# Patient Record
Sex: MALE | Race: WHITE | NOT HISPANIC OR LATINO | Employment: OTHER | URBAN - METROPOLITAN AREA
[De-identification: names, ages, dates, MRNs, and addresses within clinical notes are randomized per-mention and may not be internally consistent; named-entity substitution may affect disease eponyms.]

---

## 2017-02-17 ENCOUNTER — OFFICE VISIT - HEALTHEAST (OUTPATIENT)
Dept: INTERNAL MEDICINE | Facility: CLINIC | Age: 62
End: 2017-02-17

## 2017-02-17 DIAGNOSIS — I10 ESSENTIAL HYPERTENSION, BENIGN: ICD-10-CM

## 2017-02-17 DIAGNOSIS — R51.9 HEADACHE: ICD-10-CM

## 2017-02-17 ASSESSMENT — MIFFLIN-ST. JEOR: SCORE: 1659.43

## 2017-02-28 ENCOUNTER — COMMUNICATION - HEALTHEAST (OUTPATIENT)
Dept: INTERNAL MEDICINE | Facility: CLINIC | Age: 62
End: 2017-02-28

## 2017-05-02 ENCOUNTER — COMMUNICATION - HEALTHEAST (OUTPATIENT)
Dept: INTERNAL MEDICINE | Facility: CLINIC | Age: 62
End: 2017-05-02

## 2017-05-02 DIAGNOSIS — I10 ESSENTIAL HYPERTENSION, BENIGN: ICD-10-CM

## 2017-05-02 DIAGNOSIS — F41.9 ANXIETY: ICD-10-CM

## 2017-07-13 ENCOUNTER — COMMUNICATION - HEALTHEAST (OUTPATIENT)
Dept: INTERNAL MEDICINE | Facility: CLINIC | Age: 62
End: 2017-07-13

## 2017-07-13 DIAGNOSIS — F41.9 ANXIETY: ICD-10-CM

## 2017-07-28 ENCOUNTER — COMMUNICATION - HEALTHEAST (OUTPATIENT)
Dept: INTERNAL MEDICINE | Facility: CLINIC | Age: 62
End: 2017-07-28

## 2017-07-28 DIAGNOSIS — F41.9 ANXIETY: ICD-10-CM

## 2017-08-09 ENCOUNTER — COMMUNICATION - HEALTHEAST (OUTPATIENT)
Dept: INTERNAL MEDICINE | Facility: CLINIC | Age: 62
End: 2017-08-09

## 2017-08-09 DIAGNOSIS — F41.9 ANXIETY: ICD-10-CM

## 2017-08-12 ENCOUNTER — COMMUNICATION - HEALTHEAST (OUTPATIENT)
Dept: INTERNAL MEDICINE | Facility: CLINIC | Age: 62
End: 2017-08-12

## 2017-08-12 DIAGNOSIS — F41.9 ANXIETY: ICD-10-CM

## 2017-08-17 ENCOUNTER — OFFICE VISIT - HEALTHEAST (OUTPATIENT)
Dept: INTERNAL MEDICINE | Facility: CLINIC | Age: 62
End: 2017-08-17

## 2017-08-17 ENCOUNTER — COMMUNICATION - HEALTHEAST (OUTPATIENT)
Dept: INTERNAL MEDICINE | Facility: CLINIC | Age: 62
End: 2017-08-17

## 2017-08-17 ENCOUNTER — AMBULATORY - HEALTHEAST (OUTPATIENT)
Dept: INTERNAL MEDICINE | Facility: CLINIC | Age: 62
End: 2017-08-17

## 2017-08-17 DIAGNOSIS — Z72.0 TOBACCO ABUSE: ICD-10-CM

## 2017-08-17 DIAGNOSIS — Z51.81 MEDICATION MONITORING ENCOUNTER: ICD-10-CM

## 2017-08-17 DIAGNOSIS — M79.671 CHRONIC HEEL PAIN, RIGHT: ICD-10-CM

## 2017-08-17 DIAGNOSIS — G89.29 CHRONIC HEEL PAIN, RIGHT: ICD-10-CM

## 2017-08-17 DIAGNOSIS — F41.9 ANXIETY: ICD-10-CM

## 2017-08-17 DIAGNOSIS — G47.00 INSOMNIA, UNSPECIFIED: ICD-10-CM

## 2017-08-17 DIAGNOSIS — I10 ESSENTIAL HYPERTENSION, BENIGN: ICD-10-CM

## 2017-08-17 ASSESSMENT — MIFFLIN-ST. JEOR: SCORE: 1623.14

## 2017-08-21 ENCOUNTER — RECORDS - HEALTHEAST (OUTPATIENT)
Dept: ADMINISTRATIVE | Facility: OTHER | Age: 62
End: 2017-08-21

## 2017-08-23 ENCOUNTER — COMMUNICATION - HEALTHEAST (OUTPATIENT)
Dept: INTERNAL MEDICINE | Facility: CLINIC | Age: 62
End: 2017-08-23

## 2017-08-29 ENCOUNTER — COMMUNICATION - HEALTHEAST (OUTPATIENT)
Dept: INTERNAL MEDICINE | Facility: CLINIC | Age: 62
End: 2017-08-29

## 2017-08-29 DIAGNOSIS — F41.9 ANXIETY: ICD-10-CM

## 2017-08-29 DIAGNOSIS — G47.00 INSOMNIA: ICD-10-CM

## 2017-09-12 ENCOUNTER — COMMUNICATION - HEALTHEAST (OUTPATIENT)
Dept: INTERNAL MEDICINE | Facility: CLINIC | Age: 62
End: 2017-09-12

## 2017-09-12 DIAGNOSIS — F41.9 ANXIETY: ICD-10-CM

## 2017-09-12 DIAGNOSIS — G47.00 INSOMNIA: ICD-10-CM

## 2017-11-07 ENCOUNTER — COMMUNICATION - HEALTHEAST (OUTPATIENT)
Dept: INTERNAL MEDICINE | Facility: CLINIC | Age: 62
End: 2017-11-07

## 2017-11-07 DIAGNOSIS — F41.9 ANXIETY: ICD-10-CM

## 2017-11-07 DIAGNOSIS — G47.00 INSOMNIA: ICD-10-CM

## 2017-11-17 ENCOUNTER — OFFICE VISIT - HEALTHEAST (OUTPATIENT)
Dept: INTERNAL MEDICINE | Facility: CLINIC | Age: 62
End: 2017-11-17

## 2017-11-17 DIAGNOSIS — Z23 NEED FOR IMMUNIZATION AGAINST INFLUENZA: ICD-10-CM

## 2017-11-17 DIAGNOSIS — I10 ESSENTIAL HYPERTENSION, BENIGN: ICD-10-CM

## 2017-11-17 DIAGNOSIS — Z86.19 HISTORY OF HEPATITIS C: ICD-10-CM

## 2017-11-17 DIAGNOSIS — M79.671 CHRONIC HEEL PAIN, RIGHT: ICD-10-CM

## 2017-11-17 DIAGNOSIS — I71.20 THORACIC AORTIC ANEURYSM (H): ICD-10-CM

## 2017-11-17 DIAGNOSIS — F41.1 ANXIETY, GENERALIZED: ICD-10-CM

## 2017-11-17 DIAGNOSIS — Z72.0 TOBACCO ABUSE: ICD-10-CM

## 2017-11-17 DIAGNOSIS — G89.29 CHRONIC HEEL PAIN, RIGHT: ICD-10-CM

## 2017-11-17 DIAGNOSIS — Z00.00 HEALTHCARE MAINTENANCE: ICD-10-CM

## 2017-11-17 LAB
CHOLEST SERPL-MCNC: 164 MG/DL
FASTING STATUS PATIENT QL REPORTED: NORMAL
HDLC SERPL-MCNC: 47 MG/DL
LDLC SERPL CALC-MCNC: 103 MG/DL
PSA SERPL-MCNC: 0.6 NG/ML (ref 0–4.5)
TRIGL SERPL-MCNC: 72 MG/DL

## 2017-11-17 ASSESSMENT — MIFFLIN-ST. JEOR: SCORE: 1636.75

## 2017-11-18 ENCOUNTER — COMMUNICATION - HEALTHEAST (OUTPATIENT)
Dept: INTERNAL MEDICINE | Facility: CLINIC | Age: 62
End: 2017-11-18

## 2018-02-27 ENCOUNTER — COMMUNICATION - HEALTHEAST (OUTPATIENT)
Dept: INTERNAL MEDICINE | Facility: CLINIC | Age: 63
End: 2018-02-27

## 2018-02-27 DIAGNOSIS — G47.00 INSOMNIA: ICD-10-CM

## 2018-02-27 DIAGNOSIS — I10 ESSENTIAL HYPERTENSION, BENIGN: ICD-10-CM

## 2018-02-27 DIAGNOSIS — F41.9 ANXIETY: ICD-10-CM

## 2018-03-02 ENCOUNTER — COMMUNICATION - HEALTHEAST (OUTPATIENT)
Dept: SCHEDULING | Facility: CLINIC | Age: 63
End: 2018-03-02

## 2018-05-17 ENCOUNTER — AMBULATORY - HEALTHEAST (OUTPATIENT)
Dept: INTERNAL MEDICINE | Facility: CLINIC | Age: 63
End: 2018-05-17

## 2018-05-17 ENCOUNTER — RECORDS - HEALTHEAST (OUTPATIENT)
Dept: ADMINISTRATIVE | Facility: OTHER | Age: 63
End: 2018-05-17

## 2018-05-17 ENCOUNTER — OFFICE VISIT - HEALTHEAST (OUTPATIENT)
Dept: INTERNAL MEDICINE | Facility: CLINIC | Age: 63
End: 2018-05-17

## 2018-05-17 DIAGNOSIS — G89.29 CHRONIC PAIN OF RIGHT ANKLE: ICD-10-CM

## 2018-05-17 DIAGNOSIS — I10 ESSENTIAL HYPERTENSION: ICD-10-CM

## 2018-05-17 DIAGNOSIS — I71.20 THORACIC AORTIC ANEURYSM (H): ICD-10-CM

## 2018-05-17 DIAGNOSIS — K64.4 EXTERNAL HEMORRHOIDS: ICD-10-CM

## 2018-05-17 DIAGNOSIS — Z72.0 TOBACCO ABUSE: ICD-10-CM

## 2018-05-17 DIAGNOSIS — M25.571 CHRONIC PAIN OF RIGHT ANKLE: ICD-10-CM

## 2018-05-17 ASSESSMENT — MIFFLIN-ST. JEOR: SCORE: 1659.43

## 2018-06-04 ENCOUNTER — RECORDS - HEALTHEAST (OUTPATIENT)
Dept: ADMINISTRATIVE | Facility: OTHER | Age: 63
End: 2018-06-04

## 2018-07-23 ENCOUNTER — OFFICE VISIT - HEALTHEAST (OUTPATIENT)
Dept: INTERNAL MEDICINE | Facility: CLINIC | Age: 63
End: 2018-07-23

## 2018-07-23 ENCOUNTER — COMMUNICATION - HEALTHEAST (OUTPATIENT)
Dept: INTERNAL MEDICINE | Facility: CLINIC | Age: 63
End: 2018-07-23

## 2018-07-23 ENCOUNTER — HOSPITAL ENCOUNTER (OUTPATIENT)
Dept: CT IMAGING | Facility: CLINIC | Age: 63
Discharge: HOME | End: 2018-07-23
Attending: INTERNAL MEDICINE

## 2018-07-23 DIAGNOSIS — R10.30 LOWER ABDOMINAL PAIN: ICD-10-CM

## 2018-07-23 DIAGNOSIS — M54.50 ACUTE BILATERAL LOW BACK PAIN WITHOUT SCIATICA: ICD-10-CM
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
## 2018-07-23 LAB
ALBUMIN SERPL-MCNC: 4.1 G/DL (ref 3.5–5)
ALBUMIN UR-MCNC: NEGATIVE MG/DL
ALP SERPL-CCNC: 92 U/L (ref 45–120)
ALT SERPL W P-5'-P-CCNC: 15 U/L (ref 0–45)
ANION GAP SERPL CALCULATED.3IONS-SCNC: 12 MMOL/L (ref 5–18)
APPEARANCE UR: CLEAR
AST SERPL W P-5'-P-CCNC: 18 U/L (ref 0–40)
B BURGDOR IGG+IGM SER QL: <0.01 INDEX VALUE
BILIRUB SERPL-MCNC: 0.6 MG/DL (ref 0–1)
BILIRUB UR QL STRIP: ABNORMAL
BUN SERPL-MCNC: 16 MG/DL (ref 8–22)
CALCIUM SERPL-MCNC: 9.7 MG/DL (ref 8.5–10.5)
CHLORIDE BLD-SCNC: 105 MMOL/L (ref 98–107)
CO2 SERPL-SCNC: 24 MMOL/L (ref 22–31)
COLOR UR AUTO: YELLOW
CREAT BLD-MCNC: 0.8 MG/DL
CREAT SERPL-MCNC: 0.8 MG/DL (ref 0.7–1.3)
ERYTHROCYTE [DISTWIDTH] IN BLOOD BY AUTOMATED COUNT: 11.1 % (ref 11–14.5)
ERYTHROCYTE [SEDIMENTATION RATE] IN BLOOD BY WESTERGREN METHOD: 5 MM/HR (ref 0–15)
GFR SERPL CREATININE-BSD FRML MDRD: >60 ML/MIN/1.73M2
GLUCOSE BLD-MCNC: 119 MG/DL (ref 70–125)
GLUCOSE UR STRIP-MCNC: NEGATIVE MG/DL
HCT VFR BLD AUTO: 45.7 % (ref 40–54)
HGB BLD-MCNC: 15.5 G/DL (ref 14–18)
HGB UR QL STRIP: NEGATIVE
KETONES UR STRIP-MCNC: NEGATIVE MG/DL
LEUKOCYTE ESTERASE UR QL STRIP: NEGATIVE
LIPASE SERPL-CCNC: 25 U/L (ref 0–52)
MCH RBC QN AUTO: 32.9 PG (ref 27–34)
MCHC RBC AUTO-ENTMCNC: 34 G/DL (ref 32–36)
MCV RBC AUTO: 97 FL (ref 80–100)
NITRATE UR QL: NEGATIVE
PH UR STRIP: 5.5 [PH] (ref 5–8)
PLATELET # BLD AUTO: 142 THOU/UL (ref 140–440)
PMV BLD AUTO: 8.5 FL (ref 7–10)
POC GFR AMER AF HE - HISTORICAL: >60 ML/MIN/1.73M2
POC GFR NON AMER AF HE - HISTORICAL: >60 ML/MIN/1.73M2
POTASSIUM BLD-SCNC: 4.1 MMOL/L (ref 3.5–5)
PROT SERPL-MCNC: 7.1 G/DL (ref 6–8)
RBC # BLD AUTO: 4.72 MILL/UL (ref 4.4–6.2)
SODIUM SERPL-SCNC: 141 MMOL/L (ref 136–145)
SP GR UR STRIP: 1.02 (ref 1–1.03)
UROBILINOGEN UR STRIP-ACNC: ABNORMAL
WBC: 7.8 THOU/UL (ref 4–11)

## 2018-07-23 RX ORDER — DOCUSATE SODIUM 100 MG/1
100 CAPSULE, LIQUID FILLED ORAL PRN
Status: SHIPPED | COMMUNITY
Start: 2018-07-23

## 2018-07-23 ASSESSMENT — MIFFLIN-ST. JEOR: SCORE: 1623.14

## 2018-08-22 ENCOUNTER — RECORDS - HEALTHEAST (OUTPATIENT)
Dept: ADMINISTRATIVE | Facility: OTHER | Age: 63
End: 2018-08-22

## 2018-08-24 ENCOUNTER — COMMUNICATION - HEALTHEAST (OUTPATIENT)
Dept: INTERNAL MEDICINE | Facility: CLINIC | Age: 63
End: 2018-08-24

## 2018-08-31 ENCOUNTER — COMMUNICATION - HEALTHEAST (OUTPATIENT)
Dept: INTERNAL MEDICINE | Facility: CLINIC | Age: 63
End: 2018-08-31

## 2018-08-31 DIAGNOSIS — I10 ESSENTIAL HYPERTENSION, BENIGN: ICD-10-CM

## 2018-09-20 ENCOUNTER — COMMUNICATION - HEALTHEAST (OUTPATIENT)
Dept: INTERNAL MEDICINE | Facility: CLINIC | Age: 63
End: 2018-09-20

## 2018-09-20 DIAGNOSIS — F41.9 ANXIETY: ICD-10-CM

## 2018-09-20 DIAGNOSIS — G47.00 INSOMNIA: ICD-10-CM

## 2018-11-20 ENCOUNTER — OFFICE VISIT - HEALTHEAST (OUTPATIENT)
Dept: INTERNAL MEDICINE | Facility: CLINIC | Age: 63
End: 2018-11-20

## 2018-11-20 ENCOUNTER — COMMUNICATION - HEALTHEAST (OUTPATIENT)
Dept: INTERNAL MEDICINE | Facility: CLINIC | Age: 63
End: 2018-11-20

## 2018-11-20 DIAGNOSIS — Z00.00 ROUTINE GENERAL MEDICAL EXAMINATION AT A HEALTH CARE FACILITY: ICD-10-CM

## 2018-11-20 DIAGNOSIS — G89.29 CHRONIC PAIN OF RIGHT ANKLE: ICD-10-CM

## 2018-11-20 DIAGNOSIS — B18.2 CHRONIC HEPATITIS C WITHOUT HEPATIC COMA (H): ICD-10-CM

## 2018-11-20 DIAGNOSIS — R10.30 LOWER ABDOMINAL PAIN: ICD-10-CM

## 2018-11-20 DIAGNOSIS — Z12.5 SCREENING FOR PROSTATE CANCER: ICD-10-CM

## 2018-11-20 DIAGNOSIS — Z13.220 LIPID SCREENING: ICD-10-CM

## 2018-11-20 DIAGNOSIS — M25.571 CHRONIC PAIN OF RIGHT ANKLE: ICD-10-CM

## 2018-11-20 DIAGNOSIS — N52.01 ERECTILE DYSFUNCTION DUE TO ARTERIAL INSUFFICIENCY: ICD-10-CM

## 2018-11-20 DIAGNOSIS — I10 ESSENTIAL HYPERTENSION: ICD-10-CM

## 2018-11-20 DIAGNOSIS — Z23 NEED FOR IMMUNIZATION AGAINST INFLUENZA: ICD-10-CM

## 2018-11-20 DIAGNOSIS — R91.1 PULMONARY NODULE: ICD-10-CM

## 2018-11-20 DIAGNOSIS — F41.1 ANXIETY, GENERALIZED: ICD-10-CM

## 2018-11-20 DIAGNOSIS — I71.20 THORACIC AORTIC ANEURYSM WITHOUT RUPTURE (H): ICD-10-CM

## 2018-11-20 DIAGNOSIS — Z72.0 TOBACCO ABUSE: ICD-10-CM

## 2018-11-20 LAB
CHOLEST SERPL-MCNC: 174 MG/DL
FASTING STATUS PATIENT QL REPORTED: YES
HDLC SERPL-MCNC: 47 MG/DL
LDLC SERPL CALC-MCNC: 116 MG/DL
PSA SERPL-MCNC: 0.4 NG/ML (ref 0–4.5)
TRIGL SERPL-MCNC: 57 MG/DL

## 2018-11-20 ASSESSMENT — MIFFLIN-ST. JEOR: SCORE: 1632.21

## 2018-11-29 ENCOUNTER — COMMUNICATION - HEALTHEAST (OUTPATIENT)
Dept: INTERNAL MEDICINE | Facility: CLINIC | Age: 63
End: 2018-11-29

## 2019-03-07 ENCOUNTER — COMMUNICATION - HEALTHEAST (OUTPATIENT)
Dept: INTERNAL MEDICINE | Facility: CLINIC | Age: 64
End: 2019-03-07

## 2019-03-07 DIAGNOSIS — I10 ESSENTIAL HYPERTENSION, BENIGN: ICD-10-CM

## 2019-06-04 ENCOUNTER — COMMUNICATION - HEALTHEAST (OUTPATIENT)
Dept: INTERNAL MEDICINE | Facility: CLINIC | Age: 64
End: 2019-06-04

## 2019-06-04 DIAGNOSIS — I10 ESSENTIAL HYPERTENSION, BENIGN: ICD-10-CM

## 2019-09-01 ENCOUNTER — COMMUNICATION - HEALTHEAST (OUTPATIENT)
Dept: INTERNAL MEDICINE | Facility: CLINIC | Age: 64
End: 2019-09-01

## 2019-09-01 DIAGNOSIS — I10 ESSENTIAL HYPERTENSION, BENIGN: ICD-10-CM

## 2019-10-14 ENCOUNTER — OFFICE VISIT - HEALTHEAST (OUTPATIENT)
Dept: INTERNAL MEDICINE | Facility: CLINIC | Age: 64
End: 2019-10-14

## 2019-10-14 ENCOUNTER — AMBULATORY - HEALTHEAST (OUTPATIENT)
Dept: INTERNAL MEDICINE | Facility: CLINIC | Age: 64
End: 2019-10-14

## 2019-10-14 DIAGNOSIS — J06.9 UPPER RESPIRATORY TRACT INFECTION, UNSPECIFIED TYPE: ICD-10-CM

## 2019-10-14 DIAGNOSIS — R91.1 PULMONARY NODULE: ICD-10-CM

## 2019-10-14 DIAGNOSIS — R53.82 CHRONIC FATIGUE: ICD-10-CM

## 2019-10-14 DIAGNOSIS — N52.9 ERECTILE DYSFUNCTION, UNSPECIFIED ERECTILE DYSFUNCTION TYPE: ICD-10-CM

## 2019-10-14 DIAGNOSIS — Z72.0 TOBACCO ABUSE: ICD-10-CM

## 2019-10-14 DIAGNOSIS — G47.00 INSOMNIA, UNSPECIFIED TYPE: ICD-10-CM

## 2019-10-14 DIAGNOSIS — I10 ESSENTIAL HYPERTENSION, BENIGN: ICD-10-CM

## 2019-10-14 LAB
ALBUMIN SERPL-MCNC: 3.9 G/DL (ref 3.5–5)
ALP SERPL-CCNC: 103 U/L (ref 45–120)
ALT SERPL W P-5'-P-CCNC: 13 U/L (ref 0–45)
ANION GAP SERPL CALCULATED.3IONS-SCNC: 5 MMOL/L (ref 5–18)
AST SERPL W P-5'-P-CCNC: 14 U/L (ref 0–40)
BILIRUB SERPL-MCNC: 0.4 MG/DL (ref 0–1)
BUN SERPL-MCNC: 11 MG/DL (ref 8–22)
CALCIUM SERPL-MCNC: 9.3 MG/DL (ref 8.5–10.5)
CHLORIDE BLD-SCNC: 106 MMOL/L (ref 98–107)
CO2 SERPL-SCNC: 27 MMOL/L (ref 22–31)
CREAT SERPL-MCNC: 0.77 MG/DL (ref 0.7–1.3)
ERYTHROCYTE [DISTWIDTH] IN BLOOD BY AUTOMATED COUNT: 11.2 % (ref 11–14.5)
GFR SERPL CREATININE-BSD FRML MDRD: >60 ML/MIN/1.73M2
GLUCOSE BLD-MCNC: 101 MG/DL (ref 70–125)
HCT VFR BLD AUTO: 46.7 % (ref 40–54)
HGB BLD-MCNC: 15.8 G/DL (ref 14–18)
MCH RBC QN AUTO: 32.1 PG (ref 27–34)
MCHC RBC AUTO-ENTMCNC: 33.8 G/DL (ref 32–36)
MCV RBC AUTO: 95 FL (ref 80–100)
PLATELET # BLD AUTO: 152 THOU/UL (ref 140–440)
PMV BLD AUTO: 9.2 FL (ref 7–10)
POTASSIUM BLD-SCNC: 4.1 MMOL/L (ref 3.5–5)
PROT SERPL-MCNC: 7 G/DL (ref 6–8)
RBC # BLD AUTO: 4.92 MILL/UL (ref 4.4–6.2)
SODIUM SERPL-SCNC: 138 MMOL/L (ref 136–145)
TSH SERPL DL<=0.005 MIU/L-ACNC: 2.95 UIU/ML (ref 0.3–5)
VIT B12 SERPL-MCNC: 458 PG/ML (ref 213–816)
WBC: 7.1 THOU/UL (ref 4–11)

## 2019-10-14 ASSESSMENT — MIFFLIN-ST. JEOR: SCORE: 1618.61

## 2019-10-16 ENCOUNTER — COMMUNICATION - HEALTHEAST (OUTPATIENT)
Dept: INTERNAL MEDICINE | Facility: CLINIC | Age: 64
End: 2019-10-16

## 2019-10-16 LAB
SHBG SERPL-SCNC: 94 NMOL/L (ref 11–80)
TESTOST FREE SERPL-MCNC: 8.53 NG/DL (ref 4.7–24.4)
TESTOST SERPL-MCNC: 820 NG/DL (ref 240–950)

## 2019-10-17 ENCOUNTER — COMMUNICATION - HEALTHEAST (OUTPATIENT)
Dept: INTERNAL MEDICINE | Facility: CLINIC | Age: 64
End: 2019-10-17

## 2020-01-31 ENCOUNTER — COMMUNICATION - HEALTHEAST (OUTPATIENT)
Dept: INTERNAL MEDICINE | Facility: CLINIC | Age: 65
End: 2020-01-31

## 2020-02-21 ENCOUNTER — COMMUNICATION - HEALTHEAST (OUTPATIENT)
Dept: INTERNAL MEDICINE | Facility: CLINIC | Age: 65
End: 2020-02-21

## 2020-02-21 DIAGNOSIS — G47.00 INSOMNIA, UNSPECIFIED TYPE: ICD-10-CM

## 2020-03-02 ENCOUNTER — OFFICE VISIT - HEALTHEAST (OUTPATIENT)
Dept: INTERNAL MEDICINE | Facility: CLINIC | Age: 65
End: 2020-03-02

## 2020-03-02 DIAGNOSIS — B18.2 CHRONIC HEPATITIS C WITHOUT HEPATIC COMA (H): ICD-10-CM

## 2020-03-02 DIAGNOSIS — Z12.5 SCREENING FOR MALIGNANT NEOPLASM OF PROSTATE: ICD-10-CM

## 2020-03-02 DIAGNOSIS — Z00.00 WELCOME TO MEDICARE PREVENTIVE VISIT: ICD-10-CM

## 2020-03-02 DIAGNOSIS — N52.9 ERECTILE DYSFUNCTION, UNSPECIFIED ERECTILE DYSFUNCTION TYPE: ICD-10-CM

## 2020-03-02 DIAGNOSIS — M25.571 CHRONIC PAIN OF RIGHT ANKLE: ICD-10-CM

## 2020-03-02 DIAGNOSIS — G89.29 CHRONIC PAIN OF RIGHT ANKLE: ICD-10-CM

## 2020-03-02 DIAGNOSIS — Z23 NEED FOR IMMUNIZATION AGAINST INFLUENZA: ICD-10-CM

## 2020-03-02 DIAGNOSIS — Z72.0 TOBACCO ABUSE: ICD-10-CM

## 2020-03-02 DIAGNOSIS — F41.1 ANXIETY, GENERALIZED: ICD-10-CM

## 2020-03-02 DIAGNOSIS — R91.1 PULMONARY NODULE: ICD-10-CM

## 2020-03-02 DIAGNOSIS — K62.5 RECTAL BLEEDING: ICD-10-CM

## 2020-03-02 DIAGNOSIS — I71.20 THORACIC AORTIC ANEURYSM WITHOUT RUPTURE (H): ICD-10-CM

## 2020-03-02 DIAGNOSIS — Z13.220 ENCOUNTER FOR SCREENING FOR LIPOID DISORDERS: ICD-10-CM

## 2020-03-02 DIAGNOSIS — I10 ESSENTIAL HYPERTENSION: ICD-10-CM

## 2020-03-02 LAB
ALBUMIN SERPL-MCNC: 4 G/DL (ref 3.5–5)
ALBUMIN UR-MCNC: NEGATIVE MG/DL
ALP SERPL-CCNC: 96 U/L (ref 45–120)
ALT SERPL W P-5'-P-CCNC: 10 U/L (ref 0–45)
ANION GAP SERPL CALCULATED.3IONS-SCNC: 11 MMOL/L (ref 5–18)
APPEARANCE UR: CLEAR
AST SERPL W P-5'-P-CCNC: 14 U/L (ref 0–40)
BILIRUB SERPL-MCNC: 0.5 MG/DL (ref 0–1)
BILIRUB UR QL STRIP: NEGATIVE
BUN SERPL-MCNC: 11 MG/DL (ref 8–22)
CALCIUM SERPL-MCNC: 9.2 MG/DL (ref 8.5–10.5)
CHLORIDE BLD-SCNC: 104 MMOL/L (ref 98–107)
CHOLEST SERPL-MCNC: 173 MG/DL
CO2 SERPL-SCNC: 22 MMOL/L (ref 22–31)
COLOR UR AUTO: YELLOW
CREAT SERPL-MCNC: 0.77 MG/DL (ref 0.7–1.3)
ERYTHROCYTE [DISTWIDTH] IN BLOOD BY AUTOMATED COUNT: 11.3 % (ref 11–14.5)
FASTING STATUS PATIENT QL REPORTED: YES
GFR SERPL CREATININE-BSD FRML MDRD: >60 ML/MIN/1.73M2
GLUCOSE BLD-MCNC: 103 MG/DL (ref 70–125)
GLUCOSE UR STRIP-MCNC: NEGATIVE MG/DL
HCT VFR BLD AUTO: 43.8 % (ref 40–54)
HDLC SERPL-MCNC: 42 MG/DL
HGB BLD-MCNC: 15 G/DL (ref 14–18)
HGB UR QL STRIP: NEGATIVE
KETONES UR STRIP-MCNC: NEGATIVE MG/DL
LDLC SERPL CALC-MCNC: 118 MG/DL
LEUKOCYTE ESTERASE UR QL STRIP: NEGATIVE
MCH RBC QN AUTO: 32.4 PG (ref 27–34)
MCHC RBC AUTO-ENTMCNC: 34.1 G/DL (ref 32–36)
MCV RBC AUTO: 95 FL (ref 80–100)
NITRATE UR QL: NEGATIVE
PH UR STRIP: 5.5 [PH] (ref 5–8)
PLATELET # BLD AUTO: 167 THOU/UL (ref 140–440)
PMV BLD AUTO: 9 FL (ref 7–10)
POTASSIUM BLD-SCNC: 4.3 MMOL/L (ref 3.5–5)
PROT SERPL-MCNC: 6.7 G/DL (ref 6–8)
PSA SERPL-MCNC: 0.5 NG/ML (ref 0–4.5)
RBC # BLD AUTO: 4.61 MILL/UL (ref 4.4–6.2)
SODIUM SERPL-SCNC: 137 MMOL/L (ref 136–145)
SP GR UR STRIP: 1.02 (ref 1–1.03)
TRIGL SERPL-MCNC: 66 MG/DL
UROBILINOGEN UR STRIP-ACNC: NORMAL
WBC: 8.9 THOU/UL (ref 4–11)

## 2020-03-02 ASSESSMENT — MIFFLIN-ST. JEOR: SCORE: 1673.04

## 2020-03-03 ENCOUNTER — COMMUNICATION - HEALTHEAST (OUTPATIENT)
Dept: INTERNAL MEDICINE | Facility: CLINIC | Age: 65
End: 2020-03-03

## 2020-04-01 ENCOUNTER — COMMUNICATION - HEALTHEAST (OUTPATIENT)
Dept: INTERNAL MEDICINE | Facility: CLINIC | Age: 65
End: 2020-04-01

## 2020-04-01 DIAGNOSIS — G47.00 INSOMNIA, UNSPECIFIED TYPE: ICD-10-CM

## 2020-05-11 ENCOUNTER — RECORDS - HEALTHEAST (OUTPATIENT)
Dept: ADMINISTRATIVE | Facility: OTHER | Age: 65
End: 2020-05-11

## 2020-07-16 ENCOUNTER — COMMUNICATION - HEALTHEAST (OUTPATIENT)
Dept: INTERNAL MEDICINE | Facility: CLINIC | Age: 65
End: 2020-07-16

## 2020-07-16 DIAGNOSIS — G47.00 INSOMNIA, UNSPECIFIED TYPE: ICD-10-CM

## 2020-10-18 ENCOUNTER — COMMUNICATION - HEALTHEAST (OUTPATIENT)
Dept: INTERNAL MEDICINE | Facility: CLINIC | Age: 65
End: 2020-10-18

## 2020-10-18 DIAGNOSIS — I10 ESSENTIAL HYPERTENSION, BENIGN: ICD-10-CM

## 2020-11-10 ENCOUNTER — COMMUNICATION - HEALTHEAST (OUTPATIENT)
Dept: INTERNAL MEDICINE | Facility: CLINIC | Age: 65
End: 2020-11-10

## 2020-11-10 DIAGNOSIS — G47.00 INSOMNIA, UNSPECIFIED TYPE: ICD-10-CM

## 2021-02-27 ENCOUNTER — COMMUNICATION - HEALTHEAST (OUTPATIENT)
Dept: INTERNAL MEDICINE | Facility: CLINIC | Age: 66
End: 2021-02-27

## 2021-02-27 DIAGNOSIS — G47.00 INSOMNIA, UNSPECIFIED TYPE: ICD-10-CM

## 2021-04-09 ENCOUNTER — OFFICE VISIT - HEALTHEAST (OUTPATIENT)
Dept: INTERNAL MEDICINE | Facility: CLINIC | Age: 66
End: 2021-04-09

## 2021-04-09 DIAGNOSIS — R53.82 CHRONIC FATIGUE: ICD-10-CM

## 2021-04-09 DIAGNOSIS — Z72.0 TOBACCO ABUSE: ICD-10-CM

## 2021-04-09 DIAGNOSIS — B18.2 CHRONIC HEPATITIS C WITHOUT HEPATIC COMA (H): ICD-10-CM

## 2021-04-09 DIAGNOSIS — Z86.0100 HISTORY OF COLON POLYPS: ICD-10-CM

## 2021-04-09 DIAGNOSIS — R91.1 PULMONARY NODULE: ICD-10-CM

## 2021-04-09 DIAGNOSIS — N52.9 ERECTILE DYSFUNCTION, UNSPECIFIED ERECTILE DYSFUNCTION TYPE: ICD-10-CM

## 2021-04-09 DIAGNOSIS — I71.20 THORACIC AORTIC ANEURYSM WITHOUT RUPTURE (H): ICD-10-CM

## 2021-04-09 DIAGNOSIS — M25.571 CHRONIC PAIN OF RIGHT ANKLE: ICD-10-CM

## 2021-04-09 DIAGNOSIS — Z12.5 SCREENING FOR MALIGNANT NEOPLASM OF PROSTATE: ICD-10-CM

## 2021-04-09 DIAGNOSIS — Z00.00 MEDICARE ANNUAL WELLNESS VISIT, SUBSEQUENT: ICD-10-CM

## 2021-04-09 DIAGNOSIS — R05.3 CHRONIC COUGH: ICD-10-CM

## 2021-04-09 DIAGNOSIS — I10 ESSENTIAL HYPERTENSION: ICD-10-CM

## 2021-04-09 DIAGNOSIS — F41.1 ANXIETY, GENERALIZED: ICD-10-CM

## 2021-04-09 DIAGNOSIS — G89.29 CHRONIC PAIN OF RIGHT ANKLE: ICD-10-CM

## 2021-04-09 DIAGNOSIS — Z13.220 ENCOUNTER FOR SCREENING FOR LIPOID DISORDERS: ICD-10-CM

## 2021-04-09 LAB
ALBUMIN SERPL-MCNC: 3.9 G/DL (ref 3.5–5)
ALBUMIN UR-MCNC: ABNORMAL G/DL
ALP SERPL-CCNC: 90 U/L (ref 45–120)
ALT SERPL W P-5'-P-CCNC: 11 U/L (ref 0–45)
ANION GAP SERPL CALCULATED.3IONS-SCNC: 9 MMOL/L (ref 5–18)
APPEARANCE UR: CLEAR
AST SERPL W P-5'-P-CCNC: 12 U/L (ref 0–40)
BACTERIA #/AREA URNS HPF: ABNORMAL /[HPF]
BILIRUB SERPL-MCNC: 0.5 MG/DL (ref 0–1)
BILIRUB UR QL STRIP: ABNORMAL
BUN SERPL-MCNC: 11 MG/DL (ref 8–22)
CALCIUM SERPL-MCNC: 8.5 MG/DL (ref 8.5–10.5)
CHLORIDE BLD-SCNC: 106 MMOL/L (ref 98–107)
CHOLEST SERPL-MCNC: 181 MG/DL
CO2 SERPL-SCNC: 25 MMOL/L (ref 22–31)
COLOR UR AUTO: YELLOW
CREAT SERPL-MCNC: 0.79 MG/DL (ref 0.7–1.3)
ERYTHROCYTE [DISTWIDTH] IN BLOOD BY AUTOMATED COUNT: 11.7 % (ref 11–14.5)
FASTING STATUS PATIENT QL REPORTED: YES
GFR SERPL CREATININE-BSD FRML MDRD: >60 ML/MIN/1.73M2
GLUCOSE BLD-MCNC: 104 MG/DL (ref 70–125)
GLUCOSE UR STRIP-MCNC: NEGATIVE MG/DL
HCT VFR BLD AUTO: 45.3 % (ref 40–54)
HDLC SERPL-MCNC: 39 MG/DL
HGB BLD-MCNC: 15.2 G/DL (ref 14–18)
HGB UR QL STRIP: NEGATIVE
KETONES UR STRIP-MCNC: NEGATIVE MG/DL
LDLC SERPL CALC-MCNC: 120 MG/DL
LEUKOCYTE ESTERASE UR QL STRIP: NEGATIVE
MCH RBC QN AUTO: 31.7 PG (ref 27–34)
MCHC RBC AUTO-ENTMCNC: 33.6 G/DL (ref 32–36)
MCV RBC AUTO: 95 FL (ref 80–100)
MUCOUS THREADS #/AREA URNS LPF: ABNORMAL LPF
NITRATE UR QL: NEGATIVE
PH UR STRIP: 6 [PH] (ref 5–8)
PLATELET # BLD AUTO: 150 THOU/UL (ref 140–440)
PMV BLD AUTO: 10.3 FL (ref 7–10)
POTASSIUM BLD-SCNC: 4.1 MMOL/L (ref 3.5–5)
PROT SERPL-MCNC: 6.5 G/DL (ref 6–8)
PSA SERPL-MCNC: 0.5 NG/ML (ref 0–4.5)
RBC # BLD AUTO: 4.79 MILL/UL (ref 4.4–6.2)
RBC #/AREA URNS AUTO: ABNORMAL HPF
SODIUM SERPL-SCNC: 140 MMOL/L (ref 136–145)
SP GR UR STRIP: >=1.03 (ref 1–1.03)
SQUAMOUS #/AREA URNS AUTO: ABNORMAL LPF
TRIGL SERPL-MCNC: 109 MG/DL
UROBILINOGEN UR STRIP-ACNC: ABNORMAL
WBC #/AREA URNS AUTO: ABNORMAL HPF
WBC: 8.4 THOU/UL (ref 4–11)

## 2021-04-09 ASSESSMENT — MIFFLIN-ST. JEOR: SCORE: 1637.88

## 2021-04-10 LAB — BACTERIA SPEC CULT: NO GROWTH

## 2021-04-11 ENCOUNTER — COMMUNICATION - HEALTHEAST (OUTPATIENT)
Dept: INTERNAL MEDICINE | Facility: CLINIC | Age: 66
End: 2021-04-11

## 2021-05-13 ENCOUNTER — OFFICE VISIT - HEALTHEAST (OUTPATIENT)
Dept: INTERNAL MEDICINE | Facility: CLINIC | Age: 66
End: 2021-05-13

## 2021-05-13 DIAGNOSIS — B02.9 HERPES ZOSTER WITHOUT COMPLICATION: ICD-10-CM

## 2021-05-13 DIAGNOSIS — G47.00 INSOMNIA, UNSPECIFIED TYPE: ICD-10-CM

## 2021-05-13 DIAGNOSIS — N52.9 ERECTILE DYSFUNCTION, UNSPECIFIED ERECTILE DYSFUNCTION TYPE: ICD-10-CM

## 2021-05-13 DIAGNOSIS — I10 ESSENTIAL HYPERTENSION, BENIGN: ICD-10-CM

## 2021-05-13 RX ORDER — SILDENAFIL 100 MG/1
100 TABLET, FILM COATED ORAL DAILY PRN
Qty: 30 TABLET | Refills: 11 | Status: SHIPPED | OUTPATIENT
Start: 2021-05-13 | End: 2023-01-04

## 2021-05-13 RX ORDER — LISINOPRIL 20 MG/1
20 TABLET ORAL DAILY
Qty: 90 TABLET | Refills: 3 | Status: SHIPPED | OUTPATIENT
Start: 2021-05-13 | End: 2022-04-14

## 2021-05-13 RX ORDER — TIZANIDINE HYDROCHLORIDE 4 MG/1
4 CAPSULE, GELATIN COATED ORAL 3 TIMES DAILY PRN
Qty: 12 CAPSULE | Refills: 0 | Status: SHIPPED | OUTPATIENT
Start: 2021-05-13 | End: 2022-04-14

## 2021-05-13 RX ORDER — LORAZEPAM 1 MG/1
TABLET ORAL
Qty: 15 TABLET | Refills: 0 | Status: SHIPPED | OUTPATIENT
Start: 2021-05-13 | End: 2021-07-08

## 2021-05-13 ASSESSMENT — MIFFLIN-ST. JEOR: SCORE: 1642.42

## 2021-05-17 ENCOUNTER — RECORDS - HEALTHEAST (OUTPATIENT)
Dept: ADMINISTRATIVE | Facility: OTHER | Age: 66
End: 2021-05-17

## 2021-05-27 VITALS
BODY MASS INDEX: 28.73 KG/M2 | WEIGHT: 194 LBS | SYSTOLIC BLOOD PRESSURE: 120 MMHG | TEMPERATURE: 96.4 F | OXYGEN SATURATION: 97 % | HEIGHT: 69 IN | HEART RATE: 60 BPM | DIASTOLIC BLOOD PRESSURE: 82 MMHG

## 2021-05-29 ENCOUNTER — RECORDS - HEALTHEAST (OUTPATIENT)
Dept: ADMINISTRATIVE | Facility: CLINIC | Age: 66
End: 2021-05-29

## 2021-05-29 NOTE — TELEPHONE ENCOUNTER
Refill Approved    Rx renewed per Medication Renewal Policy. Medication was last renewed on 3/9/19.    Breonna De Anda, [facility] Triage/Med Refill 6/5/2019     Requested Prescriptions   Pending Prescriptions Disp Refills     lisinopril (PRINIVIL,ZESTRIL) 20 MG tablet [Pharmacy Med Name: LISINOPRIL 20MG TABLETS] 90 tablet 0     Sig: TAKE 1 TABLET(20 MG) BY MOUTH DAILY       Ace Inhibitors Refill Protocol Passed - 6/4/2019  2:09 PM        Passed - PCP or prescribing provider visit in past 12 months       Last office visit with prescriber/PCP: 7/23/2018 Sen Leon MD OR same dept: 7/23/2018 Sen Leon MD OR same specialty: 7/23/2018 Sen Leon MD  Last physical: 11/20/2018 Last MTM visit: Visit date not found   Next visit within 3 mo: Visit date not found  Next physical within 3 mo: Visit date not found  Prescriber OR PCP: Sen Leon MD  Last diagnosis associated with med order: 1. Benign Essential Hypertension  - lisinopril (PRINIVIL,ZESTRIL) 20 MG tablet [Pharmacy Med Name: LISINOPRIL 20MG TABLETS]; TAKE 1 TABLET(20 MG) BY MOUTH DAILY  Dispense: 90 tablet; Refill: 0    If protocol passes may refill for 12 months if within 3 months of last provider visit (or a total of 15 months).             Passed - Serum Potassium in past 12 months     Lab Results   Component Value Date    Potassium 4.1 07/23/2018             Passed - Blood pressure filed in past 12 months     BP Readings from Last 1 Encounters:   11/20/18 130/80             Passed - Serum Creatinine in past 12 months     Creatinine   Date Value Ref Range Status   07/23/2018 0.80 0.70 - 1.30 mg/dL Final

## 2021-05-30 ENCOUNTER — RECORDS - HEALTHEAST (OUTPATIENT)
Dept: ADMINISTRATIVE | Facility: CLINIC | Age: 66
End: 2021-05-30

## 2021-05-30 VITALS — HEIGHT: 69 IN | WEIGHT: 196 LBS | BODY MASS INDEX: 29.03 KG/M2

## 2021-05-31 ENCOUNTER — RECORDS - HEALTHEAST (OUTPATIENT)
Dept: ADMINISTRATIVE | Facility: CLINIC | Age: 66
End: 2021-05-31

## 2021-05-31 VITALS — HEIGHT: 69 IN | WEIGHT: 188 LBS | BODY MASS INDEX: 27.85 KG/M2

## 2021-05-31 VITALS — WEIGHT: 191 LBS | BODY MASS INDEX: 28.29 KG/M2 | HEIGHT: 69 IN

## 2021-05-31 NOTE — TELEPHONE ENCOUNTER
RN cannot approve Refill Request    RN can NOT refill this medication PCP messaged that patient is overdue for Labs. Last office visit: 7/23/2018 Sen Leon MD Last Physical: 11/20/2018 Last MTM visit: Visit date not found Last visit same specialty: 7/23/2018 Sen Leon MD.  Next visit within 3 mo: Visit date not found  Next physical within 3 mo: Visit date not found      Josue Abdul, [facility] Triage/Med Refill 9/1/2019    Requested Prescriptions   Pending Prescriptions Disp Refills     lisinopril (PRINIVIL,ZESTRIL) 20 MG tablet [Pharmacy Med Name: LISINOPRIL 20MG TABLETS] 90 tablet 0     Sig: TAKE 1 TABLET(20 MG) BY MOUTH DAILY       Ace Inhibitors Refill Protocol Failed - 9/1/2019  2:20 PM        Failed - Serum Potassium in past 12 months     No results found for: LN-POTASSIUM          Failed - Serum Creatinine in past 12 months     Creatinine   Date Value Ref Range Status   07/23/2018 0.80 0.70 - 1.30 mg/dL Final             Passed - PCP or prescribing provider visit in past 12 months       Last office visit with prescriber/PCP: 7/23/2018 Sen Leon MD OR same dept: Visit date not found OR same specialty: 7/23/2018 Sen Leon MD  Last physical: 11/20/2018 Last MTM visit: Visit date not found   Next visit within 3 mo: Visit date not found  Next physical within 3 mo: Visit date not found  Prescriber OR PCP: Sen Leon MD  Last diagnosis associated with med order: 1. Benign Essential Hypertension  - lisinopril (PRINIVIL,ZESTRIL) 20 MG tablet [Pharmacy Med Name: LISINOPRIL 20MG TABLETS]; TAKE 1 TABLET(20 MG) BY MOUTH DAILY  Dispense: 90 tablet; Refill: 0    If protocol passes may refill for 12 months if within 3 months of last provider visit (or a total of 15 months).             Passed - Blood pressure filed in past 12 months     BP Readings from Last 1 Encounters:   11/20/18 130/80

## 2021-06-01 VITALS — BODY MASS INDEX: 29.03 KG/M2 | HEIGHT: 69 IN | WEIGHT: 196 LBS

## 2021-06-01 VITALS — BODY MASS INDEX: 27.85 KG/M2 | WEIGHT: 188 LBS | HEIGHT: 69 IN

## 2021-06-02 VITALS — BODY MASS INDEX: 28.14 KG/M2 | HEIGHT: 69 IN | WEIGHT: 190 LBS

## 2021-06-02 NOTE — TELEPHONE ENCOUNTER
----- Message from Sen Leon MD sent at 10/16/2019  8:55 AM CDT -----  Please call patient and tell him that his CT scan looks unchanged from last year.  No change in small pulmonary nodules and no change in size of aortic aneurysm.  I am still waiting on a couple of his lab tests and results when they are available.

## 2021-06-02 NOTE — PROGRESS NOTES
Office Visit - Follow Up   Cesar Murillo   64 y.o. male    Date of Visit: 10/14/2019    Chief Complaint   Patient presents with     Cough     Fatigue        Assessment and Plan   1. Chronic fatigue  Check appropriate metabolic studies  - Comprehensive Metabolic Panel  - HM2(CBC w/o Differential)  - Thyroid Stimulating Hormone (TSH)  - Testosterone, Total and Free  - Vitamin B12    With smoking history, he needs imaging of his chest and will proceed with CT scan as outlined below    2. Upper respiratory tract infection, unspecified type  Suspect he may have a component of COPD with chronic tobacco abuse contributing to persistent cough and slow improvement.  Will treat with Z-Colin.  - azithromycin (ZITHROMAX Z-COLIN) 250 MG tablet; Take 2 tablets (500 mg) on  Day 1,  followed by 1 tablet (250 mg) once daily on Days 2 through 5.  Dispense: 6 tablet; Refill: 0    3. Benign Essential Hypertension  Blood pressure looks well controlled with current medication  - lisinopril (PRINIVIL,ZESTRIL) 20 MG tablet; Take 1 tablet (20 mg total) by mouth daily.  Dispense: 90 tablet; Refill: 3    4. Pulmonary nodule  Arrange for follow-up CT scan of chest with known pulmonary nodule and history of chronic tobacco abuse  - CT Chest Without Contrast; Future    5. Tobacco abuse  Unfortunately he is not ready to quit smoking at this time    6. Insomnia, unspecified type    - LORazepam (ATIVAN) 1 MG tablet; TAKE 1 TABLET BY MOUTH AT BEDTIME FOR INSOMNIA  Dispense: 15 tablet; Refill: 0    7. Erectile dysfunction, unspecified erectile dysfunction type    - sildenafil (VIAGRA) 100 MG tablet; Take 1 tablet (100 mg total) by mouth daily as needed. Up to once a day.  Dispense: 90 tablet; Refill: 5    Return in about 3 months (around 1/14/2020) for Annual physical.     History of Present Illness   This 64 y.o. old gentleman with history of chronic tobacco abuse, hypertension, generalized anxiety and chronic insomnia here to discuss persistent cough  for the past several weeks and worsening fatigue and low energy over the last 6 months.  Developed upper respiratory infection.  Chronic tobacco abuse probably a contributing factor to ongoing cough being longer to resolve.  Initially with fever and chills and sore throat although this is all better.  However, has not felt well for the past 6 months with much less energy than he expects.  He denies any exertional chest pain.  Small pulmonary nodule seen last year measuring 3 mm unchanged from previous year.  No new medications.  No unintentional weight loss.  Good appetite.  No change in bowel movements.  Ongoing problems with chronic insomnia and anxiety for which he takes lorazepam intermittently.  Using Viagra for erectile dysfunction.    Review of Systems:  Otherwise, a comprehensive review of systems was negative except as noted.     Medications, Allergies and Problem List   Patient Active Problem List   Diagnosis     External hemorrhoids     Anxiety, generalized     Chronic hepatitis C (H)     Chronic insomnia     Erectile dysfunction     Essential hypertension     History of alcohol abuse     Osteoarthritis of multiple joints     Tobacco abuse     Chronic pain of right ankle     Acute bilateral low back pain without sciatica     Thoracic aortic aneurysm (H)     Pulmonary nodule     Chronic fatigue       He has a past surgical history that includes Ankle fracture surgery; Knee arthroscopy (Bilateral); Foot fracture surgery (09/2013); Wrist surgery; and Colonoscopy.    No Known Allergies    Current Outpatient Medications   Medication Sig Dispense Refill     docusate sodium (STOOL SOFTENER ORAL) Take by mouth.       lisinopril (PRINIVIL,ZESTRIL) 20 MG tablet Take 1 tablet (20 mg total) by mouth daily. 90 tablet 3     LORazepam (ATIVAN) 1 MG tablet TAKE 1 TABLET BY MOUTH AT BEDTIME FOR INSOMNIA 15 tablet 0     sildenafil (VIAGRA) 100 MG tablet Take 1 tablet (100 mg total) by mouth daily as needed. Up to once a  "day. 90 tablet 5     azithromycin (ZITHROMAX Z-COLIN) 250 MG tablet Take 2 tablets (500 mg) on  Day 1,  followed by 1 tablet (250 mg) once daily on Days 2 through 5. 6 tablet 0     No current facility-administered medications for this visit.         Physical Exam   General Appearance:   Well-appearing middle-age male    /80 (Patient Site: Left Arm, Patient Position: Sitting, Cuff Size: Adult Large)   Pulse 65   Ht 5' 9\" (1.753 m)   Wt 187 lb (84.8 kg)   SpO2 100%   BMI 27.62 kg/m      HEENT: Normal  Neck without lymphadenopathy or thyromegaly  Respiratory: Normal respiratory effort.  Lungs are clear with no rales or wheezes.  Heart: Regular rate and rhythm without murmurs, rubs, or gallops.    Abdomen: Abdomen is soft, nontender without guarding, rebound, masses, or hepatosplenomegaly.  Extremities: No peripheral edema.  Neurologic: Grossly nonfocal  Skin: No cyanosis or pallor  Psych: Alert and oriented ×3, mood appropriate         Additional Information   Social History     Tobacco Use     Smoking status: Current Every Day Smoker     Types: Cigarettes     Smokeless tobacco: Never Used   Substance Use Topics     Alcohol use: Yes     Comment: 2-3 every day     Drug use: Yes     Types: Marijuana     Comment: Frequent use              Sen Leon MD  "

## 2021-06-03 VITALS
OXYGEN SATURATION: 100 % | BODY MASS INDEX: 27.7 KG/M2 | HEIGHT: 69 IN | SYSTOLIC BLOOD PRESSURE: 130 MMHG | HEART RATE: 65 BPM | DIASTOLIC BLOOD PRESSURE: 80 MMHG | WEIGHT: 187 LBS

## 2021-06-04 VITALS
DIASTOLIC BLOOD PRESSURE: 80 MMHG | HEART RATE: 69 BPM | WEIGHT: 199 LBS | HEIGHT: 69 IN | OXYGEN SATURATION: 98 % | BODY MASS INDEX: 29.47 KG/M2 | SYSTOLIC BLOOD PRESSURE: 138 MMHG

## 2021-06-05 VITALS
OXYGEN SATURATION: 98 % | SYSTOLIC BLOOD PRESSURE: 136 MMHG | DIASTOLIC BLOOD PRESSURE: 78 MMHG | TEMPERATURE: 97.4 F | HEART RATE: 67 BPM | WEIGHT: 193 LBS | BODY MASS INDEX: 28.58 KG/M2 | HEIGHT: 69 IN

## 2021-06-05 NOTE — TELEPHONE ENCOUNTER
Spoke with the patient and helped him to schedule an annual wellness exam with Dr. Leon on 3/2/2020 at 10 am.  He had no further questions at this time.  Natalya THOMAS CMA/BENY....................11:50 AM

## 2021-06-05 NOTE — TELEPHONE ENCOUNTER
New Appointment Needed  What is the reason for the visit:    PATient had to cancel his physical appointment for Monday, 02/03/2020 with Dr. Leon.  There are no morning appointments left through April 2020.  Patient wants a morning appointment,  Provider Preference: PCP only  How soon do you need to be seen?: See Above.  Patient wants a morning appointment with Dr. Leon.  Waitlist offered?: No  Okay to leave a detailed message:  Yes

## 2021-06-06 NOTE — PROGRESS NOTES
Assessment and Plan:       1. Welcome to Medicare preventive visit  Immunizations are reviewed and will provide flu shot and Prevnar 13.  Recommending Shingrix..  Living will discussed.  Discussed smoking cessation.  Discussed using alcohol in moderation.  Regular exercise discussed.  Needs colonoscopy schedule.  Prostate exam is normal and I will check a PSA for prostate cancer screening.  Dementia and depression screening completed.  He sees his ophthalmologist regularly and gets glaucoma screening.  Skin exam performed and recommending regular use of sunblock.   Will screen for diabetes with fasting glucose.  Checking fasting lipid profile.  Previous imaging negative for AAA.  Consider ultrasound at age 70.      2. Chronic hepatitis C without hepatic coma (H)  Successfully treated with Harvoni and interferon.  Monitor LFTs.    3. Thoracic aortic aneurysm without rupture (H)  Measuring 4.3 cm October 2019.  Asymptomatic.  Continue annual monitoring.    4. Rectal bleeding  Family history of colon or rectal cancer and with persistent rectal bleeding for the past several years presumably related to external hemorrhoid but should have colonoscopy completed.  - HM2(CBC w/o Differential)  - Ambulatory referral for Colonoscopy    5. Tobacco abuse  Discussed the need to quit smoking    6. Pulmonary nodule  Stable pulmonary nodule October 2019.  Continue annual follow-up    7. Essential hypertension  Good blood pressure control with current dose of lisinopril  - Comprehensive Metabolic Panel  - Urinalysis-UC if Indicated    8. Chronic pain of right ankle  Chronic pain involving right ankle from previous fracture and surgery.  He will follow-up with orthopedics.  Handicap form completed.  We discussed getting a second opinion.    9. Anxiety, generalized  He uses Lorazepam infrequently.  15 tablets will last him for 3 months    10. Erectile dysfunction, unspecified erectile dysfunction type  Refilling Viagra  - sildenafiL  (VIAGRA) 100 MG tablet; Take 1 tablet (100 mg total) by mouth daily as needed. Up to once a day.  Dispense: 30 tablet; Refill: 11    11. Encounter for screening for lipoid disorders    - Lipid [place], FASTING    12. Screening for malignant neoplasm of prostate    - PSA (Prostatic-Specific Antigen), Annual Screen    Over 25 minutes was spent addressing these chronic and new medical problems beyond time spent performing annual wellness visit with over 50% of the time spent counseling and coordination of care discussing chronic right ankle pain, rectal bleeding, hypertension management    The patient's current medical problems were reviewed.    I have had an Advance Directives discussion with the patient.  The following health maintenance schedule was reviewed with the patient and provided in printed form in the after visit summary:   Health Maintenance   Topic Date Due     HIV SCREENING  01/09/1970     ZOSTER VACCINES (1 of 2) 01/09/2005     INFLUENZA VACCINE RULE BASED (1) 08/01/2019     MEDICARE ANNUAL WELLNESS VISIT  01/09/2020     PNEUMOCOCCAL IMMUNIZATION 65+ LOW/MEDIUM RISK (1 of 2 - PCV13) 01/09/2020     FALL RISK ASSESSMENT  03/02/2021     COLONOSCOPY  01/14/2023     LIPID  11/20/2023     ADVANCE CARE PLANNING  03/02/2025     TD 18+ HE  11/20/2028     TDAP ADULT ONE TIME DOSE  Completed     HEPATITIS C SCREENING  Discontinued        Subjective:   Chief Complaint: Cesar Murillo is an 65 y.o. male here for a Welcome to Medicare visit.   HPI: In addition to welcome to Medicare visit, several other concerns discussed    Hypertension is managed with lisinopril and blood pressure is well controlled    History of chronic hepatitis C successfully treated with Harvoni and interferon    Chronic right ankle pain with previous calcaneal fracture and surgery causing pain when walking too far.  Especially bothersome in the morning    Still smoking with no plans to quit    Using Viagra for erectile dysfunction    Frequent  bright red blood per rectum presumably related to external hemorrhoids but there is family history for colon or rectal cancer.  Last colonoscopy 2013.  He has problems with chronic constipation and there is leakage.    Review of Systems:    Please see above.  The rest of the review of systems are negative for all systems.    Patient Care Team:  Sen Leon MD as PCP - General (Internal Medicine)  Sen Leon MD as Assigned PCP     Patient Active Problem List   Diagnosis     External hemorrhoids     Anxiety, generalized     Chronic hepatitis C (H)     Chronic insomnia     Erectile dysfunction     Essential hypertension     History of alcohol abuse     Osteoarthritis of multiple joints     Tobacco abuse     Chronic pain of right ankle     Acute bilateral low back pain without sciatica     Chronic fatigue     Thoracic aortic aneurysm (H)     Pulmonary nodule     Rectal bleeding     Past Medical History:   Diagnosis Date     Acute bilateral low back pain without sciatica 7/23/2018     Anxiety, generalized 11/17/2017     Calcaneal fracture 2013    Chronic foot/heel pain     Chronic fatigue 10/14/2019     Chronic hepatitis C (H)     Successfully treated.  Previously on interferon.  More recently SOFOSBUVIR and ribavirin, biopsy has been negative for cirrhosis, PCR -2015     Chronic insomnia      Chronic pain of right ankle 5/17/2018     Erectile dysfunction      Essential hypertension      External hemorrhoids 2012     History of alcohol abuse      History of depression     2014, prescribed citalopram     Lower abdominal pain 7/23/2018     Osteoarthritis of multiple joints     Chronic bilateral knee and chronic low back pain     Porphyria cutanea tarda (H)      Pulmonary nodule     CT August 2018 3 mm right upper lobe nodule reported as stable, stable October 2019, recheck in 1 year and annually     Rectal bleeding 3/2/2020     Thoracic aortic aneurysm (H) 11/17/2017    4.2 cm on CT scan August 2017,  follow annually, 4.1 cm August 2018, stable October 2019     Tobacco abuse 11/17/2017      Past Surgical History:   Procedure Laterality Date     ANKLE FRACTURE SURGERY       COLONOSCOPY      Normal colonoscopy September 2013     FOOT FRACTURE SURGERY  09/2013    calcaneal fracture     KNEE ARTHROSCOPY Bilateral      WRIST SURGERY      ligament injury      Family History   Problem Relation Age of Onset     Heart attack Mother         d. 84     Stroke Mother      Kidney failure Father         d 76     Rectal cancer Father      Skin cancer Brother      Crohn's disease Brother       Social History     Socioeconomic History     Marital status:      Spouse name: Not on file     Number of children: Not on file     Years of education: Not on file     Highest education level: Not on file   Occupational History     Not on file   Social Needs     Financial resource strain: Not on file     Food insecurity:     Worry: Not on file     Inability: Not on file     Transportation needs:     Medical: Not on file     Non-medical: Not on file   Tobacco Use     Smoking status: Current Every Day Smoker     Types: Cigarettes     Smokeless tobacco: Never Used   Substance and Sexual Activity     Alcohol use: Yes     Comment: 2-3 every day     Drug use: Yes     Types: Marijuana     Comment: Frequent use     Sexual activity: Not on file   Lifestyle     Physical activity:     Days per week: Not on file     Minutes per session: Not on file     Stress: Not on file   Relationships     Social connections:     Talks on phone: Not on file     Gets together: Not on file     Attends [employer] service: Not on file     Active member of club or organization: Not on file     Attends meetings of clubs or organizations: Not on file     Relationship status: Not on file     Intimate partner violence:     Fear of current or ex partner: Not on file     Emotionally abused: Not on file     Physically abused: Not on file     Forced sexual activity: Not on  "file   Other Topics Concern     Not on file   Social History Narrative    [employer]     4 children       Current Outpatient Medications   Medication Sig Dispense Refill     docusate sodium (STOOL SOFTENER ORAL) Take by mouth.       lisinopril (PRINIVIL,ZESTRIL) 20 MG tablet Take 1 tablet (20 mg total) by mouth daily. 90 tablet 3     LORazepam (ATIVAN) 1 MG tablet TAKE 1 TABLET BY MOUTH AT BEDTIME FOR INSOMNIA 15 tablet 0     sildenafil (VIAGRA) 100 MG tablet Take 1 tablet (100 mg total) by mouth daily as needed. Up to once a day. 90 tablet 5     No current facility-administered medications for this visit.       Objective:   Vital Signs:   Visit Vitals  /80 (Patient Site: Left Arm, Patient Position: Sitting, Cuff Size: Adult Large)   Pulse 69   Ht 5' 9\" (1.753 m)   Wt 199 lb (90.3 kg)   SpO2 98%   BMI 29.39 kg/m             VisionScreening:   Visual Acuity Screening    Right eye Left eye Both eyes   Without correction:      With correction: 10/12.5 10/12.5 10/10        PHYSICAL EXAM  EYES: Eyelids, conjunctiva, and sclera were normal. Pupils were normal. Cornea, iris, and lens were normal bilaterally.  HEAD, EARS, NOSE, MOUTH, AND THROAT: Head and face were normal. Nose appearance was normal and there was no discharge. Oropharynx was normal.  NECK: Neck appearance was normal. There were no neck masses and the thyroid was not enlarged and no nodules are felt.  No lymphadenopathy.  RESPIRATORY: Breathing pattern was normal and the chest moved symmetrically.  Percussion/auscultatory percussion was normal.  Lung sounds were normal and there were no rales or wheezes.  CARDIOVASCULAR: Heart rate and rhythm were normal.  S1 and S2 were normal and there were no extra sounds or murmurs. Peripheral pulses in arms and legs were normal.  Jugular venous pressure was normal.  There was no peripheral edema.  No carotid bruits.  GASTROINTESTINAL: The abdomen was normal in contour.  Bowel sounds were present.  "  Palpation detected no tenderness, mass, or enlarged organs.   RECTAL/PROSTATE: Large external hemorrhoid.  Prostate normal size, smooth, nontender without nodules  MUSCULOSKELETAL: Skeletal configuration was normal and muscle mass was normal for age. Joint appearance was overall normal.  LYMPHATIC: There were no enlarged nodes.  SKIN/HAIR/NAILS: Skin color was normal.  Hair and nails were normal.There were no skin lesions.  NEUROLOGIC: The patient was alert and oriented to person, place, time, and circumstance. Speech was normal. Cranial nerves were normal. Motor strength was normal for age. The patient was normally coordinated.  Sensation intact.  PSYCHIATRIC:  Mood and affect were normal and the patient had normal recent and remote memory. The patient's judgment and insight were normal.    Assessment Results 3/2/2020   Activities of Daily Living No help needed   Instrumental Activities of Daily Living No help needed   Get Up and Go Score Less than 12 seconds   Mini Cog Total Score 5   Some recent data might be hidden     A Mini Cog score of 0-2 suggests the possibility of dementia, score of 3-5 suggests no dementia    Identified Health Risks:     He is at risk for lack of exercise and has been provided with information to increase physical activity for the benefit of his well-being.  The patient was counseled and encouraged to consider modifying their diet and eating habits. He was provided with information on recommended healthy diet options.  The patient reports that he drinks more than one alcoholic drink per day but denies binge or excessive drinking. He was counseled and given information about possible harmful effects of excessive alcohol intake.  Information regarding advance directives (living mark), including where he can download the appropriate form, was provided to the patient via the AVS.

## 2021-06-06 NOTE — TELEPHONE ENCOUNTER
Controlled Substance Refill Request  Medication Name:   Requested Prescriptions     Pending Prescriptions Disp Refills     LORazepam (ATIVAN) 1 MG tablet [Pharmacy Med Name: LORAZEPAM 1MG TABLETS] 15 tablet 0     Sig: TAKE 1 TABLET BY MOUTH AT BEDTIME FOR INSOMNIA     Date Last Fill: 10/14/19  Requested Pharmacy: [facility]  Submit electronically to pharmacy  Controlled Substance Agreement on file:   Encounter-Level CSA Scan Date:    There are no encounter-level csa scan date.        Last office visit:  10/14/19

## 2021-06-07 NOTE — TELEPHONE ENCOUNTER
Please contact patient.  His 15 tablets supply should normally last 3 months.  This was refilled 6 weeks ago.

## 2021-06-07 NOTE — TELEPHONE ENCOUNTER
Controlled Substance Refill Request  Medication Name:   Requested Prescriptions     Pending Prescriptions Disp Refills     LORazepam (ATIVAN) 1 MG tablet [Pharmacy Med Name: LORAZEPAM 1MG TABLETS] 15 tablet 0     Sig: TAKE 1 TABLET BY MOUTH AT BEDTIME FOR INSOMNIA     Date Last Fill: 2/24/20  Requested Pharmacy: [facility]  Submit electronically to pharmacy  Controlled Substance Agreement on file:   Encounter-Level CSA Scan Date:    There are no encounter-level csa scan date.        Last office visit:  3/2/20

## 2021-06-08 NOTE — PROGRESS NOTES
"[facility] Follow Up Note    Cesar Murillo   62 y.o. male    Date of Visit: 2/17/2017    Chief Complaint   Patient presents with     Follow-up     3 mo follow up     Headache     everyday x 6 weeks, using Tylenol or Aleve     Subjective  Cesar is here for follow-up on his hypertension.  At last visit in November his blood pressure was 125/80.  But he reported that his wrist cuff at home is running in the 140s to 150s over 90s.  He brought his wrist cuff in with him today.  The cuff shows 140/99 and 153/93.  Our cuff shows 132/83 and 134/80.    He denies lightheaded dizzy spells.  He is compliant with lisinopril 20 mg a day.  Weight is stable.  No increasing shortness of breath or edema.    Still smoking and drinking alcohol on a regular basis, does not wish to quit.  Past history of hepatitis C it was cured after treatment.  No recurrent porphyria cutanea tarda.    He has chronic headaches but they've been worse over the last 6 weeks, associated with some dental work that he's been having with a dental infection, that was already addressed.  He's been chewing differently and been having right jaw pain around the ear area.  No fever or sinusitis symptoms.  He does take Aleve intermittently.    PMHx:  No past medical history on file.  PSHx:    Past Surgical History:   Procedure Laterality Date     ANKLE FRACTURE SURGERY       Immunizations:   Immunization History   Administered Date(s) Administered     Pneumo Polysac 23-V 07/02/2003     Td, historic 02/20/2008       ROS A comprehensive review of systems was performed and was otherwise negative    Medications, allergies, and problem list were reviewed and updated    Exam  Visit Vitals     /83     Pulse 69     Ht 5' 9\" (1.753 m)     Wt 196 lb (88.9 kg)     SpO2 97%     BMI 28.94 kg/m2     Tympanic membrane appears normal.  No tenderness on the external ear or periauricular tissues.  Jaw opening closes well.  No cervical or submandibular " adenopathy or tenderness.  Pharynx and buccal mucosa appear normal without ulcers or leukoplakia.  No tenderness on the teeth to palpation.  Heart is regular without murmur.  No edema.    Assessment/Plan  1. Benign Essential Hypertension  Adequately controlled on our checks.  His wrist cuff is not accurate.  Continue current lisinopril 20 mg a day, I will not add amlodipine.  Follow up in the summer.    Cholesterol was well-controlled last November and no indication for statin    Avoiding statin drugs and HCTZ with his history of alcohol use chronically.    Chronic ankle pain after complex heel fracture.  I recommended not using chronic narcotics.    2. Headache  Tension-type headache associated with jaw strain with change in chewing with dental work.  No evidence of active infection currently.  He'll be seeing his dentist soon.    I did recommend improved diet, having breakfast instead of just coffee with sugar and a cigarette every morning.  I had him consider half-and-half in the coffee instead of sugar, hydrate with water more regular basis.    Refuses to consider quitting smoking.    Return in about 6 months (around 8/17/2017) for Recheck.   There are no Patient Instructions on file for this visit.  Erasmo Ford MD      Current Outpatient Prescriptions   Medication Sig Dispense Refill     lisinopril (PRINIVIL,ZESTRIL) 20 MG tablet TAKE ONE TABLET BY MOUTH ONE TIME DAILY 90 tablet 2     LORazepam (ATIVAN) 0.5 MG tablet TAKE ONE OR TWO TABLETS BY MOUTH EVERY EIGHT HOURS AS NEEDED  25 tablet 1     PREVIDENT 5000 BOOSTER PLUS 1.1 % Pste BRUSH WITH A SMALL AMOUNT ONCE DAILY INSTEAD OF TOOTHPASTE, THEN EXPECTORATE. DO NOT SWALLOW.  2     sildenafil (VIAGRA) 100 MG tablet Take  mg by mouth daily as needed. Up to once a day.       No current facility-administered medications for this visit.      No Known Allergies  Social History   Substance Use Topics     Smoking status: Current Every Day Smoker     Smokeless  tobacco: None     Alcohol use None

## 2021-06-09 NOTE — TELEPHONE ENCOUNTER
Controlled Substance Refill Request  Medication Name:   Requested Prescriptions     Pending Prescriptions Disp Refills     LORazepam (ATIVAN) 1 MG tablet 15 tablet 0     Date Last Fill: 02/24/2020  Is patient out of medication?: No  Patient notified refills processed within 3 business days:  Yes  Requested Pharmacy: [facility]  Submit electronically to pharmacy  Controlled Substance Agreement on file:   Encounter-Level CSA Scan Date:    There are no encounter-level csa scan date.        Last office visit:  03/02/2020

## 2021-06-12 NOTE — TELEPHONE ENCOUNTER
Refill Approved    Rx renewed per Medication Renewal Policy. Medication was last renewed on 10/4/19.    Breonna De Anda, [facility] Triage/Med Refill 10/19/2020     Requested Prescriptions   Pending Prescriptions Disp Refills     lisinopriL (PRINIVIL,ZESTRIL) 20 MG tablet [Pharmacy Med Name: Lisinopril Oral Tablet 20 MG] 90 tablet 0     Sig: TAKE ONE TABLET BY MOUTH ONE TIME DAILY       [employer] Inhibitors Refill Protocol Passed - 10/18/2020  2:00 AM        Passed - PCP or prescribing provider visit in past 12 months       Last office visit with prescriber/PCP: 10/14/2019 Sen Leon MD OR same dept: Visit date not found OR same specialty: 10/14/2019 Sen Leon MD  Last physical: 3/2/2020 Last MTM visit: Visit date not found   Next visit within 3 mo: Visit date not found  Next physical within 3 mo: Visit date not found  Prescriber OR PCP: Sen Leon MD  Last diagnosis associated with med order: 1. Benign Essential Hypertension  - lisinopriL (PRINIVIL,ZESTRIL) 20 MG tablet [Pharmacy Med Name: Lisinopril Oral Tablet 20 MG]; TAKE ONE TABLET BY MOUTH ONE TIME DAILY   Dispense: 90 tablet; Refill: 0    If protocol passes may refill for 12 months if within 3 months of last provider visit (or a total of 15 months).             Passed - Serum Potassium in past 12 months     Lab Results   Component Value Date    Potassium 4.3 03/02/2020             Passed - Blood pressure filed in past 12 months     BP Readings from Last 1 Encounters:   03/02/20 138/80             Passed - Serum Creatinine in past 12 months     Creatinine   Date Value Ref Range Status   03/02/2020 0.77 0.70 - 1.30 mg/dL Final

## 2021-06-12 NOTE — PROGRESS NOTES
[facility] Follow Up Note    Cesar Murillo   62 y.o. male    Date of Visit: 8/17/2017    Chief Complaint   Patient presents with     Follow-up     6 mo follow up     Subjective  Cesar is here for follow-up of multiple medical problems.  Past history of hypertension, he has not been checking it recently.  He has had some variable blood pressures at home with his wrist cuff in the past, but has not used it recently.  In November of last year his blood pressure was 125/80.  On lisinopril 20 mg a day and compliant with that.    His weight is down some, 8 pounds.  He states he has been more active and eating somewhat less this summer.  He feels it is not an unexpected weight loss.    He continues to smoke over a pack a day.  He has no intention of quitting.  No change in cough or hemoptysis.  Patient is now interested in considering a screening chest CT scan.    He does not want to do health maintenance in the past.  No bowel changes.  He denies a history of colon polyps in the past but does not want to do another colonoscopy.  I did discuss option for Cologuard no blood in stool or change in stools.    Chronic right heel pain from history of complex fracture.  Today and he was given a handout.  He is not currently using narcotics, he has occasional tramadol that he is used.  I recommended not for him to use chronic narcotics.  History of chronic alcohol use.    He does have occasional anxiety and insomnia.  He does request a small amount of lorazepam.  He has not escalated his use of that.  I did warn him on risks of confusion, memory loss, fall risk, interaction with alcohol and that he cannot drive after taking it.    Past history of hepatitis C that was treated, he did have clearance of the virus.  Normal liver function tests last fall.  No right upper quadrant pain or jaundice.  Recent alcohol use 1-4 drinks a night, he denies escalation.    No chest pain or palpitations.    Cholesterol levels  "were excellent last November, not eating cholesterol treatment.    PMHx:  No past medical history on file.  PSHx:    Past Surgical History:   Procedure Laterality Date     ANKLE FRACTURE SURGERY       Immunizations:   Immunization History   Administered Date(s) Administered     Pneumo Polysac 23-V 07/02/2003     Td, historic 02/20/2008       ROS A comprehensive review of systems was performed and was otherwise negative    Medications, allergies, and problem list were reviewed and updated    Exam  /82  Pulse 64  Ht 5' 9\" (1.753 m)  Wt 188 lb (85.3 kg)  SpO2 98%  BMI 27.76 kg/m2  Alert and oriented ×3.  No jaundice.  No JVD.  Lungs show decreased breath sounds throughout consistent with chronic tobacco abuse.  No wheezing or crackles or dullness.  Heart is regular without murmur.  No ankle edema.  Abdomen is nontender no hepatosplenomegaly.  Liver edge is normal.    Assessment/Plan  1. Anxiety  Chronic.  Chronic alcohol use.  Small amount of lorazepam with warnings given to patient as above.  - LORazepam (ATIVAN) 0.5 MG tablet; TAKE 1 TO 2 TABLETS BY MOUTH in evening AS NEEDED  Dispense: 20 tablet; Refill: 0    2. Benign Essential Hypertension  Blood pressure rechecked by me was 135/82.  Is been labile in the past.  I encouraged him to check it more often and follow-up this fall to establish care with a new physician, she will not be following the out to [facility] clinic.    Continue lisinopril 20 mg a day.  I would have him consider amlodipine in addition in the future, if needed.    3. Insomnia  As above    4. Tobacco abuse  Patient refused to quit smoking.  He does wish to proceed with screening chest CT scan.  Refer to pulmonary medicine if nodule found.  - CT Low Dose Lung Screening Chest; Future    5. Chronic heel pain, right  Stable.    6. Medication monitoring encounter    - Comprehensive Metabolic Panel    Patient given handout for Cologuard    Previous hepatitis C treatment    Return in about 3 " months (around 11/17/2017) for Recheck.   There are no Patient Instructions on file for this visit.  Erasmo Ford MD  Total time with patient over 25 minutes and over 50% coord care.  Time all face to face.      Current Outpatient Prescriptions   Medication Sig Dispense Refill     lisinopril (PRINIVIL,ZESTRIL) 20 MG tablet TAKE 1 TABLET BY MOUTH EVERY DAY 90 tablet 2     LORazepam (ATIVAN) 0.5 MG tablet TAKE 1 TO 2 TABLETS BY MOUTH in evening AS NEEDED 20 tablet 0     PREVIDENT 5000 BOOSTER PLUS 1.1 % Pste BRUSH WITH A SMALL AMOUNT ONCE DAILY INSTEAD OF TOOTHPASTE, THEN EXPECTORATE. DO NOT SWALLOW.  2     traMADol (ULTRAM) 50 mg tablet   1     sildenafil (VIAGRA) 100 MG tablet Take  mg by mouth daily as needed. Up to once a day.       No current facility-administered medications for this visit.      No Known Allergies  Social History   Substance Use Topics     Smoking status: Current Every Day Smoker     Smokeless tobacco: None     Alcohol use None

## 2021-06-13 NOTE — TELEPHONE ENCOUNTER
Controlled Substance Refill Request  Medication Name:   Requested Prescriptions     Pending Prescriptions Disp Refills     LORazepam (ATIVAN) 1 MG tablet [Pharmacy Med Name: LORazepam Oral Tablet 1 MG] 15 tablet 0     Sig: Take 1 tablet  by mouth at bedtime as needed  for insonmia     Date Last Fill: 7/16/20  Requested Pharmacy: [facility]  Submit electronically to pharmacy  Controlled Substance Agreement on file:   Encounter-Level CSA Scan Date:    There are no encounter-level csa scan date.        Last office visit:  3/2/20  Therese Dunn RN, MA  [facility]    Triage Nurse Advisor

## 2021-06-14 NOTE — PROGRESS NOTES
Office Visit - Follow Up   Cesar Murillo   62 y.o. male    Date of Visit: 11/17/2017    Chief Complaint   Patient presents with     Establish Care        Assessment and Plan   1. Benign Essential Hypertension  Blood pressure not completely at goal.  We discussed increasing his dose to 40 mg.  He would prefer to wait and will continue 20 mg for now.  Also would recommend sodium restriction.  Will recheck in 6 months when he returns.  Will check appropriate monitoring labs  - Basic Metabolic Panel  - Hemoglobin  - Lipid Cascade    2. History of hepatitis C  Treated successfully with Harvoni.  Will obtain records.  Monitor LFTs  - Hepatic Profile    3. Anxiety, generalized  He will usually make 15-20 tablets of Lorazepam last for 3 months.  He takes infrequently for anxiety or insomnia.  Counseled to avoid using with alcohol    4. Tobacco abuse  He is not interested in quitting smoking.  Would recommend continued annual low-dose CT scan for lung cancer screening.    5. Thoracic aortic aneurysm  4.2 cm ascending thoracic aortic aneurysm found incidentally on recent CT scan.  He is asymptomatic.  Continue annual follow-up    6. Chronic heel pain, right  Chronic foot pain following severe calcaneal fracture in 2013.    7. Healthcare maintenance  We will perform prostate exam at his physical in the spring.  Will check screening PSA today.  We also discussed getting a colonoscopy every 5 years.  He will be due next year.  He is reluctant.  We reviewed immunizations.  - PSA, Annual Screen (Prostatic-Specific Antigen)    Return in about 6 months (around 5/17/2018) for Annual physical.     History of Present Illness   This 62 y.o. old gentleman here to establish care.  I reviewed his previous records.  Was a patient of Dr. Ford.  History of chronic hepatitis C successfully treated with interferon and later Harvoni.  Also essential hypertension on lisinopril.  Blood pressure fluctuates between 130 and 150 systolic.  He  smokes a pack per day of cigarettes.  Also smokes marijuana fairly regularly.  Will have 2 or 3 drinks every day.  He will use lorazepam infrequently to manage anxiety or insomnia.  He will usually take 1 or 2 tablets every couple weeks.  He was having severe insomnia several months ago wearing about his daughter after her significant other committed suicide.  He has no interest in quitting cigarettes.  He did have a CT scan of his chest for lung cancer screening in August showing no nodules.  Incidentally noted was a 4.2 cm dilation of his ascending thoracic aorta.  He is asymptomatic without any chest pain.  He has chronic pain involving his heel where he had a severe fracture in 2013 requiring surgery.  He will take tramadol rarely.    Review of Systems:  Otherwise, a comprehensive review of systems was negative except as noted.     Medications, Allergies and Problem List   Patient Active Problem List   Diagnosis     Hemorrhoids     Bright Red Blood Per Rectum     Chronic Hepatitis, C Virus     Benign Essential Hypertension     Insomnia     Medication monitoring encounter     History of hepatitis C     Chronic heel pain, right     Anxiety, generalized     Tobacco abuse     Thoracic aortic aneurysm       He has a past surgical history that includes Ankle fracture surgery; Knee arthroscopy (Bilateral); Foot fracture surgery (09/2013); and Wrist surgery.    No Known Allergies    Current Outpatient Prescriptions   Medication Sig Dispense Refill     lisinopril (PRINIVIL,ZESTRIL) 20 MG tablet TAKE 1 TABLET BY MOUTH EVERY DAY 90 tablet 2     LORazepam (ATIVAN) 1 MG tablet TAKE 1 TABLET(1 MG) BY MOUTH AT BEDTIME AS NEEDED FOR INSOMNIA 15 tablet 0     PREVIDENT 5000 BOOSTER PLUS 1.1 % Pste BRUSH WITH A SMALL AMOUNT ONCE DAILY INSTEAD OF TOOTHPASTE, THEN EXPECTORATE. DO NOT SWALLOW.  2     sildenafil (VIAGRA) 100 MG tablet Take  mg by mouth daily as needed. Up to once a day.       traMADol (ULTRAM) 50 mg tablet   1  "    No current facility-administered medications for this visit.         Physical Exam   General Appearance:   Well-appearing middle-aged male    /80 (Patient Site: Left Arm, Patient Position: Sitting, Cuff Size: Adult Regular)  Pulse 61  Ht 5' 9\" (1.753 m)  Wt 191 lb (86.6 kg)  SpO2 97%  BMI 28.21 kg/m2    HEENT: Normal  Respiratory: Normal respiratory effort.  Lungs are clear with no rales or wheezes.  Heart: Regular rate and rhythm without murmurs, rubs, or gallops.  No carotid bruits.  Abdomen: Abdomen is soft, nontender without guarding, rebound, masses, or hepatosplenomegaly.  Extremities: No peripheral edema.  Neurologic: Grossly nonfocal  Skin: No cyanosis or pallor  Psych: Alert and oriented ×3, mood appropriate         Additional Information   Social History   Substance Use Topics     Smoking status: Current Every Day Smoker     Types: Cigarettes     Smokeless tobacco: Never Used     Alcohol use None             Time: total time spent with the patient was 40 minutes of which >50% was spent in counseling and coordination of care     Sen Leon MD  "

## 2021-06-15 NOTE — TELEPHONE ENCOUNTER
Controlled Substance Refill Request  Medication Name:   Requested Prescriptions     Pending Prescriptions Disp Refills     LORazepam (ATIVAN) 1 MG tablet [Pharmacy Med Name: LORazepam Oral Tablet 1 MG] 15 tablet 0     Sig: Take 1 tablet by mouth at bedtime as needed for insonmia.     Date Last Fill: 11/16/2020  Requested Pharmacy: [facility]  Submit electronically to pharmacy  Controlled Substance Agreement on file:   Encounter-Level CSA Scan Date:    There are no encounter-level csa scan date.        Last office visit:  3/2/2020         Didi Ken RN, BSN Nurse Triage Advisor 2:06 PM 2/27/2021

## 2021-06-16 PROBLEM — F41.1 ANXIETY, GENERALIZED: Status: ACTIVE | Noted: 2017-11-17

## 2021-06-16 PROBLEM — I71.20 THORACIC AORTIC ANEURYSM (H): Status: ACTIVE | Noted: 2017-11-17

## 2021-06-16 PROBLEM — Z72.0 TOBACCO ABUSE: Status: ACTIVE | Noted: 2017-11-17

## 2021-06-16 PROBLEM — B02.9 SHINGLES: Status: ACTIVE | Noted: 2021-05-13

## 2021-06-16 PROBLEM — M25.571 CHRONIC PAIN OF RIGHT ANKLE: Status: ACTIVE | Noted: 2018-05-17

## 2021-06-16 PROBLEM — G89.29 CHRONIC PAIN OF RIGHT ANKLE: Status: ACTIVE | Noted: 2018-05-17

## 2021-06-16 PROBLEM — R53.82 CHRONIC FATIGUE: Status: ACTIVE | Noted: 2019-10-14

## 2021-06-16 NOTE — PROGRESS NOTES
Assessment and Plan:     Patient has been advised of split billing requirements and indicates understanding: Yes     1. Medicare annual wellness visit, subsequent  Immunizations are reviewed and recommending Pneumovax 23 and Shingrix.  He can get these at his pharmacy.  Living will discussed.  Smoking cessation discussed.  Discussed using alcohol in moderation.  Regular exercise discussed.  Up to date with colonoscopies and this should be repeated in 2025.  Prostate exam is normal and I will check a PSA for prostate cancer screening.  Dementia and depression screening completed.  Recommending that he sees his ophthalmologist every year. Skin exam performed and recommending regular use of sunblock.    Will screen for diabetes with fasting glucose.  Checking fasting lipid profile.  Previous imaging negative for AAA.  Consider ultrasound at age 70 as that imaging was done before age 65.    2. Thoracic aortic aneurysm without rupture (H)  Measured at 4.3 cm October 2019.  Asymptomatic.  Continue monitoring every 1 to 2 years with CT scan planned    3. Chronic hepatitis C without hepatic coma (H)  Successfully treated with Harvoni and interferon.  Monitor LFTs.  Counseled regarding excessive alcohol use.    4. Essential hypertension  Good blood pressure control with current dose of lisinopril  - HM2(CBC w/o Differential)  - Comprehensive Metabolic Panel  - Urinalysis-UC if Indicated    5. Tobacco abuse  Unfortunately still smoking and not interested in quitting.  We discussed the long-term damage that he is causing with increasing his risk for coronary artery disease, stroke, COPD and cancer.  We discussed Chantix and he will consider.    6. Chronic cough  Worsening chronic cough now with more wheezing.  Probable COPD.  He is not interested in using an inhaler like Spiriva or having pulmonary function testing.  He needs to quit smoking.  Known pulmonary nodules.  We will arrange for CT chest.  - CT Chest Without  Contrast; Future    7. Pulmonary nodule  As above, will obtain CT chest with known pulmonary nodules and worsening chronic cough  - CT Chest Without Contrast; Future    8. Chronic fatigue  Continues to feel fatigue.  Metabolic evaluation 2 years ago was unremarkable including normal B12 and TSH.  Will again check appropriate labs.  We discussed possible sleep apnea as he is a snorer.  He is not interested in evaluation at this time.    9. Anxiety, generalized  He will use lorazepam infrequently.  Recently refilled 15 tablets which will normally last 3 months.    10. Chronic pain of right ankle  Ongoing chronic pain of his ankle with posttraumatic osteoarthritis.  History of ankle fracture and surgery.    11. Erectile dysfunction, unspecified erectile dysfunction type  Using Viagra when needed    12. History of colon polyps  Colonoscopy in May 2020 with 2 adenomatous polyps.  Recommending that he return in 5 years.    13. Encounter for screening for lipoid disorders  We will check lipid profile.  Consider CT coronary calcium score  - Lipid [place], FASTING    14. Screening for malignant neoplasm of prostate    - PSA (Prostatic-Specific Antigen), Annual Screen    Over 30 minutes was spent addressing these chronic and new medical problems beyond time spent performing annual wellness visit    The patient's current medical problems were reviewed.    I have had an Advance Directives discussion with the patient.  The following health maintenance schedule was reviewed with the patient and provided in printed form in the after visit summary:   Health Maintenance Due   Topic Date Due     ZOSTER VACCINES (1 of 2) Never done        Subjective:   Chief Complaint: Cesar Murillo is an 66 y.o. male here for an Annual Wellness visit.   HPI: In addition to his annual wellness visit, several chronic medical problems discussed during today's visit including management of hypertension, erectile dysfunction, generalized anxiety and  chronic pain involving right ankle.  He has chronic cough and known pulmonary nodules and continues to smoke.  Also thoracic aortic aneurysm.  See assessment and plan for details.    Review of Systems:    Please see above.  The rest of the review of systems are negative for all systems.    Patient Care Team:  Sen Leon MD as PCP - General (Internal Medicine)  Sen Leon MD as Assigned PCP     Patient Active Problem List   Diagnosis     External hemorrhoids     Anxiety, generalized     Chronic hepatitis C (H)     Chronic insomnia     Erectile dysfunction     Essential hypertension     Osteoarthritis of multiple joints     Tobacco abuse     Chronic pain of right ankle     Chronic fatigue     Thoracic aortic aneurysm (H)     Pulmonary nodule     History of colon polyps     Past Medical History:   Diagnosis Date     Acute bilateral low back pain without sciatica 7/23/2018     Anxiety, generalized 11/17/2017     Calcaneal fracture 2013    Chronic foot/heel pain     Chronic fatigue 10/14/2019     Chronic hepatitis C (H)     Successfully treated.  Previously on interferon.  More recently SOFOSBUVIR and ribavirin, biopsy has been negative for cirrhosis, PCR -2015     Chronic insomnia      Chronic pain of right ankle 5/17/2018     Erectile dysfunction      Essential hypertension      External hemorrhoids 2012     History of alcohol abuse      History of colon polyps     Colonoscopy April 2020 with polyps.  Repeat in 5 years     History of depression     2014, prescribed citalopram     Lower abdominal pain 7/23/2018     Osteoarthritis of multiple joints     Chronic bilateral knee and chronic low back pain     Porphyria cutanea tarda (H)      Pulmonary nodule     CT August 2018 3 mm right upper lobe nodule reported as stable, stable October 2019, recheck in 1 year and annually     Rectal bleeding 3/2/2020     Thoracic aortic aneurysm (H) 11/17/2017    4.2 cm on CT scan August 2017, follow annually, 4.1  cm August 2018, stable October 2019     Tobacco abuse 11/17/2017      Past Surgical History:   Procedure Laterality Date     ANKLE FRACTURE SURGERY       FOOT FRACTURE SURGERY  09/2013    calcaneal fracture     KNEE ARTHROSCOPY Bilateral      WRIST SURGERY      ligament injury      Family History   Problem Relation Age of Onset     Heart attack Mother         d. 84     Stroke Mother      Kidney failure Father         d 76     Rectal cancer Father      Skin cancer Brother      Crohn's disease Brother       Social History     Socioeconomic History     Marital status:      Spouse name: Not on file     Number of children: Not on file     Years of education: Not on file     Highest education level: Not on file   Occupational History     Not on file   Social Needs     Financial resource strain: Not on file     Food insecurity     Worry: Not on file     Inability: Not on file     Transportation needs     Medical: Not on file     Non-medical: Not on file   Tobacco Use     Smoking status: Current Every Day Smoker     Types: Cigarettes     Smokeless tobacco: Never Used   Substance and Sexual Activity     Alcohol use: Yes     Comment: 2-3 every day     Drug use: Yes     Types: Marijuana     Comment: Frequent use     Sexual activity: Not on file   Lifestyle     Physical activity     Days per week: Not on file     Minutes per session: Not on file     Stress: Not on file   Relationships     Social connections     Talks on phone: Not on file     Gets together: Not on file     Attends Bahai service: Not on file     Active member of club or organization: Not on file     Attends meetings of clubs or organizations: Not on file     Relationship status: Not on file     Intimate partner violence     Fear of current or ex partner: Not on file     Emotionally abused: Not on file     Physically abused: Not on file     Forced sexual activity: Not on file   Other Topics Concern     Not on file   Social History Narrative    Real  [email or web address]     4 children      Current Outpatient Medications   Medication Sig Dispense Refill     docusate sodium (STOOL SOFTENER ORAL) Take by mouth.       lisinopriL (PRINIVIL,ZESTRIL) 20 MG tablet TAKE ONE TABLET BY MOUTH ONE TIME DAILY  90 tablet 1     LORazepam (ATIVAN) 1 MG tablet Take 1 tablet by mouth at bedtime as needed for insonmia.  15 tablet 0     sildenafiL (VIAGRA) 100 MG tablet Take 1 tablet (100 mg total) by mouth daily as needed. Up to once a day. 30 tablet 11     No current facility-administered medications for this visit.       Objective:   Vital Signs:   Visit Vitals  /78 (Patient Site: Right Arm, Patient Position: Sitting, Cuff Size: Adult Regular)   Pulse 67   Temp 97.4  F (36.3  C) (Tympanic)   Ht 5' 8.5\" (1.74 m)   Wt 193 lb (87.5 kg)   SpO2 98%   BMI 28.92 kg/m           VisionScreening:  No exam data present     PHYSICAL EXAM  EYES: Eyelids, conjunctiva, and sclera were normal. Pupils were normal. Cornea, iris, and lens were normal bilaterally.  HEAD, EARS, NOSE, MOUTH, AND THROAT: Head and face were normal. TMs and external auditory canals are normal  NECK: Neck appearance was normal. There were no neck masses and the thyroid was not enlarged and no nodules are felt.  No lymphadenopathy.  RESPIRATORY: Breathing pattern was normal and the chest moved symmetrically.   Lung sounds were normal and there were no rales or wheezes.  CARDIOVASCULAR: Heart rate and rhythm were normal.  S1 and S2 were normal and there were no extra sounds or murmurs. Peripheral pulses in arms and legs were normal.  Jugular venous pressure was normal.  There was no peripheral edema.  No carotid bruits.  GASTROINTESTINAL:  Bowel sounds were present.   Palpation detected no tenderness, mass, or enlarged organs.   RECTAL/PROSTATE: No external lesions.  Sphincter tone normal.  No palpable rectal lesions.  Prostate normal size, smooth, nontender without nodules  MUSCULOSKELETAL: Skeletal configuration " was normal and muscle mass was normal for age. Joint appearance was overall normal.  LYMPHATIC: There were no enlarged nodes.  SKIN/HAIR/NAILS: Skin color was normal.  Hair and nails were normal.There were no skin lesions.  NEUROLOGIC: The patient was alert and oriented to person, place, time, and circumstance. Speech was normal. Cranial nerves were normal. Motor strength was normal for age. The patient was normally coordinated.  Sensation intact.  PSYCHIATRIC:  Mood and affect were normal and the patient had normal recent and remote memory. The patient's judgment and insight were normal.    Assessment Results 4/9/2021   Activities of Daily Living No help needed   Instrumental Activities of Daily Living No help needed   Get Up and Go Score Less than 12 seconds   Mini Cog Total Score 5   Some recent data might be hidden     A Mini-Cog score of 0-2 suggests the possibility of dementia, score of 3-5 suggests no dementia    Identified Health Risks:     He is at risk for lack of exercise and has been provided with information to increase physical activity for the benefit of his well-being.  The patient reports that he drinks more than one alcoholic drink per day but denies binge or excessive drinking. He was counseled and given information about possible harmful effects of excessive alcohol intake.  The patient was counseled and encouraged to consider modifying their diet and eating habits. He was provided with information on recommended healthy diet options.  Information regarding advance directives (living mark), including where he can download the appropriate form, was provided to the patient via the AVS.

## 2021-06-17 NOTE — PROGRESS NOTES
"   Assessment & Plan     Herpes zoster without complication  Developed pain in right lower to mid back last weekend.  Pain can be moderate in intensity and waxes and wanes.  Very localized to mid to lower back without radiation to his buttock or down his leg.  Was doing some gardening projects over the weekend but no heavy lifting or other injury that he can recall.  He is able to get some relief when lying down in bed.  Pain is worse after he has been standing for too long.  History consistent with musculoligamentous back injury but he has what appears to be development of shingles on his back with raised erythematous lesions.  Will treat with Valtrex 1 g 3 times daily for 7 days and start prednisone 20 mg twice daily for 7 days.  He should discontinue ibuprofen which is providing some partial benefit.  I will provide him with tizanidine to take as needed given the chance that there is also a musculoligamentous component to his pain especially as it is described in the above history.  - valACYclovir (VALTREX) 1000 MG tablet  Dispense: 21 tablet; Refill: 0  - predniSONE (DELTASONE) 20 MG tablet  Dispense: 14 tablet; Refill: 0  - TiZANidine (ZANAFLEX) 4 MG capsule  Dispense: 12 capsule; Refill: 0    Benign Essential Hypertension  Good blood pressure control needs refill of lisinopril  - lisinopriL (PRINIVIL,ZESTRIL) 20 MG tablet  Dispense: 90 tablet; Refill: 3    Insomnia, unspecified type  He needs a refill of lorazepam  - LORazepam (ATIVAN) 1 MG tablet  Dispense: 15 tablet; Refill: 0    Erectile dysfunction, unspecified erectile dysfunction type  Refill of Viagra sent to his pharmacy  - sildenafiL (VIAGRA) 100 MG tablet  Dispense: 30 tablet; Refill: 11               Tobacco Cessation:   reports that he has been smoking cigarettes. He has never used smokeless tobacco.    BMI:   Estimated body mass index is 29.07 kg/m  as calculated from the following:    Height as of this encounter: 5' 8.5\" (1.74 m).    Weight as of " "this encounter: 194 lb (88 kg).       Return in about 1 year (around 5/13/2022) for Annual physical.    Sen Leon MD  [facility]    Subjective       HPI 66-year-old male here with right-sided mid to low back pain over the past 5 days.  See assessment and plan for details of visit    Current Outpatient Medications on File Prior to Visit   Medication Sig Dispense Refill     docusate sodium (STOOL SOFTENER ORAL) Take by mouth.       [DISCONTINUED] lisinopriL (PRINIVIL,ZESTRIL) 20 MG tablet TAKE ONE TABLET BY MOUTH ONE TIME DAILY  90 tablet 1     [DISCONTINUED] LORazepam (ATIVAN) 1 MG tablet Take 1 tablet by mouth at bedtime as needed for insonmia.  15 tablet 0     [DISCONTINUED] sildenafiL (VIAGRA) 100 MG tablet Take 1 tablet (100 mg total) by mouth daily as needed. Up to once a day. 30 tablet 11     No current facility-administered medications on file prior to visit.         Review of Systems  No fevers or chills.  No pain radiating into his leg        Objective    Vitals:    05/13/21 1142   BP: 120/82   Patient Site: Right Arm   Patient Position: Sitting   Cuff Size: Adult Large   Pulse: 60   Temp: (!) 96.4  F (35.8  C)   TempSrc: Tympanic   SpO2: 97%   Weight: 194 lb (88 kg)   Height: 5' 8.5\" (1.74 m)        Physical Exam  Rash in mid right lower back with raised erythematous lesions consistent with shingles      "

## 2021-06-18 NOTE — PATIENT INSTRUCTIONS - HE
Patient Instructions by Sen Leon MD at 3/2/2020 10:00 AM     Author: Sen Leon MD Service: -- Author Type: Physician    Filed: 3/2/2020 11:04 AM Encounter Date: 3/2/2020 Status: Addendum    : Sen Leon MD (Physician)    Related Notes: Original Note by Sen Leon MD (Physician) filed at 3/2/2020 11:01 AM         Patient Education     Exercise for a Healthier Heart  You may wonder how you can improve the health of your heart. If youre thinking about exercise, youre on the right track. You dont need to become an athlete, but you do need a certain amount of brisk exercise to help strengthen your heart. If you have been diagnosed with a heart condition, your doctor may recommend exercise to help stabilize your condition. To help make exercise a habit, choose safe, fun activities.       Be sure to check with your health care provider before starting an exercise program.    Why exercise?  Exercising regularly offers many healthy rewards. It can help you do all of the following:    Improve your blood cholesterol levels to help prevent further heart trouble    Lower your blood pressure to help prevent a stroke or heart attack    Control diabetes, or reduce your risk of getting this disease    Improve your heart and lung function    Reach and maintain a healthy weight    Make your muscles stronger and more limber so you can stay active    Prevent falls and fractures by slowing the loss of bone mass (osteoporosis)    Manage stress better  Exercise tips  Ease into your routine. Set small goals. Then build on them.  Exercise on most days. Aim for a total of 150 or more minutes of moderate to  vigorous intensity activity each week. Consider 40 minutes, 3 to 4 times a week. For best results, activity should last for 40 minutes on average. It is OK to work up to the 40 minute period over time. Examples of moderate-intensity activity is walking one mile in 15 minutes or 30 to 45  minutes of yard work.  Step up your daily activity level. Along with your exercise program, try being more active throughout the day. Walk instead of drive. Do more household tasks or yard work.  Choose one or more activities you enjoy. Walking is one of the easiest things you can do. You can also try swimming, riding a bike, or taking an exercise class.  Stop exercising and call your doctor if you:    Have chest pain or feel dizzy or lightheaded    Feel burning, tightness, pressure, or heaviness in your chest, neck, shoulders, back, or arms    Have unusual shortness of breath    Have increased joint or muscle pain    Have palpitations or an irregular heartbeat      8891-2401 [employer]. [address] [place] [address], [place] [address]. All rights reserved. This information is not intended as a substitute for professional medical care. Always follow your healthcare professional's instructions.         Patient Education   Understanding CH4e MyPlate  The USDA (US Department of Agriculture) has guidelines to help you make healthy food choices. These are called MyPlate. MyPlate shows the food groups that make up healthy meals using the image of a place setting. Before you eat, think about the healthiest choices for what to put onto your plate or into your cup or bowl. To learn more about building a healthy plate, visit www.choosemyplate.gov.       The Food Groups    Fruits: Any fruit or 100% fruit juice counts as part of the Fruit Group. Fruits may be fresh, canned, frozen, or dried, and may be whole, cut-up, or pureed. Make half your plate fruits and vegetables.    Vegetables: Any vegetable or 100% vegetable juice counts as a member of the Vegetable Group. Vegetables may be fresh, frozen, canned, or dried. They can be served raw or cooked and may be whole, cut-up, or mashed. Make half your plate fruits and vegetables.     Grains: All foods made from grains are part of the Grains Group. These include wheat,  rice, oats, cornmeal, and barley such as bread, pasta, oatmeal, cereal, tortillas, and grits. Grains should be no more than a quarter of your plate. At least half of your grains should be whole grains.    Protein: This group includes meat, poultry, seafood, beans and peas, eggs, processed soy products (like tofu), nuts (including nut butters), and seeds. Make protein choices no more than a quarter of your plate. Meat and poultry choices should be lean or low fat.    Dairy: All fluid milk products and foods made from milk that contain calcium, like yogurt and cheese are part of the Dairy Group. (Foods that have little calcium, such as cream, butter, and cream cheese, are not part of the group.) Most dairy choices should be low-fat or fat-free.    Oils: These are fats that are liquid at room temperature. They include canola, corn, olive, soybean, and sunflower oil. Foods that are mainly oil include mayonnaise, certain salad dressings, and soft margarines. You should have only 5 to 7 teaspoons of oils a day. You probably already get this much from the food you eat.  Use [employer] to Help Build Your Meals  The SuperTracker can help you plan and track your meals and activity. You can look up individual foods to see or compare their nutritional value. You can get guidelines for what and how much you should eat. You can compare your food choices. And you can assess personal physical activities and see ways you can improve. Go to www.chooseMicrolight Sensorsplate.gov/supertracker/.    6930-6211 [employer]. [address] [place] [address], [place] [address]. All rights reserved. This information is not intended as a substitute for professional medical care. Always follow your healthcare professional's instructions.           Patient Education   Alcohol Use   Many people can enjoy a glass of wine or beer without any negative consequences to their health. According to the Centers for Disease Control and Prevention (CDC), having one  or fewer drinks per day for women and two or fewer per day for men is considered moderate drinking.     When people drink more than moderately, it can become concerning. Excessive drinking is defined as consuming 15 drinks or more per week for men and 8 drinks or more per week for women. There are various health problems associated with excessive drinking, which include:    Damage to vital organs like the heart, brain, liver and pancreas    Harm to the digestive tract    Weaken the immune system    Higher risk for heart disease and cancer       Patient Education   Understanding Advance Care Planning  Advance care planning is the process of deciding ones own future medical care. It helps ensure that if you cant speak for yourself, your wishes can still be carried out. The plan is a series of legal documents that note a persons wishes. The documents vary by state. Advance care planning may be done when a person has a serious illness that is expected to get worse. It may be done before major surgery. And it can help you and your family be prepared in case of a major illness or injury. Advance care planning helps with making decisions at these times.       A health care proxy is a person who acts as the voice of a patient when the patient cant speak for himself or herself. The name of this role varies by state. It may be called a Durable Medical Power of  or Durable Power of  for Healthcare. It may be called an agent, surrogate, or advocate. Or it may be called a representative or decision maker. It is an official duty that is identified by a legal document. The document also varies by state.    Why Is Advance Care Planning Important?  If a person communicates their healthcare wishes:    They will be given medical care that matches their values and goals.    Their family members will not be forced to make decisions in a crisis with no guidance.  Creating a Plan  Making an advance care plan is often done  in 3 steps:    Thinking about ones wishes. To create an advance care plan, you should think about what kind of medical treatment you would want if you lose the ability to communicate. Are there any situations in which you would refuse or stop treatment? Are there therapies you would want or not want? And whom do you want to make decisions for you? There are many places to learn more about how to plan for your care. Ask your doctor or  for resources.    Picking a health care proxy. This means choosing a trusted person to speak for you only when you cant speak for yourself. When you cannot make medical decisions, your proxy makes sure the instructions in your advance care plan are followed. A proxy does not make decisions based on his or her own opinions. They must put aside those opinions and values if needed, and carry out your wishes.    Filling out the legal documents. There are several kinds of legal documents for advance care planning. Each one tells health care providers your wishes. The documents may vary by state. They must be signed and may need to be witnessed or notarized. You can cancel or change them whenever you wish. Depending on your state, the documents may include a Healthcare Proxy form, Living Will, Durable Medical Power of , Advance Directive, or others.  The Familys Role  The best help a family can give is to support their loved ones wishes. Open and honest communication is vital. Family should express any concerns they have about the patients choices while the patient can still make decisions.    9234-5246 [employer]. [address] [place] [address]. All rights reserved. This information is not intended as a substitute for professional medical care. Always follow your healthcare professional's instructions.         Also, [facility] [employer] [place] offers a free, downloadable health care directive that allows you to share your treatment choices and  personal preferences if you cannot communicate your wishes. It also allows you to appoint another person (called a health care agent) to make health care decisions if you are unable to do so. You can download an advance directive by going here: http://[email or web address]/honoring-choices.[email or web address]     Patient Education   Personalized Prevention Plan  You are due for the preventive services outlined below.  Your care team is available to assist you in scheduling these services.  If you have already completed any of these items, please share that information with your care team to update in your medical record.  Health Maintenance   Topic Date Due   ? ZOSTER VACCINES (1 of 2) 01/09/2005   ? INFLUENZA VACCINE RULE BASED (1) 08/01/2019   ? PNEUMOCOCCAL IMMUNIZATION 65+ LOW/MEDIUM RISK (1 of 2 - PCV13) 01/09/2020   ? MEDICARE ANNUAL WELLNESS VISIT  03/02/2021   ? FALL RISK ASSESSMENT  03/02/2021   ? COLONOSCOPY  01/14/2023   ? ADVANCE CARE PLANNING  03/02/2025   ? TD 18+ HE  11/20/2028   ? TDAP ADULT ONE TIME DOSE  Completed   ? HIV SCREENING  Discontinued         Recommending shingles vaccine, Shingrix.  This can be obtained at your pharmacy.    Schedule colonoscopy    CT chest in October 2020 for lung cancer screening and to follow-up thoracic aortic aneurysm    Quit tobacco use!

## 2021-06-18 NOTE — PROGRESS NOTES
Office Visit - Follow Up   Cesar Murillo   63 y.o. male    Date of Visit: 5/17/2018    Chief Complaint   Patient presents with     Hypertension     ankle pain     right     Hip Pain     right     Hemorrhoids     issues with bowel movement     wants chest x ray        Assessment and Plan   1. Essential hypertension  Blood pressure looks reasonably well-controlled with current dose of lisinopril 20 mg daily.  We discussed continued efforts of sodium restriction and getting regular exercise.    2. Chronic pain of right ankle  History of calcaneal fracture with surgery.  Worsening pain over the past 2 years raising concern for possible development of osteoarthritis in the ankle.  I suggested that he follow-up with his surgeon, Dr. Mireles to further discuss.  - Ambulatory referral to Orthopedics    3. Tobacco abuse  Counseled to discontinue tobacco but unfortunately he has no interested in quitting at this time.  Continue annual low-dose CT scan for lung cancer screening  - CT Low Dose Lung Screening Chest; Future    Lung Cancer Screening pre-scan counseling Visit    The patient fits the risk profile of patients who benefit from this screening:  -The patient is >55 years old and <80 years old  -The patient has 40 pack year history (over 30)  -The patient has smoked within the past 15 years  -The patient has no medical comorbidity severe enough that it would cause mortality prior to mortality due to the lung cancer attempting to be detected.    Discussion with patient regarding the harms associated with LDCT screening include false-negative and false-positive results, incidental findings, overdiagnosis, and radiation exposure were reviewed at length.   The patient understands that pursuing this screening test may result in a biopsy that was not necessary. It may also produce added stress over a nodule that is likely not cancer.    Of 100 patients who get screening, 25 will have a positive scan. Of those 25, only 1 will  have cancer.  Overdiagnosis is estimated at 10% of patients-- they would not have been detected in the patient's lifetime without screening. Less than 1% of patients likely had death related to radiation exposure increase.   Average low-dose CT associated with 0.61 to 1.5 mSv. Annual background radiation exposure in the [place] averages 2.4 mSv; mammogram is 0.7mSv.    The benefits are reduction in risk of death from lung cancer. The number needed to treat is 320 (for every 320 patients who undergo screening, 1 patient will have a benefit in mortality from early detection from the screening).    Undergoing this screening implies willingness to pursue further potentially invasive testing to discover potential cancer.    All questions were answered.    The patient was counseled regarding smoking cessation and its risk for lung cancer.    The patient's results will be followed in our pulmonary registry and will be recalled based on the findings of the CT scan.      4. Thoracic aortic aneurysm  Measuring 4.3 cm on last year CT scan.  Continue follow-up    5. External hemorrhoids  Discussed using Metamucil 1 teaspoon daily with large glass of water along with stool softener to avoid constipation and hard stools    Return in about 6 months (around 11/17/2018) for Annual physical.     History of Present Illness   This 63 y.o. old male here to follow-up multiple concerns.  Having chronic pain involving right ankle.  He had surgery for calcaneal fracture several years ago and has been experiencing increasing pain over the past 2 years.  He will use Aleve intermittently to treat this pain as well as other osteoarthritis pain.  Unfortunately continues to smoke cigarettes and has no interest in quitting.  He had CT scan of his chest last August for lung cancer screening showing no nodules and is interested in continued screening.  We discussed problems he has with recurrent external hemorrhoid.  He does have intermittent  "constipation and stools can be hard.  He does not use any Metamucil.  His blood pressure was not at goal at his last visit but looking better and remains on 20 mg of lisinopril daily.  Using lorazepam infrequently to manage anxiety.    Review of Systems: Denies any chest pain.  No abdominal pain otherwise, a comprehensive review of systems was negative except as noted.     Medications, Allergies and Problem List   Patient Active Problem List   Diagnosis     External hemorrhoids     Anxiety, generalized     Thoracic aortic aneurysm     Chronic hepatitis C     Chronic insomnia     Erectile dysfunction     Essential hypertension     History of alcohol abuse     Osteoarthritis of multiple joints     Tobacco abuse     Chronic pain of right ankle       He has a past surgical history that includes Ankle fracture surgery; Knee arthroscopy (Bilateral); Foot fracture surgery (09/2013); Wrist surgery; and Colonoscopy.    No Known Allergies    Current Outpatient Prescriptions   Medication Sig Dispense Refill     lisinopril (PRINIVIL,ZESTRIL) 20 MG tablet TAKE 1 TABLET BY MOUTH EVERY DAY 90 tablet 1     LORazepam (ATIVAN) 1 MG tablet TAKE 1 TABLET BY MOUTH AT BEDTIME FOR INSOMNIA 15 tablet 0     PREVIDENT 5000 BOOSTER PLUS 1.1 % Pste BRUSH WITH A SMALL AMOUNT ONCE DAILY INSTEAD OF TOOTHPASTE, THEN EXPECTORATE. DO NOT SWALLOW.  2     sildenafil (VIAGRA) 100 MG tablet Take  mg by mouth daily as needed. Up to once a day.       traMADol (ULTRAM) 50 mg tablet   1     No current facility-administered medications for this visit.         Physical Exam   General Appearance:   Well-appearing middle-aged male    /86 (Patient Site: Left Arm, Patient Position: Sitting, Cuff Size: Adult Regular)  Pulse 70  Ht 5' 9\" (1.753 m)  Wt 196 lb (88.9 kg)  SpO2 97%  BMI 28.94 kg/m2          No peripheral edema.  Good pedal pulses.  Normal dorsiflexion and plantar flexion at ankle but otherwise limited range of motion.  Some palpable " tenderness distal to fibula.     Additional Information   Social History   Substance Use Topics     Smoking status: Current Every Day Smoker     Types: Cigarettes     Smokeless tobacco: Never Used     Alcohol use Yes      Comment: 2-3 every day              Sen Leon MD

## 2021-06-18 NOTE — PATIENT INSTRUCTIONS - HE
Patient Instructions by Sen Leon MD at 5/13/2021 11:40 AM     Author: Sen Leon MD Service: -- Author Type: Physician    Filed: 5/13/2021 12:06 PM Encounter Date: 5/13/2021 Status: Signed    : Sen Leon MD (Physician)         Patient Education     Shingles  Shingles is a viral infection caused by the same virus as chicken pox. Anyone who has had chicken pox may get shingles later in life. The virus stays in the body, but remains dormant (asleep). Shingles often occurs in older persons or persons with lowered immunity. But it can affect anyone at any age.  Shingles starts as a tingling patch of skin on one side of the body. Small, painful blisters may then appear. The rash does not spread to the rest of the body.  Exposure to shingles cannot cause shingles. However, it can cause chicken pox in anyone who has not had chicken pox or has not been vaccinated. The contagious period ends when all blisters have crusted over (generally about 2 weeks after the illness begins).  After the blisters heal, the affected skin may be sensitive or painful for months (neuralgia). This often gradually goes away.  A shingles vaccine is available. This can help prevent shingles or make it less painful. It is generally recommended for adults over the age of 60 who have had chicken pox in the past, but who have never had shingles. Adults over 60 who have had neither chicken pox nor shingles can prevent both diseases with the chicken pox vaccine. Ask your healthcare provider about these vaccines.  Home care    Medicines may be prescribed to help relieve pain. Take these medicines as directed. Ask your healthcare provider or pharmacist before using over-the-counter medicines for helping treat pain and itching.    In certain cases, antiviral medicines may be prescribed to reduce pain, shorten the illness, and prevent neuralgia. Take these medicines as directed.    Compresses made from a solution of  cool water mixed with cornstarch or baking soda may help relieve pain and itching.     Gently wash skin daily with soap and water to help prevent infection.  Be certain to rinse off all of the soap, which can be irritating.    Trim fingernails and try not to scratch. Scratching the sores may leave scars.    Stay home from work or school until all blisters have formed a crust and you are no longer contagious.  Follow-up care  Follow up with your healthcare provider or as directed by our staff.  When to seek medical advice    Fever of 100.4 F (38 C) or higher, or as directed by your healthcare provider    Affected skin is on the face or neck and any of the following occur:  ? Headache  ? Eye pain  ? Changes in vision  ? Sores near the eye  ? Weakness of facial muscles    Pain, redness, or swelling of a joint    Signs of skin infection: colored drainage from the sores, warmth, increasing redness, or increasing pain  Date Last Reviewed: 9/25/2015 2000-2017 [employer]. [address] [place] [address], [place] [address]. All rights reserved. This information is not intended as a substitute for professional medical care. Always follow your healthcare professional's instructions.

## 2021-06-18 NOTE — PATIENT INSTRUCTIONS - HE
Patient Instructions by Sen Leon MD at 4/9/2021 11:20 AM     Author: Sen Leon MD Service: -- Author Type: Physician    Filed: 4/9/2021 12:15 PM Encounter Date: 4/9/2021 Status: Addendum    : Sen Leon MD (Physician)    Related Notes: Original Note by Sen Leon MD (Physician) filed at 4/9/2021 11:59 AM         Patient Education     Exercise for a Healthier Heart  You may wonder how you can improve the health of your heart. If youre thinking about exercise, youre on the right track. You dont need to become an athlete, but you do need a certain amount of brisk exercise to help strengthen your heart. If you have been diagnosed with a heart condition, your doctor may recommend exercise to help stabilize your condition. To help make exercise a habit, choose safe, fun activities.       Be sure to check with your health care provider before starting an exercise program.    Why exercise?  Exercising regularly offers many healthy rewards. It can help you do all of the following:    Improve your blood cholesterol levels to help prevent further heart trouble    Lower your blood pressure to help prevent a stroke or heart attack    Control diabetes, or reduce your risk of getting this disease    Improve your heart and lung function    Reach and maintain a healthy weight    Make your muscles stronger and more limber so you can stay active    Prevent falls and fractures by slowing the loss of bone mass (osteoporosis)    Manage stress better  Exercise tips  Ease into your routine. Set small goals. Then build on them.  Exercise on most days. Aim for a total of 150 or more minutes of moderate to  vigorous intensity activity each week. Consider 40 minutes, 3 to 4 times a week. For best results, activity should last for 40 minutes on average. It is OK to work up to the 40 minute period over time. Examples of moderate-intensity activity is walking one mile in 15 minutes or 30 to 45  minutes of yard work.  Step up your daily activity level. Along with your exercise program, try being more active throughout the day. Walk instead of drive. Do more household tasks or yard work.  Choose one or more activities you enjoy. Walking is one of the easiest things you can do. You can also try swimming, riding a bike, or taking an exercise class.  Stop exercising and call your doctor if you:    Have chest pain or feel dizzy or lightheaded    Feel burning, tightness, pressure, or heaviness in your chest, neck, shoulders, back, or arms    Have unusual shortness of breath    Have increased joint or muscle pain    Have palpitations or an irregular heartbeat      5682-3077 [employer]. [address] [place] [address], [place] [address]. All rights reserved. This information is not intended as a substitute for professional medical care. Always follow your healthcare professional's instructions.         Patient Education   Alcohol Use   Many people can enjoy a glass of wine or beer without any negative consequences to their health. According to the Centers for Disease Control and Prevention (CDC), having one or fewer drinks per day for women and two or fewer per day for men is considered moderate drinking.     When people drink more than moderately, it can become concerning. Excessive drinking is defined as consuming 15 drinks or more per week for men and 8 drinks or more per week for women. There are various health problems associated with excessive drinking, which include:    Damage to vital organs like the heart, brain, liver and pancreas    Harm to the digestive tract    Weaken the immune system    Higher risk for heart disease and cancer       Patient Education   Understanding USDA MyPlate  The USDA (US Department of Agriculture) has guidelines to help you make healthy food choices. These are called MyPlate. MyPlate shows the food groups that make up healthy meals using the image of a place setting.  Before you eat, think about the healthiest choices for what to put onto your plate or into your cup or bowl. To learn more about building a healthy plate, visit www.choosemyplate.gov.       The Food Groups    Fruits: Any fruit or 100% fruit juice counts as part of the Fruit Group. Fruits may be fresh, canned, frozen, or dried, and may be whole, cut-up, or pureed. Make half your plate fruits and vegetables.    Vegetables: Any vegetable or 100% vegetable juice counts as a member of the Vegetable Group. Vegetables may be fresh, frozen, canned, or dried. They can be served raw or cooked and may be whole, cut-up, or mashed. Make half your plate fruits and vegetables.     Grains: All foods made from grains are part of the Grains Group. These include wheat, rice, oats, cornmeal, and barley such as bread, pasta, oatmeal, cereal, tortillas, and grits. Grains should be no more than a quarter of your plate. At least half of your grains should be whole grains.    Protein: This group includes meat, poultry, seafood, beans and peas, eggs, processed soy products (like tofu), nuts (including nut butters), and seeds. Make protein choices no more than a quarter of your plate. Meat and poultry choices should be lean or low fat.    Dairy: All fluid milk products and foods made from milk that contain calcium, like yogurt and cheese are part of the Dairy Group. (Foods that have little calcium, such as cream, butter, and cream cheese, are not part of the group.) Most dairy choices should be low-fat or fat-free.    Oils: These are fats that are liquid at room temperature. They include canola, corn, olive, soybean, and sunflower oil. Foods that are mainly oil include mayonnaise, certain salad dressings, and soft margarines. You should have only 5 to 7 teaspoons of oils a day. You probably already get this much from the food you eat.  Use [employer] to Help Build Your Meals  The [employer] can help you plan and track your meals and  activity. You can look up individual foods to see or compare their nutritional value. You can get guidelines for what and how much you should eat. You can compare your food choices. And you can assess personal physical activities and see ways you can improve. Go to www.choosemyplate.gov/supertracker/.    3906-1084 [employer]. [address] [place] [address], [place] [address]. All rights reserved. This information is not intended as a substitute for professional medical care. Always follow your healthcare professional's instructions.           Patient Education   Understanding Advance Care Planning  Advance care planning is the process of deciding ones own future medical care. It helps ensure that if you cant speak for yourself, your wishes can still be carried out. The plan is a series of legal documents that note a persons wishes. The documents vary by state. Advance care planning may be done when a person has a serious illness that is expected to get worse. It may be done before major surgery. And it can help you and your family be prepared in case of a major illness or injury. Advance care planning helps with making decisions at these times.       A health care proxy is a person who acts as the voice of a patient when the patient cant speak for himself or herself. The name of this role varies by state. It may be called a Durable Medical Power of  or Durable Power of  for Healthcare. It may be called an agent, surrogate, or advocate. Or it may be called a representative or decision maker. It is an official duty that is identified by a legal document. The document also varies by state.    Why Is Advance Care Planning Important?  If a person communicates their healthcare wishes:    They will be given medical care that matches their values and goals.    Their family members will not be forced to make decisions in a crisis with no guidance.  Creating a Plan  Making an advance care plan is often  done in 3 steps:    Thinking about ones wishes. To create an advance care plan, you should think about what kind of medical treatment you would want if you lose the ability to communicate. Are there any situations in which you would refuse or stop treatment? Are there therapies you would want or not want? And whom do you want to make decisions for you? There are many places to learn more about how to plan for your care. Ask your doctor or  for resources.    Picking a health care proxy. This means choosing a trusted person to speak for you only when you cant speak for yourself. When you cannot make medical decisions, your proxy makes sure the instructions in your advance care plan are followed. A proxy does not make decisions based on his or her own opinions. They must put aside those opinions and values if needed, and carry out your wishes.    Filling out the legal documents. There are several kinds of legal documents for advance care planning. Each one tells health care providers your wishes. The documents may vary by state. They must be signed and may need to be witnessed or notarized. You can cancel or change them whenever you wish. Depending on your state, the documents may include a Healthcare Proxy form, Living Will, Durable Medical Power of , Advance Directive, or others.  The Familys Role  The best help a family can give is to support their loved ones wishes. Open and honest communication is vital. Family should express any concerns they have about the patients choices while the patient can still make decisions.    5628-8866 [employer]. [address] [place] [address], [place] [address]. All rights reserved. This information is not intended as a substitute for professional medical care. Always follow your healthcare professional's instructions.         Also, [employer] [place] offers a free, downloadable health care directive that allows you to share your treatment choices  and personal preferences if you cannot communicate your wishes. It also allows you to appoint another person (called a health care agent) to make health care decisions if you are unable to do so. You can download an advance directive by going here: http://[email or web address]/[email or web address]     Patient Education   Personalized Prevention Plan  You are due for the preventive services outlined below.  Your care team is available to assist you in scheduling these services.  If you have already completed any of these items, please share that information with your care team to update in your medical record.  Health Maintenance Due   Topic Date Due   ? ZOSTER VACCINES (1 of 2) Never done         Recommending shingles vaccine, Shingrix.  This can be obtained at your pharmacy    You should also get a Pneumovax 23 (pneumonia vaccine) completed this year    Colonoscopy should be repeated in 2025    Recommending annual eye exam with your optometrist or ophthalmologist    You will be contacted regarding the CT chest    I would consider taking Chantix to help with smoking cessation.    Make sure you are using alcohol only in moderation averaging one or two drinks per day at the most

## 2021-06-19 NOTE — PROGRESS NOTES
Office Visit - Follow Up   Cesar Murillo   63 y.o. male    Date of Visit: 7/23/2018    Chief Complaint   Patient presents with     Abdominal Pain     Back Pain     severe at night        Assessment and Plan   1. Lower abdominal pain with back pain  Unclear etiology of acute pain involving lower abdomen present for the past 2 weeks.  Coincides with pain in lower back and I assume this is referred pain.  Discussed possibility of diverticulitis as well as other etiologies.  We will proceed with appropriate lab studies and will make arrangements for CT abdomen/pelvis to be done today.  - HM2(CBC w/o Differential)  - Sedimentation Rate  - Comprehensive Metabolic Panel  - Lipase  - Urinalysis  - CT Abdomen Pelvis With Oral With IV Contrast; Future    Addendum.  All labs returned normal as did CT abdomen/pelvis.  Begin MiraLAX 1 scoop twice daily for severe constipation.        3. Tick bite, subsequent encounter  He raises concerns about Lyme.  Current symptoms are not consistent with this diagnosis but he has removed multiple ticks this summer and is interested in being screened  - Lyme Antibody [place]    No Follow-up on file.     History of Present Illness   This 63 y.o. old male with hypertension who is here with 2 weeks of pain in his abdomen along with low back pain.  Pain is described as cramping and severe.  Primarily located in lower abdomen and is bilateral.  Pain is in the middle of his lower back without radiation into his legs.  Worse when he lies down at night.  He rates this as severe and is actually worse than the abdominal pain.  No previous history of chronic abdominal or back pain.  He has noticed some increasing constipation.  He will have intermittent rectal bleeding secondary to hemorrhoids but nothing new.  No melena or hematochezia.  Denies any nausea.  Pain is occasionally worse after meals but can occur even when not eating.  No associated nausea or emesis.  Last colonoscopy normal 4 years  "ago.    Review of Systems: No fevers or chills.  8 pound weight loss.   No hematuria or dysuria.     Medications, Allergies and Problem List   Patient Active Problem List   Diagnosis     External hemorrhoids     Anxiety, generalized     Thoracic aortic aneurysm (H)     Chronic hepatitis C (H)     Chronic insomnia     Erectile dysfunction     Essential hypertension     History of alcohol abuse     Osteoarthritis of multiple joints     Tobacco abuse     Chronic pain of right ankle     Lower abdominal pain     Acute bilateral low back pain without sciatica       He has a past surgical history that includes Ankle fracture surgery; Knee arthroscopy (Bilateral); Foot fracture surgery (09/2013); Wrist surgery; and Colonoscopy.    No Known Allergies    Current Outpatient Prescriptions   Medication Sig Dispense Refill     docusate sodium (STOOL SOFTENER ORAL) Take by mouth.       lisinopril (PRINIVIL,ZESTRIL) 20 MG tablet TAKE 1 TABLET BY MOUTH EVERY DAY 90 tablet 1     LORazepam (ATIVAN) 1 MG tablet TAKE 1 TABLET BY MOUTH AT BEDTIME FOR INSOMNIA 15 tablet 0     PREVIDENT 5000 BOOSTER PLUS 1.1 % Pste BRUSH WITH A SMALL AMOUNT ONCE DAILY INSTEAD OF TOOTHPASTE, THEN EXPECTORATE. DO NOT SWALLOW.  2     sildenafil (VIAGRA) 100 MG tablet Take  mg by mouth daily as needed. Up to once a day.       No current facility-administered medications for this visit.         Physical Exam   General Appearance:   Well-appearing middle-aged male    /86 (Patient Site: Left Arm, Patient Position: Sitting, Cuff Size: Adult Regular)  Pulse 72  Ht 5' 9\" (1.753 m)  Wt 188 lb (85.3 kg)  SpO2 96%  BMI 27.76 kg/m2        Abdomen soft with tenderness to palpation both lower quadrants without masses, hepatosplenomegaly or rebound    No reproducible tenderness involving thoracic or lumbar spine or involving paravertebral muscles     Additional Information   Social History   Substance Use Topics     Smoking status: Current Every Day Smoker "     Types: Cigarettes     Smokeless tobacco: Never Used     Alcohol use Yes      Comment: 2-3 every day             Time: total time spent with the patient was 25 minutes of which >50% was spent in counseling and coordination of care     Sen Leon MD

## 2021-06-19 NOTE — LETTER
Letter by Sen Leon MD at      Author: Sen Leon MD Service: -- Author Type: --    Filed:  Encounter Date: 10/17/2019 Status: Signed         Cesar Murillo  [address] [place] [address]             October 17, 2019         Dear Cesar,    Below are the results from your recent visit:    Resulted Orders   Comprehensive Metabolic Panel   Result Value Ref Range    Sodium 138 136 - 145 mmol/L    Potassium 4.1 3.5 - 5.0 mmol/L    Chloride 106 98 - 107 mmol/L    CO2 27 22 - 31 mmol/L    Anion Gap, Calculation 5 5 - 18 mmol/L    Glucose 101 70 - 125 mg/dL    BUN 11 8 - 22 mg/dL    Creatinine 0.77 0.70 - 1.30 mg/dL    GFR MDRD Af Amer >60 >60 mL/min/1.73m2    GFR MDRD Non Af Amer >60 >60 mL/min/1.73m2    Bilirubin, Total 0.4 0.0 - 1.0 mg/dL    Calcium 9.3 8.5 - 10.5 mg/dL    Protein, Total 7.0 6.0 - 8.0 g/dL    Albumin 3.9 3.5 - 5.0 g/dL    Alkaline Phosphatase 103 45 - 120 U/L    AST 14 0 - 40 U/L    ALT 13 0 - 45 U/L    Narrative    Fasting Glucose reference range is 70-99 mg/dL per  American Diabetes Association (ADA) guidelines.   HM2(CBC w/o Differential)   Result Value Ref Range    WBC 7.1 4.0 - 11.0 thou/uL    RBC 4.92 4.40 - 6.20 mill/uL    Hemoglobin 15.8 14.0 - 18.0 g/dL    Hematocrit 46.7 40.0 - 54.0 %    MCV 95 80 - 100 fL    MCH 32.1 27.0 - 34.0 pg    MCHC 33.8 32.0 - 36.0 g/dL    RDW 11.2 11.0 - 14.5 %    Platelets 152 140 - 440 thou/uL    MPV 9.2 7.0 - 10.0 fL   Thyroid Stimulating Hormone (TSH)   Result Value Ref Range    TSH 2.95 0.30 - 5.00 uIU/mL   Testosterone, Total and Free   Result Value Ref Range    Testosterone, Total 820 240 - 950 ng/dL      Comment:      This test was developed and its performance characteristics determined by the  [facility],  [facility]. It has  not been cleared or approved by the FDA. The laboratory is regulated under CLIA  as qualified to perform high-complexity testing. This test is used for  clinical  purposes. It should not be regarded as investigational or for research.    Sex Hormone Bind Globulin 94 (H) 11 - 80 nmol/L    Free Testosterone Calc 8.53 4.7 - 24.4 ng/dL      Comment:        Performed and/or entered by:  [facility]  [address] [place] [address]   Vitamin B12   Result Value Ref Range    Vitamin B-12 458 213 - 816 pg/mL       All of your labs look good. No diabetes. Normal kidney and liver function. Normal testosterone levels. No anemia. Normal B12 level.    Please call with questions or contact us using [employer].    Sincerely,        Electronically signed by Sen Leon MD

## 2021-06-20 NOTE — LETTER
Letter by Sen Leon MD at      Author: Sen Leon MD Service: -- Author Type: --    Filed:  Encounter Date: 3/3/2020 Status: (Other)         Cesar Murillo  [address] [place] [address]             March 3, 2020         Dear Cesar,    Below are the results from your recent visit:    Resulted Orders   PSA (Prostatic-Specific Antigen), Annual Screen   Result Value Ref Range    PSA 0.5 0.0 - 4.5 ng/mL    Narrative    Method is Abbott Prostate-Specific Antigen (PSA)  Standard-WHO 1st International (90:10)   Lipid [place], FASTING   Result Value Ref Range    Cholesterol 173 <=199 mg/dL    Triglycerides 66 <=149 mg/dL    HDL Cholesterol 42 >=40 mg/dL    LDL Calculated 118 <=129 mg/dL    Patient Fasting > 8hrs? Yes    HM2(CBC w/o Differential)   Result Value Ref Range    WBC 8.9 4.0 - 11.0 thou/uL    RBC 4.61 4.40 - 6.20 mill/uL    Hemoglobin 15.0 14.0 - 18.0 g/dL    Hematocrit 43.8 40.0 - 54.0 %    MCV 95 80 - 100 fL    MCH 32.4 27.0 - 34.0 pg    MCHC 34.1 32.0 - 36.0 g/dL    RDW 11.3 11.0 - 14.5 %    Platelets 167 140 - 440 thou/uL    MPV 9.0 7.0 - 10.0 fL   Comprehensive Metabolic Panel   Result Value Ref Range    Sodium 137 136 - 145 mmol/L    Potassium 4.3 3.5 - 5.0 mmol/L    Chloride 104 98 - 107 mmol/L    CO2 22 22 - 31 mmol/L    Anion Gap, Calculation 11 5 - 18 mmol/L    Glucose 103 70 - 125 mg/dL    BUN 11 8 - 22 mg/dL    Creatinine 0.77 0.70 - 1.30 mg/dL    GFR MDRD Af Amer >60 >60 mL/min/1.73m2    GFR MDRD Non Af Amer >60 >60 mL/min/1.73m2    Bilirubin, Total 0.5 0.0 - 1.0 mg/dL    Calcium 9.2 8.5 - 10.5 mg/dL    Protein, Total 6.7 6.0 - 8.0 g/dL    Albumin 4.0 3.5 - 5.0 g/dL    Alkaline Phosphatase 96 45 - 120 U/L    AST 14 0 - 40 U/L    ALT 10 0 - 45 U/L    Narrative    Fasting Glucose reference range is 70-99 mg/dL per  American Diabetes Association (ADA) guidelines.   Urinalysis-UC if Indicated   Result Value Ref Range    Color, UA Yellow Colorless, Yellow, Straw, Light Yellow     Clarity, UA Clear Clear    Glucose, UA Negative Negative    Bilirubin, UA Negative Negative    Ketones, UA Negative Negative    Specific Gravity, UA 1.020 1.005 - 1.030    Blood, UA Negative Negative    pH, UA 5.5 5.0 - 8.0    Protein, UA Negative Negative mg/dL    Urobilinogen, UA 0.2 E.U./dL 0.2 E.U./dL, 1.0 E.U./dL    Nitrite, UA Negative Negative    Leukocytes, UA Negative Negative    Narrative    Microscopic not indicated  UC not indicated       Your labs are looking good.  Cholesterol well controlled.  PSA is very low and reassuring for no prostate cancer.  Normal liver studies.  Normal kidney function.  Normal urinalysis.  No anemia.  No diabetes.    Please call with questions or contact us using [email or web address].    Sincerely,        Electronically signed by Sen Leon MD

## 2021-06-21 NOTE — LETTER
Letter by Sen Leon MD at      Author: Sen Leon MD Service: -- Author Type: --    Filed:  Encounter Date: 4/11/2021 Status: (Other)         Cesar Murillo  [address] [place] [address]             April 11, 2021         Dear Cesar,    Below are the results from your recent visit:    Resulted Orders   PSA (Prostatic-Specific Antigen), Annual Screen   Result Value Ref Range    PSA 0.5 0.0 - 4.5 ng/mL    Narrative    Method is Abbott Prostate-Specific Antigen (PSA)  Standard-WHO 1st International (90:10)   Lipid [place], FASTING   Result Value Ref Range    Cholesterol 181 <=199 mg/dL    Triglycerides 109 <=149 mg/dL    HDL Cholesterol 39 (L) >=40 mg/dL    LDL Calculated 120 <=129 mg/dL    Patient Fasting > 8hrs? Yes    HM2(CBC w/o Differential)   Result Value Ref Range    WBC 8.4 4.0 - 11.0 thou/uL    RBC 4.79 4.40 - 6.20 mill/uL    Hemoglobin 15.2 14.0 - 18.0 g/dL    Hematocrit 45.3 40.0 - 54.0 %    MCV 95 80 - 100 fL    MCH 31.7 27.0 - 34.0 pg    MCHC 33.6 32.0 - 36.0 g/dL    RDW 11.7 11.0 - 14.5 %    Platelets 150 140 - 440 thou/uL    MPV 10.3 (H) 7.0 - 10.0 fL   Comprehensive Metabolic Panel   Result Value Ref Range    Sodium 140 136 - 145 mmol/L    Potassium 4.1 3.5 - 5.0 mmol/L    Chloride 106 98 - 107 mmol/L    CO2 25 22 - 31 mmol/L    Anion Gap, Calculation 9 5 - 18 mmol/L    Glucose 104 70 - 125 mg/dL    BUN 11 8 - 22 mg/dL    Creatinine 0.79 0.70 - 1.30 mg/dL    GFR MDRD Af Amer >60 >60 mL/min/1.73m2    GFR MDRD Non Af Amer >60 >60 mL/min/1.73m2    Bilirubin, Total 0.5 0.0 - 1.0 mg/dL    Calcium 8.5 8.5 - 10.5 mg/dL    Protein, Total 6.5 6.0 - 8.0 g/dL    Albumin 3.9 3.5 - 5.0 g/dL    Alkaline Phosphatase 90 45 - 120 U/L    AST 12 0 - 40 U/L    ALT 11 0 - 45 U/L    Narrative    Fasting Glucose reference range is 70-99 mg/dL per  American Diabetes Association (ADA) guidelines.   Urinalysis-UC if Indicated   Result Value Ref Range    Color, UA Yellow Colorless, Yellow, Straw,  Light Yellow    Clarity, UA Clear Clear    Glucose, UA Negative Negative    Protein, UA Trace (!) Negative    Bilirubin, UA Small (!) Negative    Urobilinogen, UA 0.2 E.U./dL 0.2 E.U./dL, 1.0 E.U./dL    pH, UA 6.0 5.0 - 8.0    Blood, UA Negative Negative    Ketones, UA Negative Negative    Nitrite, UA Negative Negative    Leukocytes, UA Negative Negative    Specific Gravity, UA >=1.030 1.005 - 1.030    RBC, UA 3-5 (!) None Seen, 0-2 hpf    WBC, UA 0-5 None Seen, 0-5 hpf    Bacteria, UA Moderate (!) None Seen    Squam Epithel, UA None Seen None Seen, 0-5 lpf    Mucus, UA Many (!) None Seen lpf    Narrative    Urine Culture ordered based on [facility]' criteria   Culture, Urine   Result Value Ref Range    Culture No Growth        Your labs look good.  PSA is reassuring for no prostate cancer.  Cholesterol is well controlled.  No diabetes.  Normal kidney function and normal liver studies.  No anemia.  Nothing of concern on the urinalysis.    Please call with questions or contact us using [employer].    Sincerely,        Electronically signed by Sen Leon MD

## 2021-06-24 NOTE — TELEPHONE ENCOUNTER
Refill Approved    Rx renewed per Medication Renewal Policy. Medication was last renewed on 8/31/18.    Kacie Hogan, [facility] Triage/Med Refill 3/9/2019     Requested Prescriptions   Pending Prescriptions Disp Refills     lisinopril (PRINIVIL,ZESTRIL) 20 MG tablet [Pharmacy Med Name: LISINOPRIL 20MG TABLETS] 90 tablet 0     Sig: TAKE 1 TABLET(20 MG) BY MOUTH DAILY    [facility] Refill Protocol Passed - 3/7/2019  8:06 AM       Passed - PCP or prescribing provider visit in past 12 months      Last office visit with prescriber/PCP: 7/23/2018 Sen Leon MD OR same dept: 7/23/2018 Sen Leon MD OR same specialty: 7/23/2018 Sen Leon MD  Last physical: 11/20/2018 Last MTM visit: Visit date not found   Next visit within 3 mo: Visit date not found  Next physical within 3 mo: Visit date not found  Prescriber OR PCP: Sen Leon MD  Last diagnosis associated with med order: 1. Benign Essential Hypertension  - lisinopril (PRINIVIL,ZESTRIL) 20 MG tablet [Pharmacy Med Name: LISINOPRIL 20MG TABLETS]; TAKE 1 TABLET(20 MG) BY MOUTH DAILY  Dispense: 90 tablet; Refill: 0    If protocol passes may refill for 12 months if within 3 months of last provider visit (or a total of 15 months).            Passed - Serum Potassium in past 12 months    Lab Results   Component Value Date    Potassium 4.1 07/23/2018            Passed - Blood pressure filed in past 12 months    BP Readings from Last 1 Encounters:   11/20/18 130/80            Passed - Serum Creatinine in past 12 months    Creatinine   Date Value Ref Range Status   07/23/2018 0.80 0.70 - 1.30 mg/dL Final

## 2021-06-26 NOTE — PROGRESS NOTES
Progress Notes by Sen Leon MD at 11/20/2018 10:00 AM     Author: Sen Leon MD Service: -- Author Type: Physician    Filed: 11/20/2018 10:52 AM Encounter Date: 11/20/2018 Status: Signed    : Sen Leon MD (Physician)       MALE PREVENTATIVE EXAM    Assessment and Plan:       1. Routine general medical examination at a health care facility  Immunizations are reviewed and will provide flu shot and Tdap.  Recommending Shingrix.  Living will has been discussed.  Discussed smoking cessation, still smoking 1 pack/day.  Discussed using alcohol in moderation.  Regular exercise discussed.  Up to date with colonoscopies and this should be repeated in 2023.  Prostate exam is normal and I will check a PSA for prostate cancer screening.  Recommending eye exam every 2 years.  Skin exam performed and recommending regular use of sunblock.   Fasting glucose normal earlier this year.  Checking fasting lipid profile.  CT negative for AAA earlier this year.  Consider ultrasound at age 70 for screening purposes.      2. Need for immunization against influenza    - Influenza, Recombinant, Inj, Quadrivalent, PF, 18+YRS    3. Tobacco abuse  Recommending that he quit tobacco but unfortunately he is currently not ready to quit.  He understands the health risks.    4. Pulmonary nodule  Continue low-dose CT scan of chest for lung cancer screening.  No change in 3 mm pulmonary nodule seen on August 2018.    5. Thoracic aortic aneurysm without rupture (H)  Stable 4.1 cm thoracic aortic aneurysm.  Continue annual screening    6. Lower abdominal pain  CT scan abdomen normal as were labs this summer.  Suspect related to constipation.  Symptoms have resolved and have not recurred    7. Essential hypertension  Good blood pressure control with current dose of lisinopril    8. Chronic hepatitis C without hepatic coma (H)  Successfully treated with interferon and Harvoni.  LFTs normal with summer    9. Anxiety,  generalized  Uses Lorazepam infrequently.  15 tablets will last 3 months.  He will take it to sleep on days of high stress.    10. Chronic pain of right ankle  Stable    11. Erectile dysfunction due to arterial insufficiency  Uses Viagra as needed    12. Screening for prostate cancer    - PSA, Annual Screen (Prostatic-Specific Antigen)    13. Lipid screening    - Lipid Cascade        Next follow up:  Return in 1 year (on 11/20/2019) for Annual physical.    Immunization Review  Adult Imm Review: Providing flu shot and Tdap.  Recommending Shingrix      I discussed the following with the patient:   Adult Healthy Living: Importance of regular exercise  Healthy nutrition  Smoking cessation    The patient was counseled and encouraged to consider modifying their diet and eating habits. He was provided with information on recommended healthy diet options.      Subjective:   Chief Complaint: Cesar Murillo is an 63 y.o. male here for a preventative health visit.     HPI: No recurrent lower abdominal pain.  Unfortunately still smoking 1 pack/day.  Uses lorazepam infrequently.  No other new concerns.    Healthy Habits  Are you taking a daily aspirin? No  Do you typically exercising at least 40 min, 3-4 times per week?  NO  Do you usually eat at least 4 servings of fruit and vegetables a day, include whole grains and fiber and avoid regularly eating high fat foods? NO  Have you had an eye exam in the past two years? NO  Do you see a dentist twice per year? Yes  Do you have any concerns regarding sleep? No    Safety Screen  If you own firearms, are they secured in a locked gun cabinet or with trigger locks? Yes  Do you feel you are safe where you are living?: Yes (11/20/2018 10:07 AM)  Do you feel you are safe in your relationship(s)?: Yes (11/20/2018 10:07 AM)      Review of Systems:  Please see above.  The rest of the review of systems are negative for all systems.     Cancer Screening       Status Date      COLONOSCOPY Next Due  "1/14/2023      Done 1/14/2013 ENDOSCOPY, COLON              History     Reviewed By Date/Time Sections Reviewed    Sen Leon MD 11/20/2018 10:16 AM Medical, Surgical, Tobacco, Alcohol, Drug Use, Sexual Activity, Family, Social Documentation    Kaykay Camacho CMA 11/20/2018 10:06 AM Tobacco, Alcohol, Drug Use, Sexual Activity            Objective:   Vital Signs:   Visit Vitals  /80 (Patient Site: Left Arm, Patient Position: Sitting, Cuff Size: Adult Regular)   Pulse 68   Ht 5' 9\" (1.753 m)   Wt 190 lb (86.2 kg)   SpO2 98%   BMI 28.06 kg/m           PHYSICAL EXAM  EYES: Eyelids, conjunctiva, and sclera were normal. Pupils were normal. Cornea, iris, and lens were normal bilaterally.  HEAD, EARS, NOSE, MOUTH, AND THROAT: Head and face were normal. Nose appearance was normal and there was no discharge. Oropharynx was normal.  NECK: Neck appearance was normal. There were no neck masses and the thyroid was not enlarged and no nodules are felt.  No lymphadenopathy.  RESPIRATORY: Breathing pattern was normal and the chest moved symmetrically.  Percussion/auscultatory percussion was normal.  Lung sounds were normal and there were no rales or wheezes.  CARDIOVASCULAR: Heart rate and rhythm were normal.  S1 and S2 were normal and there were no extra sounds or murmurs. Peripheral pulses in arms and legs were normal.  Jugular venous pressure was normal.  There was no peripheral edema.  No carotid bruits.  GASTROINTESTINAL: The abdomen was normal in contour.  Bowel sounds were present.   Palpation detected no tenderness, mass, or enlarged organs.   RECTAL/PROSTATE: No external lesions.  Sphincter tone normal.  No palpable rectal lesions.  Prostate normal size, smooth, nontender without nodules  MUSCULOSKELETAL: Skeletal configuration was normal and muscle mass was normal for age. Joint appearance was overall normal.  LYMPHATIC: There were no enlarged nodes.  SKIN/HAIR/NAILS: Skin color was normal.  Hair and " nails were normal.There were no skin lesions.  NEUROLOGIC: The patient was alert and oriented to person, place, time, and circumstance. Speech was normal. Cranial nerves were normal. Motor strength was normal for age. The patient was normally coordinated.  Sensation intact.  PSYCHIATRIC:  Mood and affect were normal and the patient had normal recent and remote memory. The patient's judgment and insight were normal.             Medication List           Accurate as of 11/20/18 10:52 AM. If you have any questions, ask your nurse or doctor.               CONTINUE taking these medications    lisinopril 20 MG tablet  Also known as:  PRINIVIL,ZESTRIL  INSTRUCTIONS:  Take 1 tablet (20 mg total) by mouth daily.        LORazepam 1 MG tablet  Also known as:  ATIVAN  INSTRUCTIONS:  TAKE 1 TABLET BY MOUTH AT BEDTIME FOR INSOMNIA        PREVIDENT 5000 BOOSTER PLUS 1.1 % Pste  INSTRUCTIONS:  BRUSH WITH A SMALL AMOUNT ONCE DAILY INSTEAD OF TOOTHPASTE, THEN EXPECTORATE. DO NOT SWALLOW.  Generic drug:  fluoride (sodium)        STOOL SOFTENER ORAL  INSTRUCTIONS:  Take by mouth.        VIAGRA 100 MG tablet  INSTRUCTIONS:  Take  mg by mouth daily as needed. Up to once a day.  Generic drug:  sildenafil               Additional Screenings Completed Today:

## 2021-07-08 ENCOUNTER — COMMUNICATION - HEALTHEAST (OUTPATIENT)
Dept: INTERNAL MEDICINE | Facility: CLINIC | Age: 66
End: 2021-07-08

## 2021-07-08 DIAGNOSIS — G47.00 INSOMNIA, UNSPECIFIED TYPE: ICD-10-CM

## 2021-07-08 RX ORDER — LORAZEPAM 1 MG/1
TABLET ORAL
Qty: 15 TABLET | Refills: 0 | Status: SHIPPED | OUTPATIENT
Start: 2021-07-08 | End: 2021-08-10

## 2021-07-08 NOTE — TELEPHONE ENCOUNTER
Telephone Encounter by Lizbet Rodriguez RN at 7/8/2021  3:40 AM     Author: Lizbet Rodriguez RN Service: -- Author Type: Registered Nurse    Filed: 7/8/2021  3:40 AM Encounter Date: 7/8/2021 Status: Signed    : Lizbet Rodriguez RN (Registered Nurse)       Controlled Substance Refill Request  Medication Name:   Requested Prescriptions     Pending Prescriptions Disp Refills   ? LORazepam (ATIVAN) 1 MG tablet [Pharmacy Med Name: LORazepam Oral Tablet 1 MG] 15 tablet 0     Sig: Take 1 tablet by mouth at bedtime as needed for insonmia.     Date Last Fill: 5/13/21  Is patient out of medication?: N/A - electronic request  Patient notified refills processed within 3 business days: N/A - electronic request  Requested Pharmacy: [facility]  Submit electronically to pharmacy  Controlled Substance Agreement on file:   Encounter-Level CSA Scan Date:    There are no encounter-level csa scan date.        Last office visit:  5/13/21    Lizbet Rodriguez RN   07/08/21 3:40 AM  [facility] Nurse Advisor

## 2021-07-14 PROBLEM — K62.5 RECTAL BLEEDING: Status: RESOLVED | Noted: 2020-03-02 | Resolved: 2021-04-09

## 2021-07-14 PROBLEM — K92.1 MELENA: Status: RESOLVED | Noted: 2020-05-13 | Resolved: 2021-04-09

## 2021-08-03 PROBLEM — I71.20 THORACIC AORTIC ANEURYSM (H): Status: RESOLVED | Noted: 2017-11-17 | Resolved: 2019-10-16

## 2021-08-03 PROBLEM — I71.20 THORACIC AORTIC ANEURYSM (H): Status: RESOLVED | Noted: 2017-11-17 | Resolved: 2018-08-24

## 2021-08-08 DIAGNOSIS — G47.00 INSOMNIA, UNSPECIFIED TYPE: ICD-10-CM

## 2021-08-10 RX ORDER — LORAZEPAM 1 MG/1
TABLET ORAL
Qty: 15 TABLET | Refills: 0 | Status: SHIPPED | OUTPATIENT
Start: 2021-08-10 | End: 2022-04-14

## 2021-08-11 NOTE — TELEPHONE ENCOUNTER
I will send refill of lorazepam but please call patient and remind him that I would like for him to make this supply last for 3 months.  I just sent a prescription for 15 tablets 1 month ago.

## 2021-08-11 NOTE — TELEPHONE ENCOUNTER
Routing refill request to provider for review/approval because:  Controlled substance    Last Written Prescription Date:  7/8/21  Last Fill Quantity: 15,  # refills: 0   Last office visit provider:  5/13/21     Requested Prescriptions   Pending Prescriptions Disp Refills     LORazepam (ATIVAN) 1 MG tablet [Pharmacy Med Name: LORazepam Oral Tablet 1 MG] 15 tablet 0     Sig: Take 1 tablet by mouth at bedtime as needed for insonmia.       There is no refill protocol information for this order          leslee perkins RN 08/10/21 8:11 PM

## 2021-08-12 NOTE — TELEPHONE ENCOUNTER
Notified pt that rx was sent and that Dr Leon would like him to make the 15 tablets last 3 months.

## 2022-04-14 ENCOUNTER — OFFICE VISIT (OUTPATIENT)
Dept: INTERNAL MEDICINE | Facility: CLINIC | Age: 67
End: 2022-04-14
Payer: COMMERCIAL

## 2022-04-14 VITALS
TEMPERATURE: 98.1 F | BODY MASS INDEX: 28.95 KG/M2 | HEIGHT: 68 IN | WEIGHT: 191 LBS | SYSTOLIC BLOOD PRESSURE: 136 MMHG | DIASTOLIC BLOOD PRESSURE: 80 MMHG | HEART RATE: 61 BPM | OXYGEN SATURATION: 98 %

## 2022-04-14 DIAGNOSIS — I10 ESSENTIAL HYPERTENSION: ICD-10-CM

## 2022-04-14 DIAGNOSIS — Z12.5 SCREENING FOR PROSTATE CANCER: ICD-10-CM

## 2022-04-14 DIAGNOSIS — Z72.0 TOBACCO ABUSE: ICD-10-CM

## 2022-04-14 DIAGNOSIS — Z13.220 LIPID SCREENING: ICD-10-CM

## 2022-04-14 DIAGNOSIS — R05.3 CHRONIC COUGH: ICD-10-CM

## 2022-04-14 DIAGNOSIS — F41.1 ANXIETY, GENERALIZED: ICD-10-CM

## 2022-04-14 DIAGNOSIS — Z86.0100 HISTORY OF COLON POLYPS: ICD-10-CM

## 2022-04-14 DIAGNOSIS — I71.20 THORACIC AORTIC ANEURYSM WITHOUT RUPTURE (H): ICD-10-CM

## 2022-04-14 DIAGNOSIS — Z00.00 ENCOUNTER FOR MEDICARE ANNUAL WELLNESS EXAM: Primary | ICD-10-CM

## 2022-04-14 DIAGNOSIS — N52.9 ERECTILE DYSFUNCTION, UNSPECIFIED ERECTILE DYSFUNCTION TYPE: ICD-10-CM

## 2022-04-14 DIAGNOSIS — F51.04 CHRONIC INSOMNIA: ICD-10-CM

## 2022-04-14 DIAGNOSIS — Z23 HIGH PRIORITY FOR 2019-NCOV VACCINE: ICD-10-CM

## 2022-04-14 DIAGNOSIS — I25.10 CORONARY ARTERY CALCIFICATION SEEN ON CT SCAN: ICD-10-CM

## 2022-04-14 PROBLEM — B02.9 SHINGLES: Status: RESOLVED | Noted: 2021-05-13 | Resolved: 2022-04-14

## 2022-04-14 LAB
ALBUMIN SERPL-MCNC: 4.1 G/DL (ref 3.5–5)
ALBUMIN UR-MCNC: NEGATIVE MG/DL
ALP SERPL-CCNC: 99 U/L (ref 45–120)
ALT SERPL W P-5'-P-CCNC: 13 U/L (ref 0–45)
ANION GAP SERPL CALCULATED.3IONS-SCNC: 12 MMOL/L (ref 5–18)
APPEARANCE UR: CLEAR
AST SERPL W P-5'-P-CCNC: 16 U/L (ref 0–40)
BILIRUB SERPL-MCNC: 0.7 MG/DL (ref 0–1)
BILIRUB UR QL STRIP: NEGATIVE
BUN SERPL-MCNC: 14 MG/DL (ref 8–22)
CALCIUM SERPL-MCNC: 9.4 MG/DL (ref 8.5–10.5)
CHLORIDE BLD-SCNC: 101 MMOL/L (ref 98–107)
CHOLEST SERPL-MCNC: 189 MG/DL
CO2 SERPL-SCNC: 24 MMOL/L (ref 22–31)
COLOR UR AUTO: YELLOW
CREAT SERPL-MCNC: 0.78 MG/DL (ref 0.7–1.3)
ERYTHROCYTE [DISTWIDTH] IN BLOOD BY AUTOMATED COUNT: 11.5 % (ref 10–15)
FASTING STATUS PATIENT QL REPORTED: ABNORMAL
GFR SERPL CREATININE-BSD FRML MDRD: >90 ML/MIN/1.73M2
GLUCOSE BLD-MCNC: 103 MG/DL (ref 70–125)
GLUCOSE UR STRIP-MCNC: NEGATIVE MG/DL
HCT VFR BLD AUTO: 46.3 % (ref 40–53)
HDLC SERPL-MCNC: 40 MG/DL
HGB BLD-MCNC: 15.2 G/DL (ref 13.3–17.7)
HGB UR QL STRIP: NEGATIVE
KETONES UR STRIP-MCNC: NEGATIVE MG/DL
LDLC SERPL CALC-MCNC: 132 MG/DL
LEUKOCYTE ESTERASE UR QL STRIP: NEGATIVE
MCH RBC QN AUTO: 31.6 PG (ref 26.5–33)
MCHC RBC AUTO-ENTMCNC: 32.8 G/DL (ref 31.5–36.5)
MCV RBC AUTO: 96 FL (ref 78–100)
NITRATE UR QL: NEGATIVE
PH UR STRIP: 6 [PH] (ref 5–8)
PLATELET # BLD AUTO: 150 10E3/UL (ref 150–450)
POTASSIUM BLD-SCNC: 4.4 MMOL/L (ref 3.5–5)
PROT SERPL-MCNC: 7 G/DL (ref 6–8)
PSA SERPL-MCNC: 0.59 UG/L (ref 0–4.5)
RBC # BLD AUTO: 4.81 10E6/UL (ref 4.4–5.9)
SODIUM SERPL-SCNC: 137 MMOL/L (ref 136–145)
SP GR UR STRIP: 1.02 (ref 1–1.03)
TRIGL SERPL-MCNC: 85 MG/DL
UROBILINOGEN UR STRIP-ACNC: 0.2 E.U./DL
WBC # BLD AUTO: 8.2 10E3/UL (ref 4–11)

## 2022-04-14 PROCEDURE — G0103 PSA SCREENING: HCPCS | Performed by: INTERNAL MEDICINE

## 2022-04-14 PROCEDURE — 80061 LIPID PANEL: CPT | Performed by: INTERNAL MEDICINE

## 2022-04-14 PROCEDURE — 85027 COMPLETE CBC AUTOMATED: CPT | Performed by: INTERNAL MEDICINE

## 2022-04-14 PROCEDURE — 81003 URINALYSIS AUTO W/O SCOPE: CPT | Performed by: INTERNAL MEDICINE

## 2022-04-14 PROCEDURE — 80053 COMPREHEN METABOLIC PANEL: CPT | Performed by: INTERNAL MEDICINE

## 2022-04-14 PROCEDURE — 99397 PER PM REEVAL EST PAT 65+ YR: CPT | Mod: 25 | Performed by: INTERNAL MEDICINE

## 2022-04-14 PROCEDURE — 99214 OFFICE O/P EST MOD 30 MIN: CPT | Mod: 25 | Performed by: INTERNAL MEDICINE

## 2022-04-14 PROCEDURE — 36415 COLL VENOUS BLD VENIPUNCTURE: CPT | Performed by: INTERNAL MEDICINE

## 2022-04-14 PROCEDURE — 91305 COVID-19,PF,PFIZER (12+ YRS): CPT | Performed by: INTERNAL MEDICINE

## 2022-04-14 PROCEDURE — 0054A COVID-19,PF,PFIZER (12+ YRS): CPT | Performed by: INTERNAL MEDICINE

## 2022-04-14 RX ORDER — LISINOPRIL 20 MG/1
20 TABLET ORAL DAILY
Qty: 90 TABLET | Refills: 3 | Status: SHIPPED | OUTPATIENT
Start: 2022-04-14 | End: 2023-03-22 | Stop reason: DRUGHIGH

## 2022-04-14 RX ORDER — LORAZEPAM 1 MG/1
TABLET ORAL
Qty: 15 TABLET | Refills: 0 | Status: SHIPPED | OUTPATIENT
Start: 2022-04-14 | End: 2022-12-08

## 2022-04-14 RX ORDER — ROSUVASTATIN CALCIUM 10 MG/1
10 TABLET, COATED ORAL DAILY
Qty: 90 TABLET | Refills: 3 | Status: SHIPPED | OUTPATIENT
Start: 2022-04-14 | End: 2023-04-10

## 2022-04-14 ASSESSMENT — ACTIVITIES OF DAILY LIVING (ADL): CURRENT_FUNCTION: NO ASSISTANCE NEEDED

## 2022-04-14 NOTE — LETTER
April 18, 2022      Cesar Murillo  [address] [place] [address]        Dear Cesar,    [facility] Orders   PSA, screen   Result Value Ref Range    Prostate Specific Antigen Screen 0.59 0.00 - 4.50 ug/L    Narrative    Assay Method is Abbott Prostate-Specific Antigen (PSA)  Standard-WHO 1st International (90:10)   CBC with platelets   Result Value Ref Range    WBC Count 8.2 4.0 - 11.0 10e3/uL    RBC Count 4.81 4.40 - 5.90 10e6/uL    Hemoglobin 15.2 13.3 - 17.7 g/dL    Hematocrit 46.3 40.0 - 53.0 %    MCV 96 78 - 100 fL    MCH 31.6 26.5 - 33.0 pg    MCHC 32.8 31.5 - 36.5 g/dL    RDW 11.5 10.0 - 15.0 %    Platelet Count 150 150 - 450 10e3/uL   Comprehensive metabolic panel (BMP + Alb, Alk Phos, ALT, AST, Total. Bili, TP)   Result Value Ref Range    Sodium 137 136 - 145 mmol/L    Potassium 4.4 3.5 - 5.0 mmol/L    Chloride 101 98 - 107 mmol/L    Carbon Dioxide (CO2) 24 22 - 31 mmol/L    Anion Gap 12 5 - 18 mmol/L    Urea Nitrogen 14 8 - 22 mg/dL    Creatinine 0.78 0.70 - 1.30 mg/dL    Calcium 9.4 8.5 - 10.5 mg/dL    Glucose 103 70 - 125 mg/dL    Alkaline Phosphatase 99 45 - 120 U/L    AST 16 0 - 40 U/L    ALT 13 0 - 45 U/L    Protein Total 7.0 6.0 - 8.0 g/dL    Albumin 4.1 3.5 - 5.0 g/dL    Bilirubin Total 0.7 0.0 - 1.0 mg/dL    GFR Estimate >90 >60 mL/min/1.73m2      Comment:      Effective December 21, 2021 eGFRcr in adults is calculated using the 2021 CKD-EPI creatinine equation which includes age and gender (Justin leyva al., NEJM, DOI: 10.1056/EAWOln1551573)   Lipid Profile (Chol, Trig, HDL, LDL calc)   Result Value Ref Range    Cholesterol 189 <=199 mg/dL    Triglycerides 85 <=149 mg/dL    Direct Measure HDL 40 >=40 mg/dL      Comment:      HDL Cholesterol Reference Range:     0-2 years:   No reference ranges established for patients under 2 years old  at [facility] for lipid analytes.    2-8 years:  Greater than 45 mg/dL     18 years and older:   Female: Greater than or equal to 50 mg/dL   Male:    Greater than or equal to 40 mg/dL    LDL Cholesterol Calculated 132 (H) <=129 mg/dL    Patient Fasting > 8hrs? Unknown    UA Macro with Reflex to Micro and Culture - lab collect   Result Value Ref Range    Color Urine Yellow Colorless, Straw, Light Yellow, Yellow    Appearance Urine Clear Clear    Glucose Urine Negative Negative mg/dL    Bilirubin Urine Negative Negative    Ketones Urine Negative Negative mg/dL    Specific Gravity Urine 1.020 1.005 - 1.030    Blood Urine Negative Negative    pH Urine 6.0 5.0 - 8.0    Protein Albumin Urine Negative Negative mg/dL    Urobilinogen Urine 0.2 0.2, 1.0 E.U./dL    Nitrite Urine Negative Negative    Leukocyte Esterase Urine Negative Negative    Narrative    Microscopic not indicated     Your cholesterol is slightly above goal.  All the more reason to start the rosuvastatin although it is primarily because of the calcified coronary arteries that were described.    Everything else looks good.  PSA remains low and reassuring for no prostate cancer.  Normal kidney function.  No diabetes.  Normal liver studies.  No anemia.  The urinalysis is normal.    If you have any questions or concerns, please call the clinic at the number listed above.       Sincerely,      Sen Leon MD

## 2022-04-14 NOTE — PROGRESS NOTES
"SUBJECTIVE:   Cesar Murillo is a 67 year old male who presents for Preventive Visit.    HPI-in addition to his annual wellness visit, Cesar is here to follow-up multiple chronic medical problems including hypertension, known thoracic Gator aneurysm, chronic tobacco use and chronic cough, chronic insomnia.  See assessment plan for details.    Patient has been advised of split billing requirements and indicates understanding: Yes  Are you in the first 12 months of your Medicare coverage?  No    Healthy Habits:     In general, how would you rate your overall health?  Good    Frequency of exercise:  None    Do you usually eat at least 4 servings of fruit and vegetables a day, include whole grains    & fiber and avoid regularly eating high fat or \"junk\" foods?  No    Taking medications regularly:  Yes    Barriers to taking medications:  None    Medication side effects:  None    Ability to successfully perform activities of daily living:  No assistance needed    Home Safety:  No safety concerns identified    Hearing Impairment:  No hearing concerns    In the past 6 months, have you been bothered by leaking of urine?  No    In general, how would you rate your overall mental or emotional health?  Good      PHQ-2 Total Score: 0    Additional concerns today:  No    Do you feel safe in your environment? Yes    Have you ever done Advance Care Planning? (For example, a Health Directive, POLST, or a discussion with a medical provider or your loved ones about your wishes): No, advance care planning information given to patient to review.  Patient declined advance care planning discussion at this time.       Fall risk  Fallen 2 or more times in the past year?: No  Any fall with injury in the past year?: No    Cognitive Screening   1) Repeat 3 items (Leader, Season, Table)    2) Clock draw: NORMAL  3) 3 item recall: Recalls 3 objects  Results: 3 items recalled: COGNITIVE IMPAIRMENT LESS LIKELY    Mini-CogTM Copyright S Harry. " "Licensed by the author for use in [facility]; reprinted with permission (pascual@[facility]). All rights reserved.      Do you have sleep apnea, excessive snoring or daytime drowsiness?: yes    Reviewed and updated as needed this visit by clinical staff   Tobacco  Allergies  Meds  Problems  Med Hx  Surg Hx  Fam Hx            Reviewed and updated as needed this visit by Provider   Tobacco  Allergies  Meds  Problems  Med Hx  Surg Hx  Fam Hx           Social History     Tobacco Use     Smoking status: Current Every Day Smoker     Types: Cigarettes     Smokeless tobacco: Never Used   Substance Use Topics     Alcohol use: Yes     Comment: Alcoholic Drinks/day: 2-3 every day         Alcohol Use 4/14/2022   Prescreen: >3 drinks/day or >7 drinks/week? Yes   AUDIT SCORE  5               Current providers sharing in care for this patient include:   Patient Care Team:  Sen Leon MD as PCP - General  Sen Leon MD as Assigned PCP    The following health maintenance items are reviewed in Epic and correct as of today:  Health Maintenance Due   Topic Date Due     LUNG CANCER SCREENING  10/16/2020     Pneumococcal Vaccine: 65+ Years (2 of 2 - PPSV23) 03/02/2021     Lab work is in process          Review of Systems  12 point review of systems is negative other than what is discussed in the assessment and plan    OBJECTIVE:   /80   Pulse 61   Temp 98.1  F (36.7  C) (Oral)   Ht 1.734 m (5' 8.25\")   Wt 86.6 kg (191 lb)   SpO2 98%   BMI 28.83 kg/m   Estimated body mass index is 28.83 kg/m  as calculated from the following:    Height as of this encounter: 1.734 m (5' 8.25\").    Weight as of this encounter: 86.6 kg (191 lb).  Physical Exam  EYES: Eyelids, conjunctiva, and sclera were normal. Pupils were normal. Cornea, iris, and lens were normal bilaterally.  HEAD, EARS, NOSE, MOUTH, AND THROAT: Head and face were normal. TMs and external auditory canals are normal  NECK: Neck appearance " was normal. There were no neck masses and the thyroid was not enlarged and no nodules are felt.  No lymphadenopathy.  RESPIRATORY: Breathing pattern was normal and the chest moved symmetrically.   Lung sounds were normal and there were no rales or wheezes.  CARDIOVASCULAR: Heart rate and rhythm were normal.  S1 and S2 were normal and there were no extra sounds or murmurs. Peripheral pulses in arms and legs were normal.  Jugular venous pressure was normal.  There was no peripheral edema.  No carotid bruits.  GASTROINTESTINAL:  Bowel sounds were present.   Palpation detected no tenderness, mass, or enlarged organs.   RECTAL/PROSTATE: Deferred  MUSCULOSKELETAL: Skeletal configuration was normal and muscle mass was normal for age. Joint appearance was overall normal.  LYMPHATIC: There were no enlarged nodes.  SKIN/HAIR/NAILS: Skin color was normal.  Hair and nails were normal.There were no skin lesions.  NEUROLOGIC: The patient was alert and oriented to person, place, time, and circumstance. Speech was normal. Cranial nerves were normal. Motor strength was normal for age. The patient was normally coordinated.  Sensation intact.  PSYCHIATRIC:  Mood and affect were normal and the patient had normal recent and remote memory. The patient's judgment and insight were normal.        ASSESSMENT / PLAN:   1. Encounter for Medicare annual wellness exam  Immunizations are reviewed and COVID booster is provided today.  Recommending that he get Pneumovax 23 completed in the next 6 months.  Continue annual flu shot.  Living will discussed.  Smoking cessation discussed.  Discussed using alcohol in moderation.  Regular exercise discussed.  Up to date with colonoscopies and this should be repeated in April 2025.  Prostate exam is deferred but I will check a PSA for prostate cancer screening.  Dementia and depression screening completed.  Recommending that he sees his ophthalmologist every year. Skin exam performed and recommending  regular use of sunblock.  Recommending that he see a dermatologist for total-body skin exam.  Previously treated for hepatitis C.  Will screen for diabetes with fasting glucose.  Previous CT 2018 with no AAA.  Consider ultrasound at age 70 as that imaging was done before age 65.    2. Essential hypertension  Blood pressure is adequately controlled with current dose of lisinopril  - CBC with platelets; Future  - Comprehensive metabolic panel (BMP + Alb, Alk Phos, ALT, AST, Total. Bili, TP); Future  - Lipid Profile (Chol, Trig, HDL, LDL calc); Future  - UA Macro with Reflex to Micro and Culture - lab collect; Future  - CBC with platelets  - Comprehensive metabolic panel (BMP + Alb, Alk Phos, ALT, AST, Total. Bili, TP)  - Lipid Profile (Chol, Trig, HDL, LDL calc)  - UA Macro with Reflex to Micro and Culture - lab collect    3. Thoracic aortic aneurysm without rupture (H)  Asymptomatic.  Last imaging in May 2021 measuring 4.5 cm.  Continue imaging every 1 to 2 years.    4. Chronic cough  Chronic cough with continued tobacco use.  Obtain CT chest especially with previous nodule seen.  - CT Chest w/o Contrast; Future    5. Tobacco abuse  Urging him to quit smoking.  Unfortunately not ready to quit at this time.  - rosuvastatin (CRESTOR) 10 MG tablet; Take 1 tablet (10 mg) by mouth daily  Dispense: 90 tablet; Refill: 3    6. Chronic insomnia with anxiety  He uses lorazepam infrequently.  We discussed caution using with alcohol        8. Coronary artery calcification seen on CT scan  Previous CT showing at least moderate amount of calcification involving coronary arteries suggesting significant atherosclerosis.  With his history of chronic tobacco use, I would recommend beginning a statin to lower his cardiovascular risk.  Begin rosuvastatin.  I am asking him to return in 4 months to have lipid profile rechecked along with LFTs  - rosuvastatin (CRESTOR) 10 MG tablet; Take 1 tablet (10 mg) by mouth daily  Dispense: 90  "tablet; Refill: 3    9. Erectile dysfunction, unspecified erectile dysfunction type  Using Viagra as needed    10. History of colon polyps  Next colonoscopy will be due in spring 2025 with history of polyps    11. High priority for 2019-nCoV vaccine  Covid booster today  - COVID-19,PF,PFIZER (12+ Yrs GRAY LABEL)    12. Screening for prostate cancer  PSA has been historically low and will be rechecked today.  Exam is deferred  - PSA, screen; Future  - PSA, screen    13. Lipid screening  Checking lipid profile.  With coronary artery calcification, beginning rosuvastatin as discussed above  - Lipid Profile (Chol, Trig, HDL, LDL calc); Future  - Lipid Profile (Chol, Trig, HDL, LDL calc)    14.  History of chronic hepatitis C with successful treatment using Harvoni and interferon.    Patient has been advised of split billing requirements and indicates understanding: Yes    COUNSELING:  Reviewed preventive health counseling, as reflected in patient instructions       Consider AAA screening for ages 65-75 and smoking history       Regular exercise       Healthy diet/nutrition       Vision screening       Dental care       Alcohol Use        Consider lung cancer screening for ages 55-80 years (77 for Medicare) and 20 pack-year smoking history        Colon cancer screening       Prostate cancer screening    Estimated body mass index is 28.83 kg/m  as calculated from the following:    Height as of this encounter: 1.734 m (5' 8.25\").    Weight as of this encounter: 86.6 kg (191 lb).        He reports that he has been smoking cigarettes. He has never used smokeless tobacco.  Tobacco Cessation Action Plan:   Information offered: Patient not interested at this time      Appropriate preventive services were discussed with this patient, including applicable screening as appropriate for cardiovascular disease, diabetes, osteopenia/osteoporosis, and glaucoma.  As appropriate for age/gender, discussed screening for colorectal cancer, " prostate cancer, breast cancer, and cervical cancer. Checklist reviewing preventive services available has been given to the patient.    Reviewed patients plan of care and provided an AVS. The Basic Care Plan (routine screening as documented in Health Maintenance) for Cesar meets the Care Plan requirement. This Care Plan has been established and reviewed with the Patient.    Counseling Resources:  ATP IV Guidelines  Pooled Cohorts Equation Calculator  Breast Cancer Risk Calculator  Breast Cancer: Medication to Reduce Risk  FRAX Risk Assessment  ICSI Preventive Guidelines  Dietary Guidelines for Americans, 2010  USDA's MyPlate  ASA Prophylaxis  Lung CA Screening    Sen Leon MD  [facility]    Identified Health Risks:      He is at risk for lack of exercise and has been provided with information to increase physical activity for the benefit of his well-being.  The patient reports that he drinks more than one alcoholic drink per day but denies binge or excessive drinking. He was counseled and given information about possible harmful effects of excessive alcohol intake.  The patient was counseled and encouraged to consider modifying their diet and eating habits. He was provided with information on recommended healthy diet options.

## 2022-04-14 NOTE — PROGRESS NOTES
He is at risk for lack of exercise and has been provided with information to increase physical activity for the benefit of his well-being.  The patient reports that he drinks more than one alcoholic drink per day but denies binge or excessive drinking. He was counseled and given information about possible harmful effects of excessive alcohol intake.  The patient was counseled and encouraged to consider modifying their diet and eating habits. He was provided with information on recommended healthy diet options.

## 2022-04-14 NOTE — PATIENT INSTRUCTIONS
Annual flu shot every fall    You should get a pneumonia vaccine called Pneumovax 23 completed in the next 6 months.  This can also be obtained at your pharmacy at the time of your flu shot.    Colonoscopy will be due April 2025    CT chest will be scheduled for lung cancer screening with your ongoing chronic cough and tobacco use    I would urge you to try to quit all tobacco use    You need to use alcohol in moderation setting a goal of no more than 2 to 3 ounces daily    I would recommend seeing a dermatologist every year for total-body skin exam.  You do have a couple lesions on your back that should be evaluated    Annual eye exam with an ophthalmologist or optometrist    I would begin rosuvastatin to lower your risk for cardiovascular disease in light of the heavy calcification found on your CT scan involving your coronary arteries.  Return in 3 to 4 months for a lab appointment to have your lipid profile rechecked and your liver studies completed.      Patient Education   Personalized Prevention Plan  You are due for the preventive services outlined below.  Your care team is available to assist you in scheduling these services.  If you have already completed any of these items, please share that information with your care team to update in your medical record.  Health Maintenance Due   Topic Date Due    LUNG CANCER SCREENING  10/16/2020    Pneumococcal Vaccine (2 of 2 - PPSV23) 03/02/2021       Exercise for a Healthier Heart  You may wonder how you can improve the health of your heart. If you re thinking about exercise, you re on the right track. You don t need to become an athlete. But you do need a certain amount of brisk exercise to help strengthen your heart. If you have been diagnosed with a heart condition, your healthcare provider may advise exercise to help stabilize your condition. To help make exercise a habit, choose safe, fun activities.      Exercise with a friend. When activity is fun, you're  more likely to stick with it.   Before you start  Check with your healthcare provider before starting an exercise program. This is especially important if you have not been active for a while. It's also important if you have a long-term (chronic) health problem such as heart disease, diabetes, or obesity. Or if you are at high risk for having these problems.   Why exercise?  Exercising regularly offers many healthy rewards. It can help you do all of the following:   Improve your blood cholesterol level to help prevent further heart trouble  Lower your blood pressure to help prevent a stroke or heart attack  Control diabetes, or reduce your risk of getting this disease  Improve your heart and lung function  Reach and stay at a healthy weight  Make your muscles stronger so you can stay active  Prevent falls and fractures by slowing the loss of bone mass (osteoporosis)  Manage stress better  Reduce your blood pressure  Improve your sense of self and your body image  Exercise tips    Ease into your routine. Set small goals. Then build on them. If you are not sure what your activity level should be, talk with your healthcare provider first before starting an exercise routine.  Exercise on most days. Aim for a total of 150 minutes (2 hours and 30 minutes) or more of moderate-intensity aerobic activity each week. Or 75 minutes (1 hour and 15 minutes) or more of vigorous-intensity aerobic activity each week. Or try for a combination of both. Moderate activity means that you breathe heavier and your heart rate increases but you can still talk. Think about doing 40 minutes of moderate exercise, 3 to 4 times a week. For best results, activity should last for about 40 minutes to lower blood pressure and cholesterol. It's OK to work up to the 40-minute period over time. Examples of moderate-intensity activity are walking 1 mile in 15 minutes. Or doing 30 to 45 minutes of yard work.  Step up your daily activity level.  Along with  your exercise program, try being more active the whole day. Walk instead of drive. Or park further away so that you take more steps each day. Do more household tasks or yard work. You may not be able to meet the advised mount of physical activity. But doing some moderate- or vigorous-intensity aerobic activity can help reduce your risk for heart disease. Your healthcare provider can help you figure out what is best for you.  Choose 1 or more activities you enjoy.  Walking is one of the easiest things you can do. You can also try swimming, riding a bike, dancing, or taking an exercise class.    When to call your healthcare provider  Call your healthcare provider if you have any of these:   Chest pain or feel dizzy or lightheaded  Burning, tightness, pressure, or heaviness in your chest, neck, shoulders, back, or arms  Abnormal shortness of breath  More joint or muscle pain  A very fast or irregular heartbeat (palpitations)  [employer] last reviewed this educational content on 7/1/2019 2000-2021 [employer]. All rights reserved. This information is not intended as a substitute for professional medical care. Always follow your healthcare professional's instructions.         Patient Education   Alcohol Use     Many people can enjoy a glass of wine or beer without any negative consequences to their health. According to the Centers for Disease Control and Prevention (CDC), having one or fewer drinks per day for women and two or fewer per day for men is considered moderate drinking.     When people drink more than moderately, it can become concerning. Excessive drinking is defined as consuming 15 drinks or more per week for men and 8 drinks or more per week for women. There are various health problems associated with excessive drinking, which include:  Damage to vital organs like the heart, brain, liver and pancreas  Harm to the digestive tract  Weaken the immune system  Higher risk for heart disease and  cancer    There are many resources available to people who are addicted to alcohol. A counselor or other health care provider can give you support. So can a , , or rabbi who is trained in substance abuse counseling. Friends and family may also help once you are connected with professionals.           Understanding USDA MyPlate  The USDA has guidelines to help you make healthy food choices. These are called MyPlate. MyPlate shows the food groups that make up healthy meals using the image of a place setting. Before you eat, think about the healthiest choices for what to put on your plate or in your cup or bowl. To learn more about building a healthy plate, visit www.choosemyplate.gov.    The food groups  Fruits. Any fruit or 100% fruit juice counts as part of the Fruit Group. Fruits may be fresh, canned, frozen, or dried, and may be whole, cut-up, or pureed. Make 1/2 of your plate fruits and vegetables.  Vegetables. Any vegetable or 100% vegetable juice counts as a member of the Vegetable Group. Vegetables may be fresh, frozen, canned, or dried. They can be served raw or cooked and may be whole, cut-up, or mashed. Make 1/2 of your plate fruits and vegetables.  Grains. All foods made from grains are part of the Grains Group. These include wheat, rice, oats, cornmeal, and barley. Grains are often used to make foods such as bread, pasta, oatmeal, cereal, tortillas, and grits. Grains should be no more than 1/4 of your plate. At least half of your grains should be whole grains.  Protein. This group includes meat, poultry, seafood, beans and peas, eggs, processed soy products (such as tofu), nuts (including nut butters), and seeds. Make protein choices no more than 1/4 of your plate. Meat and poultry choices should be lean or low fat.  Dairy. The Dairy Group includes all fluid milk products and foods made from milk that contain calcium, such as yogurt and cheese. (Foods that have little calcium, such as cream,  butter, and cream cheese, are not part of this group.) Most dairy choices should be low-fat or fat-free.  Oils. Oils aren't a food group, but they do contain essential nutrients. However it's important to watch your intake of oils. These are fats that are liquid at room temperature. They include canola, corn, olive, soybean, vegetable, and sunflower oil. Foods that are mainly oil include mayonnaise, certain salad dressings, and soft margarines. You likely already get your daily oil allowance from the foods you eat.  Things to limit  Eating healthy also means limiting these things in your diet:     Salt (sodium). Many processed foods have a lot of sodium. To keep sodium intake down, eat fresh vegetables, meats, poultry, and seafood when possible. Purchase low-sodium, reduced-sodium, or no-salt-added food products at the store. And don't add salt to your meals at home. Instead, season them with herbs and spices such as dill, oregano, cumin, and paprika. Or try adding flavor with lemon or lime zest and juice.  Saturated fat. Saturated fats are most often found in animal products such as beef, pork, and chicken. They are often solid at room temperature, such as butter. To reduce your saturated fat intake, choose leaner cuts of meat and poultry. And try healthier cooking methods such as grilling, broiling, roasting, or baking. For a simple lower-fat swap, use plain nonfat yogurt instead of mayonnaise when making potato salad or macaroni salad.  Added sugars. These are sugars added to foods. They are in foods such as ice cream, candy, soda, fruit drinks, sports drinks, energy drinks, cookies, pastries, jams, and syrups. Cut down on added sugars by sharing sweet treats with a family member or friend. You can also choose fruit for dessert, and drink water or other unsweetened beverages.     [employer] last reviewed this educational content on 6/1/2020 2000-2021 [employer]. All rights reserved. This  information is not intended as a substitute for professional medical care. Always follow your healthcare professional's instructions.

## 2022-05-04 ENCOUNTER — HOSPITAL ENCOUNTER (OUTPATIENT)
Dept: CT IMAGING | Facility: HOSPITAL | Age: 67
Discharge: HOME | End: 2022-05-04
Attending: INTERNAL MEDICINE | Admitting: INTERNAL MEDICINE
Payer: COMMERCIAL

## 2022-05-04 DIAGNOSIS — R05.3 CHRONIC COUGH: ICD-10-CM

## 2022-05-04 PROCEDURE — 71250 CT THORAX DX C-: CPT

## 2022-05-08 DIAGNOSIS — R91.1 PULMONARY NODULE: Primary | ICD-10-CM

## 2022-05-08 PROBLEM — I77.810 ASCENDING AORTA DILATION (H): Status: ACTIVE | Noted: 2022-05-08

## 2022-05-08 PROBLEM — I71.20 THORACIC AORTIC ANEURYSM (H): Status: RESOLVED | Noted: 2017-11-17 | Resolved: 2022-05-08

## 2022-06-29 ENCOUNTER — TRANSFERRED RECORDS (OUTPATIENT)
Dept: HEALTH INFORMATION MANAGEMENT | Facility: CLINIC | Age: 67
End: 2022-06-29

## 2022-07-26 ENCOUNTER — DOCUMENTATION ONLY (OUTPATIENT)
Dept: LAB | Facility: CLINIC | Age: 67
End: 2022-07-26

## 2022-07-26 DIAGNOSIS — I25.10 CORONARY ARTERY CALCIFICATION SEEN ON CT SCAN: Primary | ICD-10-CM

## 2022-07-26 DIAGNOSIS — Z51.81 ENCOUNTER FOR THERAPEUTIC DRUG MONITORING: ICD-10-CM

## 2022-07-27 ENCOUNTER — TRANSFERRED RECORDS (OUTPATIENT)
Dept: HEALTH INFORMATION MANAGEMENT | Facility: CLINIC | Age: 67
End: 2022-07-27

## 2022-08-10 ENCOUNTER — LAB (OUTPATIENT)
Dept: LAB | Facility: CLINIC | Age: 67
End: 2022-08-10
Payer: COMMERCIAL

## 2022-08-10 DIAGNOSIS — Z51.81 ENCOUNTER FOR THERAPEUTIC DRUG MONITORING: ICD-10-CM

## 2022-08-10 DIAGNOSIS — I25.10 CORONARY ARTERY CALCIFICATION SEEN ON CT SCAN: ICD-10-CM

## 2022-08-10 LAB
ALBUMIN SERPL BCG-MCNC: 4.3 G/DL (ref 3.5–5.2)
ALP SERPL-CCNC: 90 U/L (ref 40–129)
ALT SERPL W P-5'-P-CCNC: 19 U/L (ref 10–50)
AST SERPL W P-5'-P-CCNC: 28 U/L (ref 10–50)
BILIRUB DIRECT SERPL-MCNC: <0.2 MG/DL (ref 0–0.3)
BILIRUB SERPL-MCNC: 0.4 MG/DL
CHOLEST SERPL-MCNC: 136 MG/DL
HDLC SERPL-MCNC: 52 MG/DL
LDLC SERPL CALC-MCNC: 68 MG/DL
NONHDLC SERPL-MCNC: 84 MG/DL
PROT SERPL-MCNC: 6.7 G/DL (ref 6.4–8.3)
TRIGL SERPL-MCNC: 81 MG/DL

## 2022-08-10 PROCEDURE — 36415 COLL VENOUS BLD VENIPUNCTURE: CPT

## 2022-08-10 PROCEDURE — 80076 HEPATIC FUNCTION PANEL: CPT

## 2022-08-10 PROCEDURE — 80061 LIPID PANEL: CPT

## 2022-11-09 ENCOUNTER — HOSPITAL ENCOUNTER (OUTPATIENT)
Dept: CT IMAGING | Facility: HOSPITAL | Age: 67
Discharge: HOME | End: 2022-11-09
Attending: INTERNAL MEDICINE | Admitting: INTERNAL MEDICINE
Payer: COMMERCIAL

## 2022-11-09 DIAGNOSIS — R91.1 PULMONARY NODULE: ICD-10-CM

## 2022-11-09 LAB — RADIOLOGIST FLAGS: ABNORMAL

## 2022-11-09 PROCEDURE — 71250 CT THORAX DX C-: CPT

## 2022-11-10 ENCOUNTER — TELEPHONE (OUTPATIENT)
Dept: INTERNAL MEDICINE | Facility: CLINIC | Age: 67
End: 2022-11-10

## 2022-11-10 DIAGNOSIS — R91.1 PULMONARY NODULE: Primary | ICD-10-CM

## 2022-11-10 NOTE — TELEPHONE ENCOUNTER
Test Results      Who ordered the test: Dr. Leon    Type of test: Radiology  Enlarging pulmonary nodule     Date of test:  11.9.22    Where was the test performed:  [facility] CT    What are your questions/concerns?:  Abnormal    Message will be routed to PCP for review    Madison PRAJAPATI RN  [facility]

## 2022-11-11 NOTE — TELEPHONE ENCOUNTER
I discussed results of CT scan with Cesar.  Enlarging lung nodule/cystic mass needing PET scan for further evaluation

## 2022-11-25 ENCOUNTER — HOSPITAL ENCOUNTER (OUTPATIENT)
Dept: PET IMAGING | Facility: HOSPITAL | Age: 67
Discharge: HOME | End: 2022-11-25
Attending: INTERNAL MEDICINE
Payer: COMMERCIAL

## 2022-11-25 DIAGNOSIS — R91.1 PULMONARY NODULE: ICD-10-CM

## 2022-11-25 LAB
CREAT BLD-MCNC: 0.9 MG/DL (ref 0.7–1.3)
GFR SERPL CREATININE-BSD FRML MDRD: >60 ML/MIN/1.73M2
GLUCOSE BLDC GLUCOMTR-MCNC: 107 MG/DL (ref 70–99)

## 2022-11-25 PROCEDURE — 82565 ASSAY OF CREATININE: CPT

## 2022-11-25 PROCEDURE — 71260 CT THORAX DX C+: CPT

## 2022-11-25 PROCEDURE — 78815 PET IMAGE W/CT SKULL-THIGH: CPT | Mod: PI

## 2022-11-25 PROCEDURE — 343N000001 HC RX 343: Performed by: INTERNAL MEDICINE

## 2022-11-25 PROCEDURE — A9552 F18 FDG: HCPCS | Performed by: INTERNAL MEDICINE

## 2022-11-25 PROCEDURE — 250N000011 HC RX IP 250 OP 636: Performed by: INTERNAL MEDICINE

## 2022-11-25 PROCEDURE — 82962 GLUCOSE BLOOD TEST: CPT

## 2022-11-25 RX ORDER — IOPAMIDOL 755 MG/ML
94 INJECTION, SOLUTION INTRAVASCULAR ONCE
Status: COMPLETED | OUTPATIENT
Start: 2022-11-25 | End: 2022-11-25

## 2022-11-25 RX ADMIN — FLUDEOXYGLUCOSE F-18 11.45 MCI.: 500 INJECTION, SOLUTION INTRAVENOUS at 10:22

## 2022-11-25 RX ADMIN — IOPAMIDOL 94 ML: 755 INJECTION, SOLUTION INTRAVENOUS at 11:34

## 2022-11-28 ENCOUNTER — TELEPHONE (OUTPATIENT)
Dept: INTERNAL MEDICINE | Facility: CLINIC | Age: 67
End: 2022-11-28

## 2022-11-28 DIAGNOSIS — C34.90 MALIGNANT NEOPLASM OF LUNG, UNSPECIFIED LATERALITY, UNSPECIFIED PART OF LUNG (H): Primary | ICD-10-CM

## 2022-11-28 NOTE — TELEPHONE ENCOUNTER
Informed pt of info. He stated that he would prefer a Telephone call instead of a Virtual visit or an in person visit. Assisted pt with scheduling a Telephone Visit with Dr Ford as pt stated he used to see Dr Ford as well and would like to go over his CT results with Dr Ford.    GRACIELA CooperN, RN  [facility]

## 2022-11-28 NOTE — TELEPHONE ENCOUNTER
Contact patient.  Dr. Carrillo is out of town this week, but there were some findings on patient's chest CT scan that were concerning.  See if patient wants to discuss these chest CT scan findings with a virtual appointment with available provider here, or whether he wishes to discuss this with Dr. Carrillo next week.

## 2022-11-29 DIAGNOSIS — R91.8 LUNG MASS: Primary | ICD-10-CM

## 2022-12-05 DIAGNOSIS — R91.8 LUNG MASS: ICD-10-CM

## 2022-12-05 PROCEDURE — 94060 EVALUATION OF WHEEZING: CPT | Performed by: INTERNAL MEDICINE

## 2022-12-05 PROCEDURE — 94729 DIFFUSING CAPACITY: CPT | Performed by: INTERNAL MEDICINE

## 2022-12-05 PROCEDURE — 94726 PLETHYSMOGRAPHY LUNG VOLUMES: CPT | Performed by: INTERNAL MEDICINE

## 2022-12-05 NOTE — TELEPHONE ENCOUNTER
RECORDS STATUS - ALL OTHER DIAGNOSIS      RECORDS RECEIVED FROM: [facility]   DATE RECEIVED: 12/05/22   NOTES STATUS DETAILS   OFFICE NOTE from referring provider Epic Dr. Dakota De Dios   PFT Epic 12/05/22   MEDICATION LIST [facility]    CLINICAL TRIAL TREATMENTS TO DATE     LABS     PATHOLOGY REPORTS     ANYTHING RELATED TO DIAGNOSIS Epic Most recent 11/25/22   IMAGING (NEED IMAGES & REPORT)     CT SCANS PACS 11/25/22-07/23/18: CT Chest   PET PACS 11/25/22: PET Onc

## 2022-12-06 ENCOUNTER — PRE VISIT (OUTPATIENT)
Dept: SURGERY | Facility: CLINIC | Age: 67
End: 2022-12-06

## 2022-12-06 ENCOUNTER — PREP FOR PROCEDURE (OUTPATIENT)
Dept: SURGERY | Facility: CLINIC | Age: 67
End: 2022-12-06

## 2022-12-06 ENCOUNTER — OFFICE VISIT (OUTPATIENT)
Dept: SURGERY | Facility: CLINIC | Age: 67
End: 2022-12-06
Attending: INTERNAL MEDICINE
Payer: COMMERCIAL

## 2022-12-06 VITALS
SYSTOLIC BLOOD PRESSURE: 144 MMHG | BODY MASS INDEX: 28.24 KG/M2 | OXYGEN SATURATION: 97 % | HEIGHT: 69 IN | WEIGHT: 190.7 LBS | HEART RATE: 59 BPM | DIASTOLIC BLOOD PRESSURE: 92 MMHG | TEMPERATURE: 98.7 F

## 2022-12-06 DIAGNOSIS — R91.1 LUNG NODULE SEEN ON IMAGING STUDY: Primary | ICD-10-CM

## 2022-12-06 DIAGNOSIS — R91.1 LUNG NODULE SEEN ON IMAGING STUDY: ICD-10-CM

## 2022-12-06 LAB
DLCOUNC-%PRED-PRE: 112 %
DLCOUNC-PRE: 28.23 ML/MIN/MMHG
DLCOUNC-PRED: 25.02 ML/MIN/MMHG
ERV-%PRED-PRE: 105 %
ERV-PRE: 0.95 L
ERV-PRED: 0.9 L
EXPTIME-PRE: 7.77 SEC
FEF2575-%PRED-POST: 72 %
FEF2575-%PRED-PRE: 75 %
FEF2575-POST: 1.71 L/SEC
FEF2575-PRE: 1.79 L/SEC
FEF2575-PRED: 2.36 L/SEC
FEFMAX-%PRED-PRE: 84 %
FEFMAX-PRE: 6.98 L/SEC
FEFMAX-PRED: 8.26 L/SEC
FEV1-%PRED-PRE: 104 %
FEV1-PRE: 3.06 L
FEV1FEV6-PRE: 69 %
FEV1FEV6-PRED: 78 %
FEV1FVC-PRE: 65 %
FEV1FVC-PRED: 78 %
FEV1SVC-PRE: 64 %
FEV1SVC-PRED: 65 %
FIFMAX-PRE: 5.11 L/SEC
FRCPLETH-%PRED-PRE: 132 %
FRCPLETH-PRE: 4.72 L
FRCPLETH-PRED: 3.57 L
FVC-%PRED-PRE: 124 %
FVC-PRE: 4.69 L
FVC-PRED: 3.78 L
IC-%PRED-PRE: 105 %
IC-PRE: 3.83 L
IC-PRED: 3.62 L
RVPLETH-%PRED-PRE: 150 %
RVPLETH-PRE: 3.77 L
RVPLETH-PRED: 2.51 L
TLCPLETH-%PRED-PRE: 126 %
TLCPLETH-PRE: 8.55 L
TLCPLETH-PRED: 6.77 L
VA-%PRED-PRE: 118 %
VA-PRE: 7.18 L
VC-%PRED-PRE: 105 %
VC-PRE: 4.78 L
VC-PRED: 4.52 L

## 2022-12-06 PROCEDURE — 99205 OFFICE O/P NEW HI 60 MIN: CPT | Performed by: THORACIC SURGERY (CARDIOTHORACIC VASCULAR SURGERY)

## 2022-12-06 PROCEDURE — G0463 HOSPITAL OUTPT CLINIC VISIT: HCPCS

## 2022-12-06 RX ORDER — NICOTINE 21 MG/24HR
1 PATCH, TRANSDERMAL 24 HOURS TRANSDERMAL EVERY 24 HOURS
Qty: 28 PATCH | Refills: 1 | Status: SHIPPED | OUTPATIENT
Start: 2022-12-06 | End: 2023-01-04

## 2022-12-06 RX ORDER — CEFAZOLIN SODIUM 2 G/50ML
2 SOLUTION INTRAVENOUS
Status: CANCELLED | OUTPATIENT
Start: 2022-12-06

## 2022-12-06 RX ORDER — ENOXAPARIN SODIUM 100 MG/ML
40 INJECTION SUBCUTANEOUS
Status: CANCELLED | OUTPATIENT
Start: 2022-12-06

## 2022-12-06 RX ORDER — CEFAZOLIN SODIUM 2 G/50ML
2 SOLUTION INTRAVENOUS SEE ADMIN INSTRUCTIONS
Status: CANCELLED | OUTPATIENT
Start: 2022-12-06

## 2022-12-06 ASSESSMENT — PAIN SCALES - GENERAL: PAINLEVEL: NO PAIN (0)

## 2022-12-06 NOTE — NURSING NOTE
"Oncology Rooming Note    December 6, 2022 4:09 PM   Cesar Murillo is a 67 year old male who presents for:    Chief Complaint   Patient presents with     Oncology Clinic Visit     Malignant neoplasm of lung     Initial Vitals: BP (!) 144/92 (BP Location: Right arm, Patient Position: Sitting, Cuff Size: Adult Regular)   Pulse 59   Temp 98.7  F (37.1  C) (Oral)   Ht 1.75 m (5' 8.9\")   Wt 86.5 kg (190 lb 11.2 oz)   SpO2 97%   BMI 28.24 kg/m   Estimated body mass index is 28.24 kg/m  as calculated from the following:    Height as of this encounter: 1.75 m (5' 8.9\").    Weight as of this encounter: 86.5 kg (190 lb 11.2 oz). Body surface area is 2.05 meters squared.  No Pain (0) Comment: Data Unavailable   No LMP for male patient.  Allergies reviewed: Yes  Medications reviewed: Yes    Medications: Medication refills not needed today.  Pharmacy name entered into [employer]: [id number] [address]    Clinical concerns: none.       Felipe Castellano"

## 2022-12-06 NOTE — PROGRESS NOTES
THORACIC SURGERY - NEW PATIENT OFFICE VISIT      Dear Dr. Leon,    I saw Cesar Murillo at Dr. De Dios's request in consultation for the evaluation and treatment of a nodule of the RIGHT lower lobe.    HPI  Cesar Murillo is a 67 year old male patient who presents with a growing right lower lobe lung nodule. This was first seen on a lung cancer screening CT chest in June 2022. He does not have any respiratory, gastrointestinal, urinary or neurological symptoms.  He is physically active and can walk 2 blocks without problem. He can climb 2 flights of stairs but will be short of breath.     ECOG performance status  1- Mild physical restriction, sedentary      Previsit Tests   PFTs (12/05/2022): FEV1 104% predicted, 3.06L, DLCO 112    PET-CT scan (11/25/2022): FDG avid 1.8 x 1.1 cm cavitary RIGHT lower lobe Nodule (Max SUV 6.1). No suspicious hypermetabolic activity elsewhere; slowly enlarged from 0.7 x 0.6 cm on 05/17/2021, and not present on the older studies.      CT chest (5/4/2022): RIGHT lower lobe 5 x 6 mm indeterminate pulmonary Nodule, without mediastinal adenopathy, with no pleural effusion       Covid vaccination status: Vaccinated    PMH  HTN    Reviewed, as below:  Past Medical History:   Diagnosis Date     Acute bilateral low back pain without sciatica 07/23/2018     Anxiety, generalized 11/17/2017     Ascending aorta dilation (H)     4.2 cm on CT scan August 2017, follow annually, 4.1 cm August 2018, stable October 2019.  4.5 cm April 2022.  Recheck in 1 year     Calcaneal fracture 2013    Chronic foot/heel pain     Chronic cough 04/14/2022     Chronic fatigue 10/14/2019     Chronic hepatitis C (H)     Successfully treated.  Previously on interferon.  More recently SOFOSBUVIR and ribavirin, biopsy has been negative for cirrhosis, PCR -2015     Chronic insomnia      Chronic pain of right ankle 05/17/2018     Erectile dysfunction      Essential hypertension      External hemorrhoids 2012     History of alcohol  "abuse      History of colon polyps     Colonoscopy April 2020 with polyps.  Repeat in 5 years     History of depression     2014, prescribed citalopram     Lower abdominal pain 07/23/2018     Osteoarthritis of multiple joints     Chronic bilateral knee and chronic low back pain     Porphyria cutanea tarda (H)      Pulmonary nodule     CT August 2018 3 mm right upper lobe nodule reported as stable, stable October 2019.  CT with 6 mm right lower lobe nodule April 2022 needing 6-month follow-up     Rectal bleeding 03/02/2020     Screening for AAA (abdominal aortic aneurysm)     CT 2018 without abdominal aneurysm     Shingles 05/13/2021     Tobacco abuse 11/17/2017        PSH  No abdominal or chest surgery    Reviewed, as below:  Past Surgical History:   Procedure Laterality Date     ANKLE FRACTURE SURGERY       ARTHROSCOPY KNEE Bilateral      FOOT FRACTURE SURGERY  09/2013    calcaneal fracture     WRIST SURGERY      ligament injury        No Known Allergies    Current Outpatient Medications   Medication     docusate sodium (STOOL SOFTENER ORAL)     lisinopril (ZESTRIL) 20 MG tablet     LORazepam (ATIVAN) 1 MG tablet     rosuvastatin (CRESTOR) 10 MG tablet     sildenafiL (VIAGRA) 100 MG tablet     No current facility-administered medications for this visit.       ETOH: drinks alcohol daily, few drinks daily since 18 years of age  TOBACCO: 1 PPD since 20 years of age, still continues to smoke  OTHER DRUGS: smokes marijuana for last 50 years, 5 days per week    Physical examination  BP (!) 144/92 (BP Location: Right arm, Patient Position: Sitting, Cuff Size: Adult Regular)   Pulse 59   Temp 98.7  F (37.1  C) (Oral)   Ht 1.75 m (5' 8.9\")   Wt 86.5 kg (190 lb 11.2 oz)   SpO2 97%   BMI 28.24 kg/m      Exam:  General: healthy, alert and no distress  Head: normocephalic, atraumatic  Neck: supple  Cardiovascular: RRR  Respiratory: Lungs clear  Gastrointestinal: Abdomen soft, non-tender, no masses, " organomegaly  Musculoskeletal: extremities warm, no gross deformities noted  Neurologic: AAO x 3, grossly WNL.  Psychiatric: normal mentation and affect    From a personal perspective, he is here with his wife Keke. He is a  for more than 50 years and works part-time with existing clients only. He has 4 kids who live nearby.    IMPRESSION (R91.1) Lung nodule seen on imaging study  Plan: nicotine (NICODERM CQ) 21 MG/24HR 24 hr patch,         (Oncology Clinical Pharm) Med Therapy Manage         Referral   67 year old male patient with RIGHT lower lobe nodule. He appears to be a good surgical candidate.    Stage: if it is malignant, clinical stage would be [id number]    PLAN  I spent 60 min on the date of the encounter in chart review, patient visit, review of tests, documentation and/or discussion with other providers about the issues documented above. I reviewed the plan as follows:  Procedure planned: Right robot-assisted thoracoscopic right wedge resection, possible segmentectomy, possible lobectomy, mediastinal lymph node dissection.  I discussed the risks and benefits of the operation, including obtaining a diagnosis, resecting and staging the cancer. The risks include bleeding, infection, arrhythmia requiring medication or anticoagulation, prolonged air leak, chylothorax, deep venous thrombosis and pulmonary embolism, and death.  There is also a risk of prolonged pain, which could require further treatment.  Prolonged air leak could be treated with bronchoscopy and endobronchial valve insertion  or going home with a chest tube.  Postoperative bleeding (rare) could require a return to the OR.   Necessary Preop Tests & Appointments: Preoperative assessment clinic  Regional Anesthesia Plan: Intraoperative intercostal nerve block  Anticoagulation Plan: Prophylactic Lovenox  Smoking Cessation:   Mr. Murillo was counseled on the importance of smoking cessation and its impact on the antonieta-operative course.  The patient is strongly encouraged to quit smoking for 3 weeks prior to their procedure. If they feel they're absolutely unable to quit, we will proceed as planned given the malignant nature of their disease.  This guideline is in accordance with the World Health Organization (WHO) and has shown to lower the risk of both surgical and anesthesia complications as well as improve post-operative outcomes.     I asked him to quit smoking marijuana and to stop drinking prior to surgery as well.    I appreciate the opportunity to participate in the care of your patient and will keep you updated.  Sincerely,    Lobo Leger MD

## 2022-12-06 NOTE — LETTER
12/6/2022         RE: Cesar Murillo  [address]  [place] [address]        Dear Colleague,    Thank you for referring your patient, Cesar Murillo, to the [facility]. Please see a copy of my visit note below.    THORACIC SURGERY - NEW PATIENT OFFICE VISIT      Dear Dr. Leon,    I saw Cesar Murillo at Dr. De Dios's request in consultation for the evaluation and treatment of a nodule of the RIGHT lower lobe.    HPI  Cesar Murillo is a 67 year old male patient who presents with a growing right lower lobe lung nodule. This was first seen on a lung cancer screening CT chest in June 2022. He does not have any respiratory, gastrointestinal, urinary or neurological symptoms.  He is physically active and can walk 2 blocks without problem. He can climb 2 flights of stairs but will be short of breath.     ECOG performance status  1- Mild physical restriction, sedentary      Previsit Tests   PFTs (12/05/2022): FEV1 104% predicted, 3.06L, DLCO 112    PET-CT scan (11/25/2022): FDG avid 1.8 x 1.1 cm cavitary RIGHT lower lobe Nodule (Max SUV 6.1). No suspicious hypermetabolic activity elsewhere; slowly enlarged from 0.7 x 0.6 cm on 05/17/2021, and not present on the older studies.      CT chest (5/4/2022): RIGHT lower lobe 5 x 6 mm indeterminate pulmonary Nodule, without mediastinal adenopathy, with no pleural effusion       Covid vaccination status: Vaccinated    PMH  HTN    Reviewed, as below:  Past Medical History:   Diagnosis Date     Acute bilateral low back pain without sciatica 07/23/2018     Anxiety, generalized 11/17/2017     Ascending aorta dilation (H)     4.2 cm on CT scan August 2017, follow annually, 4.1 cm August 2018, stable October 2019.  4.5 cm April 2022.  Recheck in 1 year     Calcaneal fracture 2013    Chronic foot/heel pain     Chronic cough 04/14/2022     Chronic fatigue 10/14/2019     Chronic hepatitis C (H)     Successfully treated.  Previously on interferon.  More  "recently SOFOSBUVIR and ribavirin, biopsy has been negative for cirrhosis, PCR -2015     Chronic insomnia      Chronic pain of right ankle 05/17/2018     Erectile dysfunction      Essential hypertension      External hemorrhoids 2012     History of alcohol abuse      History of colon polyps     Colonoscopy April 2020 with polyps.  Repeat in 5 years     History of depression     2014, prescribed citalopram     Lower abdominal pain 07/23/2018     Osteoarthritis of multiple joints     Chronic bilateral knee and chronic low back pain     Porphyria cutanea tarda (H)      Pulmonary nodule     CT August 2018 3 mm right upper lobe nodule reported as stable, stable October 2019.  CT with 6 mm right lower lobe nodule April 2022 needing 6-month follow-up     Rectal bleeding 03/02/2020     Screening for AAA (abdominal aortic aneurysm)     CT 2018 without abdominal aneurysm     Shingles 05/13/2021     Tobacco abuse 11/17/2017        PSH  No abdominal or chest surgery    Reviewed, as below:  Past Surgical History:   Procedure Laterality Date     ANKLE FRACTURE SURGERY       ARTHROSCOPY KNEE Bilateral      FOOT FRACTURE SURGERY  09/2013    calcaneal fracture     WRIST SURGERY      ligament injury        No Known Allergies    Current Outpatient Medications   Medication     docusate sodium (STOOL SOFTENER ORAL)     lisinopril (ZESTRIL) 20 MG tablet     LORazepam (ATIVAN) 1 MG tablet     rosuvastatin (CRESTOR) 10 MG tablet     sildenafiL (VIAGRA) 100 MG tablet     No current facility-administered medications for this visit.       ETOH: drinks alcohol daily, few drinks daily since 18 years of age  TOBACCO: 1 PPD since 20 years of age, still continues to smoke  OTHER DRUGS: smokes marijuana for last 50 years, 5 days per week    Physical examination  BP (!) 144/92 (BP Location: Right arm, Patient Position: Sitting, Cuff Size: Adult Regular)   Pulse 59   Temp 98.7  F (37.1  C) (Oral)   Ht 1.75 m (5' 8.9\")   Wt 86.5 kg (190 lb 11.2 " oz)   SpO2 97%   BMI 28.24 kg/m      Exam:  General: healthy, alert and no distress  Head: normocephalic, atraumatic  Neck: supple  Cardiovascular: RRR  Respiratory: Lungs clear  Gastrointestinal: Abdomen soft, non-tender, no masses, organomegaly  Musculoskeletal: extremities warm, no gross deformities noted  Neurologic: AAO x 3, grossly WNL.  Psychiatric: normal mentation and affect    From a personal perspective, he is here with his wife Keke. He is a  for more than 50 years and works part-time with existing clients only. He has 4 kids who live nearby.    IMPRESSION (R91.1) Lung nodule seen on imaging study  Plan: nicotine (NICODERM CQ) 21 MG/24HR 24 hr patch,         (Oncology Clinical Pharm) Med Therapy Manage         Referral   67 year old male patient with RIGHT lower lobe nodule. He appears to be a good surgical candidate.    Stage: if it is malignant, clinical stage would be [id number]    PLAN  I spent 60 min on the date of the encounter in chart review, patient visit, review of tests, documentation and/or discussion with other providers about the issues documented above. I reviewed the plan as follows:  Procedure planned: Right robot-assisted thoracoscopic right wedge resection, possible segmentectomy, possible lobectomy, mediastinal lymph node dissection.  I discussed the risks and benefits of the operation, including obtaining a diagnosis, resecting and staging the cancer. The risks include bleeding, infection, arrhythmia requiring medication or anticoagulation, prolonged air leak, chylothorax, deep venous thrombosis and pulmonary embolism, and death.  There is also a risk of prolonged pain, which could require further treatment.  Prolonged air leak could be treated with bronchoscopy and endobronchial valve insertion  or going home with a chest tube.  Postoperative bleeding (rare) could require a return to the OR.   Necessary Preop Tests & Appointments: Preoperative assessment  clinic  Regional Anesthesia Plan: Intraoperative intercostal nerve block  Anticoagulation Plan: Prophylactic Lovenox  Smoking Cessation:   Mr. Murillo was counseled on the importance of smoking cessation and its impact on the antonieta-operative course. The patient is strongly encouraged to quit smoking for 3 weeks prior to their procedure. If they feel they're absolutely unable to quit, we will proceed as planned given the malignant nature of their disease.  This guideline is in accordance with the World Health Organization (WHO) and has shown to lower the risk of both surgical and anesthesia complications as well as improve post-operative outcomes.     I asked him to quit smoking marijuana and to stop drinking prior to surgery as well.    I appreciate the opportunity to participate in the care of your patient and will keep you updated.  Sincerely,  Lobo Leger MD

## 2022-12-08 DIAGNOSIS — F41.1 ANXIETY, GENERALIZED: Primary | ICD-10-CM

## 2022-12-08 RX ORDER — LORAZEPAM 1 MG/1
TABLET ORAL
Qty: 15 TABLET | Refills: 0 | Status: SHIPPED | OUTPATIENT
Start: 2022-12-08 | End: 2023-04-10

## 2022-12-09 ENCOUNTER — VIRTUAL VISIT (OUTPATIENT)
Dept: PHARMACY | Facility: CLINIC | Age: 67
End: 2022-12-09
Attending: THORACIC SURGERY (CARDIOTHORACIC VASCULAR SURGERY)
Payer: COMMERCIAL

## 2022-12-09 DIAGNOSIS — I10 ESSENTIAL HYPERTENSION: Primary | ICD-10-CM

## 2022-12-09 DIAGNOSIS — Z72.0 TOBACCO ABUSE: ICD-10-CM

## 2022-12-09 DIAGNOSIS — N52.9 ERECTILE DYSFUNCTION, UNSPECIFIED ERECTILE DYSFUNCTION TYPE: ICD-10-CM

## 2022-12-09 DIAGNOSIS — K59.00 CONSTIPATION, UNSPECIFIED CONSTIPATION TYPE: ICD-10-CM

## 2022-12-09 DIAGNOSIS — F51.04 CHRONIC INSOMNIA: ICD-10-CM

## 2022-12-09 DIAGNOSIS — E78.49 OTHER HYPERLIPIDEMIA: ICD-10-CM

## 2022-12-09 PROCEDURE — 99607 MTMS BY PHARM ADDL 15 MIN: CPT | Performed by: PHARMACIST

## 2022-12-09 PROCEDURE — 99605 MTMS BY PHARM NP 15 MIN: CPT | Performed by: PHARMACIST

## 2022-12-09 NOTE — PROGRESS NOTES
I discussed diagnosis of probable lung cancer with Cesar.  He met with surgery with plans for resection next month.  We discussed cutting back on alcohol and trying to quit smoking.  He will use nicotine patch.  I am refilling #15 lorazepam which he uses infrequently.

## 2022-12-09 NOTE — LETTER
December 9, 2022  Cesar ANGUIANO Chidi  [address] [place] [address]    Dear Mr. Lynnes, [facility]     Thank you for talking with me on Dec 9, 2022 about your health and medications. As a follow-up to our conversation, I have included two documents:      1. Your Recommended To-Do List has steps you should take to get the best results from your medications.  2. Your Medication List will help you keep track of your medications and how to take them.    If you want to talk about these documents, please call Boaz Hutchins RPH at phone: [phone], Monday-Friday 8-4:30pm.    I look forward to working with you and your doctors to make sure your medications work well for you.    Sincerely,  Boaz Hutchins RPH  [facility] Pharmacist, [facility]

## 2022-12-09 NOTE — Clinical Note
Patient prefers to quit cold turkey and is not open to medication options at this time.  Boaz Hutchins, Pharm. D., [facility] Medication Therapy Management Pharmacist Direct Voicemail: [phone]

## 2022-12-09 NOTE — PROGRESS NOTES
Medication Therapy Management (MTM) Encounter    ASSESSMENT:                            Medication Adherence/Access: No issues identified    Smoking Cessation:  Patient would benefit from pharmacotherapy to help him quit smoking however will try quitting cold turkey first.      Hypertension: blood pressure is not at goal of <130/80 mmHg. Patient should follow-up with PCP on potential increase in blood pressure medication.    Hyperlipidemia: Stable.     Insomnia: Stable.     Constipation: Stable.     ED: Stable.     PLAN:                            1. Follow-up with your primary care provider to check in on blood pressure.  2. Call me if you would like to discuss smoking cessation medications further    Follow-up: as needed    SUBJECTIVE/OBJECTIVE:                          Cesar Murillo is a 67 year old male called for an initial visit. He was referred to me from Lobo Leger.      Reason for visit: Smoking Cessation.    Allergies/ADRs: Reviewed in chart  Past Medical History: Reviewed in chart  Tobacco: He reports that he has been smoking cigarettes. He has never used smokeless tobacco.Nicotine/Tobacco Cessation Plan:   Information offered: Patient not interested at this time    Alcohol: 10 or more beverages /week      Medication Adherence/Access: no issues reported    Smoking Cessation:  How much does patient smoke:  Over 1 ppd - rolls his own cigarettes  Why does patient smoke: something he has always done  Triggers for smoking include:  Just in general. If he is drinking he will smoke more  How long has patient been smokin years  What is the most patient ever smoked:  current  Tried quitting in the past: No.    Why does patient want to quit (motivators for quitting): health issues - feels fine somewhat short of breath sometimes. They will be removing part of his lung due to a lung nodule.   What scares patient about quitting? no     Smoked under a pack on Tuesday. Wednesday he smoked 7 cigarettes, Thursday he  "had 8, so far he has had 2. He has also been trying to reduce his cocktails. Surgery in mid January. Wants him to stop smoking and drinking before then. Reports that he isnt going to  a nicotine patch. Not interested in picking a quit date yet at this point. Only smoking half a cigarette at a time.        Hypertension: Current medications include lisinopril 20 mg daily.  Patient does not self-monitor blood pressure.  Patient reports no current medication side effects.  BP Readings from Last 3 Encounters:   12/06/22 (!) 144/92   04/14/22 136/80   05/13/21 120/82     Hyperlipidemia: Current therapy includes atorvastatin 10mg daily.  Patient reports no significant myalgias or other side effects.  The 10-year ASCVD risk score (Rossy HODGSON, et al., 2019) is: 20.9%    Values used to calculate the score:      Age: 67 years      Sex: Male      Is Non- : No      Diabetic: No      Tobacco smoker: Yes      Systolic Blood Pressure: 144 mmHg      Is BP treated: Yes      HDL Cholesterol: 52 mg/dL      Total Cholesterol: 136 mg/dL  Recent Labs   Lab Test 08/10/22  1009 04/14/22  1230   CHOL 136 189   HDL 52 40   LDL 68 132*   TRIG 81 85       Insomnia: Currently taking lorazepam 1 mg as needed. Sometimes wont use for more than a month.     Constipation: Currently taking docusate as needed but not very often.     ED: Has a prescription for sildenafil 100 mg as needed, but hasnt used in \"a long time.\"    Today's Vitals: There were no vitals taken for this visit.  ----------------      I spent 20 minutes with this patient today. All changes were made via collaborative practice agreement with Lobo Leger. A copy of the visit note was provided to the patient's provider(s).    A summary of these recommendations was declined by the patient.    Boaz Hutchins, Pharm. D., [facility]  Medication Therapy Management Pharmacist  Direct Voicemail: [phone]      Telemedicine Visit Details  Type of service:  Telephone " visit  Start Time: 2:08 pm  End Time: 2:28 PM  Originating Location (pt. Location): Home      Distant Location (provider location):  On-site  Provider has received verbal consent for a visit from the patient? Yes     Medication Therapy Recommendations  No medication therapy recommendations to display

## 2022-12-09 NOTE — LETTER
_  Medication List        Prepared on: Dec 9, 2022     Bring your Medication List when you go to the doctor, hospital, or   emergency room. And, share it with your family or caregivers.     Note any changes to how you take your medications.  Cross out medications when you no longer use them.    Medication How I take it Why I use it Prescriber   docusate sodium (STOOL SOFTENER ORAL)  Take 1 by mouth daily as needed Constipation Over the Counter   lisinopril (ZESTRIL) 20 MG tablet Take 1 tablet (20 mg) by mouth daily Blood pressure  Sen Leon MD   LORazepam (ATIVAN) 1 MG tablet Take 1 tablet by mouth at bedtime as needed for insonmia. Anxiety, generalized Sen Leon MD   nicotine (NICODERM CQ) 21 MG/24HR 24 hr patch Place 1 patch onto the skin every 24 hours Smoking Cessation Lobo Leger MD   rosuvastatin (CRESTOR) 10 MG tablet Take 1 tablet (10 mg) by mouth daily Tobacco Abuse; Coronary artery calcification seen on CT scan Sen Leon MD   sildenafiL (VIAGRA) 100 MG tablet Take 1 tablet (100 mg total) by mouth daily as needed. Erectile dysfunction, unspecified erectile dysfunction type Sen Leon MD         Add new medications, over-the-counter drugs, herbals, vitamins, or  minerals in the blank rows below.    Medication How I take it Why I use it Prescriber                          Allergies:      No Known Allergies        Side effects I have had:               Other Information:              My notes and questions:

## 2022-12-09 NOTE — LETTER
"Recommended To-Do List      Prepared on: Dec 9, 2022       You can get the best results from your medications by completing the items on this \"To-Do List.\"      Bring your To-Do List when you go to your doctor. And, share it with your family or caregivers.    My To-Do List:  What we talked about: What I should do:    We discussed your medications and your smoking   Reach out to the [facility] pharmacist if you would like to discuss medications further                "

## 2022-12-15 ENCOUNTER — TELEPHONE (OUTPATIENT)
Dept: SURGERY | Facility: CLINIC | Age: 67
End: 2022-12-15

## 2022-12-15 NOTE — TELEPHONE ENCOUNTER
Called and talked to Cesar to see if he was interested in learning more about a research study that he could be a part of. He would like to learn more, Dr. Herrera was notified and patient will receive a call with further information.    Cesar has not had an alcoholic drink or smoked marijuana since 12/7/22. He is still currently smoking 7-10 cigarettes per day down from 20-25 cigarettes per day. He would prefer a Wednesday surgery, he mentioned January 18th. Our OR  will be in contact with him, he had no other questions or concerns.

## 2022-12-21 ENCOUNTER — TELEPHONE (OUTPATIENT)
Dept: ONCOLOGY | Facility: CLINIC | Age: 67
End: 2022-12-21

## 2022-12-21 NOTE — TELEPHONE ENCOUNTER
Called and spoke with pt to offer 01/25/23 at  for surgery w/Dr. Leger. Pt declined stating he does not want surgery there and would prefer [facility] since his wife will be driving back and forth during his stay. Informed pt writer would need to follow up with Dr. Leger and would get back to him.    Lala Carpio on 12/21/2022 at 8:45 AM

## 2022-12-26 ENCOUNTER — TELEPHONE (OUTPATIENT)
Dept: ONCOLOGY | Facility: CLINIC | Age: 67
End: 2022-12-26

## 2022-12-26 NOTE — TELEPHONE ENCOUNTER
FUTURE VISIT INFORMATION      SURGERY INFORMATION:    Date: 1/18/23    [facility]    Surgeon:  Lobo Leger MD-    Anesthesia Type:  general    Procedure: Robot-assisted thoracoscopic right lower lobe wedge, possible lobectomy, mediastinal lymph node dissection    Consult: ov 12/6    RECORDS REQUESTED FROM:       Primary Care Provider: Sen Leon MD- [facility]    Pertinent Medical History: hypertension, ascending aorta dilation    Most recent PFT's: 12/5/22

## 2022-12-26 NOTE — TELEPHONE ENCOUNTER
FUTURE VISIT INFORMATION        SURGERY INFORMATION:    Date: 1/18/23    Location: [facility]    Surgeon:  Lobo Leger MD-    Anesthesia Type:  general    Procedure: Robot-assisted thoracoscopic right lower lobe wedge, possible lobectomy, mediastinal lymph node dissection    Consult: ov 12/6     RECORDS REQUESTED FROM:         Primary Care Provider: Sen Leon MD- [facility]     Pertinent Medical History: hypertension, ascending aorta dilation     Most recent PFT's: 12/5/22

## 2022-12-26 NOTE — TELEPHONE ENCOUNTER
Spoke with patient to schedule procedure with Arsen Castro   Procedure was scheduled on 01/18/23 at [facility] OR  Patient will have H&P with PAC    Patient is aware a COVID-19 test is needed before their procedure.   Patient will schedule at: [facility]  (Patient is aware IP/Thoracic are still requiring COVID-19 test)     Patient is aware a / is needed day of surgery.   Surgery letter was sent via mail, patient has my direct contact information for any further questions.     Need order for Xray  Pt requested PAC link via text to his mobile [phone]       Jan 04, 2023  5:45 PM  (Arrive by 5:30 PM)  PAC EVALUATION with Fe Walker PA-C  [facility] [place] ([facility] ) [phone]   Jan 16, 2023  5:30 PM  Testing Only with [facility] COVID LAB  [facility] [place] ([facility] ) [phone]   Feb 14, 2023  2:00 PM  (Arrive by 1:45 PM)  Return Visit with Lobo Leger MD  [facility] ([facility] ) [phone]

## 2022-12-31 DIAGNOSIS — R91.1 LUNG NODULE SEEN ON IMAGING STUDY: Primary | ICD-10-CM

## 2023-01-04 ENCOUNTER — VIRTUAL VISIT (OUTPATIENT)
Dept: SURGERY | Facility: CLINIC | Age: 68
End: 2023-01-04
Payer: COMMERCIAL

## 2023-01-04 ENCOUNTER — ANESTHESIA EVENT (OUTPATIENT)
Dept: SURGERY | Facility: CLINIC | Age: 68
DRG: 165 | End: 2023-01-04
Payer: COMMERCIAL

## 2023-01-04 ENCOUNTER — PRE VISIT (OUTPATIENT)
Dept: SURGERY | Facility: CLINIC | Age: 68
End: 2023-01-04

## 2023-01-04 DIAGNOSIS — Z01.810 ENCOUNTER FOR PREPROCEDURAL CARDIOVASCULAR EXAMINATION: ICD-10-CM

## 2023-01-04 DIAGNOSIS — Z01.818 PRE-OP EVALUATION: Primary | ICD-10-CM

## 2023-01-04 PROCEDURE — 99203 OFFICE O/P NEW LOW 30 MIN: CPT | Mod: 95 | Performed by: PHYSICIAN ASSISTANT

## 2023-01-04 ASSESSMENT — LIFESTYLE VARIABLES: TOBACCO_USE: 1

## 2023-01-04 ASSESSMENT — ENCOUNTER SYMPTOMS: SEIZURES: 0

## 2023-01-04 NOTE — PROGRESS NOTES
Cesar is a 67 year old who is being evaluated via a billable video visit.      How would you like to obtain your AVS?  Send to e-mail at: [email or web address]    Pre-Op Exam      HPI         Review of Systems       Physical Exam     YAMILETH Davenport LPN

## 2023-01-04 NOTE — PATIENT INSTRUCTIONS
Patient ok to use her insulin pump per .  She covered herself with   20:36  -21.4 U by patient  22:15 -1.6 by patient  (We gave 10 U Lantus  12U novolog)  2:15    -3.2 U by patient  6:15   BS  114 no units given Preparing for Your Surgery      Name:  Cesar Murillo   MRN:  [id number]   :  1955   Today's Date:  2023       Arriving for surgery:  Surgery date:  23  Arrival time:  1:00 pm     Surgeries and procedures: Adult patients can have 2 visitors all through the surgery process.     Visiting hours: 8 a.m. to 8:30 p.m.     Hospital: Adult patients and children under age 18 can have 4 visitor at a time     No visitors under the age of 5 are allowed for hospital patients.  Double occupancy rooms: Patients can have only two visitors at a time.     Patients with disabilities: Can have a support person with them (family member, service provider     Or someone well informed about their needs) plus the allowed number of visitors     Patients confirmed or suspected to have symptoms of COVID 19 or flu:     No visitors allowed for adult patients.   Children (under age 18) can have 1 named visitor.     People who are sick or showing symptoms of COVID 19 or flu:    Are not allowed to visit patients--we can only make exceptions in special situations.       Please follow these guidelines for your visit:   Arrive wearing a mask over your mouth and nose; we will give you a medical mask to wear    If you arrive wearing a cloth mask.   Keep it on during your entire visit, even when in patient's room.   If you don't wear a mask we'll ask you to leave.     Clean your hands with alcohol hand . Do this when you arrive at and leave the building and patient room,    And again after you touch your mask or anything in the room.     You can t visit if you have a fever, cough, shortness of breath, muscle aches, headaches, sore throat    Or diarrhea      Stay 6 feet away from others during your visit and between visits     Go directly to and from the room you are visiting.     Stay in the patient s room during your visit. Limit going to other places in the hospital as much as possible     Leave bags and jackets at home or in the  car.     For everyone s health, please don t come and go during your visit. That includes for smoking   during your visit.     Please come to:     [facility] [address]  [address] [place]  [address]    - ? parking is available in front of the hospital      -    Please proceed to [address] on the 3rd floor. [phone]?     - ?If you are in need of directions, wheelchair or escort please stop at the Information Desk in the lobby.  Inform the information person that you are here for surgery; a wheelchair and escort to [address] will be provided.?     What can I eat or drink?  -  You may eat and drink normally up to 8 hours prior to arrival time.   -  You may have clear liquids until 2 hours prior to arrival time.     Examples of clear liquids:  Water  Clear broth  Juices (apple, white grape, white cranberry  and cider) without pulp  Noncarbonated, powder based beverages  (lemonade and Jr-Aid)  Sodas (Sprite, 7-Up, ginger ale and seltzer)  Coffee or tea (without milk or cream)  Gatorade    -  No Alcohol for at least 24 hours before surgery.     Which medicines can I take?    Hold Aspirin for 7 days before surgery.   Hold Multivitamins for 7 days before surgery.  Hold Supplements for 7 days before surgery.  Hold Ibuprofen (Advil, Motrin) for 1 day before surgery--unless otherwise directed by surgeon.  Hold Naproxen (Aleve) for 4 days before surgery.  -  DO NOT take these medications the day of surgery:  Lisinopril.    -  PLEASE TAKE these medications the day of surgery:  Tylenol if needed; take other morning medications.    How do I prepare myself?  - Please take 2 showers before surgery using Scrubcare or Hibiclens soap.    Use this soap only from the neck to your toes.     Leave the soap on your skin for one minute--then rinse thoroughly.      You may use your own shampoo and conditioner. No other hair products.   - Please remove all jewelry and  body piercings.  - No lotions, deodorants or fragrance.  - No makeup or fingernail polish.   - Bring your ID and insurance card.    -If you have a Deep Brain Stimulator, Spinal Cord Stimulator, or any Neuro Stimulator device---you must bring the remote control to the hospital.      ALL PATIENTS GOING HOME THE SAME DAY OF SURGERY ARE REQUIRED TO HAVE A RESPONSIBLE ADULT TO DRIVE AND BE IN ATTENDANCE WITH THEM FOR 24 HOURS FOLLOWING SURGERY.    Covid testing policy as of 12/06/2022  Your surgeon will notify and schedule you for a COVID test if one is needed before surgery--please direct any questions or COVID symptoms to your surgeon      Questions or Concerns:    - For any questions regarding the day of surgery or your hospital stay, please contact the Pre Admission Nursing Office at [phone].       - If you have health changes between today and your surgery, please call your surgeon.       - For questions after surgery, please call your surgeons office.

## 2023-01-04 NOTE — H&P
Pre-Operative H & P     CC:  Preoperative exam to assess for increased cardiopulmonary risk while undergoing surgery and anesthesia.    Date of Encounter: 1/4/2023  Primary Care Physician:  Sen Leon     Reason for visit:   Encounter Diagnoses   Name Primary?     Pre-op evaluation Yes     Encounter for preprocedural cardiovascular examination        HPI  Cesar Murillo is a 67 year old male who presents for pre-operative H & P in preparation for  Procedure Information     Case: [id number] Date/Time: 01/18/23 1505    Procedure: Robot-assisted thoracoscopic right lower lobe wedge, possible lobectomy, mediastinal lymph node dissection (Right: Chest)    Anesthesia type: General    Diagnosis: Lung nodule seen on imaging study [R91.1]    Pre-op diagnosis: Lung nodule seen on imaging study [R91.1]    Location:  OR [address] [place] [address] OR    Providers: Lobo Leger MD          Patient is being evaluated for comorbid conditions of hypertension, tobacco use, depression, anxiety, h/o HCV now treated, OA    Mr. Murillo has a history of a growing right lung nodule. He was seen by Dr. Leger and is now scheduled for the above procedure.     History is obtained from the patient and chart review    Hx of abnormal bleeding or anti-platelet use: denies      Past Medical History  Past Medical History:   Diagnosis Date     Acute bilateral low back pain without sciatica 07/23/2018     Anxiety, generalized 11/17/2017     Ascending aorta dilation (H)     4.2 cm on CT scan August 2017, follow annually, 4.1 cm August 2018, stable October 2019.  4.5 cm April 2022.  Recheck in 1 year     Calcaneal fracture 2013    Chronic foot/heel pain     Chronic cough 04/14/2022     Chronic fatigue 10/14/2019     Chronic hepatitis C (H)     Successfully treated.  Previously on interferon.  More recently SOFOSBUVIR and ribavirin, biopsy has been negative for cirrhosis, PCR -2015     Chronic insomnia      Chronic pain of right ankle 05/17/2018     Erectile  dysfunction      Essential hypertension      External hemorrhoids 2012     History of alcohol abuse      History of colon polyps     Colonoscopy April 2020 with polyps.  Repeat in 5 years     History of depression     2014, prescribed citalopram     Lower abdominal pain 07/23/2018     Osteoarthritis of multiple joints     Chronic bilateral knee and chronic low back pain     Porphyria cutanea tarda (H)      Pulmonary nodule     CT August 2018 3 mm right upper lobe nodule reported as stable, stable October 2019.  CT with 6 mm right lower lobe nodule April 2022 needing 6-month follow-up     Rectal bleeding 03/02/2020     Screening for AAA (abdominal aortic aneurysm)     CT 2018 without abdominal aneurysm     Shingles 05/13/2021     Tobacco abuse 11/17/2017       Past Surgical History  Past Surgical History:   Procedure Laterality Date     ANKLE FRACTURE SURGERY       ARTHROSCOPY KNEE Bilateral      COLONOSCOPY       FOOT FRACTURE SURGERY  09/01/2013    calcaneal fracture     WRIST SURGERY      ligament injury       Prior to Admission Medications  Current Outpatient Medications   Medication Sig Dispense Refill     docusate sodium (STOOL SOFTENER ORAL) Take by mouth as needed       lisinopril (ZESTRIL) 20 MG tablet Take 1 tablet (20 mg) by mouth daily (Patient taking differently: Take 20 mg by mouth every morning) 90 tablet 3     LORazepam (ATIVAN) 1 MG tablet Take 1 tablet by mouth at bedtime as needed for insonmia. (Patient taking differently: Take 1 mg by mouth nightly as needed Take 1 tablet by mouth at bedtime as needed for insonmia.) 15 tablet 0     rosuvastatin (CRESTOR) 10 MG tablet Take 1 tablet (10 mg) by mouth daily (Patient taking differently: Take 10 mg by mouth every morning) 90 tablet 3       Allergies  No Known Allergies    Social History  Social History     Socioeconomic History     Marital status:      Spouse name: Not on file     Number of children: Not on file     Years of education: Not on  file     Highest education level: Not on file   Occupational History     Not on file   Tobacco Use     Smoking status: Every Day     Types: Cigarettes     Smokeless tobacco: Never   Substance and Sexual Activity     Alcohol use: Yes     Comment: Alcoholic Drinks/day: 2-3 every day     Drug use: Yes     Types: Marijuana     Comment: Drug use: Frequent use     Sexual activity: Not on file   Other Topics Concern     Not on file   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 84     Cerebrovascular Disease Mother      Kidney failure Father         d 76     Rectal Cancer Father      Skin Cancer Brother      Crohn's Disease Brother      Anesthesia Reaction No family hx of      Deep Vein Thrombosis (DVT) No family hx of        Review of Systems  The complete review of systems is negative other than noted in the HPI or here.   Anesthesia Evaluation   Pt has had prior anesthetic.     No history of anesthetic complications       ROS/MED HX  ENT/Pulmonary: Comment: Lung nodule    (+) tobacco use, Current use,     Neurologic:  - neg neurologic ROS  (-) no seizures and no CVA   Cardiovascular:     (+) hypertension----- (-) taking anticoagulants/antiplatelets   METS/Exercise Tolerance: >4 METS Comment: Can walk a mile and ascend a flight of stairs without CP, some intermittent GANDHI   Hematologic:  - neg hematologic  ROS  (-) history of blood clots and history of blood transfusion   Musculoskeletal: Comment: OA      GI/Hepatic:     (+) hepatitis resolved hepatitis type C,      Renal/Genitourinary:  - neg Renal ROS     Endo:  - neg endo ROS  (-) chronic steroid usage   Psychiatric/Substance Use:     (+) psychiatric history anxiety and depression     Infectious Disease:  - neg infectious disease ROS     Malignancy:       Other:            Virtual visit -  No vitals were obtained    Physical Exam  Constitutional: Awake, alert, cooperative, no apparent distress, and appears stated age.  HENT: Normocephalic  Respiratory: non labored breathing   Neurologic: Awake, alert, oriented to name, place and time.   Neuropsychiatric: Calm, cooperative. Normal affect.      Prior Labs/Diagnostic Studies   All labs and imaging personally reviewed     EKG/ stress test - if available please see in ROS above   No results found.  PFT Latest Ref Rng & Units 12/5/2022   FVC L 4.69   FEV1 L 3.06   FVC% % 124   FEV1% % 104         The patient's records and results personally reviewed by this provider.     Outside records reviewed from: Care Everywhere      Assessment      Cesar Murillo is a 67 year old male seen as a PAC referral for risk assessment and optimization for anesthesia.    Plan/Recommendations  Pt will be optimized for the proposed procedure.  See below for details on the assessment, risk, and preoperative recommendations    NEUROLOGY  - No history of TIA, CVA or seizure  -Post Op delirium risk factors:  No risk identified    ENT  - No current airway concerns.  Will need to be reassessed day of surgery.  Mallampati: Unable to assess  TM: Unable to assess    CARDIAC  -hypertension using lisinopril  -denies cardiac history  -reports he can walk up to a mile and ascend stairs slowly. Endorses GANDHI. Will update stress test prior to surgery  - METS (Metabolic Equivalents)  Patient CANNOT perform 4 METS exercise without symptoms            Total Score: 1    Functional Capacity: Unable to complete 4 METS      RCRI-Low risk: Class 2 0.9% complication rate            Total Score: 1    RCRI: High Risk Surgery     "    PULMONARY  PAWAN Low Risk            Total Score: 2    PAWAN: Over 50 ys old    PAWAN: Male      -pulmonary nodule with above procedure planned   - Tobacco History      History   Smoking Status     Every Day     Types: Cigarettes   Smokeless Tobacco     Never       GI  -h/o HCV, now treated  PONV Low Risk  Total Score: 1           1 AN PONV: Intended Post Op Opioids        /RENAL  - Baseline Creatinine WNL    ENDOCRINE    - BMI: Estimated body mass index is 28.24 kg/m  as calculated from the following:    Height as of 12/6/22: 1.75 m (5' 8.9\").    Weight as of 12/6/22: 86.5 kg (190 lb 11.2 oz).  Overweight (BMI 25.0-29.9)  - No history of Diabetes Mellitus    HEME  VTE High Risk 3%            Total Score: 8    VTE: Greater than 59 yrs old    VTE: Male    VTE: Current cancer      - No history of abnormal bleeding or antiplatelet use.      Different anesthesia methods/types have been discussed with the patient, but they are aware that the final plan will be decided by the assigned anesthesia provider on the date of service.    The patient is optimized for their procedure, pending stress test results. AVS with information on surgery time/arrival time, meds and NPO status given by nursing staff. No further diagnostic testing indicated.    Please refer to the physical examination documented by the anesthesiologist in the anesthesia record on the day of surgery.      Addendum 1/13/23: Cesar went in for DSE, but it was cancelled as patient had frequent PVCs/ runs bigeminy.  Discussed with Dr. Garcia. He completed NM stress test today with results below. He is scheduled for cardiology optimization 1/16 in preparation for surgery 1/18/23.   \"   The nuclear stress test is negative for inducible myocardial ischemia or infarction.     Left ventricular function is normal.     There is no prior study for comparison.\"      ADDENDUM: 1/17/23   Cardiology evaluation on 1/16/23  At this point in time, I recommended we proceed with " diagnostic coronary angiography for a definitive evaluation in this regard.  I have explained the indications, risks and benefits of coronary angiography to the patient in detail and he is agreeable to proceeding.  I will contact the surgical team at the [facility] to coordinate his surgery scheduling based on the results of the coronary angiogram.       I would anticipate that unless critical left main/ 3-vessel coronary artery disease is identified, we would recommend proceeding with surgery given that this nodule may represent a malignant process.    Coronary angiogram 1/17/23  Conclusion         The ejection fraction is 40-50% by visual estimate.    2nd Diag lesion is 20% stenosed.    Prox LAD to Mid LAD lesion is 30% stenosed.    RPAV lesion is 30% stenosed.    Prox RCA to Mid RCA lesion is 30% stenosed.    Mid RCA to Dist RCA lesion is 20% stenosed.     Non-obstructive coronary artery disease.  LVEDP 17.  Reduced LVEF with frequent unifocal PVC    Updated EKG today   Sinus bradycardia, with frequent PVCS in a pattern of bigeminy, septal infarct, age undetermined    Approved for surgery by Dr. Pereira              Video-Visit Details    Type of service:  Video Visit    Provider received verbal consent for a Video Visit from the patient? Yes   Video Start Time: 1512  Video End Time:1525    Originating Location (pt. Location): Home    Distant Location (provider location):  Off-site  Mode of Communication:  Video Conference via [employer]  On the day of service:     Prep time: 6 minutes  Visit time: 13 minutes  Documentation time: 6 minutes  ------------------------------------------  Total time: 25 minutes      Fe Walker PA-C  Preoperative Assessment Center  [facility]  Clinic and Surgery Center  Phone: [phone]  Fax: [phone]

## 2023-01-05 ENCOUNTER — TELEPHONE (OUTPATIENT)
Dept: SURGERY | Facility: CLINIC | Age: 68
End: 2023-01-05

## 2023-01-05 NOTE — TELEPHONE ENCOUNTER
Updated Cesar of his stress test on 1/10, 10:00 am at [place].  Reviewed prep with him as well; Cesar expressed understanding.  Corazon Banks RN

## 2023-01-10 ENCOUNTER — HOSPITAL ENCOUNTER (OUTPATIENT)
Dept: CARDIOLOGY | Facility: CLINIC | Age: 68
Discharge: HOME | End: 2023-01-10
Attending: PHYSICIAN ASSISTANT | Admitting: PHYSICIAN ASSISTANT
Payer: COMMERCIAL

## 2023-01-10 DIAGNOSIS — Z01.818 PREOP EXAMINATION: Primary | ICD-10-CM

## 2023-01-10 DIAGNOSIS — Z01.810 ENCOUNTER FOR PREPROCEDURAL CARDIOVASCULAR EXAMINATION: ICD-10-CM

## 2023-01-10 DIAGNOSIS — R91.1 LUNG NODULE SEEN ON IMAGING STUDY: Primary | ICD-10-CM

## 2023-01-10 LAB — LVEF ECHO: NORMAL

## 2023-01-10 PROCEDURE — 93306 TTE W/DOPPLER COMPLETE: CPT | Mod: 26 | Performed by: STUDENT IN AN ORGANIZED HEALTH CARE EDUCATION/TRAINING PROGRAM

## 2023-01-10 PROCEDURE — 255N000002 HC RX 255 OP 636: Performed by: STUDENT IN AN ORGANIZED HEALTH CARE EDUCATION/TRAINING PROGRAM

## 2023-01-10 RX ORDER — METOPROLOL TARTRATE 1 MG/ML
1-20 INJECTION, SOLUTION INTRAVENOUS
Status: DISCONTINUED | OUTPATIENT
Start: 2023-01-10 | End: 2023-01-10

## 2023-01-10 RX ORDER — ATROPINE SULFATE 0.4 MG/ML
.2-2 AMPUL (ML) INJECTION
Status: DISCONTINUED | OUTPATIENT
Start: 2023-01-10 | End: 2023-01-10

## 2023-01-10 RX ORDER — DOBUTAMINE HYDROCHLORIDE 200 MG/100ML
10-50 INJECTION INTRAVENOUS CONTINUOUS
Status: DISCONTINUED | OUTPATIENT
Start: 2023-01-10 | End: 2023-01-10

## 2023-01-10 RX ADMIN — PERFLUTREN 5 ML: 6.52 INJECTION, SUSPENSION INTRAVENOUS at 11:03

## 2023-01-11 ENCOUNTER — TELEPHONE (OUTPATIENT)
Dept: SURGERY | Facility: CLINIC | Age: 68
End: 2023-01-11

## 2023-01-11 NOTE — TELEPHONE ENCOUNTER
Called patient to let him know Dr. Leger would like an updated ct chest scan prior to surgery on 1/18/23. Patient stated he is still smoking about 1/5 pack of cigarettes per day and drinking about 1-3 drinks per week, which is down from 1-1.5 packs of cigarettes per day and drinking 1-4 drinks per day. Patient should stop drinking today as it is 1 week prior to surgery. PAC ordered an ECHO and Cesar stated he was told he has irregularities with his heart rate and is scheduled for an EKG Stress [facility] on Friday. Cesar did not have any further questions or concerns and appreciated writer's call.    Patient is scheduled for the chest ct scan on 1/16/23. It was also advised that he see Cardiology prior to surgery. Dr. Leger was notified.

## 2023-01-12 ENCOUNTER — TELEPHONE (OUTPATIENT)
Dept: SURGERY | Facility: CLINIC | Age: 68
End: 2023-01-12

## 2023-01-12 DIAGNOSIS — Z01.818 PREOP EXAMINATION: Primary | ICD-10-CM

## 2023-01-12 NOTE — TELEPHONE ENCOUNTER
Misael Guerrero of his upcoming cardiology appointment [facility] - scheduled   Monday at 9:15 am 1/16, with Dr. Pereira.  Cesar expressed understanding.  Corazon Banks RN

## 2023-01-13 ENCOUNTER — HOSPITAL ENCOUNTER (OUTPATIENT)
Dept: NUCLEAR MEDICINE | Facility: CLINIC | Age: 68
Setting detail: NUCLEAR MEDICINE
Discharge: HOME | End: 2023-01-13
Attending: PHYSICIAN ASSISTANT
Payer: COMMERCIAL

## 2023-01-13 ENCOUNTER — HOSPITAL ENCOUNTER (OUTPATIENT)
Dept: CARDIOLOGY | Facility: CLINIC | Age: 68
Discharge: HOME | End: 2023-01-13
Attending: PHYSICIAN ASSISTANT
Payer: COMMERCIAL

## 2023-01-13 DIAGNOSIS — Z01.810 ENCOUNTER FOR PREPROCEDURAL CARDIOVASCULAR EXAMINATION: ICD-10-CM

## 2023-01-13 DIAGNOSIS — Z01.818 PREOP EXAMINATION: ICD-10-CM

## 2023-01-13 LAB
CV STRESS MAX HR HE: 79
RATE PRESSURE PRODUCT: 9006
STRESS ECHO BASELINE DIASTOLIC HE: 68
STRESS ECHO BASELINE HR: 59 BPM
STRESS ECHO BASELINE SYSTOLIC BP: 130
STRESS ECHO CALCULATED PERCENT HR: 52 %
STRESS ECHO LAST STRESS DIASTOLIC BP: 67
STRESS ECHO LAST STRESS SYSTOLIC BP: 114
STRESS ECHO TARGET HR: 152

## 2023-01-13 PROCEDURE — A9502 TC99M TETROFOSMIN: HCPCS | Performed by: PHYSICIAN ASSISTANT

## 2023-01-13 PROCEDURE — 343N000001 HC RX 343: Performed by: PHYSICIAN ASSISTANT

## 2023-01-13 PROCEDURE — 78452 HT MUSCLE IMAGE SPECT MULT: CPT | Mod: 26 | Performed by: RADIOLOGY

## 2023-01-13 PROCEDURE — 78452 HT MUSCLE IMAGE SPECT MULT: CPT

## 2023-01-13 PROCEDURE — 93016 CV STRESS TEST SUPVJ ONLY: CPT | Performed by: INTERNAL MEDICINE

## 2023-01-13 PROCEDURE — 93017 CV STRESS TEST TRACING ONLY: CPT

## 2023-01-13 PROCEDURE — 250N000011 HC RX IP 250 OP 636: Performed by: INTERNAL MEDICINE

## 2023-01-13 PROCEDURE — 93018 CV STRESS TEST I&R ONLY: CPT | Performed by: INTERNAL MEDICINE

## 2023-01-13 RX ORDER — AMINOPHYLLINE 25 MG/ML
50-100 INJECTION, SOLUTION INTRAVENOUS
Status: DISCONTINUED | OUTPATIENT
Start: 2023-01-13 | End: 2023-01-14 | Stop reason: HOSPADM

## 2023-01-13 RX ORDER — REGADENOSON 0.08 MG/ML
0.4 INJECTION, SOLUTION INTRAVENOUS ONCE
Status: COMPLETED | OUTPATIENT
Start: 2023-01-13 | End: 2023-01-13

## 2023-01-13 RX ORDER — CAFFEINE CITRATE 20 MG/ML
60 SOLUTION INTRAVENOUS
Status: DISCONTINUED | OUTPATIENT
Start: 2023-01-13 | End: 2023-01-14 | Stop reason: HOSPADM

## 2023-01-13 RX ORDER — ACYCLOVIR 200 MG/1
0-1 CAPSULE ORAL
Status: DISCONTINUED | OUTPATIENT
Start: 2023-01-13 | End: 2023-01-14 | Stop reason: HOSPADM

## 2023-01-13 RX ORDER — ALBUTEROL SULFATE 90 UG/1
2 AEROSOL, METERED RESPIRATORY (INHALATION) EVERY 5 MIN PRN
Status: DISCONTINUED | OUTPATIENT
Start: 2023-01-13 | End: 2023-01-14 | Stop reason: HOSPADM

## 2023-01-13 RX ADMIN — REGADENOSON 0.4 MG: 0.08 INJECTION, SOLUTION INTRAVENOUS at 11:27

## 2023-01-13 RX ADMIN — TETROFOSMIN 37 MCI.: 1.38 INJECTION, POWDER, LYOPHILIZED, FOR SOLUTION INTRAVENOUS at 11:30

## 2023-01-13 RX ADMIN — TETROFOSMIN 10 MCI.: 1.38 INJECTION, POWDER, LYOPHILIZED, FOR SOLUTION INTRAVENOUS at 10:12

## 2023-01-15 LAB
ABO/RH(D): NORMAL
ANTIBODY SCREEN: NEGATIVE
SPECIMEN EXPIRATION DATE: NORMAL

## 2023-01-15 NOTE — PROGRESS NOTES
HPI and Plan:   See dictation      CURRENT MEDICATIONS:  Current Outpatient Medications   Medication Sig Dispense Refill     docusate sodium (STOOL SOFTENER ORAL) Take by mouth as needed       lisinopril (ZESTRIL) 20 MG tablet Take 1 tablet (20 mg) by mouth daily (Patient taking differently: Take 20 mg by mouth every morning) 90 tablet 3     LORazepam (ATIVAN) 1 MG tablet Take 1 tablet by mouth at bedtime as needed for insonmia. (Patient taking differently: Take 1 mg by mouth nightly as needed Take 1 tablet by mouth at bedtime as needed for insonmia.) 15 tablet 0     rosuvastatin (CRESTOR) 10 MG tablet Take 1 tablet (10 mg) by mouth daily (Patient taking differently: Take 10 mg by mouth every morning) 90 tablet 3       ALLERGIES   No Known Allergies    PAST MEDICAL HISTORY:  Past Medical History:   Diagnosis Date     Acute bilateral low back pain without sciatica 07/23/2018     Anxiety, generalized 11/17/2017     Ascending aorta dilation (H)     4.2 cm on CT scan August 2017, follow annually, 4.1 cm August 2018, stable October 2019.  4.5 cm April 2022.  Recheck in 1 year     Calcaneal fracture 2013    Chronic foot/heel pain     Chronic cough 04/14/2022     Chronic fatigue 10/14/2019     Chronic hepatitis C (H)     Successfully treated.  Previously on interferon.  More recently SOFOSBUVIR and ribavirin, biopsy has been negative for cirrhosis, PCR -2015     Chronic insomnia      Chronic pain of right ankle 05/17/2018     Erectile dysfunction      Essential hypertension      External hemorrhoids 2012     History of alcohol abuse      History of colon polyps     Colonoscopy April 2020 with polyps.  Repeat in 5 years     History of depression     2014, prescribed citalopram     Lower abdominal pain 07/23/2018     Osteoarthritis of multiple joints     Chronic bilateral knee and chronic low back pain     Porphyria cutanea tarda (H)      Pulmonary nodule     CT August 2018 3 mm right upper lobe nodule reported as stable,  stable October 2019.  CT with 6 mm right lower lobe nodule April 2022 needing 6-month follow-up     Rectal bleeding 03/02/2020     Screening for AAA (abdominal aortic aneurysm)     CT 2018 without abdominal aneurysm     Shingles 05/13/2021     Tobacco abuse 11/17/2017       PAST SURGICAL HISTORY:  Past Surgical History:   Procedure Laterality Date     ANKLE FRACTURE SURGERY       ARTHROSCOPY KNEE Bilateral      COLONOSCOPY       FOOT FRACTURE SURGERY  09/01/2013    calcaneal fracture     WRIST SURGERY      ligament injury       FAMILY HISTORY:  Family History   Problem Relation Age of Onset     Coronary Artery Disease Mother         d. 84     Cerebrovascular Disease Mother      Kidney failure Father         d 76     Rectal Cancer Father      Skin Cancer Brother      Crohn's Disease Brother      Anesthesia Reaction No family hx of      Deep Vein Thrombosis (DVT) No family hx of        SOCIAL HISTORY:  Social History     Socioeconomic History     Marital status:    Tobacco Use     Smoking status: Every Day     Types: Cigarettes     Smokeless tobacco: Never   Substance and Sexual Activity     Alcohol use: Yes     Comment: Alcoholic Drinks/day: 2-3 every day     Drug use: Yes     Types: Marijuana     Comment: Drug use: Frequent use   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    Constitutional:  cooperative        Skin:  warm and dry to the touch          Head:  normocephalic        Eyes:           Lymph:No Cervical lymphadenopathy present     ENT:           Neck:  JVP normal        Respiratory:  clear to auscultation         Cardiac: regular rhythm                 pulses full and equal                                        GI:  abdomen soft        Extremities and Muscular Skeletal:  no deformities, clubbing, cyanosis, erythema observed              Neurological:  no gross motor deficits        Psych:  Alert and Oriented x 3        CC  Fe Walker PA-C  [address] [place] [address]

## 2023-01-16 ENCOUNTER — TELEPHONE (OUTPATIENT)
Dept: MEDSURG UNIT | Facility: CLINIC | Age: 68
End: 2023-01-16

## 2023-01-16 ENCOUNTER — TELEPHONE (OUTPATIENT)
Dept: CARDIOLOGY | Facility: CLINIC | Age: 68
End: 2023-01-16

## 2023-01-16 ENCOUNTER — LAB (OUTPATIENT)
Dept: LAB | Facility: CLINIC | Age: 68
End: 2023-01-16
Payer: COMMERCIAL

## 2023-01-16 ENCOUNTER — PATIENT OUTREACH (OUTPATIENT)
Dept: SURGERY | Facility: CLINIC | Age: 68
End: 2023-01-16

## 2023-01-16 ENCOUNTER — ANCILLARY PROCEDURE (OUTPATIENT)
Dept: CT IMAGING | Facility: CLINIC | Age: 68
End: 2023-01-16
Attending: THORACIC SURGERY (CARDIOTHORACIC VASCULAR SURGERY)
Payer: COMMERCIAL

## 2023-01-16 ENCOUNTER — OFFICE VISIT (OUTPATIENT)
Dept: CARDIOLOGY | Facility: CLINIC | Age: 68
End: 2023-01-16
Payer: COMMERCIAL

## 2023-01-16 VITALS
HEART RATE: 62 BPM | WEIGHT: 190 LBS | DIASTOLIC BLOOD PRESSURE: 80 MMHG | BODY MASS INDEX: 28.79 KG/M2 | OXYGEN SATURATION: 98 % | HEIGHT: 68 IN | SYSTOLIC BLOOD PRESSURE: 130 MMHG

## 2023-01-16 DIAGNOSIS — Z01.818 PREOP EXAMINATION: ICD-10-CM

## 2023-01-16 DIAGNOSIS — R91.1 LUNG NODULE SEEN ON IMAGING STUDY: ICD-10-CM

## 2023-01-16 DIAGNOSIS — R93.1 ABNORMAL FINDINGS ON DIAGNOSTIC IMAGING OF HEART AND CORONARY CIRCULATION: Primary | ICD-10-CM

## 2023-01-16 DIAGNOSIS — Z01.818 PRE-OP EVALUATION: ICD-10-CM

## 2023-01-16 DIAGNOSIS — Z20.822 ENCOUNTER FOR LABORATORY TESTING FOR COVID-19 VIRUS: ICD-10-CM

## 2023-01-16 LAB
ANION GAP SERPL CALCULATED.3IONS-SCNC: 9 MMOL/L (ref 7–15)
BUN SERPL-MCNC: 13.1 MG/DL (ref 8–23)
CALCIUM SERPL-MCNC: 9.1 MG/DL (ref 8.8–10.2)
CHLORIDE SERPL-SCNC: 105 MMOL/L (ref 98–107)
CREAT SERPL-MCNC: 0.84 MG/DL (ref 0.67–1.17)
DEPRECATED HCO3 PLAS-SCNC: 25 MMOL/L (ref 22–29)
ERYTHROCYTE [DISTWIDTH] IN BLOOD BY AUTOMATED COUNT: 11.7 % (ref 10–15)
GFR SERPL CREATININE-BSD FRML MDRD: >90 ML/MIN/1.73M2
GLUCOSE SERPL-MCNC: 109 MG/DL (ref 70–99)
HCT VFR BLD AUTO: 41.8 % (ref 40–53)
HGB BLD-MCNC: 13.8 G/DL (ref 13.3–17.7)
MCH RBC QN AUTO: 31.1 PG (ref 26.5–33)
MCHC RBC AUTO-ENTMCNC: 33 G/DL (ref 31.5–36.5)
MCV RBC AUTO: 94 FL (ref 78–100)
PLATELET # BLD AUTO: 141 10E3/UL (ref 150–450)
POTASSIUM SERPL-SCNC: 4.3 MMOL/L (ref 3.4–5.3)
RBC # BLD AUTO: 4.44 10E6/UL (ref 4.4–5.9)
SODIUM SERPL-SCNC: 139 MMOL/L (ref 136–145)
WBC # BLD AUTO: 10.2 10E3/UL (ref 4–11)

## 2023-01-16 PROCEDURE — 80048 BASIC METABOLIC PNL TOTAL CA: CPT | Performed by: PATHOLOGY

## 2023-01-16 PROCEDURE — 93000 ELECTROCARDIOGRAM COMPLETE: CPT | Performed by: INTERNAL MEDICINE

## 2023-01-16 PROCEDURE — 99207 PR NO CHARGE NURSE ONLY: CPT

## 2023-01-16 PROCEDURE — 86901 BLOOD TYPING SEROLOGIC RH(D): CPT | Performed by: PHYSICIAN ASSISTANT

## 2023-01-16 PROCEDURE — 85027 COMPLETE CBC AUTOMATED: CPT | Performed by: PATHOLOGY

## 2023-01-16 PROCEDURE — 99205 OFFICE O/P NEW HI 60 MIN: CPT | Performed by: INTERNAL MEDICINE

## 2023-01-16 PROCEDURE — 71250 CT THORAX DX C-: CPT | Mod: GC | Performed by: RADIOLOGY

## 2023-01-16 PROCEDURE — 36415 COLL VENOUS BLD VENIPUNCTURE: CPT | Performed by: PATHOLOGY

## 2023-01-16 PROCEDURE — U0003 INFECTIOUS AGENT DETECTION BY NUCLEIC ACID (DNA OR RNA); SEVERE ACUTE RESPIRATORY SYNDROME CORONAVIRUS 2 (SARS-COV-2) (CORONAVIRUS DISEASE [COVID-19]), AMPLIFIED PROBE TECHNIQUE, MAKING USE OF HIGH THROUGHPUT TECHNOLOGIES AS DESCRIBED BY CMS-2020-01-R: HCPCS | Performed by: FAMILY MEDICINE

## 2023-01-16 RX ORDER — LIDOCAINE 40 MG/G
CREAM TOPICAL
Status: CANCELLED | OUTPATIENT
Start: 2023-01-16

## 2023-01-16 RX ORDER — POTASSIUM CHLORIDE 1500 MG/1
20 TABLET, EXTENDED RELEASE ORAL
Status: CANCELLED | OUTPATIENT
Start: 2023-01-16

## 2023-01-16 RX ORDER — SODIUM CHLORIDE 9 MG/ML
INJECTION, SOLUTION INTRAVENOUS CONTINUOUS
Status: CANCELLED | OUTPATIENT
Start: 2023-01-16

## 2023-01-16 NOTE — TELEPHONE ENCOUNTER
Patient saw Dr. Pereira today for pre-surgery clearance. He has been scheduled for a diagnostic angiogram tomorrow.  Per Dr. Pereira - Dr. Leger to be updated and to see if patient can still have his pulmonary surgery on 1/18/2023 if the angiogram clears him.    Called Dr. Leger at the [facility] [phone] and left a message for his RN team with update.  Alternate phone if no reply - [phone] /5/2/triage.    Awaiting reply      [id number] call from Dr. Leger's office: if angiogram clears the patient for cardiac approval, then OK to continue with surgery plan.  Per Dr. Leger's recommendation, have patient avoid caffeine post-angiogram and drink 6-8 glasses of water post-cath up to midnight cut-off for hydration.    Once angiogram is completed and clearance is approved. Please update Dr. Leger's team (Aubree) at [phone].    Will update Dr. Pereira

## 2023-01-16 NOTE — TELEPHONE ENCOUNTER
"Writer received voicemail message from Dr Randall Garrido 's nurse (at [facility]). Radhika was calling to let Dr Leger know that this patient was seen today by Dr Pereira for cardiac clearance for his upcoming surgery that is scheduled for Wed Jan 18th. Radhkia wanted Dr Leger to know that he is scheduled for a Diagnostic Angio tomorrow and wanted to clarify, that IF he is cleared for surgery after the Angio tomorrow, that he can report for surgery as planned on Wednesday morning.  Verbalized that their concern is that because the angio includes CT dye and it is occurring the day before surgery, they wanted to make sure that it is ok to proceed with surgery. Writer forwarded message on to Dr Leger, who responded \"Yes, he can proceed if the angiogram is negative. He should just hydrate well up until midnight\".  Writer called Radhika to inform her of Dr Leger's response. Radhika verbalized that she will provide patient with instructions to hydrate well (drink at least 8 glasses of water and avoid caffenated beverages) after the angio with their instructions. Radhika will update Dr Leger's nurse, Aubree Beasley after results of Angio are in. Aubree's direct office number provided to Radhika.    Velma Mora RN, BSN  Thoracic Surgery RN Care Coordinator      "

## 2023-01-16 NOTE — PROGRESS NOTES
Service Date: 01/16/2023    CARDIOLOGY CONSULTATION    REASON FOR CONSULTATION:  Preoperative cardiac risk assessment prior to undergoing robotic-assisted thoracoscopic right-sided lower lobe wedge resection.    HISTORY OF PRESENT ILLNESS:  I had the pleasure of seeing Mr. Murillo in consultation at the [facility] today.  He is a very pleasant 68-year-old gentleman who was recently found to have an enlarging right lung nodule for which he was seen by Dr. Leger at the [facility].  Robotic-assisted thoracoscopic right lower lobe wedge resection and possible lobectomy and mediastinal lymph node dissection is planned for 01/18/2023.    As part of the patient's preoperative cardiac evaluation, he underwent an attempted dobutamine stress echocardiogram on 01/10/2023.  He was noted to have frequent runs of PVCs at rest and was also found to have mildly reduced left ventricular systolic function with an estimated ejection fraction of 45%-50%.  Given these findings, a Lexiscan stress perfusion study was performed on 01/13/2023.  This was reported as being negative for ischemia or infarction with normal left ventricular systolic function.  Of note, he has severe coronary artery calcification noted on his CT of the chest, although he has never undergone formal coronary artery calcium scoring.    From a symptomatic standpoint, he does endorse dyspnea on exertion that has been worsening over the past few years.  Recently, he was working in his garage and did experience significant dyspnea which was resolved by rest.  He denies any chest pressure, palpitations, syncope or presyncope.  Denies any PND or orthopnea.    He does have a significant history of tobacco use, although he has recently cut back.    His past medical history, family history, social history, allergies and medications are reviewed in my Epic note.    PHYSICAL EXAMINATION:  Dictated below.    I personally  reviewed his dobutamine stress echocardiogram baseline images as well as his Lexiscan stress perfusion study from 01/13/2023.  This demonstrates apical inferior and septal perfusion abnormalities which do improve with attenuation correction but did not completely resolve.    I also personally reviewed his CT of the chest on 11/25/2022 which demonstrated severe diffuse coronary artery calcification.    Pulmonary function tests were also reviewed from 12/05/2022.  These demonstrate a reduced FEV1/FVC ratio consistent with mild airflow obstruction without a significant bronchodilator response.  Diffusion capacity was normal.    Lipids on 08/10/2022 were reviewed and demonstrated total cholesterol 189 with an LDL of 132.  He has been started on rosuvastatin 10 mg daily.    IMPRESSION:    1.  Right lower lobe nodule for which surgery is planned, as described above.  2.  Significant history of tobacco use.  3.  Exertional dyspnea which has been worsening over the past year.  4.  Severe coronary artery calcification.    Mr. Murillo presents for an evaluation regarding upcoming thoracic surgery, as described above.  Although his stress perfusion study was reported as normal, in the context of severe coronary artery calcification and worseningdyspnea on exertion, I am still concerned about significant obstructive coronary artery disease.    At this point in time, I recommended we proceed with diagnostic coronary angiography for a definitive evaluation in this regard.  I have explained the indications, risks and benefits of coronary angiography to the patient in detail and he is agreeable to proceeding.  I will contact the surgical team at the [facility] to coordinate his surgery scheduling based on the results of the coronary angiogram.      I would anticipate that unless critical left main/ 3-vessel coronary artery disease is identified, we would recommend proceeding with surgery given that this  nodule may represent a malignant process.    It was a pleasure seeing him in consultation.  This was a high-complexity visit.    Von Pereira MD        D: 2023   T: 2023   MT: vernon    Name:     DULCE CHAVIRA  MRN:      [id number]        Account:      [id number]   :      1955           Service Date: 2023       Document: [id number]      Addendum 2023.    Coronary angiogram reviewed. Mild to moderate non-obstructive CAD. No intervention indicated. OK to proceed with surgery, will need cardiology follow up after surgery.

## 2023-01-16 NOTE — TELEPHONE ENCOUNTER
Coronary angiogram/PCI/Right Heart Cath prep instructions.     Patient is scheduled for a Coronary Angiogram at [facility] - [address], [place] [address] - Main Entrance of the Hospital, on 1/17/2023.  Check in time is at 0730 and procedure to follow.    Patient instructed to remain NPO for solid foods 8 hours prior to arrival and may have clear liquids up to 2 hours prior to arrival.  Post-cath per surgery team - no caffeine all day and drink 6-8 glasses of water for extra hydration.    Patient does not require extra fluids prior to procedure.    Patient is not diabetic.    Patient is not on anticoagulation.    Patient is having a diagnostic angiogram, no aspirin is needed    Pt is not on a SGLT2 inhibitor.    Patient advised to take their other daily medications the morning of the procedure with small sips of water.     Verified patient does not have a contrast allergy.    Verified patient has someone available to drive them home from the hospital and can stay with them for 24 hours after the procedure.     Patient advised to notify care team with any new COVID like symptoms prior to procedure.    Patient will check their temperature the morning of procedure and call [facility] at [phone] if temp is >100.0.    Patient is aware of visitor policy.    Patient expresses understanding of above instructions and denies further questions at this time.

## 2023-01-16 NOTE — LETTER
1/16/2023    Sen Leon MD  [address] [facility] [address]    RE: Cesar Murillo       Dear Colleague,     I had the pleasure of seeing Cesar Murillo in the [facility].  HPI and Plan:   See dictation      CURRENT MEDICATIONS:  Current Outpatient Medications   Medication Sig Dispense Refill     docusate sodium (STOOL SOFTENER ORAL) Take by mouth as needed       lisinopril (ZESTRIL) 20 MG tablet Take 1 tablet (20 mg) by mouth daily (Patient taking differently: Take 20 mg by mouth every morning) 90 tablet 3     LORazepam (ATIVAN) 1 MG tablet Take 1 tablet by mouth at bedtime as needed for insonmia. (Patient taking differently: Take 1 mg by mouth nightly as needed Take 1 tablet by mouth at bedtime as needed for insonmia.) 15 tablet 0     rosuvastatin (CRESTOR) 10 MG tablet Take 1 tablet (10 mg) by mouth daily (Patient taking differently: Take 10 mg by mouth every morning) 90 tablet 3       ALLERGIES   No Known Allergies    PAST MEDICAL HISTORY:  Past Medical History:   Diagnosis Date     Acute bilateral low back pain without sciatica 07/23/2018     Anxiety, generalized 11/17/2017     Ascending aorta dilation (H)     4.2 cm on CT scan August 2017, follow annually, 4.1 cm August 2018, stable October 2019.  4.5 cm April 2022.  Recheck in 1 year     Calcaneal fracture 2013    Chronic foot/heel pain     Chronic cough 04/14/2022     Chronic fatigue 10/14/2019     Chronic hepatitis C (H)     Successfully treated.  Previously on interferon.  More recently SOFOSBUVIR and ribavirin, biopsy has been negative for cirrhosis, PCR -2015     Chronic insomnia      Chronic pain of right ankle 05/17/2018     Erectile dysfunction      Essential hypertension      External hemorrhoids 2012     History of alcohol abuse      History of colon polyps     Colonoscopy April 2020 with polyps.  Repeat in 5 years     History of depression     2014, prescribed citalopram     Lower abdominal pain 07/23/2018      Osteoarthritis of multiple joints     Chronic bilateral knee and chronic low back pain     Porphyria cutanea tarda (H)      Pulmonary nodule     CT August 2018 3 mm right upper lobe nodule reported as stable, stable October 2019.  CT with 6 mm right lower lobe nodule April 2022 needing 6-month follow-up     Rectal bleeding 03/02/2020     Screening for AAA (abdominal aortic aneurysm)     CT 2018 without abdominal aneurysm     Shingles 05/13/2021     Tobacco abuse 11/17/2017       PAST SURGICAL HISTORY:  Past Surgical History:   Procedure Laterality Date     ANKLE FRACTURE SURGERY       ARTHROSCOPY KNEE Bilateral      COLONOSCOPY       FOOT FRACTURE SURGERY  09/01/2013    calcaneal fracture     WRIST SURGERY      ligament injury       FAMILY HISTORY:  Family History   Problem Relation Age of Onset     Coronary Artery Disease Mother         d. 84     Cerebrovascular Disease Mother      Kidney failure Father         d 76     Rectal Cancer Father      Skin Cancer Brother      Crohn's Disease Brother      Anesthesia Reaction No family hx of      Deep Vein Thrombosis (DVT) No family hx of        SOCIAL HISTORY:  Social History     Socioeconomic History     Marital status:    Tobacco Use     Smoking status: Every Day     Types: Cigarettes     Smokeless tobacco: Never   Substance and Sexual Activity     Alcohol use: Yes     Comment: Alcoholic Drinks/day: 2-3 every day     Drug use: Yes     Types: Marijuana     Comment: Drug use: Frequent use   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    Constitutional:  cooperative        Skin:  warm and dry to the touch           Head:  normocephalic        Eyes:           Lymph:No Cervical lymphadenopathy present     ENT:           Neck:  JVP normal        Respiratory:  clear to auscultation         Cardiac: regular rhythm                pulses full and equal                                        GI:  abdomen soft        Extremities and Muscular Skeletal:  no deformities, clubbing, cyanosis, erythema observed              Neurological:  no gross motor deficits        Psych:  Alert and Oriented x 3        CC  Fe Walker PA-C  [address] [place] [address]                Service Date: 01/16/2023    CARDIOLOGY CONSULTATION    REASON FOR CONSULTATION:  Preoperative cardiac risk assessment prior to undergoing robotic-assisted thoracoscopic right-sided lower lobe wedge resection.    HISTORY OF PRESENT ILLNESS:  I had the pleasure of seeing Mr. Murillo in consultation at the [facility] today.  He is a very pleasant 68-year-old gentleman who was recently found to have an enlarging right lung nodule for which he was seen by Dr. Leger at the [facility].  Robotic-assisted thoracoscopic right lower lobe wedge resection and possible lobectomy and mediastinal lymph node dissection is planned for 01/18/2023.    As part of the patient's preoperative cardiac evaluation, he underwent an attempted dobutamine stress echocardiogram on 01/10/2023.  He was noted to have frequent runs of PVCs at rest and was also found to have mildly reduced left ventricular systolic function with an estimated ejection fraction of 45%-50%.  Given these findings, a Lexiscan stress perfusion study was performed on 01/13/2023.  This was reported as being negative for ischemia or infarction with normal left ventricular systolic function.  Of note, he has severe coronary artery calcification noted on his CT of the chest, although he has never undergone formal coronary artery calcium scoring.    From a  symptomatic standpoint, he does endorse dyspnea on exertion that has been worsening over the past few years.  Recently, he was working in his garage and did experience significant dyspnea which was resolved by rest.  He denies any chest pressure, palpitations, syncope or presyncope.  Denies any PND or orthopnea.    He does have a significant history of tobacco use, although he has recently cut back.    His past medical history, family history, social history, allergies and medications are reviewed in my Epic note.    PHYSICAL EXAMINATION:  Dictated below.    I personally reviewed his dobutamine stress echocardiogram baseline images as well as his Lexiscan stress perfusion study from 01/13/2023.  This demonstrates apical inferior and septal perfusion abnormalities which do improve with attenuation correction but did not completely resolve.    I also personally reviewed his CT of the chest on 11/25/2022 which demonstrated severe diffuse coronary artery calcification.    Pulmonary function tests were also reviewed from 12/05/2022.  These demonstrate a reduced FEV1/FVC ratio consistent with mild airflow obstruction without a significant bronchodilator response.  Diffusion capacity was normal.    Lipids on 08/10/2022 were reviewed and demonstrated total cholesterol 189 with an LDL of 132.  He has been started on rosuvastatin 10 mg daily.    IMPRESSION:    1.  Right lower lobe nodule for which surgery is planned, as described above.  2.  Significant history of tobacco use.  3.  Exertional dyspnea which has been worsening over the past year.  4.  Severe coronary artery calcification.    Mr. Murillo presents for an evaluation regarding upcoming thoracic surgery, as described above.  Although his stress perfusion study was reported as normal, in the context of severe coronary artery calcification and worseningdyspnea on exertion, I am still concerned about significant obstructive coronary artery disease.    At this point in  time, I recommended we proceed with diagnostic coronary angiography for a definitive evaluation in this regard.  I have explained the indications, risks and benefits of coronary angiography to the patient in detail and he is agreeable to proceeding.  I will contact the surgical team at the [facility] to coordinate his surgery scheduling based on the results of the coronary angiogram.      I would anticipate that unless critical left main/ 3-vessel coronary artery disease is identified, we would recommend proceeding with surgery given that this nodule may represent a malignant process.    It was a pleasure seeing him in consultation.  This was a high-complexity visit.    Von Pereira MD        D: 2023   T: 2023   MT: dw    Name:     DULCE CHAVIRA  MRN:      [id number]        Account:      [id number]   :      1955           Service Date: 2023       Document: [id number]      Thank you for allowing me to participate in the care of your patient.      Sincerely,     Von Pereira MD     [facility]  cc:   Fe Walker PA-C  [address] [place] [id number]

## 2023-01-17 ENCOUNTER — TELEPHONE (OUTPATIENT)
Dept: CARDIOLOGY | Facility: CLINIC | Age: 68
End: 2023-01-17

## 2023-01-17 ENCOUNTER — HOSPITAL ENCOUNTER (OUTPATIENT)
Facility: CLINIC | Age: 68
Discharge: HOME | End: 2023-01-17
Admitting: INTERNAL MEDICINE
Payer: COMMERCIAL

## 2023-01-17 ENCOUNTER — PREP FOR PROCEDURE (OUTPATIENT)
Dept: SURGERY | Facility: CLINIC | Age: 68
End: 2023-01-17

## 2023-01-17 ENCOUNTER — DOCUMENTATION ONLY (OUTPATIENT)
Dept: SURGERY | Facility: CLINIC | Age: 68
End: 2023-01-17

## 2023-01-17 VITALS
HEIGHT: 69 IN | WEIGHT: 189.9 LBS | DIASTOLIC BLOOD PRESSURE: 85 MMHG | HEART RATE: 56 BPM | RESPIRATION RATE: 16 BRPM | SYSTOLIC BLOOD PRESSURE: 123 MMHG | TEMPERATURE: 98 F | BODY MASS INDEX: 28.13 KG/M2 | OXYGEN SATURATION: 98 %

## 2023-01-17 DIAGNOSIS — I25.10 CAD (CORONARY ARTERY DISEASE): Primary | ICD-10-CM

## 2023-01-17 DIAGNOSIS — R93.1 ABNORMAL FINDINGS ON DIAGNOSTIC IMAGING OF HEART AND CORONARY CIRCULATION: ICD-10-CM

## 2023-01-17 PROBLEM — Z98.890 STATUS POST CORONARY ANGIOGRAM: Status: ACTIVE | Noted: 2023-01-17

## 2023-01-17 LAB
ANION GAP SERPL CALCULATED.3IONS-SCNC: 4 MMOL/L (ref 3–14)
APTT PPP: 29 SECONDS (ref 22–38)
BUN SERPL-MCNC: 15 MG/DL (ref 7–30)
CALCIUM SERPL-MCNC: 8.9 MG/DL (ref 8.5–10.1)
CATH EF ESTIMATED: 45 %
CHLORIDE BLD-SCNC: 110 MMOL/L (ref 94–109)
CO2 SERPL-SCNC: 26 MMOL/L (ref 20–32)
CREAT SERPL-MCNC: 0.84 MG/DL (ref 0.66–1.25)
ERYTHROCYTE [DISTWIDTH] IN BLOOD BY AUTOMATED COUNT: 11.9 % (ref 10–15)
GFR SERPL CREATININE-BSD FRML MDRD: >90 ML/MIN/1.73M2
GLUCOSE BLD-MCNC: 108 MG/DL (ref 70–99)
HCT VFR BLD AUTO: 44.1 % (ref 40–53)
HGB BLD-MCNC: 14.4 G/DL (ref 13.3–17.7)
INR PPP: 1.03 (ref 0.85–1.15)
MCH RBC QN AUTO: 31.4 PG (ref 26.5–33)
MCHC RBC AUTO-ENTMCNC: 32.7 G/DL (ref 31.5–36.5)
MCV RBC AUTO: 96 FL (ref 78–100)
PLATELET # BLD AUTO: 133 10E3/UL (ref 150–450)
POTASSIUM BLD-SCNC: 4.3 MMOL/L (ref 3.4–5.3)
RBC # BLD AUTO: 4.58 10E6/UL (ref 4.4–5.9)
SARS-COV-2 RNA RESP QL NAA+PROBE: NEGATIVE
SODIUM SERPL-SCNC: 140 MMOL/L (ref 133–144)
WBC # BLD AUTO: 6.9 10E3/UL (ref 4–11)

## 2023-01-17 PROCEDURE — 93458 L HRT ARTERY/VENTRICLE ANGIO: CPT | Mod: 26 | Performed by: INTERNAL MEDICINE

## 2023-01-17 PROCEDURE — 250N000011 HC RX IP 250 OP 636: Performed by: INTERNAL MEDICINE

## 2023-01-17 PROCEDURE — 258N000003 HC RX IP 258 OP 636: Performed by: INTERNAL MEDICINE

## 2023-01-17 PROCEDURE — 999N000071 HC STATISTIC HEART CATH LAB OR EP LAB

## 2023-01-17 PROCEDURE — 36591 DRAW BLOOD OFF VENOUS DEVICE: CPT

## 2023-01-17 PROCEDURE — 93458 L HRT ARTERY/VENTRICLE ANGIO: CPT | Performed by: INTERNAL MEDICINE

## 2023-01-17 PROCEDURE — 250N000009 HC RX 250: Performed by: INTERNAL MEDICINE

## 2023-01-17 PROCEDURE — 36415 COLL VENOUS BLD VENIPUNCTURE: CPT | Performed by: INTERNAL MEDICINE

## 2023-01-17 PROCEDURE — 85027 COMPLETE CBC AUTOMATED: CPT | Performed by: INTERNAL MEDICINE

## 2023-01-17 PROCEDURE — 85610 PROTHROMBIN TIME: CPT | Performed by: INTERNAL MEDICINE

## 2023-01-17 PROCEDURE — 99152 MOD SED SAME PHYS/QHP 5/>YRS: CPT | Mod: GC | Performed by: INTERNAL MEDICINE

## 2023-01-17 PROCEDURE — 93005 ELECTROCARDIOGRAM TRACING: CPT

## 2023-01-17 PROCEDURE — 272N000001 HC OR GENERAL SUPPLY STERILE: Performed by: INTERNAL MEDICINE

## 2023-01-17 PROCEDURE — 99152 MOD SED SAME PHYS/QHP 5/>YRS: CPT | Performed by: INTERNAL MEDICINE

## 2023-01-17 PROCEDURE — 82947 ASSAY GLUCOSE BLOOD QUANT: CPT | Performed by: INTERNAL MEDICINE

## 2023-01-17 PROCEDURE — 93010 ELECTROCARDIOGRAM REPORT: CPT | Performed by: INTERNAL MEDICINE

## 2023-01-17 PROCEDURE — 999N000054 HC STATISTIC EKG NON-CHARGEABLE

## 2023-01-17 PROCEDURE — 85730 THROMBOPLASTIN TIME PARTIAL: CPT | Performed by: INTERNAL MEDICINE

## 2023-01-17 PROCEDURE — 999N000184 HC STATISTIC TELEMETRY

## 2023-01-17 RX ORDER — OXYCODONE HYDROCHLORIDE 5 MG/1
10 TABLET ORAL EVERY 4 HOURS PRN
Status: DISCONTINUED | OUTPATIENT
Start: 2023-01-17 | End: 2023-01-17 | Stop reason: HOSPADM

## 2023-01-17 RX ORDER — LIDOCAINE 40 MG/G
CREAM TOPICAL
Status: DISCONTINUED | OUTPATIENT
Start: 2023-01-17 | End: 2023-01-17 | Stop reason: HOSPADM

## 2023-01-17 RX ORDER — CEFAZOLIN SODIUM 2 G/50ML
2 SOLUTION INTRAVENOUS SEE ADMIN INSTRUCTIONS
Status: CANCELLED | OUTPATIENT
Start: 2023-01-17

## 2023-01-17 RX ORDER — NALOXONE HYDROCHLORIDE 0.4 MG/ML
0.4 INJECTION, SOLUTION INTRAMUSCULAR; INTRAVENOUS; SUBCUTANEOUS
Status: DISCONTINUED | OUTPATIENT
Start: 2023-01-17 | End: 2023-01-17 | Stop reason: HOSPADM

## 2023-01-17 RX ORDER — OXYCODONE HYDROCHLORIDE 5 MG/1
5 TABLET ORAL EVERY 4 HOURS PRN
Status: DISCONTINUED | OUTPATIENT
Start: 2023-01-17 | End: 2023-01-17 | Stop reason: HOSPADM

## 2023-01-17 RX ORDER — POTASSIUM CHLORIDE 1500 MG/1
20 TABLET, EXTENDED RELEASE ORAL
Status: DISCONTINUED | OUTPATIENT
Start: 2023-01-17 | End: 2023-01-17 | Stop reason: HOSPADM

## 2023-01-17 RX ORDER — CEFAZOLIN SODIUM 2 G/100ML
2 INJECTION, SOLUTION INTRAVENOUS SEE ADMIN INSTRUCTIONS
Status: DISCONTINUED | OUTPATIENT
Start: 2023-01-17 | End: 2023-01-17 | Stop reason: CLARIF

## 2023-01-17 RX ORDER — SODIUM CHLORIDE 9 MG/ML
INJECTION, SOLUTION INTRAVENOUS CONTINUOUS
Status: DISCONTINUED | OUTPATIENT
Start: 2023-01-17 | End: 2023-01-17 | Stop reason: HOSPADM

## 2023-01-17 RX ORDER — ATROPINE SULFATE 0.1 MG/ML
0.5 INJECTION INTRAVENOUS
Status: DISCONTINUED | OUTPATIENT
Start: 2023-01-17 | End: 2023-01-17 | Stop reason: HOSPADM

## 2023-01-17 RX ORDER — NALOXONE HYDROCHLORIDE 0.4 MG/ML
0.2 INJECTION, SOLUTION INTRAMUSCULAR; INTRAVENOUS; SUBCUTANEOUS
Status: DISCONTINUED | OUTPATIENT
Start: 2023-01-17 | End: 2023-01-17 | Stop reason: HOSPADM

## 2023-01-17 RX ORDER — ENOXAPARIN SODIUM 100 MG/ML
40 INJECTION SUBCUTANEOUS
Status: CANCELLED | OUTPATIENT
Start: 2023-01-17

## 2023-01-17 RX ORDER — CEFAZOLIN SODIUM 2 G/100ML
2 INJECTION, SOLUTION INTRAVENOUS
Status: DISCONTINUED | OUTPATIENT
Start: 2023-01-17 | End: 2023-01-17 | Stop reason: CLARIF

## 2023-01-17 RX ORDER — FENTANYL CITRATE 50 UG/ML
25 INJECTION, SOLUTION INTRAMUSCULAR; INTRAVENOUS
Status: DISCONTINUED | OUTPATIENT
Start: 2023-01-17 | End: 2023-01-17 | Stop reason: HOSPADM

## 2023-01-17 RX ORDER — CEFAZOLIN SODIUM 2 G/50ML
2 SOLUTION INTRAVENOUS
Status: CANCELLED | OUTPATIENT
Start: 2023-01-17

## 2023-01-17 RX ORDER — FLUMAZENIL 0.1 MG/ML
0.2 INJECTION, SOLUTION INTRAVENOUS
Status: DISCONTINUED | OUTPATIENT
Start: 2023-01-17 | End: 2023-01-17 | Stop reason: HOSPADM

## 2023-01-17 RX ORDER — ACETAMINOPHEN 325 MG/1
650 TABLET ORAL EVERY 4 HOURS PRN
Status: DISCONTINUED | OUTPATIENT
Start: 2023-01-17 | End: 2023-01-17 | Stop reason: HOSPADM

## 2023-01-17 RX ORDER — FENTANYL CITRATE 50 UG/ML
INJECTION, SOLUTION INTRAMUSCULAR; INTRAVENOUS
Status: DISCONTINUED | OUTPATIENT
Start: 2023-01-17 | End: 2023-01-17 | Stop reason: HOSPADM

## 2023-01-17 RX ORDER — ENOXAPARIN SODIUM 100 MG/ML
40 INJECTION SUBCUTANEOUS
Status: DISCONTINUED | OUTPATIENT
Start: 2023-01-17 | End: 2023-01-17 | Stop reason: CLARIF

## 2023-01-17 RX ADMIN — SODIUM CHLORIDE: 9 INJECTION, SOLUTION INTRAVENOUS at 08:59

## 2023-01-17 ASSESSMENT — ACTIVITIES OF DAILY LIVING (ADL)
ADLS_ACUITY_SCORE: 35

## 2023-01-17 NOTE — PRE-PROCEDURE
GENERAL PRE-PROCEDURE:   Procedure:  Heart catheterization possible intervention  Date/Time:  1/17/2023 9:34 AM    Written consent obtained?: Yes    Risks and benefits: Risks, benefits and alternatives were discussed    Consent given by:  Patient  Patient states understanding of procedure being performed: Yes    Patient's understanding of procedure matches consent: Yes    Procedure consent matches procedure scheduled: Yes    Expected level of sedation:  Moderate  Appropriately NPO:  Yes  Lungs:  Lungs clear with good breath sounds bilaterally  Heart:  Normal heart sounds and rate  History & Physical reviewed:  History and physical reviewed and no updates needed  Statement of review:  I have reviewed the lab findings, diagnostic data, medications, and the plan for sedation  risk benefit indication for cath poss intervention discussed with pt and wife  All questions answered  Long discussion with pt and wife regarding if needs stent then unlikely will get lung surgery for at least one month  He understands

## 2023-01-17 NOTE — TELEPHONE ENCOUNTER
Left patient a message that his cardiac clearance for tomorrow was called to Dr. Leger's office. Reviewed that Dr. Pereira would like to see the patient in 2 months to review cardiac results and to expect a call from scheduling.

## 2023-01-17 NOTE — DISCHARGE INSTRUCTIONS
Cardiac Angiogram Discharge Instructions - Femoral    After you go home:    Have an adult stay with you until tomorrow.  Drink extra fluids for 2 days.  You may resume your normal diet.  No smoking       For 24 hours - due to the sedation you received:  Relax and take it easy.  Do NOT make any important or legal decisions.  Do NOT drive or operate machines at home or at work.  Do NOT drink alcohol.    Care of Groin Puncture Site:    For the first 24 hrs - check the puncture site every 1-2 hours while awake.  For 2 days, when you cough, sneeze, laugh or move your bowels, hold your hand over the puncture site and press firmly.  Remove the bandaid after 24 hours. If there is minor oozing, apply another bandaid and remove it after 12 hours.  It is normal to have a small bruise or pea size lump at the site.  You may shower tomorrow. Do NOT take a bath, or use a hot tub or pool for at least 3 days. Do NOT scrub the site. Do not use lotion or powder near the puncture site.    Activity:            For 2 days:  No stooping or squatting  Do NOT do any heavy activity such as exercise, lifting, or straining.   No housework, yard work or any activity that make you sweat  Do NOT lift more than 10 pounds    Bleeding:    If you start bleeding from the site in your groin, lie down flat and press firmly on/above the site for 10 minutes.   Once bleeding stops, lay flat for 2 hours.   Call [facility] as soon as you can.       Call 911 right away if you have heavy bleeding or bleeding that does not stop.      Medicines:    If you are taking an antiplatelet medication such as Plavix, Brilinta or Effient, do not stop taking it until you talk to your cardiologist.    Take your medications, including blood thinners, unless your provider tells you not to.    If you have stopped any medicines, check with your provider about when to restart them.    Follow Up Appointments:    Follow up with [facility] Nurse Practitioner at [facility] of  patient preference in 7-10 days.    Call the clinic if:    You have increased pain or a large or growing hard lump around the site.  The site is red, swollen, hot or tender.  Blood or fluid is draining from the site.  You have chills or a fever greater than 101 F (38 C).  Your leg feels numb, cool or changes color.  You have hives, a rash or unusual itching.  New pain in the back or belly that you cannot control with Tylenol.  Any questions or concerns.          [facility] [place]:    [phone] UM (7 days a week)

## 2023-01-17 NOTE — Clinical Note
Is This A New Presentation, Or A Follow-Up?: Nail Discoloration dry, intact, no bleeding and no hematoma. How Severe Is It?: mild

## 2023-01-17 NOTE — TELEPHONE ENCOUNTER
Diagnostic angiogram 1/17/2023:    The ejection fraction is 40-50% by visual estimate.    2nd Diag lesion is 20% stenosed.    Prox LAD to Mid LAD lesion is 30% stenosed.    RPAV lesion is 30% stenosed.    Prox RCA to Mid RCA lesion is 30% stenosed.    Mid RCA to Dist RCA lesion is 20% stenosed.     Non-obstructive coronary artery disease.  LVEDP 17.  Reduced LVEF with frequent unifocal PVC      Will message Dr. Pereira to review for final cardiac clearance prior to surgery tomorrow    Once angiogram is completed and clearance is approved. Please update Dr. Leger's team (Aubree) at [phone].    .

## 2023-01-17 NOTE — PROGRESS NOTES
Care Suites Admission Nursing Note    Patient Information  Name: Cesar Murillo  Age: 68 year old  Reason for admission: heart cath  Care Suites arrival time: 0800    Visitor Information  Name: isatu Davies  Informed of visitor restrictions: Yes  1 visitor allowed per patient   Visitor must screen negative for COVID symptoms   Visitor must wear a mask  Waiting rooms closed to visitors    Patient Admission/Assessment   Pre-procedure assessment complete: Yes  If abnormal assessment/labs, provider notified: Yes  NPO: Yes  Medications held per instructions/orders: Yes  Consent: obtained  If applicable, pregnancy test status: deferred  Patient oriented to room: Yes  Education/questions answered: Yes  Plan/other: scheduled for 0930    Discharge Planning  Discharge name/phone number: Keke [phone]  Overnight post sedation caregiver: isatu Davies  Discharge location: [place]    Lara Dillard RN

## 2023-01-17 NOTE — PROGRESS NOTES
Received voicemail from TENZIN Garrido with [facility] stating Dr. Pereira has approved patient for surgery tomorrow with Dr. Leger.     Dr. Leger was updated

## 2023-01-17 NOTE — TELEPHONE ENCOUNTER
Left a message for Aubree ([phone]) at Dr. Leger's team to review cardiac clearance update.    Per Dr. Pereira's addendum:Coronary angiogram reviewed. Mild to moderate non-obstructive CAD. No intervention indicated. OK to proceed with surgery, will need cardiology follow up after surgery.     Will message Dr. Pereira to clarify the timing of a return visit and if this is to be SARIAH or MD visit.

## 2023-01-17 NOTE — PROGRESS NOTES
Care Suites Post Procedure Note    Patient Information  Name: Cesar Murillo  Age: 68 year old    Post Procedure  Time patient returned to Care Suites: 1045  Concerns/abnormal assessment: none  If abnormal assessment, provider notified: N/A  Plan/Other: bedrest x 2 hours then discharge    Lara Dillard RN

## 2023-01-17 NOTE — PROGRESS NOTES
Care Suites Discharge Nursing Note    Patient Information  Name: Cesar Murillo  Age: 68 year old    Discharge Education:  Discharge instructions reviewed: Yes  Additional education/resources provided: yes  Patient/patient representative verbalizes understanding: Yes  Patient discharging on new medications: No  Medication education completed: Yes    Discharge Plans:   Discharge location: home  Discharge ride contacted: Yes  Approximate discharge time: 1345    Discharge Criteria:  Discharge criteria met and vital signs stable: Yes    Patient Belongs:  Patient belongings returned to patient: Yes    Lara Dillard RN

## 2023-01-18 ENCOUNTER — HOSPITAL ENCOUNTER (INPATIENT)
Facility: CLINIC | Age: 68
LOS: 4 days | Discharge: HOME | DRG: 165 | End: 2023-01-22
Attending: THORACIC SURGERY (CARDIOTHORACIC VASCULAR SURGERY) | Admitting: THORACIC SURGERY (CARDIOTHORACIC VASCULAR SURGERY)
Payer: COMMERCIAL

## 2023-01-18 ENCOUNTER — PATIENT OUTREACH (OUTPATIENT)
Dept: SURGERY | Facility: CLINIC | Age: 68
End: 2023-01-18
Payer: COMMERCIAL

## 2023-01-18 ENCOUNTER — ANESTHESIA (OUTPATIENT)
Dept: SURGERY | Facility: CLINIC | Age: 68
DRG: 165 | End: 2023-01-18
Payer: COMMERCIAL

## 2023-01-18 ENCOUNTER — APPOINTMENT (OUTPATIENT)
Dept: GENERAL RADIOLOGY | Facility: CLINIC | Age: 68
DRG: 165 | End: 2023-01-18
Attending: THORACIC SURGERY (CARDIOTHORACIC VASCULAR SURGERY)
Payer: COMMERCIAL

## 2023-01-18 ENCOUNTER — TRANSFERRED RECORDS (OUTPATIENT)
Dept: HEALTH INFORMATION MANAGEMENT | Facility: CLINIC | Age: 68
End: 2023-01-18

## 2023-01-18 DIAGNOSIS — C34.31 MALIGNANT NEOPLASM OF LOWER LOBE OF RIGHT LUNG (H): Primary | ICD-10-CM

## 2023-01-18 PROBLEM — C34.30 LUNG CANCER, LOWER LOBE (H): Status: ACTIVE | Noted: 2023-01-18

## 2023-01-18 LAB
CREAT SERPL-MCNC: 0.81 MG/DL (ref 0.67–1.17)
GFR SERPL CREATININE-BSD FRML MDRD: >90 ML/MIN/1.73M2
GLUCOSE BLDC GLUCOMTR-MCNC: 116 MG/DL (ref 70–99)

## 2023-01-18 PROCEDURE — 370N000017 HC ANESTHESIA TECHNICAL FEE, PER MIN: Performed by: THORACIC SURGERY (CARDIOTHORACIC VASCULAR SURGERY)

## 2023-01-18 PROCEDURE — 88307 TISSUE EXAM BY PATHOLOGIST: CPT | Mod: 26 | Performed by: PATHOLOGY

## 2023-01-18 PROCEDURE — 88332 PATH CONSLTJ SURG EA ADD BLK: CPT | Mod: 26 | Performed by: STUDENT IN AN ORGANIZED HEALTH CARE EDUCATION/TRAINING PROGRAM

## 2023-01-18 PROCEDURE — 250N000009 HC RX 250: Performed by: THORACIC SURGERY (CARDIOTHORACIC VASCULAR SURGERY)

## 2023-01-18 PROCEDURE — 250N000011 HC RX IP 250 OP 636: Performed by: THORACIC SURGERY (CARDIOTHORACIC VASCULAR SURGERY)

## 2023-01-18 PROCEDURE — 710N000010 HC RECOVERY PHASE 1, LEVEL 2, PER MIN: Performed by: THORACIC SURGERY (CARDIOTHORACIC VASCULAR SURGERY)

## 2023-01-18 PROCEDURE — 250N000013 HC RX MED GY IP 250 OP 250 PS 637: Performed by: THORACIC SURGERY (CARDIOTHORACIC VASCULAR SURGERY)

## 2023-01-18 PROCEDURE — 36415 COLL VENOUS BLD VENIPUNCTURE: CPT | Performed by: THORACIC SURGERY (CARDIOTHORACIC VASCULAR SURGERY)

## 2023-01-18 PROCEDURE — 258N000003 HC RX IP 258 OP 636: Performed by: THORACIC SURGERY (CARDIOTHORACIC VASCULAR SURGERY)

## 2023-01-18 PROCEDURE — 88331 PATH CONSLTJ SURG 1 BLK 1SPC: CPT | Mod: TC | Performed by: THORACIC SURGERY (CARDIOTHORACIC VASCULAR SURGERY)

## 2023-01-18 PROCEDURE — 250N000009 HC RX 250: Performed by: ANESTHESIOLOGY

## 2023-01-18 PROCEDURE — 999N000141 HC STATISTIC PRE-PROCEDURE NURSING ASSESSMENT: Performed by: THORACIC SURGERY (CARDIOTHORACIC VASCULAR SURGERY)

## 2023-01-18 PROCEDURE — 250N000009 HC RX 250: Performed by: STUDENT IN AN ORGANIZED HEALTH CARE EDUCATION/TRAINING PROGRAM

## 2023-01-18 PROCEDURE — 250N000013 HC RX MED GY IP 250 OP 250 PS 637: Performed by: ANESTHESIOLOGY

## 2023-01-18 PROCEDURE — 07T74ZZ RESECTION OF THORAX LYMPHATIC, PERCUTANEOUS ENDOSCOPIC APPROACH: ICD-10-PCS | Performed by: THORACIC SURGERY (CARDIOTHORACIC VASCULAR SURGERY)

## 2023-01-18 PROCEDURE — 71045 X-RAY EXAM CHEST 1 VIEW: CPT | Mod: 26 | Performed by: RADIOLOGY

## 2023-01-18 PROCEDURE — 0BTF4ZZ RESECTION OF RIGHT LOWER LUNG LOBE, PERCUTANEOUS ENDOSCOPIC APPROACH: ICD-10-PCS | Performed by: THORACIC SURGERY (CARDIOTHORACIC VASCULAR SURGERY)

## 2023-01-18 PROCEDURE — 272N000001 HC OR GENERAL SUPPLY STERILE: Performed by: THORACIC SURGERY (CARDIOTHORACIC VASCULAR SURGERY)

## 2023-01-18 PROCEDURE — 82565 ASSAY OF CREATININE: CPT | Performed by: THORACIC SURGERY (CARDIOTHORACIC VASCULAR SURGERY)

## 2023-01-18 PROCEDURE — 88341 IMHCHEM/IMCYTCHM EA ADD ANTB: CPT | Mod: 26 | Performed by: PATHOLOGY

## 2023-01-18 PROCEDURE — 32663 THORACOSCOPY W/LOBECTOMY: CPT | Mod: RT | Performed by: THORACIC SURGERY (CARDIOTHORACIC VASCULAR SURGERY)

## 2023-01-18 PROCEDURE — 88342 IMHCHEM/IMCYTCHM 1ST ANTB: CPT | Mod: 26 | Performed by: PATHOLOGY

## 2023-01-18 PROCEDURE — 32668 THORACOSCOPY W/W RESECT DIAG: CPT | Mod: RT | Performed by: THORACIC SURGERY (CARDIOTHORACIC VASCULAR SURGERY)

## 2023-01-18 PROCEDURE — 999N000065 XR CHEST PORT 1 VIEW

## 2023-01-18 PROCEDURE — 0BBF4ZX EXCISION OF RIGHT LOWER LUNG LOBE, PERCUTANEOUS ENDOSCOPIC APPROACH, DIAGNOSTIC: ICD-10-PCS | Performed by: THORACIC SURGERY (CARDIOTHORACIC VASCULAR SURGERY)

## 2023-01-18 PROCEDURE — 250N000011 HC RX IP 250 OP 636: Performed by: ANESTHESIOLOGY

## 2023-01-18 PROCEDURE — 88305 TISSUE EXAM BY PATHOLOGIST: CPT | Mod: 26 | Performed by: PATHOLOGY

## 2023-01-18 PROCEDURE — 250N000025 HC SEVOFLURANE, PER MIN: Performed by: THORACIC SURGERY (CARDIOTHORACIC VASCULAR SURGERY)

## 2023-01-18 PROCEDURE — 32674 THORACOSCOPY LYMPH NODE EXC: CPT | Mod: GC | Performed by: THORACIC SURGERY (CARDIOTHORACIC VASCULAR SURGERY)

## 2023-01-18 PROCEDURE — 88331 PATH CONSLTJ SURG 1 BLK 1SPC: CPT | Mod: 26 | Performed by: STUDENT IN AN ORGANIZED HEALTH CARE EDUCATION/TRAINING PROGRAM

## 2023-01-18 PROCEDURE — 88309 TISSUE EXAM BY PATHOLOGIST: CPT | Mod: 26 | Performed by: PATHOLOGY

## 2023-01-18 PROCEDURE — 88360 TUMOR IMMUNOHISTOCHEM/MANUAL: CPT | Mod: 26 | Performed by: PATHOLOGY

## 2023-01-18 PROCEDURE — 88332 PATH CONSLTJ SURG EA ADD BLK: CPT | Mod: TC | Performed by: THORACIC SURGERY (CARDIOTHORACIC VASCULAR SURGERY)

## 2023-01-18 PROCEDURE — 8E0W4CZ ROBOTIC ASSISTED PROCEDURE OF TRUNK REGION, PERCUTANEOUS ENDOSCOPIC APPROACH: ICD-10-PCS | Performed by: THORACIC SURGERY (CARDIOTHORACIC VASCULAR SURGERY)

## 2023-01-18 PROCEDURE — 258N000003 HC RX IP 258 OP 636: Performed by: ANESTHESIOLOGY

## 2023-01-18 PROCEDURE — 120N000002 HC R&B MED SURG/OB UMMC

## 2023-01-18 PROCEDURE — 250N000011 HC RX IP 250 OP 636: Performed by: STUDENT IN AN ORGANIZED HEALTH CARE EDUCATION/TRAINING PROGRAM

## 2023-01-18 PROCEDURE — 360N000080 HC SURGERY LEVEL 7, PER MIN: Performed by: THORACIC SURGERY (CARDIOTHORACIC VASCULAR SURGERY)

## 2023-01-18 RX ORDER — ONDANSETRON 4 MG/1
4 TABLET, ORALLY DISINTEGRATING ORAL EVERY 30 MIN PRN
Status: DISCONTINUED | OUTPATIENT
Start: 2023-01-18 | End: 2023-01-18 | Stop reason: HOSPADM

## 2023-01-18 RX ORDER — FENTANYL CITRATE 50 UG/ML
50 INJECTION, SOLUTION INTRAMUSCULAR; INTRAVENOUS EVERY 5 MIN PRN
Status: DISCONTINUED | OUTPATIENT
Start: 2023-01-18 | End: 2023-01-18 | Stop reason: HOSPADM

## 2023-01-18 RX ORDER — KETAMINE HYDROCHLORIDE 10 MG/ML
INJECTION INTRAMUSCULAR; INTRAVENOUS PRN
Status: DISCONTINUED | OUTPATIENT
Start: 2023-01-18 | End: 2023-01-18

## 2023-01-18 RX ORDER — POLYETHYLENE GLYCOL 3350 17 G/17G
17 POWDER, FOR SOLUTION ORAL DAILY
Status: DISCONTINUED | OUTPATIENT
Start: 2023-01-19 | End: 2023-01-22 | Stop reason: HOSPADM

## 2023-01-18 RX ORDER — SODIUM CHLORIDE, SODIUM LACTATE, POTASSIUM CHLORIDE, CALCIUM CHLORIDE 600; 310; 30; 20 MG/100ML; MG/100ML; MG/100ML; MG/100ML
INJECTION, SOLUTION INTRAVENOUS CONTINUOUS
Status: DISCONTINUED | OUTPATIENT
Start: 2023-01-18 | End: 2023-01-18 | Stop reason: HOSPADM

## 2023-01-18 RX ORDER — METHOCARBAMOL 500 MG/1
500 TABLET, FILM COATED ORAL EVERY 6 HOURS PRN
Status: DISCONTINUED | OUTPATIENT
Start: 2023-01-18 | End: 2023-01-22 | Stop reason: HOSPADM

## 2023-01-18 RX ORDER — PROCHLORPERAZINE MALEATE 5 MG
5 TABLET ORAL EVERY 6 HOURS PRN
Status: DISCONTINUED | OUTPATIENT
Start: 2023-01-18 | End: 2023-01-22 | Stop reason: HOSPADM

## 2023-01-18 RX ORDER — ACETAMINOPHEN 325 MG/1
975 TABLET ORAL ONCE
Status: DISCONTINUED | OUTPATIENT
Start: 2023-01-18 | End: 2023-01-18 | Stop reason: HOSPADM

## 2023-01-18 RX ORDER — ALBUTEROL SULFATE 0.83 MG/ML
2.5 SOLUTION RESPIRATORY (INHALATION) EVERY 4 HOURS PRN
Status: DISCONTINUED | OUTPATIENT
Start: 2023-01-18 | End: 2023-01-18 | Stop reason: HOSPADM

## 2023-01-18 RX ORDER — NALOXONE HYDROCHLORIDE 0.4 MG/ML
0.4 INJECTION, SOLUTION INTRAMUSCULAR; INTRAVENOUS; SUBCUTANEOUS
Status: DISCONTINUED | OUTPATIENT
Start: 2023-01-18 | End: 2023-01-18 | Stop reason: HOSPADM

## 2023-01-18 RX ORDER — AMOXICILLIN 250 MG
1 CAPSULE ORAL 2 TIMES DAILY
Status: DISCONTINUED | OUTPATIENT
Start: 2023-01-18 | End: 2023-01-22 | Stop reason: HOSPADM

## 2023-01-18 RX ORDER — CEFAZOLIN SODIUM/WATER 2 G/20 ML
2 SYRINGE (ML) INTRAVENOUS SEE ADMIN INSTRUCTIONS
Status: DISCONTINUED | OUTPATIENT
Start: 2023-01-18 | End: 2023-01-18 | Stop reason: HOSPADM

## 2023-01-18 RX ORDER — HYDROMORPHONE HYDROCHLORIDE 1 MG/ML
0.4 INJECTION, SOLUTION INTRAMUSCULAR; INTRAVENOUS; SUBCUTANEOUS EVERY 5 MIN PRN
Status: DISCONTINUED | OUTPATIENT
Start: 2023-01-18 | End: 2023-01-18 | Stop reason: HOSPADM

## 2023-01-18 RX ORDER — OXYCODONE HYDROCHLORIDE 5 MG/1
5 TABLET ORAL EVERY 4 HOURS PRN
Status: DISCONTINUED | OUTPATIENT
Start: 2023-01-18 | End: 2023-01-18 | Stop reason: HOSPADM

## 2023-01-18 RX ORDER — FENTANYL CITRATE 50 UG/ML
25 INJECTION, SOLUTION INTRAMUSCULAR; INTRAVENOUS EVERY 5 MIN PRN
Status: DISCONTINUED | OUTPATIENT
Start: 2023-01-18 | End: 2023-01-18 | Stop reason: HOSPADM

## 2023-01-18 RX ORDER — SODIUM CHLORIDE, SODIUM LACTATE, POTASSIUM CHLORIDE, CALCIUM CHLORIDE 600; 310; 30; 20 MG/100ML; MG/100ML; MG/100ML; MG/100ML
INJECTION, SOLUTION INTRAVENOUS CONTINUOUS PRN
Status: DISCONTINUED | OUTPATIENT
Start: 2023-01-18 | End: 2023-01-18

## 2023-01-18 RX ORDER — BISACODYL 10 MG
10 SUPPOSITORY, RECTAL RECTAL DAILY PRN
Status: DISCONTINUED | OUTPATIENT
Start: 2023-01-18 | End: 2023-01-22 | Stop reason: HOSPADM

## 2023-01-18 RX ORDER — ONDANSETRON 2 MG/ML
INJECTION INTRAMUSCULAR; INTRAVENOUS PRN
Status: DISCONTINUED | OUTPATIENT
Start: 2023-01-18 | End: 2023-01-18

## 2023-01-18 RX ORDER — ONDANSETRON 2 MG/ML
4 INJECTION INTRAMUSCULAR; INTRAVENOUS EVERY 30 MIN PRN
Status: DISCONTINUED | OUTPATIENT
Start: 2023-01-18 | End: 2023-01-18 | Stop reason: HOSPADM

## 2023-01-18 RX ORDER — NALOXONE HYDROCHLORIDE 0.4 MG/ML
0.2 INJECTION, SOLUTION INTRAMUSCULAR; INTRAVENOUS; SUBCUTANEOUS
Status: DISCONTINUED | OUTPATIENT
Start: 2023-01-18 | End: 2023-01-22 | Stop reason: HOSPADM

## 2023-01-18 RX ORDER — MEPERIDINE HYDROCHLORIDE 25 MG/ML
12.5 INJECTION INTRAMUSCULAR; INTRAVENOUS; SUBCUTANEOUS EVERY 5 MIN PRN
Status: DISCONTINUED | OUTPATIENT
Start: 2023-01-18 | End: 2023-01-18 | Stop reason: HOSPADM

## 2023-01-18 RX ORDER — DIPHENHYDRAMINE HYDROCHLORIDE 50 MG/ML
12.5 INJECTION INTRAMUSCULAR; INTRAVENOUS EVERY 6 HOURS PRN
Status: DISCONTINUED | OUTPATIENT
Start: 2023-01-18 | End: 2023-01-22 | Stop reason: HOSPADM

## 2023-01-18 RX ORDER — CEFAZOLIN SODIUM/WATER 2 G/20 ML
2 SYRINGE (ML) INTRAVENOUS
Status: COMPLETED | OUTPATIENT
Start: 2023-01-18 | End: 2023-01-18

## 2023-01-18 RX ORDER — PROPOFOL 10 MG/ML
INJECTION, EMULSION INTRAVENOUS PRN
Status: DISCONTINUED | OUTPATIENT
Start: 2023-01-18 | End: 2023-01-18

## 2023-01-18 RX ORDER — NALOXONE HYDROCHLORIDE 0.4 MG/ML
0.4 INJECTION, SOLUTION INTRAMUSCULAR; INTRAVENOUS; SUBCUTANEOUS
Status: DISCONTINUED | OUTPATIENT
Start: 2023-01-18 | End: 2023-01-22 | Stop reason: HOSPADM

## 2023-01-18 RX ORDER — ENOXAPARIN SODIUM 100 MG/ML
40 INJECTION SUBCUTANEOUS
Status: COMPLETED | OUTPATIENT
Start: 2023-01-18 | End: 2023-01-18

## 2023-01-18 RX ORDER — ESMOLOL HYDROCHLORIDE 10 MG/ML
INJECTION INTRAVENOUS PRN
Status: DISCONTINUED | OUTPATIENT
Start: 2023-01-18 | End: 2023-01-18

## 2023-01-18 RX ORDER — ALBUTEROL SULFATE 0.83 MG/ML
2.5 SOLUTION RESPIRATORY (INHALATION)
Status: DISCONTINUED | OUTPATIENT
Start: 2023-01-18 | End: 2023-01-22 | Stop reason: HOSPADM

## 2023-01-18 RX ORDER — OXYCODONE HYDROCHLORIDE 5 MG/1
5 TABLET ORAL EVERY 4 HOURS PRN
Status: DISCONTINUED | OUTPATIENT
Start: 2023-01-18 | End: 2023-01-22 | Stop reason: HOSPADM

## 2023-01-18 RX ORDER — ENOXAPARIN SODIUM 100 MG/ML
40 INJECTION SUBCUTANEOUS EVERY 24 HOURS
Status: DISCONTINUED | OUTPATIENT
Start: 2023-01-19 | End: 2023-01-22 | Stop reason: HOSPADM

## 2023-01-18 RX ORDER — SODIUM CHLORIDE, SODIUM GLUCONATE, SODIUM ACETATE, POTASSIUM CHLORIDE AND MAGNESIUM CHLORIDE 526; 502; 368; 37; 30 MG/100ML; MG/100ML; MG/100ML; MG/100ML; MG/100ML
INJECTION, SOLUTION INTRAVENOUS CONTINUOUS PRN
Status: DISCONTINUED | OUTPATIENT
Start: 2023-01-18 | End: 2023-01-18

## 2023-01-18 RX ORDER — LIDOCAINE HYDROCHLORIDE 20 MG/ML
INJECTION, SOLUTION INFILTRATION; PERINEURAL PRN
Status: DISCONTINUED | OUTPATIENT
Start: 2023-01-18 | End: 2023-01-18

## 2023-01-18 RX ORDER — BUPIVACAINE HYDROCHLORIDE 2.5 MG/ML
INJECTION, SOLUTION INFILTRATION; PERINEURAL PRN
Status: DISCONTINUED | OUTPATIENT
Start: 2023-01-18 | End: 2023-01-18 | Stop reason: HOSPADM

## 2023-01-18 RX ORDER — LABETALOL 20 MG/4 ML (5 MG/ML) INTRAVENOUS SYRINGE
PRN
Status: DISCONTINUED | OUTPATIENT
Start: 2023-01-18 | End: 2023-01-18

## 2023-01-18 RX ORDER — ACETAMINOPHEN 325 MG/1
975 TABLET ORAL EVERY 6 HOURS
Status: COMPLETED | OUTPATIENT
Start: 2023-01-18 | End: 2023-01-21

## 2023-01-18 RX ORDER — GLYCOPYRROLATE 0.2 MG/ML
INJECTION, SOLUTION INTRAMUSCULAR; INTRAVENOUS PRN
Status: DISCONTINUED | OUTPATIENT
Start: 2023-01-18 | End: 2023-01-18

## 2023-01-18 RX ORDER — FAMOTIDINE 20 MG/1
20 TABLET, FILM COATED ORAL 2 TIMES DAILY
Status: DISCONTINUED | OUTPATIENT
Start: 2023-01-18 | End: 2023-01-22 | Stop reason: HOSPADM

## 2023-01-18 RX ORDER — NALOXONE HYDROCHLORIDE 0.4 MG/ML
0.2 INJECTION, SOLUTION INTRAMUSCULAR; INTRAVENOUS; SUBCUTANEOUS
Status: DISCONTINUED | OUTPATIENT
Start: 2023-01-18 | End: 2023-01-18 | Stop reason: HOSPADM

## 2023-01-18 RX ORDER — HYDROMORPHONE HYDROCHLORIDE 1 MG/ML
0.2 INJECTION, SOLUTION INTRAMUSCULAR; INTRAVENOUS; SUBCUTANEOUS EVERY 5 MIN PRN
Status: DISCONTINUED | OUTPATIENT
Start: 2023-01-18 | End: 2023-01-18 | Stop reason: HOSPADM

## 2023-01-18 RX ORDER — CELECOXIB 200 MG/1
200 CAPSULE ORAL ONCE
Status: COMPLETED | OUTPATIENT
Start: 2023-01-18 | End: 2023-01-18

## 2023-01-18 RX ORDER — ONDANSETRON 4 MG/1
4 TABLET, ORALLY DISINTEGRATING ORAL EVERY 6 HOURS PRN
Status: DISCONTINUED | OUTPATIENT
Start: 2023-01-18 | End: 2023-01-22 | Stop reason: HOSPADM

## 2023-01-18 RX ORDER — DEXAMETHASONE SODIUM PHOSPHATE 4 MG/ML
INJECTION, SOLUTION INTRA-ARTICULAR; INTRALESIONAL; INTRAMUSCULAR; INTRAVENOUS; SOFT TISSUE PRN
Status: DISCONTINUED | OUTPATIENT
Start: 2023-01-18 | End: 2023-01-18

## 2023-01-18 RX ORDER — NITROGLYCERIN 10 MG/100ML
INJECTION INTRAVENOUS PRN
Status: DISCONTINUED | OUTPATIENT
Start: 2023-01-18 | End: 2023-01-18

## 2023-01-18 RX ORDER — ACETAMINOPHEN 325 MG/1
975 TABLET ORAL ONCE
Status: COMPLETED | OUTPATIENT
Start: 2023-01-18 | End: 2023-01-18

## 2023-01-18 RX ORDER — ONDANSETRON 2 MG/ML
4 INJECTION INTRAMUSCULAR; INTRAVENOUS EVERY 6 HOURS PRN
Status: DISCONTINUED | OUTPATIENT
Start: 2023-01-18 | End: 2023-01-22 | Stop reason: HOSPADM

## 2023-01-18 RX ORDER — FENTANYL CITRATE 50 UG/ML
INJECTION, SOLUTION INTRAMUSCULAR; INTRAVENOUS PRN
Status: DISCONTINUED | OUTPATIENT
Start: 2023-01-18 | End: 2023-01-18

## 2023-01-18 RX ORDER — OXYCODONE HYDROCHLORIDE 10 MG/1
10 TABLET ORAL EVERY 4 HOURS PRN
Status: DISCONTINUED | OUTPATIENT
Start: 2023-01-18 | End: 2023-01-22 | Stop reason: HOSPADM

## 2023-01-18 RX ORDER — DIPHENHYDRAMINE HCL 12.5MG/5ML
12.5 LIQUID (ML) ORAL EVERY 6 HOURS PRN
Status: DISCONTINUED | OUTPATIENT
Start: 2023-01-18 | End: 2023-01-22 | Stop reason: HOSPADM

## 2023-01-18 RX ORDER — EPHEDRINE SULFATE 50 MG/ML
INJECTION, SOLUTION INTRAMUSCULAR; INTRAVENOUS; SUBCUTANEOUS PRN
Status: DISCONTINUED | OUTPATIENT
Start: 2023-01-18 | End: 2023-01-18

## 2023-01-18 RX ADMIN — SODIUM CHLORIDE, SODIUM GLUCONATE, SODIUM ACETATE, POTASSIUM CHLORIDE AND MAGNESIUM CHLORIDE: 526; 502; 368; 37; 30 INJECTION, SOLUTION INTRAVENOUS at 15:33

## 2023-01-18 RX ADMIN — HYDROMORPHONE HYDROCHLORIDE 0.5 MG: 1 INJECTION, SOLUTION INTRAMUSCULAR; INTRAVENOUS; SUBCUTANEOUS at 19:10

## 2023-01-18 RX ADMIN — ONDANSETRON 4 MG: 2 INJECTION INTRAMUSCULAR; INTRAVENOUS at 19:10

## 2023-01-18 RX ADMIN — FENTANYL CITRATE 100 MCG: 50 INJECTION, SOLUTION INTRAMUSCULAR; INTRAVENOUS at 14:05

## 2023-01-18 RX ADMIN — Medication 10 MG: at 16:51

## 2023-01-18 RX ADMIN — ENOXAPARIN SODIUM 40 MG: 40 INJECTION SUBCUTANEOUS at 13:13

## 2023-01-18 RX ADMIN — Medication 20 MG: at 18:15

## 2023-01-18 RX ADMIN — Medication 50 MG: at 13:57

## 2023-01-18 RX ADMIN — ESMOLOL HYDROCHLORIDE 30 MG: 10 INJECTION, SOLUTION INTRAVENOUS at 15:13

## 2023-01-18 RX ADMIN — HYDROMORPHONE HYDROCHLORIDE 0.2 MG: 1 INJECTION, SOLUTION INTRAMUSCULAR; INTRAVENOUS; SUBCUTANEOUS at 19:57

## 2023-01-18 RX ADMIN — DEXAMETHASONE SODIUM PHOSPHATE 8 MG: 4 INJECTION, SOLUTION INTRA-ARTICULAR; INTRALESIONAL; INTRAMUSCULAR; INTRAVENOUS; SOFT TISSUE at 14:52

## 2023-01-18 RX ADMIN — FENTANYL CITRATE 50 MCG: 50 INJECTION, SOLUTION INTRAMUSCULAR; INTRAVENOUS at 19:25

## 2023-01-18 RX ADMIN — SUGAMMADEX 200 MG: 100 INJECTION, SOLUTION INTRAVENOUS at 18:50

## 2023-01-18 RX ADMIN — GLYCOPYRROLATE 0.4 MG: 0.2 INJECTION, SOLUTION INTRAMUSCULAR; INTRAVENOUS at 14:29

## 2023-01-18 RX ADMIN — HYDROMORPHONE HYDROCHLORIDE 0.4 MG: 1 INJECTION, SOLUTION INTRAMUSCULAR; INTRAVENOUS; SUBCUTANEOUS at 19:38

## 2023-01-18 RX ADMIN — ONDANSETRON 4 MG: 2 INJECTION INTRAMUSCULAR; INTRAVENOUS at 17:51

## 2023-01-18 RX ADMIN — Medication 20 MG: at 16:14

## 2023-01-18 RX ADMIN — Medication 2 G: at 14:22

## 2023-01-18 RX ADMIN — HYDROMORPHONE HYDROCHLORIDE 0.5 MG: 1 INJECTION, SOLUTION INTRAMUSCULAR; INTRAVENOUS; SUBCUTANEOUS at 17:38

## 2023-01-18 RX ADMIN — PROCHLORPERAZINE EDISYLATE 5 MG: 5 INJECTION INTRAMUSCULAR; INTRAVENOUS at 19:20

## 2023-01-18 RX ADMIN — Medication 10 MG: at 14:19

## 2023-01-18 RX ADMIN — ACETAMINOPHEN 975 MG: 325 TABLET ORAL at 23:22

## 2023-01-18 RX ADMIN — CELECOXIB 200 MG: 200 CAPSULE ORAL at 13:22

## 2023-01-18 RX ADMIN — ESMOLOL HYDROCHLORIDE 30 MG: 10 INJECTION, SOLUTION INTRAVENOUS at 14:45

## 2023-01-18 RX ADMIN — ACETAMINOPHEN 975 MG: 325 TABLET, FILM COATED ORAL at 13:13

## 2023-01-18 RX ADMIN — FAMOTIDINE 20 MG: 20 TABLET, FILM COATED ORAL at 23:22

## 2023-01-18 RX ADMIN — Medication 30 MG: at 14:45

## 2023-01-18 RX ADMIN — KETAMINE HYDROCHLORIDE 10 MG/HR: 100 INJECTION, SOLUTION, CONCENTRATE INTRAMUSCULAR; INTRAVENOUS at 14:40

## 2023-01-18 RX ADMIN — Medication 30 MG: at 14:05

## 2023-01-18 RX ADMIN — HYDROMORPHONE HYDROCHLORIDE 0.5 MG: 1 INJECTION, SOLUTION INTRAMUSCULAR; INTRAVENOUS; SUBCUTANEOUS at 15:12

## 2023-01-18 RX ADMIN — GLYCOPYRROLATE 0.4 MG: 0.2 INJECTION, SOLUTION INTRAMUSCULAR; INTRAVENOUS at 18:23

## 2023-01-18 RX ADMIN — Medication 20 MG: at 14:02

## 2023-01-18 RX ADMIN — PROPOFOL 50 MG: 10 INJECTION, EMULSION INTRAVENOUS at 14:02

## 2023-01-18 RX ADMIN — ESMOLOL HYDROCHLORIDE 20 MG: 10 INJECTION, SOLUTION INTRAVENOUS at 14:57

## 2023-01-18 RX ADMIN — HYDROMORPHONE HYDROCHLORIDE 0.4 MG: 1 INJECTION, SOLUTION INTRAMUSCULAR; INTRAVENOUS; SUBCUTANEOUS at 19:49

## 2023-01-18 RX ADMIN — LIDOCAINE HYDROCHLORIDE 100 MG: 20 INJECTION, SOLUTION INFILTRATION; PERINEURAL at 13:55

## 2023-01-18 RX ADMIN — FENTANYL CITRATE 100 MCG: 50 INJECTION, SOLUTION INTRAMUSCULAR; INTRAVENOUS at 13:55

## 2023-01-18 RX ADMIN — FENTANYL CITRATE 50 MCG: 50 INJECTION, SOLUTION INTRAMUSCULAR; INTRAVENOUS at 19:16

## 2023-01-18 RX ADMIN — Medication 20 MG: at 17:17

## 2023-01-18 RX ADMIN — LABETALOL 20 MG/4 ML (5 MG/ML) INTRAVENOUS SYRINGE 10 MG: at 15:32

## 2023-01-18 RX ADMIN — SODIUM CHLORIDE, POTASSIUM CHLORIDE, SODIUM LACTATE AND CALCIUM CHLORIDE: 600; 310; 30; 20 INJECTION, SOLUTION INTRAVENOUS at 13:45

## 2023-01-18 RX ADMIN — PROPOFOL 150 MG: 10 INJECTION, EMULSION INTRAVENOUS at 13:56

## 2023-01-18 RX ADMIN — NITROGLYCERIN 200 MCG: 10 INJECTION INTRAVENOUS at 14:00

## 2023-01-18 RX ADMIN — MIDAZOLAM 2 MG: 1 INJECTION INTRAMUSCULAR; INTRAVENOUS at 13:54

## 2023-01-18 RX ADMIN — Medication 10 MG: at 15:52

## 2023-01-18 RX ADMIN — SODIUM CHLORIDE, SODIUM GLUCONATE, SODIUM ACETATE, POTASSIUM CHLORIDE AND MAGNESIUM CHLORIDE: 526; 502; 368; 37; 30 INJECTION, SOLUTION INTRAVENOUS at 14:05

## 2023-01-18 RX ADMIN — GLYCOPYRROLATE 0.2 MG: 0.2 INJECTION, SOLUTION INTRAMUSCULAR; INTRAVENOUS at 14:27

## 2023-01-18 RX ADMIN — Medication 20 MG: at 15:18

## 2023-01-18 RX ADMIN — Medication: at 21:14

## 2023-01-18 ASSESSMENT — ACTIVITIES OF DAILY LIVING (ADL)
ADLS_ACUITY_SCORE: 22

## 2023-01-18 ASSESSMENT — LIFESTYLE VARIABLES: TOBACCO_USE: 1

## 2023-01-18 ASSESSMENT — ENCOUNTER SYMPTOMS: SEIZURES: 0

## 2023-01-18 NOTE — ANESTHESIA PREPROCEDURE EVALUATION
Pre-Operative H & P     CC:  Preoperative exam to assess for increased cardiopulmonary risk while undergoing surgery and anesthesia.    Date of Encounter: 1/4/2023  Primary Care Physician:  Sen Leon     Reason for visit:   No diagnosis found.    HPI  Cesar Murillo is a 67 year old male who presents for pre-operative H & P in preparation for  Procedure Information     Case: [id number] Date/Time: 01/18/23 1215    Procedure: Robot-assisted thoracoscopic right lower lobe wedge, possible lobectomy, mediastinal lymph node dissection (Right: Chest)    Anesthesia type: General    Diagnosis: Lung nodule seen on imaging study [R91.1]    Pre-op diagnosis: Lung nodule seen on imaging study [R91.1]    Location:  OR  /  OR    Providers: Lobo Leger MD          Patient is being evaluated for comorbid conditions of hypertension, tobacco use, depression, anxiety, h/o HCV now treated, OA    Mr. Murillo has a history of a growing right lung nodule. He was seen by Dr. Leger and is now scheduled for the above procedure.     History is obtained from the patient and chart review    Hx of abnormal bleeding or anti-platelet use: denies      Past Medical History  Past Medical History:   Diagnosis Date     Acute bilateral low back pain without sciatica 07/23/2018     Anxiety, generalized 11/17/2017     Ascending aorta dilation (H)     4.2 cm on CT scan August 2017, follow annually, 4.1 cm August 2018, stable October 2019.  4.5 cm April 2022.  Recheck in 1 year     CAD (coronary artery disease)     cardiac cath 1/17/2023: non-obstructive CAD     Calcaneal fracture 2013    Chronic foot/heel pain     Chronic cough 04/14/2022     Chronic fatigue 10/14/2019     Chronic hepatitis C (H)     Successfully treated.  Previously on interferon.  More recently SOFOSBUVIR and ribavirin, biopsy has been negative for cirrhosis, PCR -2015     Chronic insomnia      Chronic pain of right ankle 05/17/2018     Erectile dysfunction      Essential  hypertension      External hemorrhoids 2012     History of alcohol abuse      History of colon polyps     Colonoscopy April 2020 with polyps.  Repeat in 5 years     History of depression     2014, prescribed citalopram     Lower abdominal pain 07/23/2018     Osteoarthritis of multiple joints     Chronic bilateral knee and chronic low back pain     Porphyria cutanea tarda (H)      Pulmonary nodule     CT August 2018 3 mm right upper lobe nodule reported as stable, stable October 2019.  CT with 6 mm right lower lobe nodule April 2022 needing 6-month follow-up     Rectal bleeding 03/02/2020     Screening for AAA (abdominal aortic aneurysm)     CT 2018 without abdominal aneurysm     Shingles 05/13/2021     Tobacco abuse 11/17/2017       Past Surgical History  Past Surgical History:   Procedure Laterality Date     ANKLE FRACTURE SURGERY       ARTHROSCOPY KNEE Bilateral      COLONOSCOPY       CV CORONARY ANGIOGRAM N/A 1/17/2023    Procedure: Coronary Angiogram;  Surgeon: Souleymane Ratliff MD;  Location:  [facility]     CV LEFT HEART CATH N/A 1/17/2023    Procedure: Left Heart Catheterization;  Surgeon: Souleymane Ratliff MD;  Location:  [facility]ATH [facility]     CV LEFT VENTRICULOGRAM N/A 1/17/2023    Procedure: Left Ventriculogram;  Surgeon: Souleymane Ratliff MD;  Location:  [facility]ATH [facility]     FOOT FRACTURE SURGERY  09/01/2013    calcaneal fracture     WRIST SURGERY      ligament injury       Prior to Admission Medications  No current outpatient medications on file.       Allergies  No Known Allergies    Social History  Social History     Socioeconomic History     Marital status:      Spouse name: Not on file     Number of children: Not on file     Years of education: Not on file     Highest education level: Not on file   Occupational History     Not on file   Tobacco Use     Smoking status: Every Day     Packs/day: 1.50     Types: Cigarettes     Start date: 1968     Smokeless tobacco:  Never     Tobacco comments:     Patient smokes half a pack a day   Substance and Sexual Activity     Alcohol use: Yes     Comment: Alcoholic Drinks/day: 2-3 every day     Drug use: Yes     Types: Marijuana     Comment: Drug use: Frequent use     Sexual activity: Not on file   Other Topics Concern     Not on file   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 84     Cerebrovascular Disease Mother      Kidney failure Father         d 76     Rectal Cancer Father      Skin Cancer Brother      Crohn's Disease Brother      Anesthesia Reaction No family hx of      Deep Vein Thrombosis (DVT) No family hx of        Review of Systems  The complete review of systems is negative other than noted in the HPI or here.   Anesthesia Evaluation   Pt has had prior anesthetic. Type: General and MAC.    No history of anesthetic complications       ROS/MED HX  ENT/Pulmonary: Comment: Lung nodule    (+) tobacco use, Current use,     Neurologic:  - neg neurologic ROS  (-) no seizures and no CVA   Cardiovascular:     (+) Dyslipidemia hypertension----- (-) taking anticoagulants/antiplatelets and murmur   METS/Exercise Tolerance: >4 METS Comment: Can walk a mile and ascend a flight of stairs without CP, some intermittent GANDHI   Hematologic:  - neg hematologic  ROS  (-) history of blood clots and history of blood transfusion   Musculoskeletal: Comment: OA      GI/Hepatic:     (+) hepatitis resolved hepatitis type C,     Renal/Genitourinary:  - neg Renal ROS     Endo:  - neg endo ROS  (-) chronic steroid  usage   Psychiatric/Substance Use:     (+) psychiatric history anxiety and depression     Infectious Disease:  - neg infectious disease ROS     Malignancy:       Other:            Virtual visit -  No vitals were obtained    Physical Exam  Constitutional: Awake, alert, cooperative, no apparent distress, and appears stated age.  HENT: Normocephalic  Respiratory: non labored breathing   Neurologic: Awake, alert, oriented to name, place and time.   Neuropsychiatric: Calm, cooperative. Normal affect.      Prior Labs/Diagnostic Studies   All labs and imaging personally reviewed     EKG/ stress test - if available please see in ROS above   No results found.  PFT Latest Ref Rng & Units 12/5/2022   FVC L 4.69   FEV1 L 3.06   FVC% % 124   FEV1% % 104         The patient's records and results personally reviewed by this provider.     Outside records reviewed from: Care Everywhere      Assessment      Cesar Murillo is a 67 year old male seen as a PAC referral for risk assessment and optimization for anesthesia.    Plan/Recommendations  Pt will be optimized for the proposed procedure.  See below for details on the assessment, risk, and preoperative recommendations    NEUROLOGY  - No history of TIA, CVA or seizure  -Post Op delirium risk factors:  No risk identified    ENT  - No current airway concerns.  Will need to be reassessed day of surgery.  Mallampati: Unable to assess  TM: Unable to assess    CARDIAC  -hypertension using lisinopril  -denies cardiac history  -reports he can walk up to a mile and ascend stairs slowly. Endorses GANDHI. Will update stress test prior to surgery  - METS (Metabolic Equivalents)  Patient CANNOT perform 4 METS exercise without symptoms            Total Score: 1    Functional Capacity: Unable to complete 4 METS      RCRI-Low risk: Class 2 0.9% complication rate            Total Score: 1    RCRI: High Risk Surgery        PULMONARY  PAWAN Low Risk            Total Score: 2    PAWAN: Over 50 ys old    PAWAN:  "Male      -pulmonary nodule with above procedure planned   - Tobacco History      History   Smoking Status     Every Day     Packs/day: 1.50     Types: Cigarettes     Start date: 1968   Smokeless Tobacco     Never       GI  -h/o HCV, now treated  PONV Low Risk  Total Score: 1           1 AN PONV: Intended Post Op Opioids        /RENAL  - Baseline Creatinine WNL    ENDOCRINE    - BMI: Estimated body mass index is 28.13 kg/m  as calculated from the following:    Height as of an earlier encounter on 1/18/23: 1.753 m (5' 9\").    Weight as of an earlier encounter on 1/18/23: 86.4 kg (190 lb 7.6 oz).  Overweight (BMI 25.0-29.9)  - No history of Diabetes Mellitus    HEME  VTE High Risk 3%            Total Score: 8    VTE: Greater than 59 yrs old    VTE: Male    VTE: Current cancer      - No history of abnormal bleeding or antiplatelet use.      Different anesthesia methods/types have been discussed with the patient, but they are aware that the final plan will be decided by the assigned anesthesia provider on the date of service.    The patient is optimized for their procedure, pending stress test results. AVS with information on surgery time/arrival time, meds and NPO status given by nursing staff. No further diagnostic testing indicated.    Please refer to the physical examination documented by the anesthesiologist in the anesthesia record on the day of surgery.      Addendum 1/13/23: Cesar went in for DSE, but it was cancelled as patient had frequent PVCs/ runs bigeminy.  Discussed with Dr. Garcia. He completed NM stress test today with results below. He is scheduled for cardiology optimization 1/16 in preparation for surgery 1/18/23.   \"   The nuclear stress test is negative for inducible myocardial ischemia or infarction.     Left ventricular function is normal.     There is no prior study for comparison.\"      ADDENDUM: 1/17/23   Cardiology evaluation on 1/16/23  At this point in time, I recommended we proceed with " diagnostic coronary angiography for a definitive evaluation in this regard.  I have explained the indications, risks and benefits of coronary angiography to the patient in detail and he is agreeable to proceeding.  I will contact the surgical team at the [facility] to coordinate his surgery scheduling based on the results of the coronary angiogram.       I would anticipate that unless critical left main/ 3-vessel coronary artery disease is identified, we would recommend proceeding with surgery given that this nodule may represent a malignant process.    Coronary angiogram 1/17/23  Conclusion         The ejection fraction is 40-50% by visual estimate.    2nd Diag lesion is 20% stenosed.    Prox LAD to Mid LAD lesion is 30% stenosed.    RPAV lesion is 30% stenosed.    Prox RCA to Mid RCA lesion is 30% stenosed.    Mid RCA to Dist RCA lesion is 20% stenosed.     Non-obstructive coronary artery disease.  LVEDP 17.  Reduced LVEF with frequent unifocal PVC    Updated EKG today   Sinus bradycardia, with frequent PVCS in a pattern of bigeminy, septal infarct, age undetermined    Approved for surgery by Dr. Pereira              Video-Visit Details    Type of service:  Video Visit    Provider received verbal consent for a Video Visit from the patient? Yes   Video Start Time: 1512  Video End Time:1525    Originating Location (pt. Location): Home    Distant Location (provider location):  Off-site  Mode of Communication:  Video Conference via [employer]  On the day of service:     Prep time: 6 minutes  Visit time: 13 minutes  Documentation time: 6 minutes  ------------------------------------------  Total time: 25 minutes      Fe Walker PA-C  Preoperative Assessment Center  [facility]  Phone: [phone]  Fax: [phone]    Physical Exam    Airway        Mallampati: I   TM distance: > 3 FB   Neck ROM: full   Mouth opening: > 3 cm    Respiratory  Devices and Support         Dental       (+) Minor Abnormalities - some fillings, tiny chips      Cardiovascular          Rhythm and rate: regular and normal (-) no murmur    Pulmonary           breath sounds clear to auscultation             Anesthesia Plan    ASA Status:  3   NPO Status:  NPO Appropriate    Anesthesia Type: General.     - Airway: ETT   Induction: Intravenous.   Maintenance: Inhalation.   Techniques and Equipment:     - Lines/Monitors: 2nd IV, BIS     - Blood: T&S     Consents    Anesthesia Plan(s) and associated risks, benefits, and realistic alternatives discussed. Questions answered and patient/representative(s) expressed understanding.    - Discussed:     - Discussed with:  Patient      - Extended Intubation/Ventilatory Support Discussed: No.      - Patient is DNR/DNI Status: No    Use of blood products discussed: Yes.     - Discussed with: Patient.     - Consented: consented to blood products            Reason for refusal: other.     Postoperative Care    Pain management: IV analgesics, Oral pain medications.   PONV prophylaxis: Ondansetron (or other 5HT-3), Dexamethasone or Solumedrol     Comments:    Other Comments: Patient seen and examined.  Risks, benefits and alternatives to GETA discussed.  Questions answered and patient wishes to proceed

## 2023-01-18 NOTE — TELEPHONE ENCOUNTER
Received call from patient. Patient calling to inform Dr Leger that he is running late for his surgery. Verbalized that he previously spoke with Kacie and told her 11am but now it will be closer to 11:30 before he gets to the hospital for his surgery. Stated that he is having some issues passing his bowels. Informed patient that writer will update Dr Leger and surgery team. [employer] message sent to Dr Leger, Braxton BOWIE, and AZEB Shafer.    Velma Mora RN, BSN  Thoracic Surgery RN Care Coordinator

## 2023-01-18 NOTE — ANESTHESIA PROCEDURE NOTES
Airway       Patient location during procedure: OR       Procedure Start/Stop Times: 1/18/2023 2:01 PM  Staff -        CRNA: Keshav Billingsley APRN CRNA       Performed By: CRNA  Consent for Airway        Urgency: elective  Indications and Patient Condition       Indications for airway management: antonieta-procedural       Induction type:intravenous       Mask difficulty assessment: 1 - vent by mask    Final Airway Details       Final airway type: endotracheal airway       Successful airway: ETT - double lumen left  Endotracheal Airway Details        Cuffed: yes       Inital cuff pressure (cm H2O): 10       Tracheal cuff pressure (cm H2O): 3       Successful intubation technique: video laryngoscopy       VL Blade Size: Glidescope 3       Grade View of Cords: 1       Adjucts: stylet       Position: Right       Measured from: gums/teeth       Secured at (cm): 27       Bite block used: None       ETT Double lumen (fr): 41    Post intubation assessment        Placement verified by: capnometry, equal breath sounds and chest rise        Number of attempts at approach: 1       Number of other approaches attempted: 0       Secured with: pink tape       Ease of procedure: easy       Dentition: Intact and Unchanged    Medication(s) Administered   Medication Administration Time: 1/18/2023 2:01 PM

## 2023-01-19 ENCOUNTER — APPOINTMENT (OUTPATIENT)
Dept: OCCUPATIONAL THERAPY | Facility: CLINIC | Age: 68
DRG: 165 | End: 2023-01-19
Attending: THORACIC SURGERY (CARDIOTHORACIC VASCULAR SURGERY)
Payer: COMMERCIAL

## 2023-01-19 ENCOUNTER — APPOINTMENT (OUTPATIENT)
Dept: GENERAL RADIOLOGY | Facility: CLINIC | Age: 68
DRG: 165 | End: 2023-01-19
Attending: THORACIC SURGERY (CARDIOTHORACIC VASCULAR SURGERY)
Payer: COMMERCIAL

## 2023-01-19 LAB
ANION GAP SERPL CALCULATED.3IONS-SCNC: 14 MMOL/L (ref 7–15)
ATRIAL RATE - MUSE: 58 BPM
BUN SERPL-MCNC: 11.7 MG/DL (ref 8–23)
CALCIUM SERPL-MCNC: 8.2 MG/DL (ref 8.8–10.2)
CHLORIDE SERPL-SCNC: 102 MMOL/L (ref 98–107)
CREAT SERPL-MCNC: 0.72 MG/DL (ref 0.67–1.17)
DEPRECATED HCO3 PLAS-SCNC: 19 MMOL/L (ref 22–29)
DIASTOLIC BLOOD PRESSURE - MUSE: NORMAL MMHG
ERYTHROCYTE [DISTWIDTH] IN BLOOD BY AUTOMATED COUNT: 11.9 % (ref 10–15)
GFR SERPL CREATININE-BSD FRML MDRD: >90 ML/MIN/1.73M2
GLUCOSE SERPL-MCNC: 140 MG/DL (ref 70–99)
HCT VFR BLD AUTO: 38.6 % (ref 40–53)
HGB BLD-MCNC: 12.4 G/DL (ref 13.3–17.7)
INTERPRETATION ECG - MUSE: NORMAL
MCH RBC QN AUTO: 31.5 PG (ref 26.5–33)
MCHC RBC AUTO-ENTMCNC: 32.1 G/DL (ref 31.5–36.5)
MCV RBC AUTO: 98 FL (ref 78–100)
P AXIS - MUSE: 1 DEGREES
PLATELET # BLD AUTO: 133 10E3/UL (ref 150–450)
POTASSIUM SERPL-SCNC: 4.5 MMOL/L (ref 3.4–5.3)
PR INTERVAL - MUSE: 172 MS
QRS DURATION - MUSE: 100 MS
QT - MUSE: 450 MS
QTC - MUSE: 441 MS
R AXIS - MUSE: 36 DEGREES
RBC # BLD AUTO: 3.94 10E6/UL (ref 4.4–5.9)
SODIUM SERPL-SCNC: 135 MMOL/L (ref 136–145)
SYSTOLIC BLOOD PRESSURE - MUSE: NORMAL MMHG
T AXIS - MUSE: 51 DEGREES
VENTRICULAR RATE- MUSE: 58 BPM
WBC # BLD AUTO: 14.3 10E3/UL (ref 4–11)

## 2023-01-19 PROCEDURE — 97165 OT EVAL LOW COMPLEX 30 MIN: CPT | Mod: GO

## 2023-01-19 PROCEDURE — 120N000002 HC R&B MED SURG/OB UMMC

## 2023-01-19 PROCEDURE — 71045 X-RAY EXAM CHEST 1 VIEW: CPT

## 2023-01-19 PROCEDURE — 93010 ELECTROCARDIOGRAM REPORT: CPT | Performed by: INTERNAL MEDICINE

## 2023-01-19 PROCEDURE — 36415 COLL VENOUS BLD VENIPUNCTURE: CPT | Performed by: THORACIC SURGERY (CARDIOTHORACIC VASCULAR SURGERY)

## 2023-01-19 PROCEDURE — 250N000011 HC RX IP 250 OP 636: Performed by: THORACIC SURGERY (CARDIOTHORACIC VASCULAR SURGERY)

## 2023-01-19 PROCEDURE — 93005 ELECTROCARDIOGRAM TRACING: CPT

## 2023-01-19 PROCEDURE — 80048 BASIC METABOLIC PNL TOTAL CA: CPT | Performed by: THORACIC SURGERY (CARDIOTHORACIC VASCULAR SURGERY)

## 2023-01-19 PROCEDURE — 97530 THERAPEUTIC ACTIVITIES: CPT | Mod: GO

## 2023-01-19 PROCEDURE — 71045 X-RAY EXAM CHEST 1 VIEW: CPT | Mod: 26 | Performed by: RADIOLOGY

## 2023-01-19 PROCEDURE — 250N000013 HC RX MED GY IP 250 OP 250 PS 637: Performed by: STUDENT IN AN ORGANIZED HEALTH CARE EDUCATION/TRAINING PROGRAM

## 2023-01-19 PROCEDURE — 999N000147 HC STATISTIC PT IP EVAL DEFER

## 2023-01-19 PROCEDURE — 85027 COMPLETE CBC AUTOMATED: CPT | Performed by: THORACIC SURGERY (CARDIOTHORACIC VASCULAR SURGERY)

## 2023-01-19 PROCEDURE — 250N000013 HC RX MED GY IP 250 OP 250 PS 637: Performed by: THORACIC SURGERY (CARDIOTHORACIC VASCULAR SURGERY)

## 2023-01-19 RX ORDER — NICOTINE 21 MG/24HR
1 PATCH, TRANSDERMAL 24 HOURS TRANSDERMAL DAILY
Status: DISCONTINUED | OUTPATIENT
Start: 2023-01-19 | End: 2023-01-22 | Stop reason: HOSPADM

## 2023-01-19 RX ADMIN — OXYCODONE HYDROCHLORIDE 5 MG: 5 TABLET ORAL at 14:44

## 2023-01-19 RX ADMIN — FAMOTIDINE 20 MG: 20 TABLET, FILM COATED ORAL at 20:32

## 2023-01-19 RX ADMIN — SENNOSIDES AND DOCUSATE SODIUM 1 TABLET: 50; 8.6 TABLET ORAL at 08:01

## 2023-01-19 RX ADMIN — ACETAMINOPHEN 975 MG: 325 TABLET ORAL at 05:48

## 2023-01-19 RX ADMIN — METHOCARBAMOL 500 MG: 500 TABLET ORAL at 08:01

## 2023-01-19 RX ADMIN — OXYCODONE HYDROCHLORIDE 10 MG: 10 TABLET ORAL at 21:25

## 2023-01-19 RX ADMIN — POLYETHYLENE GLYCOL 3350 17 G: 17 POWDER, FOR SOLUTION ORAL at 08:01

## 2023-01-19 RX ADMIN — FAMOTIDINE 20 MG: 20 TABLET, FILM COATED ORAL at 08:01

## 2023-01-19 RX ADMIN — METHOCARBAMOL 500 MG: 500 TABLET ORAL at 20:40

## 2023-01-19 RX ADMIN — NICOTINE 1 PATCH: 14 PATCH, EXTENDED RELEASE TRANSDERMAL at 16:51

## 2023-01-19 RX ADMIN — ENOXAPARIN SODIUM 40 MG: 40 INJECTION SUBCUTANEOUS at 12:26

## 2023-01-19 RX ADMIN — ACETAMINOPHEN 975 MG: 325 TABLET ORAL at 12:14

## 2023-01-19 RX ADMIN — METHOCARBAMOL 500 MG: 500 TABLET ORAL at 14:46

## 2023-01-19 ASSESSMENT — ACTIVITIES OF DAILY LIVING (ADL)
ADLS_ACUITY_SCORE: 22
ADLS_ACUITY_SCORE: 28
ADLS_ACUITY_SCORE: 22
ADLS_ACUITY_SCORE: 28

## 2023-01-19 NOTE — PROGRESS NOTES
"Surgery [place] Post-op Check    S: Patient seen at bedside. Pain is well controlled with dPCA. Has had sips of water. Denies n/v, cp, sob. Has not urinated yet.     O: /75 (BP Location: Right arm, Patient Position: Semi-Ramos's)   Pulse 72   Temp 97.9  F (36.6  C) (Oral)   Resp 21   Ht 1.753 m (5' 9\")   Wt 86.4 kg (190 lb 7.6 oz)   SpO2 98%   BMI 28.13 kg/m      Gen: A&O x3, right CT, pca in place  Resp: NLB on 2LNV, Right CT with minimal bloody output, no air leak noted  CV: rrr, wwp  Abdomen: soft, non tender, non-distended  Extremities: no oedema noted, wwp, SCDs in place    A/P: 67yo M with h/o lung Ca, now s/p thoracoscopic RLL wedge resection and mediastinal LN dissection. Patient doing well post-op, no acute concerns.     - multimodal pain control  - CLD, adat  - pulmonary hygiene, IS  - OOB  - Rest of care per primary team    Cee Moore, PGY1  General Surgery Resident  [phone]   "

## 2023-01-19 NOTE — PROGRESS NOTES
Occupational Therapy Evaluation 01/19/23 0900   Appointment Info   Signing Clinician's Name / Credentials (OT) Maura Angelo OTR/L   Living Environment   People in Home child(zackery), adult;spouse   Current Living Arrangements house   Home Accessibility stairs within home   Number of Stairs, Within Home, Primary other (see comments)  (one flight to basement)   Transportation Anticipated family or friend will provide   Self-Care   Usual Activity Tolerance good   Current Activity Tolerance good   Regular Exercise No   Equipment Currently Used at Home none   Fall history within last six months no   Activity/Exercise/Self-Care Comment Pt reports IND with I/ADLs and mobility at baseline   General Information   Onset of Illness/Injury or Date of Surgery 01/18/23   Referring Physician Lobo Leger MD   Patient/Family Therapy Goal Statement (OT) To return to PLOF   Additional Occupational Profile Info/Pertinent History of Current Problem 67yo M with h/o lung Ca, now s/p thoracoscopic RLL wedge resection and mediastinal LN dissection. Patient doing well post-op, no acute concerns.   Existing Precautions/Restrictions thoracotomy   Right Upper Extremity (Weight-bearing Status) partial weight-bearing (PWB)   Cognitive Status Examination   Orientation Status orientation to person, place and time   Cognitive Status Comments Cognition appears intact   Visual Perception   Visual Impairment/Limitations WFL   Sensory   Sensory Quick Adds sensation intact   Pain Assessment   Patient Currently in Pain No   Posture   Posture not impaired   Range of Motion Comprehensive   General Range of Motion bilateral upper extremity ROM WFL   Strength Comprehensive (MMT)   Comment, General Manual Muscle Testing (MMT) Assessment Not formally tested d/t thoracotomy precautions   Coordination   Upper Extremity Coordination No deficits were identified   Bed Mobility   Bed Mobility supine-sit   Supine-Sit [place] (Bed Mobility) supervision    Transfers   Transfers sit-stand transfer   Sit-Stand Transfer   Sit-Stand [place] (Transfers) supervision   Activities of Daily Living   BADL Assessment/Intervention lower body dressing;toileting;upper body dressing   Upper Body Dressing Assessment/Training   Comment, (Upper Body Dressing) Per clinical judgement, SBA   [place] Level (Upper Body Dressing) not tested   Lower Body Dressing Assessment/Training   Comment, (Lower Body Dressing) Per clinical judgement, SBA   [place] Level (Lower Body Dressing) not tested   Toileting   [place] Level (Toileting) supervision   Clinical Impression   Criteria for Skilled Therapeutic Interventions Met (OT) Yes, treatment indicated   OT Diagnosis Decreased I/ADL IND   OT Problem List-Impairments impacting ADL problems related to;post-surgical precautions   Assessment of Occupational Performance 1-3 Performance Deficits   Identified Performance Deficits bathing, home mgmt, dressing   Planned Therapy Interventions (OT) ADL retraining;IADL retraining;progressive activity/exercise;ROM;transfer training;home program guidelines   Clinical Decision Making Complexity (OT) low complexity   Risk & Benefits of therapy have been explained evaluation/treatment results reviewed;care plan/treatment goals reviewed;risks/benefits reviewed;current/potential barriers reviewed;participants voiced agreement with care plan;participants included;patient   OT Total Evaluation Time   OT Eval, Low Complexity Minutes (04731) 5   OT Goals   Therapy Frequency (OT) Daily   OT Predicted Duration/Target Date for Goal Attainment 01/21/23   OT Goals Upper Body Dressing;Lower Body Dressing;OT Goal 1   OT: Upper Body Dressing Independent   OT: Lower Body Dressing Independent   OT: Goal 1 Pt will be able to IND complete thoracotomy exercises to promote return of ROM on surgical side to maintain ADL IND   OT Discharge Planning   OT Discharge Recommendation (DC Rec) home with assist   OT  Rationale for DC Rec Pt presents near functional baseline and is safe to d/c home w/ A for heavy IADLs from family   Total Session Time   Total Session Time (sum of timed and untimed services) 5

## 2023-01-19 NOTE — PROGRESS NOTES
Patient having low hear rates this evening (37-40s), pt asymptomatic, BP stable, RA. Denies chest pain or SOB. Called the Rapid Respond team to assess pt. Report given to the oncoming RN. Will continue to monitor.

## 2023-01-19 NOTE — PHARMACY-ADMISSION MEDICATION HISTORY
Admission Medication History Completed by Pharmacy    See [facility] Admission Navigator for allergy information, preferred outpatient pharmacy, prior to admission medications and immunization status.     Medication History Sources:     Patient    Fill history    Changes made to PTA medication list (reason):    Added: None    Deleted: None    Changed:   o Docusate (added dose and tablet strength)    Additional Information:    None    Prior to Admission medications    Medication Sig Last Dose Taking? Auth Provider Long Term End Date   docusate sodium (COLACE) 100 MG capsule Take 100 mg by mouth as needed 1/18/2023 at 0800 Yes Provider, Historical Yes    lisinopril (ZESTRIL) 20 MG tablet Take 1 tablet (20 mg) by mouth daily 1/17/2023 at 0830 Yes Sen Leon MD Yes    LORazepam (ATIVAN) 1 MG tablet Take 1 tablet by mouth at bedtime as needed for insonmia. Past Month Yes Sen Leon MD Yes    rosuvastatin (CRESTOR) 10 MG tablet Take 1 tablet (10 mg) by mouth daily 1/18/2023 Yes Sen Leon MD Yes        Date completed: 01/19/23    Medication history completed by: Rajan Ayers [facility]

## 2023-01-19 NOTE — PROVIDER NOTIFICATION
Paged the team (thoracic surg). Re:   Patient is requesting an order for a nicotine patch. He uses 14mg patch daily at home. Please order if appropriate. Thanks renetta

## 2023-01-19 NOTE — PROGRESS NOTES
"THORACIC SURGERY PROGRESS NOTE     Subjective:   Resting in bed, no acute events overnight. States tolerating CLD without nausea. Minimal pain at chest tube site. No SOB.     Objective:   /68 (BP Location: Right arm)   Pulse 69   Temp 97.9  F (36.6  C) (Oral)   Resp 16   Ht 1.753 m (5' 9\")   Wt 86.4 kg (190 lb 7.6 oz)   SpO2 98%   BMI 28.13 kg/m      Physical Exam:   General: AOX3, NAD, Resting comfortably in bed   CV: RRR   Pulmonary: nonlabored breathing on room air. L chest tube in place with serosnaguinous output and mild air leak. Tidling present.   Abdomen: soft, NT, ND   Neuro: AOX3, CN II-XII grossly intact, SOSA   Extremities: WWP, no pitting edema     A&P:   Mr. Murillo is a 69 y/o male w/ h/o lung cancer now s/p thoracoscopic RLL wedge resection, RLL lobectomy, and mediastinal LN dissection on 1/18/23.   - CT to water seal   - Regular diet, pepcid, BR   - Multimodal pain control: evie tylenol, prn robaxin and oxy; dilaudid PCA discontinued   - Added nicotine patch   - Encourage pulmonary toilet with IS  - OOB/ambulate     Patient seen and discussed with fellow and staff.     Walter Lemos MD PGY1  General Surgery Resident       "

## 2023-01-19 NOTE — PROGRESS NOTES
Admitted/transferred from:  PACU  2 RN full   skin assessment completed by Debra Mathias, RN and Cristela RN  Skin assessment finding: issues found 4 lap sites and chest tube insertion site   Interventions/actions: skin interventions continue to monitor.     Bedside Emergency Equipment Present:  Suction Regulator: Yes  Suction Canister: Yes  Tubing between Regulator and Canister: Yes  O2 Regulator with Tree: Yes  Ambu Bag: Yes     POD #0   Activity:patient not out of bed   Neuros:alert and oriented x 4  Cardiac:denies chest pain  Respiratory:2L NC  GI/:hypoactive bowel sounds, due to void  Diet:clear liquid  Lines/Drains:PIV right hand infusing TKO and PCA pump, Left PIV saline locked  Plan:continue to monitor

## 2023-01-19 NOTE — BRIEF OP NOTE
[facility]    Brief Operative Note    Pre-operative diagnosis: Lung nodule seen on imaging study [R91.1]  Post-operative diagnosis Lung cancer    Procedure: Procedure(s):  Robot-assisted thoracoscopic right lower lobe wedge, right lower lobectomy, and mediastinal lymph node dissection  Surgeon: Surgeon(s) and Role:     * Lobo Leger MD - Primary     * Lisandro Marcus PA-MELISA - Assisting     * Jed Herrera MD - Fellow - Assisting  Anesthesia: General   Estimated Blood Loss: Less than 50 ml    Drains: 28 Fr chest tube  Specimens:   ID Type Source Tests Collected by Time Destination   1 : Righ lower lobe wedge Tissue Lung, Lower Lobe, Right SURGICAL PATHOLOGY EXAM Lobo Leger MD 1/18/2023  3:28 PM    2 : 9R Tissue Lymph Node(s) SURGICAL PATHOLOGY EXAM Lobo Leger MD 1/18/2023  3:29 PM    3 : Station 7 Tissue Lymph Node(s) SURGICAL PATHOLOGY EXAM Lobo Leger MD 1/18/2023  3:56 PM    4 : 4R Tissue Lymph Node(s) SURGICAL PATHOLOGY EXAM Lobo Leger MD 1/18/2023  4:09 PM    5 : 11R Tissue Lymph Node(s) SURGICAL PATHOLOGY EXAM Lobo Leger MD 1/18/2023  4:33 PM    6 : RIGHT lower  lobe Tissue Lung, Lower Lobe, Right SURGICAL PATHOLOGY EXAM Lobo Leger MD 1/18/2023  5:36 PM      Findings:   None.  Complications: None.  Implants: * No implants in log *

## 2023-01-19 NOTE — ANESTHESIA POSTPROCEDURE EVALUATION
Patient: Cesar Murillo    Procedure: Procedure(s):  Robot-assisted thoracoscopic right lower lobe wedge, right lower lobectomy, and mediastinal lymph node dissection       Anesthesia Type:  General    Note:  Disposition: Inpatient   Postop Pain Control: Uneventful            Sign Out: Well controlled pain   PONV: No   Neuro/Psych: Uneventful            Sign Out: Acceptable/Baseline neuro status   Airway/Respiratory: Uneventful            Sign Out: Acceptable/Baseline resp. status   CV/Hemodynamics: Uneventful            Sign Out: Acceptable CV status; No obvious hypovolemia; No obvious fluid overload   Other NRE: NONE   DID A NON-ROUTINE EVENT OCCUR? No           Last vitals:  Vitals Value Taken Time   /69 01/18/23 2000   Temp 36.8  C (98.2  F) 01/18/23 2000   Pulse 73 01/18/23 2004   Resp 12 01/18/23 2004   SpO2 98 % 01/18/23 2004   Vitals shown include unvalidated device data.    Electronically Signed By: Jayme Lin MD  January 18, 2023  8:06 PM

## 2023-01-19 NOTE — PLAN OF CARE
Physical Therapy: Orders received. Chart reviewed and discussed with care team.? Physical Therapy not indicated due to pt demonstrating functional mobility with SBA and no acute PT needs. OT following and will address all impairments with functional mobility.? Defer discharge recommendations to OT.? Will complete orders.  Please re-consult PT if any new needs arise.

## 2023-01-19 NOTE — PLAN OF CARE
"Goal Outcome Evaluation:      Plan of Care Reviewed With: patient    Overall Patient Progress: no changeOverall Patient Progress: no change    Shift: 9397-3478    Vital Signs: /55 (BP Location: Right arm, Patient Position: Semi-Ramos's)   Pulse 73   Temp 97.8  F (36.6  C) (Oral)   Resp 20   Ht 1.753 m (5' 9\")   Wt 86.4 kg (190 lb 7.6 oz)   SpO2 97%   BMI 28.13 kg/m      Neuros: A & Ox4. Neuro intact.  Activity:    Cardiac: WNL, denies any chest pain  Respiratory: LS coarse and diminished right lung, clear left lung. CT to water seal, bright red drainage, with known mild air leak. CT site leaking, reinforced dressing with pressure tape, provider aware. Denies SOB, Unlabored. RA. On capnography.   GI/: +BS, denies passing flatus, no BM. Voiding large amount of urine this am.   Diet: Regular-tolerating   Skin: Lap sites primapore, scant dried drainage marked.   Lines:  Left PIV-Saline locked   Labs: Reviewed.   Pain/nausea: Right lateral chest and right back pain 4-6/10. PCA stopped. Given prn Oxycodone 5mg and Robaxin. scheduled Tylenol. Denies nausea.   New changes this shift: walked in the halls. Pt requesting nicotine patch.   Plan: Continue POC.   "

## 2023-01-19 NOTE — ANESTHESIA CARE TRANSFER NOTE
Patient: Cesar Murillo    Procedure: Procedure(s):  Robot-assisted thoracoscopic right lower lobe wedge, right lower lobectomy, and mediastinal lymph node dissection       Diagnosis: Lung nodule seen on imaging study [R91.1]  Diagnosis Additional Information: No value filed.    Anesthesia Type:   General     Note:    Oropharynx: oropharynx clear of all foreign objects  Level of Consciousness: awake  Oxygen Supplementation: nasal cannula  Level of Supplemental Oxygen (L/min / FiO2): 3  Independent Airway: airway patency satisfactory and stable  Dentition: dentition unchanged  Vital Signs Stable: post-procedure vital signs reviewed and stable  Report to RN Given: handoff report given  Patient transferred to: PACU    Handoff Report: Identifed the Patient, Identified the Reponsible Provider, Reviewed the pertinent medical history, Discussed the surgical course, Reviewed Intra-OP anesthesia mangement and issues during anesthesia, Set expectations for post-procedure period and Allowed opportunity for questions and acknowledgement of understanding      Vitals:  Vitals Value Taken Time   BP     Temp     Pulse 82 01/18/23 1915   Resp 20 01/18/23 1915   SpO2 98 % 01/18/23 1915   Vitals shown include unvalidated device data.    Electronically Signed By: FAUZIA Calzada CRNA  January 18, 2023  7:16 PM

## 2023-01-19 NOTE — PLAN OF CARE
"Vital signs:  Temp: 97.9  F (36.6  C) Temp src: Oral BP: 130/75 Pulse: 72   Resp: 21 SpO2: 98 % O2 Device: Nasal cannula Oxygen Delivery: 2 LPM Height: 175.3 cm (5' 9\") Weight: 86.4 kg (190 lb 7.6 oz)    2278-4991:    Activity: Stood at bedside with assist of one.   Neuros: A & Ox4. Neuro intact.  Cardiac: WDL. Asymptomatic.   Respiratory: LS coarse and diminished right lung, clear left lung. CT to water seal with 55cc bright red drainage, with known mild air leak. CT site leaking, reinforced dressing with pressure tape, provider notified. Denies SOB. Unlabored. O2 sats high 90s on 2 L/min NC. On capnography, IPI 10.   GI/: BS faint, denies passing flatus, no BM. Unable to void at 0245, bladder scanned 541cc, straight catheterized 700cc clear tea-colored urine, provider notified.  Diet: Tolerating clears.   Skin: Lap sites primapore, scant dried drainage marked.   Lines: Right arm PIV out with patient movement, stopped bleeding and applied dressing. Left PIV infusing TKO with PCA Dilaudid.   Labs: Reviewed.   Pain/nausea: Right lateral chest and right back pain 6/10 controlled with PCA Dilaudid at 0.2mg with 10 minute lockout and scheduled Tylenol, slept in between cares. Denies nausea.   New changes this shift: See above.  Plan: Continue POC.     "

## 2023-01-19 NOTE — PROVIDER NOTIFICATION
Paged the team (thoracic surg). Re: patient requesting diet changes. Pt stating he is very hungry and tolerating clears.

## 2023-01-20 ENCOUNTER — APPOINTMENT (OUTPATIENT)
Dept: GENERAL RADIOLOGY | Facility: CLINIC | Age: 68
DRG: 165 | End: 2023-01-20
Attending: THORACIC SURGERY (CARDIOTHORACIC VASCULAR SURGERY)
Payer: COMMERCIAL

## 2023-01-20 ENCOUNTER — APPOINTMENT (OUTPATIENT)
Dept: GENERAL RADIOLOGY | Facility: CLINIC | Age: 68
DRG: 165 | End: 2023-01-20
Attending: PHYSICIAN ASSISTANT
Payer: COMMERCIAL

## 2023-01-20 ENCOUNTER — APPOINTMENT (OUTPATIENT)
Dept: OCCUPATIONAL THERAPY | Facility: CLINIC | Age: 68
DRG: 165 | End: 2023-01-20
Attending: THORACIC SURGERY (CARDIOTHORACIC VASCULAR SURGERY)
Payer: COMMERCIAL

## 2023-01-20 LAB
ATRIAL RATE - MUSE: 62 BPM
ATRIAL RATE - MUSE: 65 BPM
DIASTOLIC BLOOD PRESSURE - MUSE: NORMAL MMHG
DIASTOLIC BLOOD PRESSURE - MUSE: NORMAL MMHG
ERYTHROCYTE [DISTWIDTH] IN BLOOD BY AUTOMATED COUNT: 11.9 % (ref 10–15)
HCT VFR BLD AUTO: 40.3 % (ref 40–53)
HGB BLD-MCNC: 13.1 G/DL (ref 13.3–17.7)
HOLD SPECIMEN: NORMAL
INTERPRETATION ECG - MUSE: NORMAL
INTERPRETATION ECG - MUSE: NORMAL
MCH RBC QN AUTO: 31.5 PG (ref 26.5–33)
MCHC RBC AUTO-ENTMCNC: 32.5 G/DL (ref 31.5–36.5)
MCV RBC AUTO: 97 FL (ref 78–100)
P AXIS - MUSE: 29 DEGREES
P AXIS - MUSE: 54 DEGREES
PLATELET # BLD AUTO: 132 10E3/UL (ref 150–450)
PR INTERVAL - MUSE: 168 MS
PR INTERVAL - MUSE: 170 MS
QRS DURATION - MUSE: 100 MS
QRS DURATION - MUSE: 94 MS
QT - MUSE: 364 MS
QT - MUSE: 422 MS
QTC - MUSE: 417 MS
QTC - MUSE: 428 MS
R AXIS - MUSE: -14 DEGREES
R AXIS - MUSE: -2 DEGREES
RBC # BLD AUTO: 4.16 10E6/UL (ref 4.4–5.9)
SYSTOLIC BLOOD PRESSURE - MUSE: NORMAL MMHG
SYSTOLIC BLOOD PRESSURE - MUSE: NORMAL MMHG
T AXIS - MUSE: -8 DEGREES
T AXIS - MUSE: 14 DEGREES
VENTRICULAR RATE- MUSE: 62 BPM
VENTRICULAR RATE- MUSE: 79 BPM
WBC # BLD AUTO: 12.6 10E3/UL (ref 4–11)

## 2023-01-20 PROCEDURE — 85027 COMPLETE CBC AUTOMATED: CPT

## 2023-01-20 PROCEDURE — 93010 ELECTROCARDIOGRAM REPORT: CPT | Performed by: INTERNAL MEDICINE

## 2023-01-20 PROCEDURE — 250N000011 HC RX IP 250 OP 636: Performed by: THORACIC SURGERY (CARDIOTHORACIC VASCULAR SURGERY)

## 2023-01-20 PROCEDURE — 71045 X-RAY EXAM CHEST 1 VIEW: CPT | Mod: 77

## 2023-01-20 PROCEDURE — 120N000002 HC R&B MED SURG/OB UMMC

## 2023-01-20 PROCEDURE — 93005 ELECTROCARDIOGRAM TRACING: CPT

## 2023-01-20 PROCEDURE — 97535 SELF CARE MNGMENT TRAINING: CPT | Mod: GO

## 2023-01-20 PROCEDURE — 250N000013 HC RX MED GY IP 250 OP 250 PS 637: Performed by: THORACIC SURGERY (CARDIOTHORACIC VASCULAR SURGERY)

## 2023-01-20 PROCEDURE — 250N000013 HC RX MED GY IP 250 OP 250 PS 637: Performed by: STUDENT IN AN ORGANIZED HEALTH CARE EDUCATION/TRAINING PROGRAM

## 2023-01-20 PROCEDURE — 250N000013 HC RX MED GY IP 250 OP 250 PS 637

## 2023-01-20 PROCEDURE — 71045 X-RAY EXAM CHEST 1 VIEW: CPT | Mod: 26 | Performed by: RADIOLOGY

## 2023-01-20 PROCEDURE — 36415 COLL VENOUS BLD VENIPUNCTURE: CPT

## 2023-01-20 PROCEDURE — 71045 X-RAY EXAM CHEST 1 VIEW: CPT

## 2023-01-20 RX ADMIN — ACETAMINOPHEN 975 MG: 325 TABLET ORAL at 00:41

## 2023-01-20 RX ADMIN — DIPHENHYDRAMINE HYDROCHLORIDE 12.5 MG: 50 INJECTION, SOLUTION INTRAMUSCULAR; INTRAVENOUS at 00:42

## 2023-01-20 RX ADMIN — POLYETHYLENE GLYCOL 3350 17 G: 17 POWDER, FOR SOLUTION ORAL at 08:07

## 2023-01-20 RX ADMIN — OXYCODONE HYDROCHLORIDE 10 MG: 10 TABLET ORAL at 11:08

## 2023-01-20 RX ADMIN — OXYCODONE HYDROCHLORIDE 10 MG: 10 TABLET ORAL at 06:21

## 2023-01-20 RX ADMIN — SENNOSIDES AND DOCUSATE SODIUM 1 TABLET: 50; 8.6 TABLET ORAL at 20:46

## 2023-01-20 RX ADMIN — ACETAMINOPHEN 975 MG: 325 TABLET ORAL at 12:00

## 2023-01-20 RX ADMIN — SENNOSIDES AND DOCUSATE SODIUM 1 TABLET: 50; 8.6 TABLET ORAL at 08:07

## 2023-01-20 RX ADMIN — METHOCARBAMOL 500 MG: 500 TABLET ORAL at 07:00

## 2023-01-20 RX ADMIN — FAMOTIDINE 20 MG: 20 TABLET, FILM COATED ORAL at 20:46

## 2023-01-20 RX ADMIN — ENOXAPARIN SODIUM 40 MG: 40 INJECTION SUBCUTANEOUS at 12:00

## 2023-01-20 RX ADMIN — METHOCARBAMOL 500 MG: 500 TABLET ORAL at 20:46

## 2023-01-20 RX ADMIN — Medication 1 MG: at 20:46

## 2023-01-20 RX ADMIN — ACETAMINOPHEN 975 MG: 325 TABLET ORAL at 06:21

## 2023-01-20 RX ADMIN — OXYCODONE HYDROCHLORIDE 10 MG: 10 TABLET ORAL at 15:43

## 2023-01-20 RX ADMIN — METHOCARBAMOL 500 MG: 500 TABLET ORAL at 14:40

## 2023-01-20 RX ADMIN — NICOTINE 1 PATCH: 14 PATCH, EXTENDED RELEASE TRANSDERMAL at 08:11

## 2023-01-20 RX ADMIN — OXYCODONE HYDROCHLORIDE 10 MG: 10 TABLET ORAL at 20:46

## 2023-01-20 RX ADMIN — ACETAMINOPHEN 975 MG: 325 TABLET ORAL at 18:07

## 2023-01-20 RX ADMIN — FAMOTIDINE 20 MG: 20 TABLET, FILM COATED ORAL at 08:07

## 2023-01-20 ASSESSMENT — ACTIVITIES OF DAILY LIVING (ADL)
ADLS_ACUITY_SCORE: 22
ADLS_ACUITY_SCORE: 28
ADLS_ACUITY_SCORE: 28
ADLS_ACUITY_SCORE: 22
ADLS_ACUITY_SCORE: 22
ADLS_ACUITY_SCORE: 28
ADLS_ACUITY_SCORE: 22
ADLS_ACUITY_SCORE: 28
ADLS_ACUITY_SCORE: 22
ADLS_ACUITY_SCORE: 28
ADLS_ACUITY_SCORE: 22
ADLS_ACUITY_SCORE: 22

## 2023-01-20 NOTE — PLAN OF CARE
Occupational Therapy Discharge Summary    Reason for therapy discharge:    All goals and outcomes met, no further needs identified.    Progress towards therapy goal(s). See goals on Care Plan in [facility] electronic health record for goal details.  Goals met    Therapy recommendation(s):    Continue home exercise program.

## 2023-01-20 NOTE — PROGRESS NOTES
"THORACIC SURGERY PROGRESS NOTE     Subjective:   Resting in bed, no acute events overnight. States tolerating CLD without nausea. Minimal pain at chest tube site. No SOB.     Objective:   BP (!) 143/90 (BP Location: Right arm)   Pulse 59   Temp 97.8  F (36.6  C) (Oral)   Resp 18   Ht 1.753 m (5' 9\")   Wt 85 kg (187 lb 6.4 oz)   SpO2 99%   BMI 27.67 kg/m      Physical Exam:   General: AOX3, NAD, Resting comfortably in bed   CV: RRR   Pulmonary: nonlabored breathing on room air. L chest tube in place with serosnaguinous output and mild air leak. Tidling present.   Abdomen: soft, NT, ND   Neuro: AOX3, CN II-XII grossly intact, SOSA   Extremities: WWP, no pitting edema     A&P:   Mr. Murillo is a 69 y/o male w/ h/o lung cancer now s/p thoracoscopic RLL wedge resection, RLL lobectomy, and mediastinal LN dissection on 1/18/23.    -Bradycardia via monitor, not c/w pulse which has a higher rate (wnl)  - Regular diet, pepcid, BR   - Multimodal pain control   - Added nicotine patch   - Encourage pulmonary toilet with IS  - OOB/ambulate   - LIkely dispo in 1-2 days    Patient seen and discussed with fellow and staff.     Chong Segal PA-C       "

## 2023-01-20 NOTE — PLAN OF CARE
Goal Outcome Evaluation:      Plan of Care Reviewed With: patient    Overall Patient Progress: improving    Time: 0407-0431  Neuros: A&Ox4, anxious, cooperative  Cardiac: HR 83-87 on continuous monitoring, denies cardiac chest pain.   Respiratory: Sats 95-96% on RA. Denies SOB. IS encouraged.   Pain: managed with scheduled tylenol, oxycodone, & robaxin with relief.   Nausea: Denies N/V  Diet: Regular, po fluids provided.   GI/: Voiding adequately in the urinal. Hypo BS, denies passing flatus, no BM.   Skin/Incisions/drains: 4 Lap sites intact covered with primapore, chest tube to water seal dressing changed x1.   Lines: L PIV SL.   Labs: Reviewed.   Activity: Activity encouraged OOB, assist of 1  New Changes this Shift: pt requested prn benadryl for sleep. Nicotine patch is off, removed on previous evening shift.   Plan: Continue POC.

## 2023-01-20 NOTE — PLAN OF CARE
Activity:assist of 1  Neuros:alert and oriented x 4  Cardiac:bradycardic at shift change - EKG ordered, service notified. Later in shift heart rate irregular service notified. Will continue to monitor  Respiratory:room air, chest tube with air leak  GI/:hypoactive bowel sounds, voiding  Diet:regular denies nausea  Skin:4 lap sites and chest tube insertion site. Nicotine patch removed  Lines/Drains:PIV saline locked  Plan:continue to monitor.

## 2023-01-20 NOTE — PLAN OF CARE
Vitals: Temp: 97.7  F (36.5  C) Temp src: Oral BP: 131/80 Pulse: (!) 43   Resp: 20 SpO2: 97 % O2 Device: None (Room air)        Time: 6158-4971  Reason for admission: POD#2 RLL wedge + mediastinal lymph node dissection.   Activity:  SBA.  Pain:  Oxycodone given x2. Robaxin x1.   Neuro: A&O x4. CMS intact.   Cardiac: Bradycardic 30-40's at times. Continuous pulse ox monitoring. Denies chest pain or other symptoms. EKG done.  Respiratory:  LS w/ expiratory wheezes, diminished in R lobes. RA. Denies SOB or cough. IS at bedside, encouraged.   GI/:  Voiding spontaneously, not saving. +flatus. No BM.   Diet: Regular, tolerating well.   Lines:  L PIV SL.   Incisions/Drains/Skin:  Lap sites x4. R CT clamped per thoracic.  Lab:  Reviewed.   Electrolyte Replacements: Not indicated.      New changes this shift:  EKG done for bradycardia. Plan to take out CT tomorrow after CXR. Continue to monitor.

## 2023-01-20 NOTE — PROVIDER NOTIFICATION
"THORACIC paged \"1q, [id number], Chidi.   COSME pt's HR dipping into 40's. Pt asymptomatic.   Thanks, Eloisa Walden ([facility]) \"    STAT EKG ordered.    1145 follow-up page \"7k, [id number], Chidi.   FYI EKG is in. Pt still resting in the 40's and sometimes 30's. Just double checking cards was contacted?  Thanks, Eloisa Walden \"    "

## 2023-01-21 ENCOUNTER — APPOINTMENT (OUTPATIENT)
Dept: GENERAL RADIOLOGY | Facility: CLINIC | Age: 68
DRG: 165 | End: 2023-01-21
Attending: THORACIC SURGERY (CARDIOTHORACIC VASCULAR SURGERY)
Payer: COMMERCIAL

## 2023-01-21 LAB
HOLD SPECIMEN: NORMAL
PLATELET # BLD AUTO: 119 10E3/UL (ref 150–450)

## 2023-01-21 PROCEDURE — 71045 X-RAY EXAM CHEST 1 VIEW: CPT

## 2023-01-21 PROCEDURE — 36415 COLL VENOUS BLD VENIPUNCTURE: CPT | Performed by: THORACIC SURGERY (CARDIOTHORACIC VASCULAR SURGERY)

## 2023-01-21 PROCEDURE — 250N000013 HC RX MED GY IP 250 OP 250 PS 637

## 2023-01-21 PROCEDURE — 120N000002 HC R&B MED SURG/OB UMMC

## 2023-01-21 PROCEDURE — 71045 X-RAY EXAM CHEST 1 VIEW: CPT | Mod: 26 | Performed by: RADIOLOGY

## 2023-01-21 PROCEDURE — 71045 X-RAY EXAM CHEST 1 VIEW: CPT | Mod: 77

## 2023-01-21 PROCEDURE — 250N000011 HC RX IP 250 OP 636: Performed by: THORACIC SURGERY (CARDIOTHORACIC VASCULAR SURGERY)

## 2023-01-21 PROCEDURE — 85049 AUTOMATED PLATELET COUNT: CPT | Performed by: THORACIC SURGERY (CARDIOTHORACIC VASCULAR SURGERY)

## 2023-01-21 PROCEDURE — 250N000013 HC RX MED GY IP 250 OP 250 PS 637: Performed by: STUDENT IN AN ORGANIZED HEALTH CARE EDUCATION/TRAINING PROGRAM

## 2023-01-21 PROCEDURE — 250N000013 HC RX MED GY IP 250 OP 250 PS 637: Performed by: THORACIC SURGERY (CARDIOTHORACIC VASCULAR SURGERY)

## 2023-01-21 RX ORDER — METHOCARBAMOL 500 MG/1
500 TABLET, FILM COATED ORAL EVERY 6 HOURS PRN
Qty: 30 TABLET | Refills: 0 | Status: SHIPPED | OUTPATIENT
Start: 2023-01-21 | End: 2023-01-21

## 2023-01-21 RX ORDER — AMOXICILLIN 250 MG
1 CAPSULE ORAL 2 TIMES DAILY
Qty: 50 TABLET | Refills: 0 | Status: SHIPPED | OUTPATIENT
Start: 2023-01-21 | End: 2023-01-21

## 2023-01-21 RX ORDER — ACETAMINOPHEN 325 MG/1
975 TABLET ORAL EVERY 6 HOURS
COMMUNITY
Start: 2023-01-21 | End: 2023-02-10

## 2023-01-21 RX ORDER — AMOXICILLIN 250 MG
1 CAPSULE ORAL 2 TIMES DAILY
Qty: 50 TABLET | Refills: 0 | Status: SHIPPED | OUTPATIENT
Start: 2023-01-21

## 2023-01-21 RX ORDER — OXYCODONE HYDROCHLORIDE 5 MG/1
5-10 TABLET ORAL EVERY 4 HOURS PRN
Qty: 50 TABLET | Refills: 0 | Status: SHIPPED | OUTPATIENT
Start: 2023-01-21 | End: 2023-02-17

## 2023-01-21 RX ORDER — OXYCODONE HYDROCHLORIDE 5 MG/1
5-10 TABLET ORAL EVERY 4 HOURS PRN
Qty: 50 TABLET | Refills: 0 | Status: SHIPPED | OUTPATIENT
Start: 2023-01-21 | End: 2023-01-21

## 2023-01-21 RX ORDER — NICOTINE 21 MG/24HR
1 PATCH, TRANSDERMAL 24 HOURS TRANSDERMAL DAILY
Qty: 30 PATCH | Refills: 0 | Status: SHIPPED | OUTPATIENT
Start: 2023-01-22 | End: 2023-01-21

## 2023-01-21 RX ORDER — NICOTINE 21 MG/24HR
1 PATCH, TRANSDERMAL 24 HOURS TRANSDERMAL DAILY
Qty: 30 PATCH | Refills: 0 | Status: SHIPPED | OUTPATIENT
Start: 2023-01-22

## 2023-01-21 RX ORDER — METHOCARBAMOL 500 MG/1
500 TABLET, FILM COATED ORAL EVERY 6 HOURS PRN
Qty: 30 TABLET | Refills: 0 | Status: SHIPPED | OUTPATIENT
Start: 2023-01-21 | End: 2023-02-09

## 2023-01-21 RX ORDER — ENOXAPARIN SODIUM 100 MG/ML
40 INJECTION SUBCUTANEOUS EVERY 24 HOURS
Qty: 1.6 ML | Refills: 0 | Status: SHIPPED | OUTPATIENT
Start: 2023-01-21 | End: 2023-01-25

## 2023-01-21 RX ADMIN — FAMOTIDINE 20 MG: 20 TABLET, FILM COATED ORAL at 07:40

## 2023-01-21 RX ADMIN — OXYCODONE HYDROCHLORIDE 10 MG: 10 TABLET ORAL at 06:42

## 2023-01-21 RX ADMIN — OXYCODONE HYDROCHLORIDE 5 MG: 5 TABLET ORAL at 12:12

## 2023-01-21 RX ADMIN — METHOCARBAMOL 500 MG: 500 TABLET ORAL at 13:19

## 2023-01-21 RX ADMIN — Medication 1 MG: at 20:19

## 2023-01-21 RX ADMIN — METHOCARBAMOL 500 MG: 500 TABLET ORAL at 06:42

## 2023-01-21 RX ADMIN — SENNOSIDES AND DOCUSATE SODIUM 1 TABLET: 50; 8.6 TABLET ORAL at 07:40

## 2023-01-21 RX ADMIN — ENOXAPARIN SODIUM 40 MG: 40 INJECTION SUBCUTANEOUS at 12:14

## 2023-01-21 RX ADMIN — METHOCARBAMOL 500 MG: 500 TABLET ORAL at 20:19

## 2023-01-21 RX ADMIN — OXYCODONE HYDROCHLORIDE 5 MG: 5 TABLET ORAL at 20:19

## 2023-01-21 RX ADMIN — OXYCODONE HYDROCHLORIDE 10 MG: 10 TABLET ORAL at 01:26

## 2023-01-21 RX ADMIN — NICOTINE 1 PATCH: 14 PATCH, EXTENDED RELEASE TRANSDERMAL at 07:41

## 2023-01-21 RX ADMIN — POLYETHYLENE GLYCOL 3350 17 G: 17 POWDER, FOR SOLUTION ORAL at 07:40

## 2023-01-21 RX ADMIN — SENNOSIDES AND DOCUSATE SODIUM 1 TABLET: 50; 8.6 TABLET ORAL at 20:19

## 2023-01-21 ASSESSMENT — ACTIVITIES OF DAILY LIVING (ADL)
ADLS_ACUITY_SCORE: 22

## 2023-01-21 NOTE — PROGRESS NOTES
"THORACIC SURGERY PROGRESS NOTE     Subjective:   No acute events. Continued wet cough, able to expel sputum. Chest tube removed on rounds. No SOB.     Objective:   /71 (BP Location: Right arm)   Pulse (!) 45   Temp 98  F (36.7  C) (Oral)   Resp 18   Ht 1.753 m (5' 9\")   Wt 85 kg (187 lb 6.4 oz)   SpO2 96%   BMI 27.67 kg/m      General: AOX3, NAD, Resting comfortably in bed   CV: RRR   Pulmonary: nonlabored breathing on room air. R chest tube in place with serosnaguinous output, no airleak   Abdomen: soft, NT, ND   Neuro: AOX3, CN II-XII grossly intact, SOSA   Extremities: WWP, no pitting edema     A&P:   Mr. Murillo is a 69 y/o male w/ h/o lung cancer now s/p thoracoscopic RLL wedge resection, RLL lobectomy, and mediastinal LN dissection on 1/18/23.    - Regular diet, pepcid, bowel regimen  - Multimodal pain control   - Encourage pulmonary toilet with IS  - OOB/ambulate   - Chest tube pulled, post pull CXR  - Home 1/21 PM vs 1/22 AM    Patient seen and discussed with fellow and staff.     Bruno Carrillo MD  Surgery PGY-3        "

## 2023-01-21 NOTE — PLAN OF CARE
"5496-3467    /59 (BP Location: Right arm)   Pulse 85   Temp 97.3  F (36.3  C) (Oral)   Resp 18   Ht 1.753 m (5' 9\")   Wt 85 kg (187 lb 6.4 oz)   SpO2 99%   BMI 27.67 kg/m        Activity: rested in bed overnight   Neuros: alert and orientated x 4, calm and cooperative   Cardiac: bradycardic at times (high 30's-40's), otherwise WNL   Respiratory: O2 sats high 90's on RA, unlabored.  Lungs sounds diminished right mid/lower lobe   GI/: abdomen soft/non-tender.  No BM this shift.  Voiding spont   Diet: regular diet   Lines: PIV   Incisions/Drains: lap sites X 4, CDI.  Right chest tube clamped   Labs: reviewed   Pain/nausea: scheduled tylenol, prn oxycodone and robaxin with \"adequate\" pain control.  No nausea    New changes this shift: none    Plan: continue POC          "

## 2023-01-21 NOTE — PLAN OF CARE
Abdomen , soft, nontender, nondistended , no guarding or rigidity , no masses palpable , normal bowel sounds , Liver and Spleen , no hepatomegaly present , no hepatosplenomegaly , liver nontender , spleen not palpable Vitals: Temp: 98  F (36.7  C) Temp src: Oral BP: 117/71 Pulse: (!) 45   Resp: 18 SpO2: 96 % O2 Device: None (Room air)        Time: 5759-2108  Reason for admission: POD#3 RLL wedge + mediastinal lymph node dissection.   Activity:  SBA.  Pain:  Oxycodone given x2. Robaxin x1.   Neuro: A&O x4. CMS intact.   Cardiac: Bradycardic 30-40's at times. Continuous pulse ox monitoring. Asymptomatic.   Respiratory:  LS diminished in R lobes. RA. Denies SOB or cough. IS at bedside, encouraged.   GI/:  Voiding spontaneously, not saving. +flatus. No BM.   Diet: Regular, tolerating well.   Lines:  L PIV SL.   Incisions/Drains/Skin:  Lap sites x4. Old R CT site w/ drainage on dressing.  Lab:  Reviewed.   Electrolyte Replacements: Not indicated.      New changes this shift: Discharge tonight vs tomorrow AM. Continue to monitor.

## 2023-01-21 NOTE — OP NOTE
Preoperative diagnosis:                         Lung nodule  Postoperative diagnosis:                       Lung cancer     Procedure:   1. Right robot-assisted right lower wedge resection, completion right lower lobectomy, mediastinal lymph node dissection     Anesthesia: General   Surgeon: Lobo Leger   Assistant:  Lisandro Marcus PA-C; Jed Herrera MD  EBL: 50 mL     Complications: none immediate     Description of procedure  The patient was brought to the room and placed supine upon the table.  After confirming the patient's identity and verifying the consent, appropriate monitoring devices were placed, as well as SCD boots. General anesthesia was administered.  The patient was intubated with a double-lumen endotracheal tube.  Proper position was confirmed by fiberoptic bronchoscopy.  Intravenous antibiotic was administered within 1 hour prior to the incision. The patient was turned to the left lateral decubitus position and all pressure points were padded. The bed was flexed, and the patient was secured to the table and the right arm was placed on an airplane board.  The right chest was prepped with chlorhexidine and  draped in the standard surgical fashion.       An 8 mm incision was made in the seventh intercostal space in line with the anterior superior iliac spine.  This was carried down to the pleural cavity.  An 8 mm port was placed and an 8 mm, 30-degree thoracoscope was inserted into the pleural cavity.  There were no adhesions.  A 12 mm port was placed anteriorly and another 12 mm port placed posteriorly, approximately 7 cm apart.  A final 8 mm port was placed 7 mm more posterior from the posterior 12 mm port, approximately 8 cm anterior to the spinous processes.  Finally, a 15 mm assistant port was made in the low anterior chest.  The robot was docked without difficulty.  The nodule was located in the right lower lobe and a wedge resection was done with serial fires of the stapler with blue  loads. The frozen section was consisted with lung cancer. The inferior pulmonary ligament and posterior mediastinal pleura were divided and a level 9 lymph node station was removed.  The level 7 packet was dissected free and removed, as was the right level 4 and right level 10 stations.  The anterior mediastinal pleura was divided and dissection continued in the fissure, at the confluence of the lobes. The inferior   pulmonary vein branches were divided with the robotic stapler with a vascular load. The lower lobe PA branches were located and the fissure was divided using bipolar and serial fires of the robotic stapler with blue loads.  The middle lobe and posterior ascending PA branches were identified. The lower lobe PA branches were divided with a vascular load of the stapler.  The lower lobe bronchus was dissected free and divided with a blue load after confirming appropriate placement with the bronchoscope. The lower lobe was brought out through the enlarged assistant port. The bronchial margin was benign on frozen section.  Hemostasis was achieved with pressure and SNOW. A multiple level intercostal nerve block was administered. A 28 Malagasy chest tube was then placed going posteriorly to the apex  through the initial camera port site.  This was secured to the skin using 0 silk suture.  The lung was observed to inflate appropriately.  The utility incision was closed in layers, irrigating each layer prior to closure.  The smaller incisions were closed in layers. The areas were cleaned and dried and benzoin and steri-strips were applied. At  the end of the case, sponge, needle, and instrument counts were correct.

## 2023-01-21 NOTE — PLAN OF CARE
Goal Outcome Evaluation:      Plan of Care Reviewed With: patient          Outcome Evaluation: 68 year male post op #2 RLL wedge and mediastinal lymph node dissection.  VSS exept episodic bradycardic. Reports he has no  symptoms. Lung sounds diminished right lobe  with expiratory wheezes.  CT tube clamped per thoracic with 4 lap sites  CDI.  CT Dressing CDI with minimal dried drainage and  minimal serosaguinous ouput.  Continuous pulse ox shows intermittent HR  30s-40s and jumps back to 90-100s within 10-15 seconds.Patient reports hopeful to get CT removed and trial of ovenight inpatient without it to see how he manages.  Patiet motivated to quit smoking and reduce ETOH.   Appetite improving. UO with bedside urinal. Pain managed with prn oxycodone,robaxinol.  Patient had melatonin prn at bedtime.    Plan:  Continue to follow POC and update provider with changes.

## 2023-01-22 ENCOUNTER — APPOINTMENT (OUTPATIENT)
Dept: GENERAL RADIOLOGY | Facility: CLINIC | Age: 68
DRG: 165 | End: 2023-01-22
Attending: THORACIC SURGERY (CARDIOTHORACIC VASCULAR SURGERY)
Payer: COMMERCIAL

## 2023-01-22 VITALS
DIASTOLIC BLOOD PRESSURE: 79 MMHG | WEIGHT: 187.4 LBS | OXYGEN SATURATION: 96 % | HEIGHT: 69 IN | SYSTOLIC BLOOD PRESSURE: 121 MMHG | BODY MASS INDEX: 27.76 KG/M2 | HEART RATE: 55 BPM | TEMPERATURE: 97.7 F | RESPIRATION RATE: 16 BRPM

## 2023-01-22 PROCEDURE — 250N000013 HC RX MED GY IP 250 OP 250 PS 637: Performed by: THORACIC SURGERY (CARDIOTHORACIC VASCULAR SURGERY)

## 2023-01-22 PROCEDURE — 71045 X-RAY EXAM CHEST 1 VIEW: CPT | Mod: 26 | Performed by: RADIOLOGY

## 2023-01-22 PROCEDURE — 71045 X-RAY EXAM CHEST 1 VIEW: CPT

## 2023-01-22 PROCEDURE — 250N000013 HC RX MED GY IP 250 OP 250 PS 637: Performed by: STUDENT IN AN ORGANIZED HEALTH CARE EDUCATION/TRAINING PROGRAM

## 2023-01-22 RX ADMIN — SENNOSIDES AND DOCUSATE SODIUM 1 TABLET: 50; 8.6 TABLET ORAL at 07:27

## 2023-01-22 RX ADMIN — OXYCODONE HYDROCHLORIDE 10 MG: 10 TABLET ORAL at 06:15

## 2023-01-22 RX ADMIN — METHOCARBAMOL 500 MG: 500 TABLET ORAL at 02:16

## 2023-01-22 RX ADMIN — NICOTINE 1 PATCH: 14 PATCH, EXTENDED RELEASE TRANSDERMAL at 07:30

## 2023-01-22 RX ADMIN — OXYCODONE HYDROCHLORIDE 10 MG: 10 TABLET ORAL at 02:14

## 2023-01-22 ASSESSMENT — ACTIVITIES OF DAILY LIVING (ADL)
ADLS_ACUITY_SCORE: 22

## 2023-01-22 NOTE — DISCHARGE SUMMARY
NAME: Cesar Murillo   MRN: [id number]   : 1955     DATE OF ADMISSION: 2023     Preoperative diagnosis:                         Lung nodule  Postoperative diagnosis:                       Lung cancer     Procedure:   Right robot-assisted right lower wedge resection, completion right lower lobectomy, mediastinal lymph node dissection      CODING ADDENDUM:  Thrombocytopenia    PATHOLOGY RESULTS: Final pathology pending at time of discharge.      CULTURE RESULTS: None     INTRAOPERATIVE COMPLICATIONS: None     POSTOPERATIVE MEDICAL ISSUES: None     DRAINS/TUBES PRESENT AT DISCHARGE: None    DATE OF DISCHARGE:  2023     HOSPITAL COURSE: Cesar Murillo is a 68 year old male who on 2023 underwent the above-named procedures.  He tolerated the operation well and postoperatively was transferred to the general post-surgical unit.  The remainder of his course was essentially uncomplicated.  Prior to discharge, his pain was controlled well, he was able to perform ADLs and ambulate independently without difficulty, and had full return of bowel and bladder function.  On 2023, he was discharged to home in stable condition.    DISCHARGE EXAM:   A&O, NAD  Resp non-labored  Distal extremities warm    Incisions healthy appearing     DISCHARGE INSTRUCTIONS:  Discharge Procedure Orders   X-ray Chest 2 vws*   Standing Status: Future Standing Exp. Date: 23     Order Specific Question Answer Comments   Priority Routine      Reason for your hospital stay   Order Comments: surgery     Activity   Order Comments: As in discharge instruction section     Order Specific Question Answer Comments   Is discharge order? Yes      Adult [facility] Follow-up and recommended labs and tests   Order Comments: 1.) Follow up with primary care physician, Sen Leon, in 1-2 weeks.  2.) Follow up with Jeanne Dillard, Clinical Nurse Specialist, in Thoracic Surgery clinic in 1 month, prior to which a CXR should  be performed (CXR should be on the same day as clinic appointment).      Appointments on [place] and/or [facility] (with [facility] or [facility] provider or service). Call [phone] if you haven't heard regarding these appointments within 7 days of discharge.     Discharge Instructions   Order Comments: THORACIC SURGERY DISCHARGE INSTRUCTIONS    DIET: Regular diet - as prior to admission     If your plans upon discharge include prolonged periods of sitting (i.e a lengthy car or plane ride), it is highly beneficial to get up and walk at least once per hour to help prevent swelling and blood clots.     You may remove chest tube dressing 48 hours after tube removal and bandage the site at your own discretion thereafter.  Small amounts of leakage are normal for 2-3 days after removal.  Feel free to call with questions.    You may get incision wet 2 days after operation. Do not submerge, soak, or scrub incision or swim until seen in follow-up.    Take incentive spirometer home for continued frequent use    Activity as tolerated, no strenous activity until seen in follow-up, no lifting greater than 20 pounds for the next 1-2 weeks.    Stay hydrated. Take over the counter fiber (metamucil or benefiber) and stool softeners (Miralax, docusate or senna) if becoming constipated.     Call for fever greater than 101.5, chills, increased size of incision, red skin around incision, vision changes, muscle strength changes, sensation changes, shortness of breath, or other concerns.    No driving while taking narcotic pain medication.    Transition to ibuprofen or tylenol/acetaminophen for pain control. Do not take tylenol/acetaminophen and acetaminophen containing narcotic (e.g., percocet or vicodin) at the same time. If you have known ulcer problems, or kidney trouble (elevated creatinine) do not take the ibuprofen.    In emergencies, call 911    For other Questions or Concerns;   A.) During weekday working hours (Monday through  "Friday 8am to 4:30pm)   call [phone] and ask to speak to a thoracic surgery nurse (RN or LPN).     CELESTINA) At nights (after 4:30pm), on weekends, or if urgent call [phone] and   tell the  \"I would like to page job code [phone], the thoracic surgery   fellow on call, please.\"     Diet   Order Comments: As in discharge instruction section     Order Specific Question Answer Comments   Is discharge order? Yes        DISCHARGE MEDICATIONS:   Current Discharge Medication List      START taking these medications    Details   acetaminophen (TYLENOL) 325 MG tablet Take 3 tablets (975 mg) by mouth every 6 hours    Associated Diagnoses: Malignant neoplasm of lower lobe of right lung (H)      enoxaparin ANTICOAGULANT (LOVENOX) 40 MG/0.4ML syringe Inject 0.4 mLs (40 mg) Subcutaneous every 24 hours for 4 days  Qty: 1.6 mL, Refills: 0    Associated Diagnoses: Malignant neoplasm of lower lobe of right lung (H)      methocarbamol (ROBAXIN) 500 MG tablet Take 1 tablet (500 mg) by mouth every 6 hours as needed for muscle spasms  Qty: 30 tablet, Refills: 0    Associated Diagnoses: Malignant neoplasm of lower lobe of right lung (H)      nicotine (NICODERM CQ) 14 MG/24HR 24 hr patch Place 1 patch onto the skin daily  Qty: 30 patch, Refills: 0    Associated Diagnoses: Malignant neoplasm of lower lobe of right lung (H)      oxyCODONE (ROXICODONE) 5 MG tablet Take 1-2 tablets (5-10 mg) by mouth every 4 hours as needed for moderate pain (4-6) Wean narcotic use as tolerated starting at time of discharge  Qty: 50 tablet, Refills: 0    Associated Diagnoses: Malignant neoplasm of lower lobe of right lung (H)      senna-docusate (SENOKOT-S/PERICOLACE) 8.6-50 MG tablet Take 1 tablet by mouth 2 times daily  Qty: 50 tablet, Refills: 0    Associated Diagnoses: Malignant neoplasm of lower lobe of right lung (H)         CONTINUE these medications which have NOT CHANGED    Details   docusate sodium (COLACE) 100 MG capsule Take 100 mg " by mouth as needed      lisinopril (ZESTRIL) 20 MG tablet Take 1 tablet (20 mg) by mouth daily  Qty: 90 tablet, Refills: 3      LORazepam (ATIVAN) 1 MG tablet Take 1 tablet by mouth at bedtime as needed for insonmia.  Qty: 15 tablet, Refills: 0    Associated Diagnoses: Anxiety, generalized      rosuvastatin (CRESTOR) 10 MG tablet Take 1 tablet (10 mg) by mouth daily  Qty: 90 tablet, Refills: 3    Associated Diagnoses: Tobacco abuse; Coronary artery calcification seen on CT scan

## 2023-01-22 NOTE — PLAN OF CARE
Pt refusing continuous pulse ox at this time. Educated on importance of monitoring his HR given being bradycardic this admission. HR currently in 70's. Pt verbalized understanding and will be agreeable to putting it back on at bedtime.

## 2023-01-22 NOTE — PLAN OF CARE
"1930-0730    BP (!) 122/94 (BP Location: Right arm)   Pulse 90   Temp 97.8  F (36.6  C) (Oral)   Resp 18   Ht 1.753 m (5' 9\")   Wt 85 kg (187 lb 6.4 oz)   SpO2 98%   BMI 27.67 kg/m        Activity: up ad pat in room   Neuros: alert and orientated x 4, calm and cooperative   Cardiac: bradycardic at times (high 30's-40's), otherwise WNL   Respiratory: O2 sats high 90's on RA, unlabored.  Lungs sounds diminished right mid/lower lobe   GI/: abdomen soft/non-tender.  Passing gas.  Last BM 1/18. Voiding spont   Diet: regular diet   Lines: PIV   Incisions/Drains: lap sites X 4, CDI.  Old chest tube site dressing with moderate serosang   Skin:  bruising right chest and flank   Labs: reviewed   Pain/nausea: scheduled tylenol, prn oxycodone and robaxin with \"adequate\" pain control.  No nausea   New changes this shift: none    Plan: continue POC.  Possible discharge to home today    "

## 2023-01-22 NOTE — PLAN OF CARE
Pt. discharged at 0945 to home, was accompanied by wife, and left with personal belongings. Pt. received complete discharge paperwork and medications as filled by discharge pharmacy. Pt. was given times of last dose for all discharge medications in writing on discharge medication sheets. Discharge teaching included medication, pain management, activity restrictions, dressing changes, and signs and symptoms of infection. Dressing supplies sent home. Pt. to follow up with thoracic in clinic. Pt. had no further questions at the time of discharge and no unmet needs were identified.

## 2023-01-23 ENCOUNTER — PATIENT OUTREACH (OUTPATIENT)
Dept: CARE COORDINATION | Facility: CLINIC | Age: 68
End: 2023-01-23
Payer: COMMERCIAL

## 2023-01-23 RX ORDER — IOPAMIDOL 755 MG/ML
INJECTION, SOLUTION INTRAVASCULAR
Status: SHIPPED | OUTPATIENT
Start: 2023-01-17

## 2023-01-23 NOTE — PROGRESS NOTES
Clinic Care Coordination Contact  [facility]: Post-Discharge Note  SITUATION                                                      Admission:    Admission Date: 01/18/23   Reason for Admission: Right robot-assisted right lower wedge resection, completion right lower lobectomy, mediastinal lymph node dissection  Discharge:   Discharge Date: 01/22/23  Discharge Diagnosis: Right robot-assisted right lower wedge resection, completion right lower lobectomy, mediastinal lymph node dissection    BACKGROUND                                                      Per hospital discharge summary and inpatient provider notes:    Cesar Murillo is a 68 year old male who on 1/18/2023 underwent the above-named procedures.  He tolerated the operation well and postoperatively was transferred to the general post-surgical unit.  The remainder of his course was essentially uncomplicated.  Prior to discharge, his pain was controlled well, he was able to perform ADLs and ambulate independently without difficulty, and had full return of bowel and bladder function.  On January 22, 2023, he was discharged to home in stable condition.    ASSESSMENT           Discharge Assessment  How are you doing now that you are home?: I am doing okay. I have not had a bowel movement yet. I am only taking Tylenol for the pain until I have a bowel movement.  How are your symptoms? (Red Flag symptoms escalate to triage hotline per guidelines): Unchanged  Do you feel your condition is stable enough to be safe at home until your provider visit?: Yes  Does the patient have their discharge instructions? : Yes  Does the patient have questions regarding their discharge instructions? : No  Were you started on any new medications or were there changes to any of your previous medications? : Yes  Does the patient have all of their medications?: Yes  Do you have questions regarding any of your medications? : No  Discharge follow-up appointment scheduled within 14  calendar days? : Yes  Discharge Follow Up Appointment Date: 02/14/23  Discharge Follow Up Appointment Scheduled with?: Specialty Care Provider    Post-op ([facility] Only)  If the patient had a surgery or procedure, do they have any questions for a nurse?: No           PLAN                                                      Outpatient Plan: 1.) Follow up with primary care physician, Sen Leon, in 1-2 weeks.  2.) Follow up with Jeanne Dillard, Clinical Nurse Specialist, in Thoracic Surgery clinic in 1 month, prior to which a CXR  should be performed (CXR should be on the same day as clinic appointment).    Future Appointments   Date Time Provider Department Center   2/6/2023  1:30 PM [facility]   2/14/2023  1:00 PM [id number]   2/14/2023  2:00 PM Lobo Leger MD [facility]         For any urgent concerns, please contact our 24 hour nurse triage line: [phone] ([phone])         TERRI Mcintosh  [phone]  [facility]

## 2023-01-24 LAB
PATH REPORT.COMMENTS IMP SPEC: ABNORMAL
PATH REPORT.COMMENTS IMP SPEC: ABNORMAL
PATH REPORT.COMMENTS IMP SPEC: YES
PATH REPORT.FINAL DX SPEC: ABNORMAL
PATH REPORT.GROSS SPEC: ABNORMAL
PATH REPORT.INTRAOP OBS SPEC DOC: ABNORMAL
PATH REPORT.MICROSCOPIC SPEC OTHER STN: ABNORMAL
PATH REPORT.RELEVANT HX SPEC: ABNORMAL
PATHOLOGY SYNOPTIC REPORT: ABNORMAL
PHOTO IMAGE: ABNORMAL

## 2023-01-25 ENCOUNTER — PATIENT OUTREACH (OUTPATIENT)
Dept: SURGERY | Facility: CLINIC | Age: 68
End: 2023-01-25
Payer: COMMERCIAL

## 2023-01-27 NOTE — TELEPHONE ENCOUNTER
"Writer received call from patient stating that he has a couple of questions. Inquiring about pathology results. Informed patient that pathology results are not back yet and explained that it can take up to 10-14 days for the final pathology report to come back. Reassured him that someone from the Thoracic Team will reach out to him to discuss the results once they are in. Patient verbalized his understanding and requested that a copy of the pathology results be faxed to his PCP, Dr Leon.     While on the phone patient stated that his is \"overall doing well\". Reports that his pain is well controlled, if not diminished. Inquired what pain medication was given to him in his arm in the hospital. Stating \"that didn't do anything\".     Velma Mora RN, BSN  Thoracic Surgery RN Care Coordinator    "

## 2023-02-01 ENCOUNTER — TELEPHONE (OUTPATIENT)
Dept: SURGERY | Facility: CLINIC | Age: 68
End: 2023-02-01
Payer: COMMERCIAL

## 2023-02-01 NOTE — TELEPHONE ENCOUNTER
Called Cesar as our Thoracic Surgery nurse mentioned that he would like to talk more about his pathology. There was no answer. Message left with call back information.

## 2023-02-02 ENCOUNTER — TELEPHONE (OUTPATIENT)
Dept: INTERNAL MEDICINE | Facility: CLINIC | Age: 68
End: 2023-02-02
Payer: COMMERCIAL

## 2023-02-02 NOTE — TELEPHONE ENCOUNTER
Test Results    Contacts       Type Contact Phone/Fax    02/02/2023 11:17 AM CST Phone (Incoming) Cesar Murillo (Self) [phone] (M)          Who ordered the test:  Dr. Lobo Leger    Type of test: Lab Pathology result from surgical procedure    Date of test:  01/18/2023    Where was the test performed:  [facility]    What are your questions/concerns?:  Would like to discuss what results are and address any questions/concerns    Okay to leave a detailed message?: Yes at Cell number on file:    Telephone Information:   Mobile [phone]

## 2023-02-02 NOTE — TELEPHONE ENCOUNTER
Looks like somebody from the thoracic team reached out to him yesterday and was unable to get a hold of him.  Somebody named Jeanne Dillard apparently left callback information for him. He needs to call the thoracic team back for results and recommendations.

## 2023-02-02 NOTE — TELEPHONE ENCOUNTER
Called pt and told him that Dr Leon was out of the office this week.  Needs to talk to team that did the biopsy.  He said that Aubree Beasley left him a message yesterday and that he tried calling them twice and left messages with no return call.  Sent IM to Aubree to try to contact pt again.

## 2023-02-09 ENCOUNTER — TELEPHONE (OUTPATIENT)
Dept: INTERNAL MEDICINE | Facility: CLINIC | Age: 68
End: 2023-02-09
Payer: COMMERCIAL

## 2023-02-09 DIAGNOSIS — C34.31 MALIGNANT NEOPLASM OF LOWER LOBE OF RIGHT LUNG (H): ICD-10-CM

## 2023-02-09 RX ORDER — METHOCARBAMOL 500 MG/1
500 TABLET, FILM COATED ORAL EVERY 6 HOURS PRN
Qty: 30 TABLET | Refills: 0 | Status: SHIPPED | OUTPATIENT
Start: 2023-02-09 | End: 2023-02-17

## 2023-02-09 NOTE — TELEPHONE ENCOUNTER
Methocarbamol 500mg tablet  Last prescribing provider: Chong Segal on 1/21/23    Last clinic visit date: 1/18/23 procedure with Dr. Leger    Recommendations for requested medication (if none, N/A): NA    Any other pertinent information (if none, N/A): Pt reports he has been out of muscle relaxer for a couple days and next upcoming appt is on 2/17/23. He is questioning if he can get a stronger muscle relaxer. He reports spasms throughout his lower back, spasms come and go, denies radiating.     Refilled: Y/N, if NO, why?    Routed to Chong Segal for refill and Jeanne Dillard as FYI for upcoming appt.

## 2023-02-10 ENCOUNTER — DOCUMENTATION ONLY (OUTPATIENT)
Dept: CARDIOLOGY | Facility: CLINIC | Age: 68
End: 2023-02-10

## 2023-02-10 ENCOUNTER — OFFICE VISIT (OUTPATIENT)
Dept: CARDIOLOGY | Facility: CLINIC | Age: 68
End: 2023-02-10
Payer: COMMERCIAL

## 2023-02-10 VITALS
BODY MASS INDEX: 28.54 KG/M2 | SYSTOLIC BLOOD PRESSURE: 123 MMHG | WEIGHT: 188.3 LBS | HEART RATE: 66 BPM | DIASTOLIC BLOOD PRESSURE: 80 MMHG | HEIGHT: 68 IN | OXYGEN SATURATION: 97 %

## 2023-02-10 DIAGNOSIS — I25.10 CORONARY ARTERY DISEASE INVOLVING NATIVE CORONARY ARTERY OF NATIVE HEART WITHOUT ANGINA PECTORIS: Primary | ICD-10-CM

## 2023-02-10 DIAGNOSIS — I25.10 CORONARY ARTERY DISEASE INVOLVING NATIVE CORONARY ARTERY OF NATIVE HEART WITHOUT ANGINA PECTORIS: ICD-10-CM

## 2023-02-10 DIAGNOSIS — R00.2 PALPITATIONS: Primary | ICD-10-CM

## 2023-02-10 PROCEDURE — 99214 OFFICE O/P EST MOD 30 MIN: CPT | Performed by: NURSE PRACTITIONER

## 2023-02-10 NOTE — PROGRESS NOTES
Cardiology Clinic Progress Note  Cesar Murillo MRN# [id number]   YOB: 1955 Age: 68 year old   Primary Cardiologist: Dr. Pereira Reason for visit: Post angiogram follow-up            Assessment and Plan:   1. Non-obstructive coronary artery disease  -1/2023 angiogram showing multivessel mild nonobstructive coronary disease  -Continue statin   -We will review starting aspirin 81 mg daily with with    -No anginal symptoms     2.  Right lung squamous cell carcinoma, s/p complete right lower lobe lobectomy and mediastinal lymph node dissection (1/2023)  -Plan is for patient undergo repeat imaging in follow-up, unsure of timeline for this as patient has not yet had his postsurgical follow-up, scheduled for 2/17/2023    3.  Hyperlipidemia, controlled  -Continue rosuvastatin  -Will review labs from PCP visit 2/28/2023 at her follow-up visit and discussed goal for his LDL is less than 55, so likely will need to increase his rosuvastatin at the time    4.  Tobacco abuse, continued smoking  -Encouraged continued taper and discontinuation of tobacco use  -Congratulated him for reducing his use up to this point and also encouraged him to discuss with his primary care provider at the end of the month any further options they have for supporting his tobacco cessation    5. Mild non-ischemic cardiomyopathy, LVEF 45-50% (1/2023)  -Suspect secondary to chronic alcohol use    6. Palpitations  -We will have patient wear a Zio patch monitor to assess ectopic burden and for any other arrhythmias that could be causing symptoms of palpitations as frequent PVCs were noted during coronary angiogram as well as stress echocardiogram  -Would look to add low dose beta blocker and reduce lisinopril to avoid hypotension    Patient is seen today in follow-up after his coronary angiogram.  He is recovering from his right lower lobe lobectomy and post-operative pain continues to improve.  His main cardiac concerns is his  palpitations.  He has been reducing his alcohol use significantly since meeting with Dr. Leger in December 2022 which has been in turn helpful at reducing his tobacco use as he smokes less when he has not using alcohol.  He has a follow-up with his primary care provider at the end of the month and plans to have labs drawn at that visit.  We will plan to review those labs at his follow-up visit with me after his Zio patch monitor has been completed.  We can discuss any titrations of his rosuvastatin at that time.  In the meantime I will contact Dr. Leger and collaborate regarding start of an 81 mg aspirin considering his coronary angiogram showed mild nonobstructive coronary disease.    Changes today: Start aspirin 81mg daily pending collaboration with Dr. Leger, patient     Follow up plan: Patient follow-up with me after he has completed his Zio patch monitor        History of Presenting Illness:    Cesar Murillo is a very pleasant 68 year old male with a history of coronary artery disease, significant history of tobacco abuse, right lower lobe lung nodule.    Patient was seen in clinic by Dr. Pereira on 1/16/2023 for preoperative cardiac risk assessment prior to a undergoing robotic assisted thorascopic right sided lower lobe wedge resection.  Patient had an enlarging right lung nodule.     As part of his preoperative cardiac evaluation he underwent a dobutamine stress echocardiogram on 1/10/2023 during which he was having frequent runs of PVCs and noted to have a mildly reduced LVEF estimated at 45 to 50%.  A follow-up Lexiscan nuclear stress test was done on 1/13/2023 which was negative for ischemia or infarction.  On his CT of the chest he is noted to have severe coronary artery calcification although he is never undergone formal coronary artery calcium scoring.  Diagnostic coronary angiography was recommended to assess for any concerns for three-vessel disease.  Nonobstructive coronary disease was present,  all lesions were 30% stenotic or less.     Patient underwent  his right lower wedge resection, complete right lower lobe lobectomy and mediastinal lymph node dissection on 1/18/2023 at [facility] with Dr. Leger.  Pathology showed stage I A2 squamous cell carcinoma.  Plan is to follow with serial imaging.    Patient is here today for follow-up after his angiogram. He continues to gradually feel better from his surgery but had issues with significant post-operative pain.     Patient denies chest pain or chest tightness. Denies dizziness, lightheadedness or other presyncopal symptoms. Denies tachycardia. He has had some shortness of breath since his surgery.     He will get frequent palpitations with associated shortness of breath, short in duration.     Working on quitting smoking, down to 1/2 PPD. Using the nicotine patch.     Blood pressure 123/80 and HR 66 in clinic today.    Denies any issues with bleeding.     Diet: eats what he feels like   Exercise: golfs but rides cart, light yardwork  Alcohol: drinks daily, hard alcohol 2 per day, some times more prior to his 12/6, now drinking 1-2 times weekly   Tobacco: 1/2 PPD     Right groin puncture site healed, non-tender, no bruising.         Recent Hospitalizations   As above          Social History    ,   Social History     Socioeconomic History     Marital status:      Spouse name: Not on file     Number of children: Not on file     Years of education: Not on file     Highest education level: Not on file   Occupational History     Not on file   Tobacco Use     Smoking status: Every Day     Packs/day: 1.50     Types: Cigarettes     Start date: 1968     Smokeless tobacco: Never     Tobacco comments:     Patient smokes half a pack a day   Substance and Sexual Activity     Alcohol use: Yes     Comment: Alcoholic Drinks/day: 2-3 every day     Drug use: Yes     Types: Marijuana     Comment: Drug use: Frequent use     Sexual activity: Not on file   Other Topics  "Concern     Not on file   Social History Narrative    Real [email or web address] business   4 children       Social Determinants of Health     Financial Resource Strain: Not on file   Food Insecurity: Not on file   Transportation Needs: Not on file   Physical Activity: Not on file   Stress: Not on file   Social Connections: Not on file   Intimate Partner Violence: Not on file   Housing Stability: Not on file            Review of Systems:   Skin:        Eyes:       ENT:       Respiratory:  Positive for dyspnea on exertion  Cardiovascular:  Negative;chest pain;palpitations;dizziness;lightheadedness;edema Positive for;fatigue  Gastroenterology:      Genitourinary:       Musculoskeletal:       Neurologic:       Psychiatric:       Heme/Lymph/Imm:       Endocrine:  Negative           Physical Exam:   Vitals: /80 (BP Location: Left arm, Patient Position: Sitting)   Pulse 66   Ht 1.727 m (5' 8\")   Wt 85.4 kg (188 lb 4.8 oz)   SpO2 97%   BMI 28.63 kg/m     Wt Readings from Last 4 Encounters:   02/10/23 85.4 kg (188 lb 4.8 oz)   01/20/23 85 kg (187 lb 6.4 oz)   01/17/23 86.1 kg (189 lb 14.4 oz)   01/16/23 86.2 kg (190 lb)     GEN: well nourished, in no acute distress.  HEENT:  Pupils equal, round. Sclerae nonicteric.   NECK: Supple, no masses appreciated.  C/V:  Regular rate and rhythm, no murmur, rub or gallop.  Occasional irregular beat  RESP: Respirations are unlabored. Clear to auscultation bilaterally without wheezing, rales, or rhonchi.  GI: Abdomen soft, nontender.  EXTREM: No LE edema.  NEURO: Alert and oriented, cooperative.  SKIN: Warm and dry. Right groin puncture site healed with no bruising, non-tender       Data:     LIPID RESULTS:  Lab Results   Component Value Date    CHOL 136 08/10/2022    HDL 52 08/10/2022    LDL 68 08/10/2022    TRIG 81 08/10/2022     LIVER ENZYME RESULTS:  Lab Results   Component Value Date    AST 28 08/10/2022    ALT 19 08/10/2022     CBC RESULTS:  Lab Results   Component Value Date    " WBC 12.6 (H) 01/20/2023    RBC 4.16 (L) 01/20/2023    HGB 13.1 (L) 01/20/2023    HCT 40.3 01/20/2023    MCV 97 01/20/2023    MCH 31.5 01/20/2023    MCHC 32.5 01/20/2023    RDW 11.9 01/20/2023     (L) 01/21/2023     BMP RESULTS:  Lab Results   Component Value Date     (L) 01/19/2023    POTASSIUM 4.5 01/19/2023    POTASSIUM 4.3 01/17/2023    CHLORIDE 102 01/19/2023    CHLORIDE 110 (H) 01/17/2023    CO2 19 (L) 01/19/2023    CO2 26 01/17/2023    ANIONGAP 14 01/19/2023    ANIONGAP 4 01/17/2023     (H) 01/19/2023     (H) 01/18/2023     (H) 01/17/2023    BUN 11.7 01/19/2023    BUN 15 01/17/2023    CR 0.72 01/19/2023    GFRESTIMATED >90 01/19/2023    GFRESTIMATED >60 11/25/2022    GFRESTIMATED >60 04/09/2021    GFRESTBLACK >60 04/09/2021    JANINA 8.2 (L) 01/19/2023      A1C RESULTS:  No results found for: A1C  INR RESULTS:  Lab Results   Component Value Date    INR 1.03 01/17/2023            Medications     Current Outpatient Medications   Medication Sig Dispense Refill     docusate sodium (COLACE) 100 MG capsule Take 100 mg by mouth as needed       lisinopril (ZESTRIL) 20 MG tablet Take 1 tablet (20 mg) by mouth daily 90 tablet 3     LORazepam (ATIVAN) 1 MG tablet Take 1 tablet by mouth at bedtime as needed for insonmia. 15 tablet 0     methocarbamol (ROBAXIN) 500 MG tablet Take 1 tablet (500 mg) by mouth every 6 hours as needed for muscle spasms 30 tablet 0     Naproxen Sodium (ALEVE PO) As needed       nicotine (NICODERM CQ) 14 MG/24HR 24 hr patch Place 1 patch onto the skin daily 30 patch 0     oxyCODONE (ROXICODONE) 5 MG tablet Take 1-2 tablets (5-10 mg) by mouth every 4 hours as needed for moderate pain (4-6) Wean narcotic use as tolerated starting at time of discharge 50 tablet 0     rosuvastatin (CRESTOR) 10 MG tablet Take 1 tablet (10 mg) by mouth daily 90 tablet 3     senna-docusate (SENOKOT-S/PERICOLACE) 8.6-50 MG tablet Take 1 tablet by mouth 2 times daily 50 tablet 0      acetaminophen (TYLENOL) 325 MG tablet Take 3 tablets (975 mg) by mouth every 6 hours (Patient not taking: Reported on 2/10/2023)            Past Medical History     Past Medical History:   Diagnosis Date     Acute bilateral low back pain without sciatica 07/23/2018     Anxiety, generalized 11/17/2017     Ascending aorta dilation (H)     4.2 cm on CT scan August 2017, follow annually, 4.1 cm August 2018, stable October 2019.  4.5 cm April 2022.  Recheck in 1 year     CAD (coronary artery disease)     cardiac cath 1/17/2023: non-obstructive CAD     Calcaneal fracture 2013    Chronic foot/heel pain     Chronic cough 04/14/2022     Chronic fatigue 10/14/2019     Chronic hepatitis C (H)     Successfully treated.  Previously on interferon.  More recently SOFOSBUVIR and ribavirin, biopsy has been negative for cirrhosis, PCR -2015     Chronic insomnia      Chronic pain of right ankle 05/17/2018     Erectile dysfunction      Essential hypertension      External hemorrhoids 2012     History of alcohol abuse      History of colon polyps     Colonoscopy April 2020 with polyps.  Repeat in 5 years     History of depression     2014, prescribed citalopram     Lower abdominal pain 07/23/2018     Osteoarthritis of multiple joints     Chronic bilateral knee and chronic low back pain     Porphyria cutanea tarda (H)      Pulmonary nodule     CT August 2018 3 mm right upper lobe nodule reported as stable, stable October 2019.  CT with 6 mm right lower lobe nodule April 2022 needing 6-month follow-up     Rectal bleeding 03/02/2020     Screening for AAA (abdominal aortic aneurysm)     CT 2018 without abdominal aneurysm     Shingles 05/13/2021     Tobacco abuse 11/17/2017     Past Surgical History:   Procedure Laterality Date     ANKLE FRACTURE SURGERY       ARTHROSCOPY KNEE Bilateral      COLONOSCOPY       CV CORONARY ANGIOGRAM N/A 1/17/2023    Procedure: Coronary Angiogram;  Surgeon: Souleymane Ratliff MD;  Location: [facility] CARDIAC  CATH LAB     CV LEFT HEART CATH N/A 1/17/2023    Procedure: Left Heart Catheterization;  Surgeon: Souleymane Ratliff MD;  Location:  HEART CARDIAC CATH LAB     CV LEFT VENTRICULOGRAM N/A 1/17/2023    Procedure: Left Ventriculogram;  Surgeon: Souleymane Ratliff MD;  Location: [facility] CARDIAC CATH LAB     DAVINCI LOBECTOMY LUNG Right 1/18/2023    Procedure: Robot-assisted thoracoscopic right lower lobe wedge, right lower lobectomy, and mediastinal lymph node dissection;  Surgeon: Lobo Leger MD;  Location: [facility] OR     FOOT FRACTURE SURGERY  09/01/2013    calcaneal fracture     WRIST SURGERY      ligament injury     Family History   Problem Relation Age of Onset     Coronary Artery Disease Mother         d. 84     Cerebrovascular Disease Mother      Kidney failure Father         d 76     Rectal Cancer Father      Skin Cancer Brother      Crohn's Disease Brother      Anesthesia Reaction No family hx of      Deep Vein Thrombosis (DVT) No family hx of             Allergies   Patient has no known allergies.        Neli Woodall NP  [facility] CARE  Pager: [phone]

## 2023-02-10 NOTE — PROGRESS NOTES
Patient had non-obstructive coronary artery disease on his coronary angiogram. From a post-surgical perspective are you ok with initiating an 81mg aspirin daily? Or should he wait until after he is seen in your clinic in follow up in 2/17/23?

## 2023-02-10 NOTE — PATIENT INSTRUCTIONS
Today's Recommendations    I will discuss with your surgeon if he is ok with you starting a daily 81mg aspirin  Keep working on reducing your alcohol and tobacco use  I would like you to wear a monitor see how many irregular heart beats you are having   Continue all medications without changes.  Please follow up with me in 6 weeks.    Please send a [facility] message or call [phone] to the RN team with questions or concerns.     Scheduling number [phone]  FAUZIA Montanez, CNP

## 2023-02-10 NOTE — LETTER
2/10/2023    Sen Leon MD  [address] [facility] [address]    RE: Cesar Murillo       Dear Colleague,     I had the pleasure of seeing Cesar Murillo in the [facility].  Cardiology Clinic Progress Note  Cesar Murillo MRN# [id number]   YOB: 1955 Age: 68 year old   Primary Cardiologist: Dr. Pereira Reason for visit: Post angiogram follow-up            Assessment and Plan:   1. Non-obstructive coronary artery disease  -1/2023 angiogram showing multivessel mild nonobstructive coronary disease  -Continue statin   -We will review starting aspirin 81 mg daily with with    -No anginal symptoms     2.  Right lung squamous cell carcinoma, s/p complete right lower lobe lobectomy and mediastinal lymph node dissection (1/2023)  -Plan is for patient undergo repeat imaging in follow-up, unsure of timeline for this as patient has not yet had his postsurgical follow-up, scheduled for 2/17/2023    3.  Hyperlipidemia, controlled  -Continue rosuvastatin  -Will review labs from PCP visit 2/28/2023 at her follow-up visit and discussed goal for his LDL is less than 55, so likely will need to increase his rosuvastatin at the time    4.  Tobacco abuse, continued smoking  -Encouraged continued taper and discontinuation of tobacco use  -Congratulated him for reducing his use up to this point and also encouraged him to discuss with his primary care provider at the end of the month any further options they have for supporting his tobacco cessation    5. Mild non-ischemic cardiomyopathy, LVEF 45-50% (1/2023)  -Suspect secondary to chronic alcohol use    6. Palpitations  -We will have patient wear a Zio patch monitor to assess ectopic burden and for any other arrhythmias that could be causing symptoms of palpitations as frequent PVCs were noted during coronary angiogram as well as stress echocardiogram  -Would look to add low dose beta blocker and reduce lisinopril to avoid  hypotension    Patient is seen today in follow-up after his coronary angiogram.  He is recovering from his right lower lobe lobectomy and post-operative pain continues to improve.  His main cardiac concerns is his palpitations.  He has been reducing his alcohol use significantly since meeting with Dr. Leger in December 2022 which has been in turn helpful at reducing his tobacco use as he smokes less when he has not using alcohol.  He has a follow-up with his primary care provider at the end of the month and plans to have labs drawn at that visit.  We will plan to review those labs at his follow-up visit with me after his Zio patch monitor has been completed.  We can discuss any titrations of his rosuvastatin at that time.  In the meantime I will contact Dr. Leger and collaborate regarding start of an 81 mg aspirin considering his coronary angiogram showed mild nonobstructive coronary disease.    Changes today: Start aspirin 81mg daily pending collaboration with Dr. Leger, patient     Follow up plan: Patient follow-up with me after he has completed his Zio patch monitor        History of Presenting Illness:    Cesar Murillo is a very pleasant 68 year old male with a history of coronary artery disease, significant history of tobacco abuse, right lower lobe lung nodule.    Patient was seen in clinic by Dr. Pereira on 1/16/2023 for preoperative cardiac risk assessment prior to a undergoing robotic assisted thorascopic right sided lower lobe wedge resection.  Patient had an enlarging right lung nodule.     As part of his preoperative cardiac evaluation he underwent a dobutamine stress echocardiogram on 1/10/2023 during which he was having frequent runs of PVCs and noted to have a mildly reduced LVEF estimated at 45 to 50%.  A follow-up Lexiscan nuclear stress test was done on 1/13/2023 which was negative for ischemia or infarction.  On his CT of the chest he is noted to have severe coronary artery calcification  although he is never undergone formal coronary artery calcium scoring.  Diagnostic coronary angiography was recommended to assess for any concerns for three-vessel disease.  Nonobstructive coronary disease was present, all lesions were 30% stenotic or less.     Patient underwent  his right lower wedge resection, complete right lower lobe lobectomy and mediastinal lymph node dissection on 1/18/2023 at [facility] with Dr. Leger.  Pathology showed stage I A2 squamous cell carcinoma.  Plan is to follow with serial imaging.    Patient is here today for follow-up after his angiogram. He continues to gradually feel better from his surgery but had issues with significant post-operative pain.     Patient denies chest pain or chest tightness. Denies dizziness, lightheadedness or other presyncopal symptoms. Denies tachycardia. He has had some shortness of breath since his surgery.     He will get frequent palpitations with associated shortness of breath, short in duration.     Working on quitting smoking, down to 1/2 PPD. Using the nicotine patch.     Blood pressure 123/80 and HR 66 in clinic today.    Denies any issues with bleeding.     Diet: eats what he feels like   Exercise: golfs but rides cart, light yardwork  Alcohol: drinks daily, hard alcohol 2 per day, some times more prior to his 12/6, now drinking 1-2 times weekly   Tobacco: 1/2 PPD     Right groin puncture site healed, non-tender, no bruising.         Recent Hospitalizations   As above          Social History    ,   Social History     Socioeconomic History     Marital status:      Spouse name: Not on file     Number of children: Not on file     Years of education: Not on file     Highest education level: Not on file   Occupational History     Not on file   Tobacco Use     Smoking status: Every Day     Packs/day: 1.50     Types: Cigarettes     Start date: 1968     Smokeless tobacco: Never     Tobacco comments:     Patient smokes half a pack a day  "  Substance and Sexual Activity     Alcohol use: Yes     Comment: Alcoholic Drinks/day: 2-3 every day     Drug use: Yes     Types: Marijuana     Comment: Drug use: Frequent use     Sexual activity: Not on file   Other Topics Concern     Not on file   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       Social Determinants of Health     Financial Resource Strain: Not on file   Food Insecurity: Not on file   Transportation Needs: Not on file   Physical Activity: Not on file   Stress: Not on file   Social Connections: Not on file   Intimate Partner Violence: Not on file   Housing Stability: Not on file            Review of Systems:   Skin:        Eyes:       ENT:       Respiratory:  Positive for dyspnea on exertion  Cardiovascular:  Negative;chest pain;palpitations;dizziness;lightheadedness;edema Positive for;fatigue  Gastroenterology:      Genitourinary:       Musculoskeletal:       Neurologic:       Psychiatric:       Heme/Lymph/Imm:       Endocrine:  Negative           Physical Exam:   Vitals: /80 (BP Location: Left arm, Patient Position: Sitting)   Pulse 66   Ht 1.727 m (5' 8\")   Wt 85.4 kg (188 lb 4.8 oz)   SpO2 97%   BMI 28.63 kg/m     Wt Readings from Last 4 Encounters:   02/10/23 85.4 kg (188 lb 4.8 oz)   01/20/23 85 kg (187 lb 6.4 oz)   01/17/23 86.1 kg (189 lb 14.4 oz)   01/16/23 86.2 kg (190 lb)     GEN: well nourished, in no acute distress.  HEENT:  Pupils equal, round. Sclerae nonicteric.   NECK: Supple, no masses appreciated.  C/V:  Regular rate and rhythm, no murmur, rub or gallop.  Occasional irregular beat  RESP: Respirations are unlabored. Clear to auscultation bilaterally without wheezing, rales, or rhonchi.  GI: Abdomen soft, nontender.  EXTREM: No LE edema.  NEURO: Alert and oriented, cooperative.  SKIN: Warm and dry. Right groin puncture site healed with no bruising, non-tender       Data:     LIPID RESULTS:  Lab Results   Component Value Date    CHOL 136 " 08/10/2022    HDL 52 08/10/2022    LDL 68 08/10/2022    TRIG 81 08/10/2022     LIVER ENZYME RESULTS:  Lab Results   Component Value Date    AST 28 08/10/2022    ALT 19 08/10/2022     CBC RESULTS:  Lab Results   Component Value Date    WBC 12.6 (H) 01/20/2023    RBC 4.16 (L) 01/20/2023    HGB 13.1 (L) 01/20/2023    HCT 40.3 01/20/2023    MCV 97 01/20/2023    MCH 31.5 01/20/2023    MCHC 32.5 01/20/2023    RDW 11.9 01/20/2023     (L) 01/21/2023     BMP RESULTS:  Lab Results   Component Value Date     (L) 01/19/2023    POTASSIUM 4.5 01/19/2023    POTASSIUM 4.3 01/17/2023    CHLORIDE 102 01/19/2023    CHLORIDE 110 (H) 01/17/2023    CO2 19 (L) 01/19/2023    CO2 26 01/17/2023    ANIONGAP 14 01/19/2023    ANIONGAP 4 01/17/2023     (H) 01/19/2023     (H) 01/18/2023     (H) 01/17/2023    BUN 11.7 01/19/2023    BUN 15 01/17/2023    CR 0.72 01/19/2023    GFRESTIMATED >90 01/19/2023    GFRESTIMATED >60 11/25/2022    GFRESTIMATED >60 04/09/2021    GFRESTBLACK >60 04/09/2021    JANINA 8.2 (L) 01/19/2023      A1C RESULTS:  No results found for: A1C  INR RESULTS:  Lab Results   Component Value Date    INR 1.03 01/17/2023            Medications     Current Outpatient Medications   Medication Sig Dispense Refill     docusate sodium (COLACE) 100 MG capsule Take 100 mg by mouth as needed       lisinopril (ZESTRIL) 20 MG tablet Take 1 tablet (20 mg) by mouth daily 90 tablet 3     LORazepam (ATIVAN) 1 MG tablet Take 1 tablet by mouth at bedtime as needed for insonmia. 15 tablet 0     methocarbamol (ROBAXIN) 500 MG tablet Take 1 tablet (500 mg) by mouth every 6 hours as needed for muscle spasms 30 tablet 0     Naproxen Sodium (ALEVE PO) As needed       nicotine (NICODERM CQ) 14 MG/24HR 24 hr patch Place 1 patch onto the skin daily 30 patch 0     oxyCODONE (ROXICODONE) 5 MG tablet Take 1-2 tablets (5-10 mg) by mouth every 4 hours as needed for moderate pain (4-6) Wean narcotic use as tolerated starting at time  of discharge 50 tablet 0     rosuvastatin (CRESTOR) 10 MG tablet Take 1 tablet (10 mg) by mouth daily 90 tablet 3     senna-docusate (SENOKOT-S/PERICOLACE) 8.6-50 MG tablet Take 1 tablet by mouth 2 times daily 50 tablet 0     acetaminophen (TYLENOL) 325 MG tablet Take 3 tablets (975 mg) by mouth every 6 hours (Patient not taking: Reported on 2/10/2023)            Past Medical History     Past Medical History:   Diagnosis Date     Acute bilateral low back pain without sciatica 07/23/2018     Anxiety, generalized 11/17/2017     Ascending aorta dilation (H)     4.2 cm on CT scan August 2017, follow annually, 4.1 cm August 2018, stable October 2019.  4.5 cm April 2022.  Recheck in 1 year     CAD (coronary artery disease)     cardiac cath 1/17/2023: non-obstructive CAD     Calcaneal fracture 2013    Chronic foot/heel pain     Chronic cough 04/14/2022     Chronic fatigue 10/14/2019     Chronic hepatitis C (H)     Successfully treated.  Previously on interferon.  More recently SOFOSBUVIR and ribavirin, biopsy has been negative for cirrhosis, PCR -2015     Chronic insomnia      Chronic pain of right ankle 05/17/2018     Erectile dysfunction      Essential hypertension      External hemorrhoids 2012     History of alcohol abuse      History of colon polyps     Colonoscopy April 2020 with polyps.  Repeat in 5 years     History of depression     2014, prescribed citalopram     Lower abdominal pain 07/23/2018     Osteoarthritis of multiple joints     Chronic bilateral knee and chronic low back pain     Porphyria cutanea tarda (H)      Pulmonary nodule     CT August 2018 3 mm right upper lobe nodule reported as stable, stable October 2019.  CT with 6 mm right lower lobe nodule April 2022 needing 6-month follow-up     Rectal bleeding 03/02/2020     Screening for AAA (abdominal aortic aneurysm)     CT 2018 without abdominal aneurysm     Shingles 05/13/2021     Tobacco abuse 11/17/2017     Past Surgical History:   Procedure  Laterality Date     ANKLE FRACTURE SURGERY       ARTHROSCOPY KNEE Bilateral      COLONOSCOPY       CV CORONARY ANGIOGRAM N/A 1/17/2023    Procedure: Coronary Angiogram;  Surgeon: Souleymane Ratliff MD;  Location:  HEART CARDIAC CATH LAB     CV LEFT HEART CATH N/A 1/17/2023    Procedure: Left Heart Catheterization;  Surgeon: Souleymane Ratliff MD;  Location:  HEART CARDIAC CATH LAB     CV LEFT VENTRICULOGRAM N/A 1/17/2023    Procedure: Left Ventriculogram;  Surgeon: Souleymane Ratliff MD;  Location: [facility] CARDIAC C[facility] LAB     DAVINCI LOBECTOMY LUNG Right 1/18/2023    Procedure: Robot-assisted thoracoscopic right lower lobe wedge, right lower lobectomy, and mediastinal lymph node dissection;  Surgeon: Lobo Leger MD;  Location: [facility] OR     FOOT FRACTURE SURGERY  09/01/2013    calcaneal fracture     WRIST SURGERY      ligament injury     Family History   Problem Relation Age of Onset     Coronary Artery Disease Mother         d. 84     Cerebrovascular Disease Mother      Kidney failure Father         d 76     Rectal Cancer Father      Skin Cancer Brother      Crohn's Disease Brother      Anesthesia Reaction No family hx of      Deep Vein Thrombosis (DVT) No family hx of             Allergies   Patient has no known allergies.        Neli Woodall NP  [facility]  Pager: [phone]        Thank you for allowing me to participate in the care of your patient.      Sincerely,     Nlei Woodall NP     [facility]  cc:   Von Pereira MD  [address] [address]

## 2023-02-13 NOTE — PROGRESS NOTES
Called pt with recommendations from Raven Woodall to start aspirin 81 mg daily. Pt verbalized understanding. He states he does not need a prescription & will get it over the counter. Med list updated. Ninfa DAVE

## 2023-02-16 NOTE — PROGRESS NOTES
THORACIC SURGERY FOLLOW UP VISIT    Dear Dr. Leger,  I saw Mr. Murillo in follow-up today. The clinical summary follows:     PREOP DIAGNOSIS   Lung nodule  PROCEDURE   Robot assisted thoracoscopic right lower lobe wedge resection, completion right lower lobectomy, mediastinal lymph node dissection    DATE OF PROCEDURE  01/18/2023    HISTOPATHOLOGY   Squamous cell carcinoma, keratinizing type, moderately differentiated, pT1bN0    COMPLICATIONS  None    INTERVAL STUDIES  CXR: final report pending at time of clinic visit. To my review the right pleural effusion seems improved and the previously seen sub-Q emphysema has resolved.    ETOH yes, significantly reduced since December 2022  TOB current smoker, down to 1/2 PPd (from 1.5 PPD), he rolls his own cigarettes  /83 (BP Location: Right arm, Patient Position: Sitting, Cuff Size: Adult Regular)   Pulse 60   Temp 98.5  F (36.9  C) (Oral)   Wt 83.5 kg (184 lb 1.6 oz)   SpO2 98%   BMI 27.99 kg/m        SUBJECTIVE   Cesar is doing ok. He still has a lot of tenderness on his right side (back and front). He takes oxycodone occasionally for discomfort but usually relies on the muscle relaxer for relief. He spends a lot of time sitting and reading. He is not as active as he was prior to surgery. Part of this is because of the weather. He has had troubles with constipation. He takes Senna every day. At his worst, he took Senna, Dulcolax and Miralax and got good relief from that.    From a personal perspective, he is hoping to be out playing golf by May.    IMPRESSION   68 year-old male status post right lower lobe wedge resection, completion right lower lobectomy and mediastinal lymph node dissection for a pT1bN0 (stage IA2) non small cell lung cancer. He is here today for post operative follow up.    He had an R0 resection, chemotherapy and radiation therapy are not indicated. We will follow him per the NCCN guidelines for lung cancer surveillance. We will get a new  baseline chest CT in 2 months followed by a chest CT every 6 months for 2 years and annually thereafter.    We discussed the importance of a good bowel regimen. We also discussed how his lack of activity is likely contributing to his discomfort around the surgery site as well as his constipation. I encouraged him to increase his activity to help keep his bowels moving and to keep his chest wall muscles loose. I recommend he use Miralax to assist in keeping his bowels regular.    PLAN  I spent 25 min on the date of the encounter in chart review, patient visit, review of tests, documentation and/or discussion with other providers about the issues documented above. I reviewed the plan as follows:  New baseline Chest CT in 2 months  1. Necessary Tests & Appointments: chest CT  2. Pain Control Plan: one final refill of oxycodone, refill methocarbamol, use warm packs as needed  3. Smoking Cessation: Working on smoking cessation-wearing a 14 mg Nicoderm patch    All questions were answered and the patient and present family were in agreement with the plan.  I appreciate the opportunity to participate in the care of your patient and will keep you updated.  Sincerely,

## 2023-02-17 ENCOUNTER — ONCOLOGY VISIT (OUTPATIENT)
Dept: SURGERY | Facility: CLINIC | Age: 68
End: 2023-02-17
Attending: CLINICAL NURSE SPECIALIST
Payer: COMMERCIAL

## 2023-02-17 ENCOUNTER — ANCILLARY PROCEDURE (OUTPATIENT)
Dept: GENERAL RADIOLOGY | Facility: CLINIC | Age: 68
End: 2023-02-17
Attending: THORACIC SURGERY (CARDIOTHORACIC VASCULAR SURGERY)
Payer: COMMERCIAL

## 2023-02-17 VITALS
OXYGEN SATURATION: 98 % | DIASTOLIC BLOOD PRESSURE: 83 MMHG | TEMPERATURE: 98.5 F | BODY MASS INDEX: 27.99 KG/M2 | SYSTOLIC BLOOD PRESSURE: 138 MMHG | HEART RATE: 60 BPM | WEIGHT: 184.1 LBS

## 2023-02-17 DIAGNOSIS — C34.31 MALIGNANT NEOPLASM OF LOWER LOBE OF RIGHT LUNG (H): ICD-10-CM

## 2023-02-17 DIAGNOSIS — R91.1 LUNG NODULE SEEN ON IMAGING STUDY: ICD-10-CM

## 2023-02-17 PROCEDURE — G0463 HOSPITAL OUTPT CLINIC VISIT: HCPCS | Performed by: CLINICAL NURSE SPECIALIST

## 2023-02-17 PROCEDURE — 99024 POSTOP FOLLOW-UP VISIT: CPT | Performed by: CLINICAL NURSE SPECIALIST

## 2023-02-17 PROCEDURE — 99212 OFFICE O/P EST SF 10 MIN: CPT | Performed by: CLINICAL NURSE SPECIALIST

## 2023-02-17 PROCEDURE — 71046 X-RAY EXAM CHEST 2 VIEWS: CPT | Mod: GC | Performed by: RADIOLOGY

## 2023-02-17 RX ORDER — OXYCODONE HYDROCHLORIDE 5 MG/1
5 TABLET ORAL EVERY 8 HOURS PRN
Qty: 21 TABLET | Refills: 0 | Status: SHIPPED | OUTPATIENT
Start: 2023-02-17 | End: 2023-02-24

## 2023-02-17 RX ORDER — METHOCARBAMOL 500 MG/1
500 TABLET, FILM COATED ORAL EVERY 6 HOURS PRN
Qty: 30 TABLET | Refills: 0 | Status: SHIPPED | OUTPATIENT
Start: 2023-02-17 | End: 2023-04-19

## 2023-02-17 ASSESSMENT — PAIN SCALES - GENERAL: PAINLEVEL: SEVERE PAIN (6)

## 2023-02-17 NOTE — LETTER
2/17/2023         RE: Cesar Murillo  [address]  [place] [address]        Dear Colleague,    Thank you for referring your patient, Cesar Murillo, to the [facility]. Please see a copy of my visit note below.    THORACIC SURGERY FOLLOW UP VISIT    Dear Dr. Leger,  I saw Mr. Murillo in follow-up today. The clinical summary follows:     PREOP DIAGNOSIS   Lung nodule  PROCEDURE   Robot assisted thoracoscopic right lower lobe wedge resection, completion right lower lobectomy, mediastinal lymph node dissection    DATE OF PROCEDURE  01/18/2023    HISTOPATHOLOGY   Squamous cell carcinoma, keratinizing type, moderately differentiated, pT1bN0    COMPLICATIONS  None    INTERVAL STUDIES  CXR: final report pending at time of clinic visit. To my review the right pleural effusion seems improved and the previously seen sub-Q emphysema has resolved.    ETOH yes, significantly reduced since December 2022  TOB current smoker, down to 1/2 PPd (from 1.5 PPD), he rolls his own cigarettes  /83 (BP Location: Right arm, Patient Position: Sitting, Cuff Size: Adult Regular)   Pulse 60   Temp 98.5  F (36.9  C) (Oral)   Wt 83.5 kg (184 lb 1.6 oz)   SpO2 98%   BMI 27.99 kg/m        SUBJECTIVE   Cesar is doing ok. He still has a lot of tenderness on his right side (back and front). He takes oxycodone occasionally for discomfort but usually relies on the muscle relaxer for relief. He spends a lot of time sitting and reading. He is not as active as he was prior to surgery. Part of this is because of the weather. He has had troubles with constipation. He takes Senna every day. At his worst, he took Senna, Dulcolax and Miralax and got good relief from that.    From a personal perspective, he is hoping to be out playing golf by May.    IMPRESSION   68 year-old male status post right lower lobe wedge resection, completion right lower lobectomy and mediastinal lymph node dissection for a pT1bN0  (stage IA2) non small cell lung cancer. He is here today for post operative follow up.    He had an R0 resection, chemotherapy and radiation therapy are not indicated. We will follow him per the NCCN guidelines for lung cancer surveillance. We will get a new baseline chest CT in 2 months followed by a chest CT every 6 months for 2 years and annually thereafter.    We discussed the importance of a good bowel regimen. We also discussed how his lack of activity is likely contributing to his discomfort around the surgery site as well as his constipation. I encouraged him to increase his activity to help keep his bowels moving and to keep his chest wall muscles loose. I recommend he use Miralax to assist in keeping his bowels regular.    PLAN  I spent 25 min on the date of the encounter in chart review, patient visit, review of tests, documentation and/or discussion with other providers about the issues documented above. I reviewed the plan as follows:  New baseline Chest CT in 2 months  1. Necessary Tests & Appointments: chest CT  2. Pain Control Plan: one final refill of oxycodone, refill methocarbamol, use warm packs as needed  3. Smoking Cessation: Working on smoking cessation-wearing a 14 mg Nicoderm patch    All questions were answered and the patient and present family were in agreement with the plan.  I appreciate the opportunity to participate in the care of your patient and will keep you updated.  Sincerely,    FAUZIA Nava CNS

## 2023-02-17 NOTE — NURSING NOTE
"Oncology Rooming Note    February 17, 2023 2:07 PM   Cesar Murillo is a 68 year old male who presents for:    Chief Complaint   Patient presents with     Oncology Clinic Visit     Lung nodule seen on imaging study      Initial Vitals: There were no vitals taken for this visit. Estimated body mass index is 28.63 kg/m  as calculated from the following:    Height as of 2/10/23: 1.727 m (5' 8\").    Weight as of 2/10/23: 85.4 kg (188 lb 4.8 oz). There is no height or weight on file to calculate BSA.  Data Unavailable Comment: Data Unavailable   No LMP for male patient.  Allergies reviewed: Yes  Medications reviewed: Yes    Medications: MEDICATION REFILLS NEEDED TODAY. Provider was notified.  Pharmacy name entered into Verified Identity Pass: [id number] - [address]    Clinical concerns: needs refills for muscle relaxers.       Felipe Castellano            "

## 2023-02-22 ENCOUNTER — HOSPITAL ENCOUNTER (OUTPATIENT)
Dept: CARDIOLOGY | Facility: CLINIC | Age: 68
Discharge: HOME | End: 2023-02-22
Attending: NURSE PRACTITIONER | Admitting: NURSE PRACTITIONER
Payer: COMMERCIAL

## 2023-02-22 DIAGNOSIS — R00.2 PALPITATIONS: ICD-10-CM

## 2023-02-22 PROCEDURE — 93248 EXT ECG>7D<15D REV&INTERPJ: CPT | Performed by: INTERNAL MEDICINE

## 2023-02-22 PROCEDURE — 93242 EXT ECG>48HR<7D RECORDING: CPT

## 2023-02-28 ENCOUNTER — OFFICE VISIT (OUTPATIENT)
Dept: INTERNAL MEDICINE | Facility: CLINIC | Age: 68
End: 2023-02-28
Payer: COMMERCIAL

## 2023-02-28 VITALS
HEIGHT: 68 IN | DIASTOLIC BLOOD PRESSURE: 80 MMHG | HEART RATE: 65 BPM | WEIGHT: 183.8 LBS | RESPIRATION RATE: 14 BRPM | OXYGEN SATURATION: 96 % | SYSTOLIC BLOOD PRESSURE: 112 MMHG | BODY MASS INDEX: 27.86 KG/M2 | TEMPERATURE: 98.1 F

## 2023-02-28 DIAGNOSIS — I42.8 NONISCHEMIC CARDIOMYOPATHY (H): ICD-10-CM

## 2023-02-28 DIAGNOSIS — J44.9 COPD, MILD (H): ICD-10-CM

## 2023-02-28 DIAGNOSIS — I25.10 NONOBSTRUCTIVE ATHEROSCLEROSIS OF CORONARY ARTERY: ICD-10-CM

## 2023-02-28 DIAGNOSIS — Z72.0 TOBACCO ABUSE: ICD-10-CM

## 2023-02-28 DIAGNOSIS — F41.1 ANXIETY, GENERALIZED: ICD-10-CM

## 2023-02-28 DIAGNOSIS — K59.01 SLOW TRANSIT CONSTIPATION: ICD-10-CM

## 2023-02-28 DIAGNOSIS — C34.31 MALIGNANT NEOPLASM OF LOWER LOBE OF RIGHT LUNG (H): Primary | ICD-10-CM

## 2023-02-28 DIAGNOSIS — I10 ESSENTIAL HYPERTENSION: ICD-10-CM

## 2023-02-28 PROBLEM — R91.1 LUNG NODULE SEEN ON IMAGING STUDY: Status: RESOLVED | Noted: 2022-05-08 | Resolved: 2023-02-28

## 2023-02-28 LAB
ALBUMIN SERPL BCG-MCNC: 4.4 G/DL (ref 3.5–5.2)
ALP SERPL-CCNC: 117 U/L (ref 40–129)
ALT SERPL W P-5'-P-CCNC: 13 U/L (ref 10–50)
ANION GAP SERPL CALCULATED.3IONS-SCNC: 12 MMOL/L (ref 7–15)
AST SERPL W P-5'-P-CCNC: 21 U/L (ref 10–50)
BILIRUB SERPL-MCNC: 0.4 MG/DL
BUN SERPL-MCNC: 12 MG/DL (ref 8–23)
CALCIUM SERPL-MCNC: 9.9 MG/DL (ref 8.8–10.2)
CHLORIDE SERPL-SCNC: 102 MMOL/L (ref 98–107)
CREAT SERPL-MCNC: 0.86 MG/DL (ref 0.67–1.17)
DEPRECATED HCO3 PLAS-SCNC: 25 MMOL/L (ref 22–29)
ERYTHROCYTE [DISTWIDTH] IN BLOOD BY AUTOMATED COUNT: 11.4 % (ref 10–15)
GFR SERPL CREATININE-BSD FRML MDRD: >90 ML/MIN/1.73M2
GLUCOSE SERPL-MCNC: 107 MG/DL (ref 70–99)
HCT VFR BLD AUTO: 42.5 % (ref 40–53)
HGB BLD-MCNC: 14.4 G/DL (ref 13.3–17.7)
MCH RBC QN AUTO: 31.9 PG (ref 26.5–33)
MCHC RBC AUTO-ENTMCNC: 33.9 G/DL (ref 31.5–36.5)
MCV RBC AUTO: 94 FL (ref 78–100)
PLATELET # BLD AUTO: 167 10E3/UL (ref 150–450)
POTASSIUM SERPL-SCNC: 4.7 MMOL/L (ref 3.4–5.3)
PROT SERPL-MCNC: 7 G/DL (ref 6.4–8.3)
RBC # BLD AUTO: 4.52 10E6/UL (ref 4.4–5.9)
SODIUM SERPL-SCNC: 139 MMOL/L (ref 136–145)
WBC # BLD AUTO: 8.4 10E3/UL (ref 4–11)

## 2023-02-28 PROCEDURE — 36415 COLL VENOUS BLD VENIPUNCTURE: CPT | Performed by: INTERNAL MEDICINE

## 2023-02-28 PROCEDURE — 80053 COMPREHEN METABOLIC PANEL: CPT | Performed by: INTERNAL MEDICINE

## 2023-02-28 PROCEDURE — 99215 OFFICE O/P EST HI 40 MIN: CPT | Performed by: INTERNAL MEDICINE

## 2023-02-28 PROCEDURE — 96127 BRIEF EMOTIONAL/BEHAV ASSMT: CPT | Performed by: INTERNAL MEDICINE

## 2023-02-28 PROCEDURE — 85027 COMPLETE CBC AUTOMATED: CPT | Performed by: INTERNAL MEDICINE

## 2023-02-28 RX ORDER — POLYETHYLENE GLYCOL 3350 17 G/17G
1 POWDER, FOR SOLUTION ORAL DAILY
COMMUNITY
Start: 2023-02-28

## 2023-02-28 RX ORDER — ACETAMINOPHEN 500 MG
1000 TABLET ORAL 2 TIMES DAILY
COMMUNITY
Start: 2023-02-28

## 2023-02-28 ASSESSMENT — PATIENT HEALTH QUESTIONNAIRE - PHQ9
SUM OF ALL RESPONSES TO PHQ QUESTIONS 1-9: 11
SUM OF ALL RESPONSES TO PHQ QUESTIONS 1-9: 11
10. IF YOU CHECKED OFF ANY PROBLEMS, HOW DIFFICULT HAVE THESE PROBLEMS MADE IT FOR YOU TO DO YOUR WORK, TAKE CARE OF THINGS AT HOME, OR GET ALONG WITH OTHER PEOPLE: SOMEWHAT DIFFICULT

## 2023-02-28 NOTE — PATIENT INSTRUCTIONS
Start taking extra strength Tylenol 500 mg 2 tablets twice daily and try to avoid oxycodone.    Using over-the-counter Lidoderm patch daily, 12 hours on and 12 hours off is a consideration.  You may also benefit from gabapentin if symptoms are ongoing.    Make sure you are using MiraLAX on a daily basis.

## 2023-02-28 NOTE — PROGRESS NOTES
Assessment & Plan     Malignant neoplasm of lower lobe of right lung (H)  68-year-old male with history of chronic tobacco use found to have enlarging right lower lobe lung nodule.  Underwent robotic assisted resection.  Found to have early stage disease, stage Ia cancer with no lymph node involvement.  Plan is to repeat CT scan after 2 months and then every 6 months for 2 years.  He is still having quite a bit of pain 5 weeks out from surgery.  He will take occasional oxycodone and I am discouraging him from using this long-term.  He should make use of acetaminophen taking up to 1000 mg twice daily or even 3 times daily.  He can take some occasional Aleve.  We also discussed using a Lidoderm patch or trying gabapentin.  - CBC with platelets; Future  - Comprehensive metabolic panel (BMP + Alb, Alk Phos, ALT, AST, Total. Bili, TP); Future  - acetaminophen (TYLENOL) 500 MG tablet; Take 2 tablets (1,000 mg) by mouth 2 times daily    Tobacco abuse  Still smoking but has been able to cut back to only 1/2 pack daily making use of a nicotine patch.  We also discussed using Nicotrol cartridges to supplement the patch.    COPD, mild (H)  PFT suggesting mild COPD/emphysema with no bronchodilator response.  Emphasizing importance of quitting smoking.    Nonischemic cardiomyopathy (H)  Echocardiogram demonstrating 40 to 50% EF with global hypokinesis.  Nonischemic cardiomyopathy.  Suspect related to chronic alcohol use.  He has tried to cut back on his intake    Complaining of palpitations and is wearing a heart monitor.  He will follow-up with cardiology    Nonobstructive atherosclerosis of coronary artery  He underwent coronary angiogram prior to surgery demonstrating mild to moderate disease with 20-30% stenosis involving multiple vessels.  Continue medical management with rosuvastatin and aspirin.    Essential hypertension  Blood pressure looking very well controlled with current medication lisinopril  - CBC with  "platelets; Future  - Comprehensive metabolic panel (BMP + Alb, Alk Phos, ALT, AST, Total. Bili, TP); Future    Anxiety, generalized  He will use lorazepam 1 mg at bedtime for anxiety/insomnia    Slow transit constipation  Problems with constipation following surgery.  He should avoid oxycodone.  He should use MiraLAX daily along with his stool softeners.  - polyethylene glycol (MIRALAX) 17 GM/Dose powder; Take 17 g (1 capful.) by mouth daily    Mild depression most of the situational.  PHQ-9 score 11.  Will monitor.      40 minutes spent on the date of the encounter doing chart review, history and exam, documentation and further activities per the note       MED REC REQUIRED  Post Medication Reconciliation Status:  Discharge medications reconciled, continue medications without change    BMI:   Estimated body mass index is 27.95 kg/m  as calculated from the following:    Height as of this encounter: 1.727 m (5' 8\").    Weight as of this encounter: 83.4 kg (183 lb 12.8 oz).       Depression Screening Follow Up    PHQ 2/28/2023   PHQ-9 Total Score 11   Q9: Thoughts of better off dead/self-harm past 2 weeks Not at all           Return in about 3 months (around 5/28/2023) for Routine preventive.    Sen Leon MD  [employer]    Irwin Guerrero is a 68 year old, presenting for the following health issues: Here to follow-up after hospitalization for resection of right lower lobe lung cancer and to discuss other medical problems.  See assessment plan for details.  Follow Up (RIGHT LOWER LOBEECTOMY)          Review of Systems   No nausea.  Good appetite.  No chest pain.      Objective    /80 (BP Location: Left arm, Patient Position: Sitting, Cuff Size: Adult Regular)   Pulse 65   Temp 98.1  F (36.7  C) (Oral)   Resp 14   Ht 1.727 m (5' 8\")   Wt 83.4 kg (183 lb 12.8 oz)   SpO2 96%   BMI 27.95 kg/m    Body mass index is 27.95 kg/m .  Physical Exam   Well-appearing " middle-age male  Lungs clear bilaterally no rales or wheezes  Heart regular rate and rhythm  Abdomen soft without hepatosplenomegaly masses or tenderness  Chest wall incisions have healed.  Minimal tenderness to palpation.

## 2023-02-28 NOTE — LETTER
March 2, 2023      Cesar Murillo  [address] [place] [address]        Dear Twyla Guerrero Orders   CBC with platelets   Result Value Ref Range    WBC Count 8.4 4.0 - 11.0 10e3/uL    RBC Count 4.52 4.40 - 5.90 10e6/uL    Hemoglobin 14.4 13.3 - 17.7 g/dL    Hematocrit 42.5 40.0 - 53.0 %    MCV 94 78 - 100 fL    MCH 31.9 26.5 - 33.0 pg    MCHC 33.9 31.5 - 36.5 g/dL    RDW 11.4 10.0 - 15.0 %    Platelet Count 167 150 - 450 10e3/uL   Comprehensive metabolic panel (BMP + Alb, Alk Phos, ALT, AST, Total. Bili, TP)   Result Value Ref Range    Sodium 139 136 - 145 mmol/L    Potassium 4.7 3.4 - 5.3 mmol/L    Chloride 102 98 - 107 mmol/L    Carbon Dioxide (CO2) 25 22 - 29 mmol/L    Anion Gap 12 7 - 15 mmol/L    Urea Nitrogen 12.0 8.0 - 23.0 mg/dL    Creatinine 0.86 0.67 - 1.17 mg/dL    Calcium 9.9 8.8 - 10.2 mg/dL    Glucose 107 (H) 70 - 99 mg/dL    Alkaline Phosphatase 117 40 - 129 U/L    AST 21 10 - 50 U/L    ALT 13 10 - 50 U/L    Protein Total 7.0 6.4 - 8.3 g/dL    Albumin 4.4 3.5 - 5.2 g/dL    Bilirubin Total 0.4 <=1.2 mg/dL    GFR Estimate >90 >60 mL/min/1.73m2      Comment:      eGFR calculated using 2021 CKD-EPI equation.     Your labs look good.  Anemia has resolved.  Normal kidney and liver function.  Slightly elevated glucose is of no concern as I do not think you were fasting at the time of this blood draw.    If you have any questions or concerns, please call the clinic at the number listed above.       Sincerely,      Sen Leon MD

## 2023-03-10 ENCOUNTER — TELEPHONE (OUTPATIENT)
Dept: CARDIOLOGY | Facility: CLINIC | Age: 68
End: 2023-03-10
Payer: COMMERCIAL

## 2023-03-10 DIAGNOSIS — I48.0 PAROXYSMAL ATRIAL FIBRILLATION (H): Primary | ICD-10-CM

## 2023-03-10 DIAGNOSIS — I10 BENIGN ESSENTIAL HYPERTENSION: ICD-10-CM

## 2023-03-10 RX ORDER — METOPROLOL SUCCINATE 25 MG/1
25 TABLET, EXTENDED RELEASE ORAL DAILY
Qty: 90 TABLET | Refills: 3 | Status: SHIPPED | OUTPATIENT
Start: 2023-03-10 | End: 2023-05-12

## 2023-03-10 RX ORDER — LISINOPRIL 10 MG/1
10 TABLET ORAL DAILY
Qty: 90 TABLET | Refills: 3 | Status: SHIPPED | OUTPATIENT
Start: 2023-03-10 | End: 2024-02-22

## 2023-03-10 NOTE — TELEPHONE ENCOUNTER
Called and reviewed patient's zio patch monitor results with him which showed 4% atrial fibrillation burden rate up to 177bpm, longest episode of 8 hours, 7 episodes of VT, 17 episodes of SVT rate upt to 203 bpm, longest 11 seconds and 11% PVC burden. [id number] score of 4.       The indication for anticoagulation was discussed with the patient and/or his family in detail. The pros and cons of using warfarin versus newer anticoagulants was discussed including the need for INR monitoring with warfarin, dietary restrictions with warfarin and low cost of warfarin versus high co-pay for newer anticoagulants and lack of reversal agent with agents like Eliquis and Xarelto. Agents like Pradaxa has a reversal agent although the reversal agent can cause serious adverse events. The risk of bleeding including major bleeding and intracranial bleeding was discussed with all these agents and the data of efficacy and safety of these agents versus warfarin was communicated. After detailed discussion, patient and family have decided to eliquis.    Signs of unusual bleeding were discussed with patient and can include: bleeding from the gums, blood in the urine, bloody or dark stool, a nosebleed, or vomiting blood.    Discussed patient should reduce his lisinopril to 10mg daily to allow blood pressure room and start metoprolol XL 25mg daily for rate control.     Patient to stop aspirin with start of eliquis. Advised against concurrent NSAID use.     Patient is continuing to work on reducing his cigarettes and wearing a nicotine patch. He has also reduced his alcohol intake to 2 x/week. Discussed continued moderation of alcohol use with start of eliquis.     Reviewed instructions with patient and he indicated understanding. Plan to see him in clinic 3/22/23 for further review.

## 2023-03-13 PROBLEM — I48.0 PAROXYSMAL ATRIAL FIBRILLATION (H): Status: ACTIVE | Noted: 2023-03-13

## 2023-03-22 ENCOUNTER — OFFICE VISIT (OUTPATIENT)
Dept: CARDIOLOGY | Facility: CLINIC | Age: 68
End: 2023-03-22
Attending: NURSE PRACTITIONER
Payer: COMMERCIAL

## 2023-03-22 VITALS
HEART RATE: 66 BPM | HEIGHT: 68 IN | WEIGHT: 185.8 LBS | DIASTOLIC BLOOD PRESSURE: 80 MMHG | SYSTOLIC BLOOD PRESSURE: 122 MMHG | BODY MASS INDEX: 28.16 KG/M2 | OXYGEN SATURATION: 98 %

## 2023-03-22 DIAGNOSIS — I48.0 PAROXYSMAL ATRIAL FIBRILLATION (H): ICD-10-CM

## 2023-03-22 DIAGNOSIS — I10 BENIGN ESSENTIAL HYPERTENSION: Primary | ICD-10-CM

## 2023-03-22 DIAGNOSIS — I42.8 NONISCHEMIC CARDIOMYOPATHY (H): ICD-10-CM

## 2023-03-22 DIAGNOSIS — I25.10 CORONARY ARTERY DISEASE INVOLVING NATIVE CORONARY ARTERY OF NATIVE HEART WITHOUT ANGINA PECTORIS: ICD-10-CM

## 2023-03-22 DIAGNOSIS — R00.2 PALPITATIONS: ICD-10-CM

## 2023-03-22 PROCEDURE — 99214 OFFICE O/P EST MOD 30 MIN: CPT | Performed by: NURSE PRACTITIONER

## 2023-03-22 NOTE — PATIENT INSTRUCTIONS
Today's Recommendations    Resume 81mg aspirin daily with your eliquis   Lets repeat a 2 day holter monitor to recheck your heart rate   I would like to recheck your echocardiogram in 3 months  Please follow up with me in 6 months with a fasting lipid panel before    Please send a NoWait message or call [phone] to the RN team with questions or concerns.     Scheduling number [phone]  FAUZIA Montanez, CNP

## 2023-03-22 NOTE — PROGRESS NOTES
Cardiology Clinic Progress Note  Cesar Murillo MRN# [id number]   YOB: 1955 Age: 68 year old   Primary Cardiologist: Dr. Pereira Reason for visit: Post angiogram follow-up            Assessment and Plan:   1. Non-obstructive coronary artery disease  -1/2023 angiogram showing multivessel mild nonobstructive coronary disease  -Continue statin and aspirin  -No anginal symptoms     2. Paroxysmal atrial fibrillation, SVT, PVCs, NSVT  -Zio patch monitor revealed 7 episodes of nonsustained VT longest lasting 4 beats, 4% atrial fibrillation burden, longest lasting 8 hours, rate controlled, PVC burden of 11%, and 17 episodes of SVT with the longest lasting 11 seconds during which patient was asymptomatic  -Started on Eliquis 5 mg twice daily and metoprolol 25 mg daily XL, blood pressure stable with reduction in lisinopril  -We will have patient wear 48-hour Holter monitor to reassess for rate control and, PVC burden, further VT, and SVT burden  -Discussed options of rate control versus rhythm control, opted to pursue rate control and anticoagulation approach given improvement in symptoms since start of beta-blocker  -Could consider having patient see electrophysiology for management of his arrhythmias in the future if he should become symptomatic or difficulty with rate control    3.  Right lung squamous cell carcinoma, s/p complete right lower lobe lobectomy and mediastinal lymph node dissection (1/2023)  -Continues to follow with surgery team, next follow up in April 2023    3.  Hyperlipidemia, controlled  -Continue rosuvastatin 10mg   -Repeat lipid panel in 6 months     4.  Tobacco abuse, continued smoking  -Encouraged continued taper and discontinuation of tobacco use  -Congratulated him for reducing his use, encouraged continued work on cessation, still smoking 12 per day wearing a nicotine patch    5. Mild non-ischemic cardiomyopathy, LVEF 45-50% (1/2023)  -Suspect secondary to chronic alcohol use, possible  tachycardia component  -We will repeat echocardiogram in 3 months to reassess EF, now that patient is on GDMT with metoprolol and lisinopril     6. Alcohol use  -He has reduced his alcohol use to 1-2 drinks 3 times per week  -Congratulated him on his alcohol moderation     Changes today: Resume aspirin 81mg, wear 48-hour Holter    Follow up plan: Patient to follow up in 6 months with a fasting lipid panel prior, repeat echocardiogram in 3 months         History of Presenting Illness:    Cesar Murillo is a very pleasant 68 year old male with a history of coronary artery disease, significant history of tobacco abuse, right lower lobe lung nodule.    Patient was seen in clinic by Dr. Pereira on 1/16/2023 for preoperative cardiac risk assessment prior to a undergoing robotic assisted thorascopic right sided lower lobe wedge resection.  Patient had an enlarging right lung nodule.     As part of his preoperative cardiac evaluation he underwent a dobutamine stress echocardiogram on 1/10/2023 during which he was having frequent runs of PVCs and noted to have a mildly reduced LVEF estimated at 45 to 50%.  A follow-up Lexiscan nuclear stress test was done on 1/13/2023 which was negative for ischemia or infarction.  On his CT of the chest he is noted to have severe coronary artery calcification although he is never undergone formal coronary artery calcium scoring.  Diagnostic coronary angiography was recommended to assess for any concerns for three-vessel disease.  Nonobstructive coronary disease was present, all lesions were 30% stenotic or less.     Patient underwent  his right lower wedge resection, complete right lower lobe lobectomy and mediastinal lymph node dissection on 1/18/2023 at [facility] with Dr. Leger.  Pathology showed stage I A2 squamous cell carcinoma.  Plan is to follow with serial imaging.    I first met Cesar in early February to follow-up after his angiogram.  At that time he was continuing to recover from his  surgery but was experiencing significant palpitations.  I had him wear a Zio patch monitor which revealed 17 episodes of SVT, longest 11 seconds, 7 episodes of nonsustained VT, 11% PVC burden, as well as 4% atrial fibrillation burden, longest episode lasting 8 hours during which she was rate controlled.  I started him on Eliquis 5 mg twice daily and metoprolol XL 25 mg daily.  I held his aspirin 81 mg daily and reduced his lisinopril to 10 mg daily to allow blood pressure room.    Patient is here today to follow up on the results of his Zio patch monitor. He continues to gradually feel better from his surgery but has continued to have issues with significant post-operative pain.  He feels his palpitations have lessened since starting the metoprolol.    Patient denies chest pain or chest tightness. Denies dizziness, lightheadedness or other presyncopal symptoms. Denies tachycardia. He has had some shortness of breath since his surgery, which has been improving.     Working on quitting smoking, down to 12 cigarettes per day. Using the nicotine patch.     Blood pressure 122/80 and HR 66 in clinic today.    Denies any issues with bleeding since start of Eliquis.        Recent Hospitalizations   As above          Social History    ,   Social History     Socioeconomic History     Marital status:      Spouse name: Not on file     Number of children: Not on file     Years of education: Not on file     Highest education level: Not on file   Occupational History     Not on file   Tobacco Use     Smoking status: Every Day     Packs/day: 0.50     Types: Cigarettes     Start date: 1968     Smokeless tobacco: Never   Substance and Sexual Activity     Alcohol use: Yes     Comment: Alcoholic Drinks/day: 0-2 per day     Drug use: Yes     Types: Marijuana     Comment: Drug use: Frequent use     Sexual activity: Not on file   Other Topics Concern     Not on file   Social History Narrative    Real estate business    "4 children       Social Determinants of Health     Financial Resource Strain: Not on file   Food Insecurity: Not on file   Transportation Needs: Not on file   Physical Activity: Not on file   Stress: Not on file   Social Connections: Not on file   Intimate Partner Violence: Not on file   Housing Stability: Not on file            Review of Systems:   Skin:  not assessed     Eyes:  not assessed    ENT:  not assessed    Respiratory:  Positive for dyspnea on exertion  Cardiovascular:    Positive for;palpitations;fatigue;lightheadedness;dizziness  Gastroenterology: not assessed    Genitourinary:  not assessed    Musculoskeletal:  not assessed    Neurologic:  not assessed    Psychiatric:  not assessed    Heme/Lymph/Imm:  not assessed    Endocrine:  not assessed           Physical Exam:   Vitals: /80   Pulse 66   Ht 1.727 m (5' 8\")   Wt 84.3 kg (185 lb 12.8 oz)   SpO2 98%   BMI 28.25 kg/m     Wt Readings from Last 4 Encounters:   03/22/23 84.3 kg (185 lb 12.8 oz)   02/28/23 83.4 kg (183 lb 12.8 oz)   02/17/23 83.5 kg (184 lb 1.6 oz)   02/10/23 85.4 kg (188 lb 4.8 oz)     GEN: well nourished, in no acute distress.  HEENT:  Pupils equal, round. Sclerae nonicteric.   NECK: Supple, no masses appreciated.  C/V:  Regular rate and rhythm, no murmur, rub or gallop.    RESP: Respirations are unlabored. Clear to auscultation bilaterally without wheezing, rales, or rhonchi.  GI: Abdomen soft, nontender.  EXTREM: No LE edema.  NEURO: Alert and oriented, cooperative.  SKIN: Warm and dry.        Data:     LIPID RESULTS:  Lab Results   Component Value Date    CHOL 136 08/10/2022    HDL 52 08/10/2022    LDL 68 08/10/2022    TRIG 81 08/10/2022     LIVER ENZYME RESULTS:  Lab Results   Component Value Date    AST 21 02/28/2023    ALT 13 02/28/2023     CBC RESULTS:  Lab Results   Component Value Date    WBC 8.4 02/28/2023    RBC 4.52 02/28/2023    HGB 14.4 02/28/2023    HCT 42.5 02/28/2023    MCV 94 02/28/2023    MCH 31.9 " 02/28/2023    MCHC 33.9 02/28/2023    RDW 11.4 02/28/2023     02/28/2023     BMP RESULTS:  Lab Results   Component Value Date     02/28/2023    POTASSIUM 4.7 02/28/2023    POTASSIUM 4.3 01/17/2023    CHLORIDE 102 02/28/2023    CHLORIDE 110 (H) 01/17/2023    CO2 25 02/28/2023    CO2 26 01/17/2023    ANIONGAP 12 02/28/2023    ANIONGAP 4 01/17/2023     (H) 02/28/2023     (H) 01/18/2023     (H) 01/17/2023    BUN 12.0 02/28/2023    BUN 15 01/17/2023    CR 0.86 02/28/2023    GFRESTIMATED >90 02/28/2023    GFRESTIMATED >60 11/25/2022    GFRESTIMATED >60 04/09/2021    GFRESTBLACK >60 04/09/2021    JANINA 9.9 02/28/2023      A1C RESULTS:  No results found for: A1C  INR RESULTS:  Lab Results   Component Value Date    INR 1.03 01/17/2023            Medications     Current Outpatient Medications   Medication Sig Dispense Refill     acetaminophen (TYLENOL) 500 MG tablet Take 2 tablets (1,000 mg) by mouth 2 times daily       apixaban ANTICOAGULANT (ELIQUIS) 5 MG tablet Take 1 tablet (5 mg) by mouth 2 times daily 180 tablet 3     docusate sodium (COLACE) 100 MG capsule Take 100 mg by mouth as needed       lisinopril (ZESTRIL) 10 MG tablet Take 1 tablet (10 mg) by mouth daily 90 tablet 3     LORazepam (ATIVAN) 1 MG tablet Take 1 tablet by mouth at bedtime as needed for insonmia. 15 tablet 0     methocarbamol (ROBAXIN) 500 MG tablet Take 1 tablet (500 mg) by mouth every 6 hours as needed for muscle spasms 30 tablet 0     metoprolol succinate ER (TOPROL XL) 25 MG 24 hr tablet Take 1 tablet (25 mg) by mouth daily 90 tablet 3     nicotine (NICODERM CQ) 14 MG/24HR 24 hr patch Place 1 patch onto the skin daily 30 patch 0     polyethylene glycol (MIRALAX) 17 GM/Dose powder Take 17 g (1 capful.) by mouth daily       rosuvastatin (CRESTOR) 10 MG tablet Take 1 tablet (10 mg) by mouth daily 90 tablet 3     senna-docusate (SENOKOT-S/PERICOLACE) 8.6-50 MG tablet Take 1 tablet by mouth 2 times daily 50 tablet 0           Past Medical History     Past Medical History:   Diagnosis Date     Acute bilateral low back pain without sciatica 07/23/2018     Anxiety, generalized 11/17/2017     Ascending aorta dilation (H)     4.2 cm on CT scan August 2017, follow annually, 4.1 cm August 2018, stable October 2019.  4.5 cm April 2022.  Recheck in 1 year     CAD (coronary artery disease)     cardiac cath 1/17/2023: non-obstructive CAD     Calcaneal fracture 2013    Chronic foot/heel pain     Chronic cough 04/14/2022     Chronic fatigue 10/14/2019     Chronic hepatitis C (H)     Successfully treated.  Previously on interferon.  More recently SOFOSBUVIR and ribavirin, biopsy has been negative for cirrhosis, PCR -2015     Chronic insomnia      Chronic pain of right ankle 05/17/2018     COPD, mild (H) 2/28/2023     Erectile dysfunction      Essential hypertension      External hemorrhoids 2012     History of alcohol abuse      History of colon polyps     Colonoscopy April 2020 with polyps.  Repeat in 5 years     History of depression     2014, prescribed citalopram     Lower abdominal pain 07/23/2018     Nonischemic cardiomyopathy (H) 2/28/2023    Echocardiogram demonstrating 40 to 50% ejection fraction     Nonobstructive atherosclerosis of coronary artery 2/28/2023    Coronary angiogram demonstrating mild to moderate multivessel disease.  January 2023     Osteoarthritis of multiple joints     Chronic bilateral knee and chronic low back pain     Paroxysmal atrial fibrillation (H) 3/13/2023    Found on event monitor 4% burden longest run 8 hours March 2023     Porphyria cutanea tarda (H)      Pulmonary nodule     CT August 2018 3 mm right upper lobe nodule reported as stable, stable October 2019.  CT with 6 mm right lower lobe nodule April 2022 needing 6-month follow-up     Rectal bleeding 03/02/2020     Screening for AAA (abdominal aortic aneurysm)     CT 2018 without abdominal aneurysm     Shingles 05/13/2021     Slow transit constipation  2/28/2023     Tobacco abuse 11/17/2017     Past Surgical History:   Procedure Laterality Date     ANKLE FRACTURE SURGERY       ARTHROSCOPY KNEE Bilateral      COLONOSCOPY       CV CORONARY ANGIOGRAM N/A 1/17/2023    Procedure: Coronary Angiogram;  Surgeon: Souleymane Ratliff MD;  Location:  HE[facility]ARD[facility] CATH LAB     CV LEFT HEART CATH N/A 1/17/2023    Procedure: Left Heart Catheterization;  Surgeon: Souleymane Ratliff MD;  Location:  [facility]ARD[facility] CATH LAB     CV LEFT VENTRICULOGRAM N/A 1/17/2023    Procedure: Left Ventriculogram;  Surgeon: Souleymane Ratliff MD;  Location:  HE[facility]ARD[facility] CATH LAB     DAVINCI LOBECTOMY LUNG Right 1/18/2023    Procedure: Robot-assisted thoracoscopic right lower lobe wedge, right lower lobectomy, and mediastinal lymph node dissection;  Surgeon: Lobo Leger MD;  Location: [facility] OR     FOOT FRACTURE SURGERY  09/01/2013    calcaneal fracture     WRIST SURGERY      ligament injury     Family History   Problem Relation Age of Onset     Coronary Artery Disease Mother         d. 84     Cerebrovascular Disease Mother      Kidney failure Father         d 76     Rectal Cancer Father      Skin Cancer Brother      Crohn's Disease Brother      Anesthesia Reaction No family hx of      Deep Vein Thrombosis (DVT) No family hx of             Allergies   Patient has no known allergies.        Neli Woodall NP  [facility]  Pager: [phone]

## 2023-03-22 NOTE — LETTER
3/22/2023    Sen Leon MD  [address] [facility] [address]    RE: Cesar Murillo       Dear Colleague,     I had the pleasure of seeing Cesar Murillo in the [facility].  Cardiology Clinic Progress Note  Cesar Murillo MRN# [id number]   YOB: 1955 Age: 68 year old   Primary Cardiologist: Dr. Pereira Reason for visit: Post angiogram follow-up            Assessment and Plan:   1. Non-obstructive coronary artery disease  -1/2023 angiogram showing multivessel mild nonobstructive coronary disease  -Continue statin and aspirin  -No anginal symptoms     2. Paroxysmal atrial fibrillation, SVT, PVCs, NSVT  -Zio patch monitor revealed 7 episodes of nonsustained VT longest lasting 4 beats, 4% atrial fibrillation burden, longest lasting 8 hours, rate controlled, PVC burden of 11%, and 17 episodes of SVT with the longest lasting 11 seconds during which patient was asymptomatic  -Started on Eliquis 5 mg twice daily and metoprolol 25 mg daily XL, blood pressure stable with reduction in lisinopril  -We will have patient wear 48-hour Holter monitor to reassess for rate control and, PVC burden, further VT, and SVT burden  -Discussed options of rate control versus rhythm control, opted to pursue rate control and anticoagulation approach given improvement in symptoms since start of beta-blocker  -Could consider having patient see electrophysiology for management of his arrhythmias in the future if he should become symptomatic or difficulty with rate control    3.  Right lung squamous cell carcinoma, s/p complete right lower lobe lobectomy and mediastinal lymph node dissection (1/2023)  -Continues to follow with surgery team, next follow up in April 2023    3.  Hyperlipidemia, controlled  -Continue rosuvastatin 10mg   -Repeat lipid panel in 6 months     4.  Tobacco abuse, continued smoking  -Encouraged continued taper and discontinuation of tobacco use  -Congratulated him for reducing his use,  encouraged continued work on cessation, still smoking 12 per day wearing a nicotine patch    5. Mild non-ischemic cardiomyopathy, LVEF 45-50% (1/2023)  -Suspect secondary to chronic alcohol use, possible tachycardia component  -We will repeat echocardiogram in 3 months to reassess EF, now that patient is on GDMT with metoprolol and lisinopril     6. Alcohol use  -He has reduced his alcohol use to 1-2 drinks 3 times per week  -Congratulated him on his alcohol moderation     Changes today: Resume aspirin 81mg, wear 48-hour Holter    Follow up plan: Patient to follow up in 6 months with a fasting lipid panel prior, repeat echocardiogram in 3 months         History of Presenting Illness:    Cesar Murillo is a very pleasant 68 year old male with a history of coronary artery disease, significant history of tobacco abuse, right lower lobe lung nodule.    Patient was seen in clinic by Dr. Pereira on 1/16/2023 for preoperative cardiac risk assessment prior to a undergoing robotic assisted thorascopic right sided lower lobe wedge resection.  Patient had an enlarging right lung nodule.     As part of his preoperative cardiac evaluation he underwent a dobutamine stress echocardiogram on 1/10/2023 during which he was having frequent runs of PVCs and noted to have a mildly reduced LVEF estimated at 45 to 50%.  A follow-up Lexiscan nuclear stress test was done on 1/13/2023 which was negative for ischemia or infarction.  On his CT of the chest he is noted to have severe coronary artery calcification although he is never undergone formal coronary artery calcium scoring.  Diagnostic coronary angiography was recommended to assess for any concerns for three-vessel disease.  Nonobstructive coronary disease was present, all lesions were 30% stenotic or less.     Patient underwent  his right lower wedge resection, complete right lower lobe lobectomy and mediastinal lymph node dissection on 1/18/2023 at [facility] with Dr. Leger.  Pathology showed  stage I A2 squamous cell carcinoma.  Plan is to follow with serial imaging.    I first met Cesar in early February to follow-up after his angiogram.  At that time he was continuing to recover from his surgery but was experiencing significant palpitations.  I had him wear a Zio patch monitor which revealed 17 episodes of SVT, longest 11 seconds, 7 episodes of nonsustained VT, 11% PVC burden, as well as 4% atrial fibrillation burden, longest episode lasting 8 hours during which she was rate controlled.  I started him on Eliquis 5 mg twice daily and metoprolol XL 25 mg daily.  I held his aspirin 81 mg daily and reduced his lisinopril to 10 mg daily to allow blood pressure room.    Patient is here today to follow up on the results of his Zio patch monitor. He continues to gradually feel better from his surgery but has continued to have issues with significant post-operative pain.  He feels his palpitations have lessened since starting the metoprolol.    Patient denies chest pain or chest tightness. Denies dizziness, lightheadedness or other presyncopal symptoms. Denies tachycardia. He has had some shortness of breath since his surgery, which has been improving.     Working on quitting smoking, down to 12 cigarettes per day. Using the nicotine patch.     Blood pressure 122/80 and HR 66 in clinic today.    Denies any issues with bleeding since start of Eliquis.        Recent Hospitalizations   As above          Social History    ,   Social History     Socioeconomic History     Marital status:      Spouse name: Not on file     Number of children: Not on file     Years of education: Not on file     Highest education level: Not on file   Occupational History     Not on file   Tobacco Use     Smoking status: Every Day     Packs/day: 0.50     Types: Cigarettes     Start date: 1968     Smokeless tobacco: Never   Substance and Sexual Activity     Alcohol use: Yes     Comment: Alcoholic Drinks/day: 0-2 per day      "Drug use: Yes     Types: Marijuana     Comment: Drug use: Frequent use     Sexual activity: Not on file   Other Topics Concern     Not on file   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       Social Determinants of Health     Financial Resource Strain: Not on file   Food Insecurity: Not on file   Transportation Needs: Not on file   Physical Activity: Not on file   Stress: Not on file   Social Connections: Not on file   Intimate Partner Violence: Not on file   Housing Stability: Not on file            Review of Systems:   Skin:  not assessed     Eyes:  not assessed    ENT:  not assessed    Respiratory:  Positive for dyspnea on exertion  Cardiovascular:    Positive for;palpitations;fatigue;lightheadedness;dizziness  Gastroenterology: not assessed    Genitourinary:  not assessed    Musculoskeletal:  not assessed    Neurologic:  not assessed    Psychiatric:  not assessed    Heme/Lymph/Imm:  not assessed    Endocrine:  not assessed           Physical Exam:   Vitals: /80   Pulse 66   Ht 1.727 m (5' 8\")   Wt 84.3 kg (185 lb 12.8 oz)   SpO2 98%   BMI 28.25 kg/m     Wt Readings from Last 4 Encounters:   03/22/23 84.3 kg (185 lb 12.8 oz)   02/28/23 83.4 kg (183 lb 12.8 oz)   02/17/23 83.5 kg (184 lb 1.6 oz)   02/10/23 85.4 kg (188 lb 4.8 oz)     GEN: well nourished, in no acute distress.  HEENT:  Pupils equal, round. Sclerae nonicteric.   NECK: Supple, no masses appreciated.  C/V:  Regular rate and rhythm, no murmur, rub or gallop.    RESP: Respirations are unlabored. Clear to auscultation bilaterally without wheezing, rales, or rhonchi.  GI: Abdomen soft, nontender.  EXTREM: No LE edema.  NEURO: Alert and oriented, cooperative.  SKIN: Warm and dry.        Data:     LIPID RESULTS:  Lab Results   Component Value Date    CHOL 136 08/10/2022    HDL 52 08/10/2022    LDL 68 08/10/2022    TRIG 81 08/10/2022     LIVER ENZYME RESULTS:  Lab Results   Component Value Date    AST 21 02/28/2023    " ALT 13 02/28/2023     CBC RESULTS:  Lab Results   Component Value Date    WBC 8.4 02/28/2023    RBC 4.52 02/28/2023    HGB 14.4 02/28/2023    HCT 42.5 02/28/2023    MCV 94 02/28/2023    MCH 31.9 02/28/2023    MCHC 33.9 02/28/2023    RDW 11.4 02/28/2023     02/28/2023     BMP RESULTS:  Lab Results   Component Value Date     02/28/2023    POTASSIUM 4.7 02/28/2023    POTASSIUM 4.3 01/17/2023    CHLORIDE 102 02/28/2023    CHLORIDE 110 (H) 01/17/2023    CO2 25 02/28/2023    CO2 26 01/17/2023    ANIONGAP 12 02/28/2023    ANIONGAP 4 01/17/2023     (H) 02/28/2023     (H) 01/18/2023     (H) 01/17/2023    BUN 12.0 02/28/2023    BUN 15 01/17/2023    CR 0.86 02/28/2023    GFRESTIMATED >90 02/28/2023    GFRESTIMATED >60 11/25/2022    GFRESTIMATED >60 04/09/2021    GFRESTBLACK >60 04/09/2021    JANINA 9.9 02/28/2023      A1C RESULTS:  No results found for: A1C  INR RESULTS:  Lab Results   Component Value Date    INR 1.03 01/17/2023            Medications     Current Outpatient Medications   Medication Sig Dispense Refill     acetaminophen (TYLENOL) 500 MG tablet Take 2 tablets (1,000 mg) by mouth 2 times daily       apixaban ANTICOAGULANT (ELIQUIS) 5 MG tablet Take 1 tablet (5 mg) by mouth 2 times daily 180 tablet 3     docusate sodium (COLACE) 100 MG capsule Take 100 mg by mouth as needed       lisinopril (ZESTRIL) 10 MG tablet Take 1 tablet (10 mg) by mouth daily 90 tablet 3     LORazepam (ATIVAN) 1 MG tablet Take 1 tablet by mouth at bedtime as needed for insonmia. 15 tablet 0     methocarbamol (ROBAXIN) 500 MG tablet Take 1 tablet (500 mg) by mouth every 6 hours as needed for muscle spasms 30 tablet 0     metoprolol succinate ER (TOPROL XL) 25 MG 24 hr tablet Take 1 tablet (25 mg) by mouth daily 90 tablet 3     nicotine (NICODERM CQ) 14 MG/24HR 24 hr patch Place 1 patch onto the skin daily 30 patch 0     polyethylene glycol (MIRALAX) 17 GM/Dose powder Take 17 g (1 capful.) by mouth daily        rosuvastatin (CRESTOR) 10 MG tablet Take 1 tablet (10 mg) by mouth daily 90 tablet 3     senna-docusate (SENOKOT-S/PERICOLACE) 8.6-50 MG tablet Take 1 tablet by mouth 2 times daily 50 tablet 0          Past Medical History     Past Medical History:   Diagnosis Date     Acute bilateral low back pain without sciatica 07/23/2018     Anxiety, generalized 11/17/2017     Ascending aorta dilation (H)     4.2 cm on CT scan August 2017, follow annually, 4.1 cm August 2018, stable October 2019.  4.5 cm April 2022.  Recheck in 1 year     CAD (coronary artery disease)     cardiac cath 1/17/2023: non-obstructive CAD     Calcaneal fracture 2013    Chronic foot/heel pain     Chronic cough 04/14/2022     Chronic fatigue 10/14/2019     Chronic hepatitis C (H)     Successfully treated.  Previously on interferon.  More recently SOFOSBUVIR and ribavirin, biopsy has been negative for cirrhosis, PCR -2015     Chronic insomnia      Chronic pain of right ankle 05/17/2018     COPD, mild (H) 2/28/2023     Erectile dysfunction      Essential hypertension      External hemorrhoids 2012     History of alcohol abuse      History of colon polyps     Colonoscopy April 2020 with polyps.  Repeat in 5 years     History of depression     2014, prescribed citalopram     Lower abdominal pain 07/23/2018     Nonischemic cardiomyopathy (H) 2/28/2023    Echocardiogram demonstrating 40 to 50% ejection fraction     Nonobstructive atherosclerosis of coronary artery 2/28/2023    Coronary angiogram demonstrating mild to moderate multivessel disease.  January 2023     Osteoarthritis of multiple joints     Chronic bilateral knee and chronic low back pain     Paroxysmal atrial fibrillation (H) 3/13/2023    Found on event monitor 4% burden longest run 8 hours March 2023     Porphyria cutanea tarda (H)      Pulmonary nodule     CT August 2018 3 mm right upper lobe nodule reported as stable, stable October 2019.  CT with 6 mm right lower lobe nodule April 2022 needing  6-month follow-up     Rectal bleeding 03/02/2020     Screening for AAA (abdominal aortic aneurysm)     CT 2018 without abdominal aneurysm     Shingles 05/13/2021     Slow transit constipation 2/28/2023     Tobacco abuse 11/17/2017     Past Surgical History:   Procedure Laterality Date     ANKLE FRACTURE SURGERY       ARTHROSCOPY KNEE Bilateral      COLONOSCOPY       CV CORONARY ANGIOGRAM N/A 1/17/2023    Procedure: Coronary Angiogram;  Surgeon: Souleymane Ratliff MD;  Location:  HEART CARDIAC CATH LAB     CV LEFT HEART CATH N/A 1/17/2023    Procedure: Left Heart Catheterization;  Surgeon: Souleymane Ratliff MD;  Location: [facility] CARDIAC CATH LAB     CV LEFT VENTRICULOGRAM N/A 1/17/2023    Procedure: Left Ventriculogram;  Surgeon: Souleymane Ratliff MD;  Location: [facility] CARDIAC CATH LAB     DAVINCI LOBECTOMY LUNG Right 1/18/2023    Procedure: Robot-assisted thoracoscopic right lower lobe wedge, right lower lobectomy, and mediastinal lymph node dissection;  Surgeon: Lobo Leger MD;  Location: [facility] OR     FOOT FRACTURE SURGERY  09/01/2013    calcaneal fracture     WRIST SURGERY      ligament injury     Family History   Problem Relation Age of Onset     Coronary Artery Disease Mother         d. 84     Cerebrovascular Disease Mother      Kidney failure Father         d 76     Rectal Cancer Father      Skin Cancer Brother      Crohn's Disease Brother      Anesthesia Reaction No family hx of      Deep Vein Thrombosis (DVT) No family hx of             Allergies   Patient has no known allergies.    Neli Woodall NP  [facility]  Pager: [phone]        Thank you for allowing me to participate in the care of your patient.      Sincerely,     Neli Woodall NP     [facility]  cc:   Neli Woodall NP  [address] [id number]

## 2023-03-23 ENCOUNTER — TELEPHONE (OUTPATIENT)
Dept: CARDIOLOGY | Facility: CLINIC | Age: 68
End: 2023-03-23
Payer: COMMERCIAL

## 2023-03-23 DIAGNOSIS — I25.10 CORONARY ARTERY DISEASE INVOLVING NATIVE CORONARY ARTERY OF NATIVE HEART WITHOUT ANGINA PECTORIS: Primary | ICD-10-CM

## 2023-03-24 NOTE — TELEPHONE ENCOUNTER
Called patient with recommendations from Raven Woodall NP that he can restart his aspirin 81 mg daily in addition to the Eliquis.  Patient verbalized understanding, med list updated.  BELKYS Pastrana RN

## 2023-04-06 ENCOUNTER — HOSPITAL ENCOUNTER (OUTPATIENT)
Dept: CARDIOLOGY | Facility: CLINIC | Age: 68
Discharge: HOME | End: 2023-04-06
Attending: NURSE PRACTITIONER | Admitting: NURSE PRACTITIONER
Payer: COMMERCIAL

## 2023-04-06 DIAGNOSIS — I48.0 PAROXYSMAL ATRIAL FIBRILLATION (H): ICD-10-CM

## 2023-04-06 PROCEDURE — 93226 XTRNL ECG REC<48 HR SCAN A/R: CPT

## 2023-04-06 PROCEDURE — 93225 XTRNL ECG REC<48 HRS REC: CPT

## 2023-04-06 PROCEDURE — 93227 XTRNL ECG REC<48 HR R&I: CPT | Performed by: INTERNAL MEDICINE

## 2023-04-07 DIAGNOSIS — C34.31 MALIGNANT NEOPLASM OF LOWER LOBE OF RIGHT LUNG (H): ICD-10-CM

## 2023-04-07 DIAGNOSIS — F41.1 ANXIETY, GENERALIZED: ICD-10-CM

## 2023-04-07 RX ORDER — METHOCARBAMOL 500 MG/1
500 TABLET, FILM COATED ORAL EVERY 6 HOURS PRN
Qty: 30 TABLET | Refills: 0 | OUTPATIENT
Start: 2023-04-07

## 2023-04-07 NOTE — TELEPHONE ENCOUNTER
Methocarbamol refill   Last prescribing provider: jeanne Dillard     Last clinic visit date: 2/17/23 Jeanne Dillard     Recommendations for requested medication (if none, N/A): Copied from chart note 2/17/23 Jeanne Dillard   2. Pain Control Plan: one final refill of oxycodone, refill methocarbamol, use warm packs as needed      Any other pertinent information (if none, N/A):   Patient requesting refill of oxycodone also if that is an option, Says tylenol is not always enough to manage his pain. Read him Jeanne's last note from 2/17/23 he requests to ask Jeanne Dillard if a refill of oxycodone would be possible,     Refilled: Y/N, if NO, why?

## 2023-04-10 DIAGNOSIS — Z72.0 TOBACCO ABUSE: ICD-10-CM

## 2023-04-10 DIAGNOSIS — I25.10 CORONARY ARTERY CALCIFICATION SEEN ON CT SCAN: ICD-10-CM

## 2023-04-10 RX ORDER — LORAZEPAM 1 MG/1
TABLET ORAL
Qty: 15 TABLET | Refills: 0 | Status: SHIPPED | OUTPATIENT
Start: 2023-04-10 | End: 2023-07-28

## 2023-04-10 RX ORDER — ROSUVASTATIN CALCIUM 10 MG/1
10 TABLET, COATED ORAL DAILY
Qty: 90 TABLET | Refills: 2 | Status: SHIPPED | OUTPATIENT
Start: 2023-04-10 | End: 2023-12-29

## 2023-04-10 NOTE — TELEPHONE ENCOUNTER
"Last Written Prescription Date:  4/14/2022  Last Fill Quantity: 90,  # refills: 3   Last office visit provider:  2/28/2023     Requested Prescriptions   Pending Prescriptions Disp Refills     rosuvastatin (CRESTOR) 10 MG tablet [Pharmacy Med Name: Rosuvastatin Calcium Oral Tablet 10 MG] 90 tablet 0     Sig: Take 1 tablet (10 mg) by mouth daily       Statins Protocol Passed - 4/10/2023  2:00 AM        Passed - LDL on file in past 12 months     Recent Labs   Lab Test 08/10/22  1009   LDL 68             Passed - No abnormal creatine kinase in past 12 months     No lab results found.             Passed - Recent (12 mo) or future (30 days) visit within the authorizing provider's specialty     Patient has had an office visit with the authorizing provider or a provider within the authorizing providers department within the previous 12 mos or has a future within next 30 days. See \"Patient Info\" tab in inbasket, or \"Choose Columns\" in Meds & Orders section of the refill encounter.              Passed - Medication is active on med list        Passed - Patient is age 18 or older             FAM MURILLO RN 04/10/23 2:29 PM  "

## 2023-04-17 NOTE — PROGRESS NOTES
THORACIC SURGERY FOLLOW UP VISIT    I saw Mr. Murillo in follow-up today. The clinical summary follows:     PREOP DIAGNOSIS   Lung nodule  PROCEDURE   Robot assisted thoracoscopic right lower lobe wedge resection, completion right lower lobectomy, mediastinal lymph node dissection    DATE OF PROCEDURE  01/18/2023    HISTOPATHOLOGY   Squamous cell carcinoma, keratinizing type, moderately differentiated, pT1bN0    COMPLICATIONS  None    INTERVAL STUDIES  CT chest: post operative changes of a right lower lobectomy. New 3 mm nodule in the right upper lobe.    Past Medical History:   Diagnosis Date     Acute bilateral low back pain without sciatica 07/23/2018     Anxiety, generalized 11/17/2017     Ascending aorta dilation (H)     4.2 cm on CT scan August 2017, follow annually, 4.1 cm August 2018, stable October 2019.  4.5 cm April 2022.  Recheck in 1 year     CAD (coronary artery disease)     cardiac cath 1/17/2023: non-obstructive CAD     Calcaneal fracture 2013    Chronic foot/heel pain     Chronic cough 04/14/2022     Chronic fatigue 10/14/2019     Chronic hepatitis C (H)     Successfully treated.  Previously on interferon.  More recently SOFOSBUVIR and ribavirin, biopsy has been negative for cirrhosis, PCR -2015     Chronic insomnia      Chronic pain of right ankle 05/17/2018     COPD, mild (H) 2/28/2023     Erectile dysfunction      Essential hypertension      External hemorrhoids 2012     History of alcohol abuse      History of colon polyps     Colonoscopy April 2020 with polyps.  Repeat in 5 years     History of depression     2014, prescribed citalopram     Lower abdominal pain 07/23/2018     Nonischemic cardiomyopathy (H) 2/28/2023    Echocardiogram demonstrating 40 to 50% ejection fraction     Nonobstructive atherosclerosis of coronary artery 2/28/2023    Coronary angiogram demonstrating mild to moderate multivessel disease.  January 2023     Osteoarthritis of multiple joints     Chronic bilateral knee and  chronic low back pain     Paroxysmal atrial fibrillation (H) 3/13/2023    Found on event monitor 4% burden longest run 8 hours March 2023     Porphyria cutanea tarda (H)      Pulmonary nodule     CT August 2018 3 mm right upper lobe nodule reported as stable, stable October 2019.  CT with 6 mm right lower lobe nodule April 2022 needing 6-month follow-up     Rectal bleeding 03/02/2020     Screening for AAA (abdominal aortic aneurysm)     CT 2018 without abdominal aneurysm     Shingles 05/13/2021     Slow transit constipation 2/28/2023     Tobacco abuse 11/17/2017      Past Surgical History:   Procedure Laterality Date     ANKLE FRACTURE SURGERY       ARTHROSCOPY KNEE Bilateral      COLONOSCOPY       CV CORONARY ANGIOGRAM N/A 1/17/2023    Procedure: Coronary Angiogram;  Surgeon: Souleymane Ratliff MD;  Location:  HEART CARDIAC CATH LAB     CV LEFT HEART CATH N/A 1/17/2023    Procedure: Left Heart Catheterization;  Surgeon: Souleymane Ratliff MD;  Location: [facility] CARDIAC CATH LAB     CV LEFT VENTRICULOGRAM N/A 1/17/2023    Procedure: Left Ventriculogram;  Surgeon: Souleymane Ratliff MD;  Location: [facility] CARDIAC CATH LAB     DAVINCI LOBECTOMY LUNG Right 1/18/2023    Procedure: Robot-assisted thoracoscopic right lower lobe wedge, right lower lobectomy, and mediastinal lymph node dissection;  Surgeon: Lobo eLger MD;  Location: [facility] OR     FOOT FRACTURE SURGERY  09/01/2013    calcaneal fracture     WRIST SURGERY      ligament injury      SUBJECTIVE   Cesar is doing ok. He still reports abdominal tenderness that feels better if he puts pressure on it with his hands. He is wondering if there is some sort of back brace he can wear to compress his abdomen for comfort. He is hoping to be out golfing soon and feels a brace of some sort would help him to more comfortably swing his golf club.    He does still need to take oxycodone and the muscle relaxer at times because of the discomfort. He is asking for a refill of  oxycodone and methocarbamol as he anticipates being outside and more active once the warmer weather arrives. He also asked if I could make the prescriptions refillable.    OBJECTIVE  BP (!) 148/92 (BP Location: Right arm, Patient Position: Sitting, Cuff Size: Adult Regular)   Pulse 50   Temp 97.6  F (36.4  C) (Oral)   Resp 16   Wt 84.3 kg (185 lb 13.6 oz)   SpO2 99%   BMI 28.26 kg/m      From a personal perspective, he is here alone.    IMPRESSION   68 year-old male status post robot assisted thoracoscopic right lower lobe wedge resection, completion lobectomy, and mediastinal lymph node dissection for a pT1bN0 (stage IA2) non small cell lung cancer. He is here today with a new baseline chest CT.    We talked about ongoing pain management and I offered a referral to the Pain Clinic however, he said he saw someone in a pain clinic about 10 years ago for his ankle and ended up not following up with them. He declines referral to Pain Clinic. I explained to him that it is not appropriate for me to manage his long term pain management as I am following him for lung cancer surveillance. I will provide one final refill. Any future refills will need to come from his PCP or he will need to see a pain specialist. Cesar feels that his PCP will be ok taking over management of this.     checked. No active alerts for Cesar. Narc score is 250, Overdose Risk score is 400, and average MME/day is 0.4MME/day.    I provided Cesar with an abdominal binder that he can use to compression and support.    Because there is a new 3 mm nodule in the right upper lobe, I will have him return in 3 months with a short term follow up CT to get a better romulo of the nodule's behavior.    PLAN  I spent 25 min on the date of the encounter in chart review, patient visit, review of tests, documentation and/or discussion with other providers about the issues documented above. I reviewed the plan as follows:  CT chest in 3 months    All questions were  answered and the patient and present family were in agreement with the plan.  I appreciate the opportunity to participate in the care of your patient and will keep you updated.  Sincerely,

## 2023-04-19 ENCOUNTER — ONCOLOGY VISIT (OUTPATIENT)
Dept: SURGERY | Facility: CLINIC | Age: 68
End: 2023-04-19
Attending: CLINICAL NURSE SPECIALIST
Payer: COMMERCIAL

## 2023-04-19 ENCOUNTER — ANCILLARY PROCEDURE (OUTPATIENT)
Dept: CT IMAGING | Facility: CLINIC | Age: 68
End: 2023-04-19
Attending: CLINICAL NURSE SPECIALIST
Payer: COMMERCIAL

## 2023-04-19 VITALS
HEART RATE: 50 BPM | TEMPERATURE: 97.6 F | SYSTOLIC BLOOD PRESSURE: 148 MMHG | BODY MASS INDEX: 28.26 KG/M2 | RESPIRATION RATE: 16 BRPM | WEIGHT: 185.85 LBS | OXYGEN SATURATION: 99 % | DIASTOLIC BLOOD PRESSURE: 92 MMHG

## 2023-04-19 DIAGNOSIS — C34.31 MALIGNANT NEOPLASM OF LOWER LOBE OF RIGHT LUNG (H): ICD-10-CM

## 2023-04-19 PROCEDURE — G0463 HOSPITAL OUTPT CLINIC VISIT: HCPCS | Performed by: CLINICAL NURSE SPECIALIST

## 2023-04-19 PROCEDURE — 99024 POSTOP FOLLOW-UP VISIT: CPT | Performed by: CLINICAL NURSE SPECIALIST

## 2023-04-19 PROCEDURE — 71260 CT THORAX DX C+: CPT | Performed by: RADIOLOGY

## 2023-04-19 RX ORDER — IOPAMIDOL 755 MG/ML
97 INJECTION, SOLUTION INTRAVASCULAR ONCE
Status: COMPLETED | OUTPATIENT
Start: 2023-04-19 | End: 2023-04-19

## 2023-04-19 RX ORDER — OXYCODONE HYDROCHLORIDE 5 MG/1
5 TABLET ORAL EVERY 6 HOURS PRN
Qty: 28 TABLET | Refills: 0 | Status: SHIPPED | OUTPATIENT
Start: 2023-04-19 | End: 2023-04-26

## 2023-04-19 RX ORDER — METHOCARBAMOL 500 MG/1
500 TABLET, FILM COATED ORAL EVERY 6 HOURS PRN
Qty: 30 TABLET | Refills: 0 | Status: SHIPPED | OUTPATIENT
Start: 2023-04-19 | End: 2024-04-04

## 2023-04-19 RX ADMIN — IOPAMIDOL 97 ML: 755 INJECTION, SOLUTION INTRAVASCULAR at 12:32

## 2023-04-19 ASSESSMENT — PAIN SCALES - GENERAL: PAINLEVEL: MILD PAIN (3)

## 2023-04-19 NOTE — NURSING NOTE
"Oncology Rooming Note    April 19, 2023 2:05 PM   Cesar Murillo is a 68 year old male who presents for:    Chief Complaint   Patient presents with     Oncology Clinic Visit     Lung cancer, lower lobe      Initial Vitals: BP (!) 148/92 (BP Location: Right arm, Patient Position: Sitting, Cuff Size: Adult Regular)   Pulse 50   Temp 97.6  F (36.4  C) (Oral)   Resp 16   Wt 84.3 kg (185 lb 13.6 oz)   SpO2 99%   BMI 28.26 kg/m   Estimated body mass index is 28.26 kg/m  as calculated from the following:    Height as of 3/22/23: 1.727 m (5' 8\").    Weight as of this encounter: 84.3 kg (185 lb 13.6 oz). Body surface area is 2.01 meters squared.  Mild Pain (3) Comment: Data Unavailable   No LMP for male patient.  Allergies reviewed: Yes  Medications reviewed: Yes    Medications: Medication refills not needed today.  Pharmacy name entered into [facility]: [id number] [address]    Clinical concerns: Patient requesting medication refills for Robaxin and Oxycodone, preferred pharmacy was verified.        Yaa Syed (Nidhi), LPN April 19, 2023 2:05 PM              "

## 2023-04-19 NOTE — LETTER
4/19/2023         RE: Cesar Murillo  [address]  [place] [address]        Dear Colleague,    Thank you for referring your patient, Cesar Murillo, to the [facility]. Please see a copy of my visit note below.    THORACIC SURGERY FOLLOW UP VISIT    I saw Mr. Murillo in follow-up today. The clinical summary follows:     PREOP DIAGNOSIS   Lung nodule  PROCEDURE   Robot assisted thoracoscopic right lower lobe wedge resection, completion right lower lobectomy, mediastinal lymph node dissection    DATE OF PROCEDURE  01/18/2023    HISTOPATHOLOGY   Squamous cell carcinoma, keratinizing type, moderately differentiated, pT1bN0    COMPLICATIONS  None    INTERVAL STUDIES  CT chest: post operative changes of a right lower lobectomy. New 3 mm nodule in the right upper lobe.    Past Medical History:   Diagnosis Date    Acute bilateral low back pain without sciatica 07/23/2018    Anxiety, generalized 11/17/2017    Ascending aorta dilation (H)     4.2 cm on CT scan August 2017, follow annually, 4.1 cm August 2018, stable October 2019.  4.5 cm April 2022.  Recheck in 1 year    CAD (coronary artery disease)     cardiac cath 1/17/2023: non-obstructive CAD    Calcaneal fracture 2013    Chronic foot/heel pain    Chronic cough 04/14/2022    Chronic fatigue 10/14/2019    Chronic hepatitis C (H)     Successfully treated.  Previously on interferon.  More recently SOFOSBUVIR and ribavirin, biopsy has been negative for cirrhosis, PCR -2015    Chronic insomnia     Chronic pain of right ankle 05/17/2018    COPD, mild (H) 2/28/2023    Erectile dysfunction     Essential hypertension     External hemorrhoids 2012    History of alcohol abuse     History of colon polyps     Colonoscopy April 2020 with polyps.  Repeat in 5 years    History of depression     2014, prescribed citalopram    Lower abdominal pain 07/23/2018    Nonischemic cardiomyopathy (H) 2/28/2023    Echocardiogram demonstrating 40 to 50% ejection  fraction    Nonobstructive atherosclerosis of coronary artery 2/28/2023    Coronary angiogram demonstrating mild to moderate multivessel disease.  January 2023    Osteoarthritis of multiple joints     Chronic bilateral knee and chronic low back pain    Paroxysmal atrial fibrillation (H) 3/13/2023    Found on event monitor 4% burden longest run 8 hours March 2023    Porphyria cutanea tarda (H)     Pulmonary nodule     CT August 2018 3 mm right upper lobe nodule reported as stable, stable October 2019.  CT with 6 mm right lower lobe nodule April 2022 needing 6-month follow-up    Rectal bleeding 03/02/2020    Screening for AAA (abdominal aortic aneurysm)     CT 2018 without abdominal aneurysm    Shingles 05/13/2021    Slow transit constipation 2/28/2023    Tobacco abuse 11/17/2017      Past Surgical History:   Procedure Laterality Date    ANKLE FRACTURE SURGERY      ARTHROSCOPY KNEE Bilateral     COLONOSCOPY      CV CORONARY ANGIOGRAM N/A 1/17/2023    Procedure: Coronary Angiogram;  Surgeon: Souleymane Ratliff MD;  Location:  HEART CARDIAC CATH LAB    CV LEFT HEART CATH N/A 1/17/2023    Procedure: Left Heart Catheterization;  Surgeon: Souleymane Ratliff MD;  Location: [facility] CARDIAC CATH LAB    CV LEFT VENTRICULOGRAM N/A 1/17/2023    Procedure: Left Ventriculogram;  Surgeon: Souleymane Ratliff MD;  Location: [facility] CARDIAC CATH LAB    DAVINCI LOBECTOMY LUNG Right 1/18/2023    Procedure: Robot-assisted thoracoscopic right lower lobe wedge, right lower lobectomy, and mediastinal lymph node dissection;  Surgeon: Lobo Leger MD;  Location: [facility] OR    FOOT FRACTURE SURGERY  09/01/2013    calcaneal fracture    WRIST SURGERY      ligament injury      SUBJECTIVE   Cesar is doing ok. He still reports abdominal tenderness that feels better if he puts pressure on it with his hands. He is wondering if there is some sort of back brace he can wear to compress his abdomen for comfort. He is hoping to be out golfing soon and  feels a brace of some sort would help him to more comfortably swing his golf club.    He does still need to take oxycodone and the muscle relaxer at times because of the discomfort. He is asking for a refill of oxycodone and methocarbamol as he anticipates being outside and more active once the warmer weather arrives. He also asked if I could make the prescriptions refillable.    OBJECTIVE  BP (!) 148/92 (BP Location: Right arm, Patient Position: Sitting, Cuff Size: Adult Regular)   Pulse 50   Temp 97.6  F (36.4  C) (Oral)   Resp 16   Wt 84.3 kg (185 lb 13.6 oz)   SpO2 99%   BMI 28.26 kg/m      From a personal perspective, he is here alone.    IMPRESSION   68 year-old male status post robot assisted thoracoscopic right lower lobe wedge resection, completion lobectomy, and mediastinal lymph node dissection for a pT1bN0 (stage IA2) non small cell lung cancer. He is here today with a new baseline chest CT.    We talked about ongoing pain management and I offered a referral to the Pain Clinic however, he said he saw someone in a pain clinic about 10 years ago for his ankle and ended up not following up with them. He declines referral to Pain Clinic. I explained to him that it is not appropriate for me to manage his long term pain management as I am following him for lung cancer surveillance. I will provide one final refill. Any future refills will need to come from his PCP or he will need to see a pain specialist. Cesar feels that his PCP will be ok taking over management of this.     checked. No active alerts for Cesar. Narc score is 250, Overdose Risk score is 400, and average MME/day is 0.4MME/day.    I provided Cesar with an abdominal binder that he can use to compression and support.    Because there is a new 3 mm nodule in the right upper lobe, I will have him return in 3 months with a short term follow up CT to get a better romulo of the nodule's behavior.    PLAN  I spent 25 min on the date of the encounter  in chart review, patient visit, review of tests, documentation and/or discussion with other providers about the issues documented above. I reviewed the plan as follows:  CT chest in 3 months    All questions were answered and the patient and present family were in agreement with the plan.  I appreciate the opportunity to participate in the care of your patient and will keep you updated.  Sincerely,    FAUZIA Nava CNS

## 2023-04-19 NOTE — LETTER
4/19/2023         RE: Cesar Murillo  [address]  [place] [address]      THORACIC SURGERY FOLLOW UP VISIT    I saw Mr. Murillo in follow-up today. The clinical summary follows:     PREOP DIAGNOSIS   Lung nodule  PROCEDURE   Robot assisted thoracoscopic right lower lobe wedge resection, completion right lower lobectomy, mediastinal lymph node dissection    DATE OF PROCEDURE  01/18/2023    HISTOPATHOLOGY   Squamous cell carcinoma, keratinizing type, moderately differentiated, pT1bN0    COMPLICATIONS  None    INTERVAL STUDIES  CT chest: post operative changes of a right lower lobectomy. New 3 mm nodule in the right upper lobe.    Past Medical History:   Diagnosis Date    Acute bilateral low back pain without sciatica 07/23/2018    Anxiety, generalized 11/17/2017    Ascending aorta dilation (H)     4.2 cm on CT scan August 2017, follow annually, 4.1 cm August 2018, stable October 2019.  4.5 cm April 2022.  Recheck in 1 year    CAD (coronary artery disease)     cardiac cath 1/17/2023: non-obstructive CAD    Calcaneal fracture 2013    Chronic foot/heel pain    Chronic cough 04/14/2022    Chronic fatigue 10/14/2019    Chronic hepatitis C (H)     Successfully treated.  Previously on interferon.  More recently SOFOSBUVIR and ribavirin, biopsy has been negative for cirrhosis, PCR -2015    Chronic insomnia     Chronic pain of right ankle 05/17/2018    COPD, mild (H) 2/28/2023    Erectile dysfunction     Essential hypertension     External hemorrhoids 2012    History of alcohol abuse     History of colon polyps     Colonoscopy April 2020 with polyps.  Repeat in 5 years    History of depression     2014, prescribed citalopram    Lower abdominal pain 07/23/2018    Nonischemic cardiomyopathy (H) 2/28/2023    Echocardiogram demonstrating 40 to 50% ejection fraction    Nonobstructive atherosclerosis of coronary artery 2/28/2023    Coronary angiogram demonstrating mild to moderate multivessel disease.  January 2023     Osteoarthritis of multiple joints     Chronic bilateral knee and chronic low back pain    Paroxysmal atrial fibrillation (H) 3/13/2023    Found on event monitor 4% burden longest run 8 hours March 2023    Porphyria cutanea tarda (H)     Pulmonary nodule     CT August 2018 3 mm right upper lobe nodule reported as stable, stable October 2019.  CT with 6 mm right lower lobe nodule April 2022 needing 6-month follow-up    Rectal bleeding 03/02/2020    Screening for AAA (abdominal aortic aneurysm)     CT 2018 without abdominal aneurysm    Shingles 05/13/2021    Slow transit constipation 2/28/2023    Tobacco abuse 11/17/2017      Past Surgical History:   Procedure Laterality Date    ANKLE FRACTURE SURGERY      ARTHROSCOPY KNEE Bilateral     COLONOSCOPY      CV CORONARY ANGIOGRAM N/A 1/17/2023    Procedure: Coronary Angiogram;  Surgeon: Souleymane Ratliff MD;  Location:  HEART CARDIAC CATH LAB    CV LEFT HEART CATH N/A 1/17/2023    Procedure: Left Heart Catheterization;  Surgeon: Souleymane Ratliff MD;  Location:  HEART CARDIAC CATH LAB    CV LEFT VENTRICULOGRAM N/A 1/17/2023    Procedure: Left Ventriculogram;  Surgeon: Souleymane Ratliff MD;  Location: [facility] CARDIAC CATH LAB    DAVINCI LOBECTOMY LUNG Right 1/18/2023    Procedure: Robot-assisted thoracoscopic right lower lobe wedge, right lower lobectomy, and mediastinal lymph node dissection;  Surgeon: Lobo Leger MD;  Location: [facility] OR    FOOT FRACTURE SURGERY  09/01/2013    calcaneal fracture    WRIST SURGERY      ligament injury      SUBJECTIVE   Cesar is doing ok. He still reports abdominal tenderness that feels better if he puts pressure on it with his hands. He is wondering if there is some sort of back brace he can wear to compress his abdomen for comfort. He is hoping to be out golfing soon and feels a brace of some sort would help him to more comfortably swing his golf club.    He does still need to take oxycodone and the muscle relaxer at times because of  the discomfort. He is asking for a refill of oxycodone and methocarbamol as he anticipates being outside and more active once the warmer weather arrives. He also asked if I could make the prescriptions refillable.    OBJECTIVE  BP (!) 148/92 (BP Location: Right arm, Patient Position: Sitting, Cuff Size: Adult Regular)   Pulse 50   Temp 97.6  F (36.4  C) (Oral)   Resp 16   Wt 84.3 kg (185 lb 13.6 oz)   SpO2 99%   BMI 28.26 kg/m      From a personal perspective, he is here alone.    IMPRESSION   68 year-old male status post robot assisted thoracoscopic right lower lobe wedge resection, completion lobectomy, and mediastinal lymph node dissection for a pT1bN0 (stage IA2) non small cell lung cancer. He is here today with a new baseline chest CT.    We talked about ongoing pain management and I offered a referral to the Pain Clinic however, he said he saw someone in a pain clinic about 10 years ago for his ankle and ended up not following up with them. He declines referral to Pain Clinic. I explained to him that it is not appropriate for me to manage his long term pain management as I am following him for lung cancer surveillance. I will provide one final refill. Any future refills will need to come from his PCP or he will need to see a pain specialist. Cesar feels that his PCP will be ok taking over management of this.     checked. No active alerts for Cesar. Narc score is 250, Overdose Risk score is 400, and average MME/day is 0.4MME/day.    I provided Cesar with an abdominal binder that he can use to compression and support.    Because there is a new 3 mm nodule in the right upper lobe, I will have him return in 3 months with a short term follow up CT to get a better romulo of the nodule's behavior.    PLAN  I spent 25 min on the date of the encounter in chart review, patient visit, review of tests, documentation and/or discussion with other providers about the issues documented above. I reviewed the plan as  follows:  CT chest in 3 months    All questions were answered and the patient and present family were in agreement with the plan.  I appreciate the opportunity to participate in the care of your patient and will keep you updated.    Sincerely,            FAUZIA Nava CNS

## 2023-04-20 ENCOUNTER — TELEPHONE (OUTPATIENT)
Dept: CARDIOLOGY | Facility: CLINIC | Age: 68
End: 2023-04-20
Payer: COMMERCIAL

## 2023-04-20 DIAGNOSIS — I25.10 CORONARY ARTERY DISEASE INVOLVING NATIVE CORONARY ARTERY OF NATIVE HEART WITHOUT ANGINA PECTORIS: ICD-10-CM

## 2023-04-20 DIAGNOSIS — I49.3 PVC'S (PREMATURE VENTRICULAR CONTRACTIONS): Primary | ICD-10-CM

## 2023-04-20 DIAGNOSIS — I48.0 PAROXYSMAL ATRIAL FIBRILLATION (H): ICD-10-CM

## 2023-04-20 DIAGNOSIS — R00.2 PALPITATIONS: ICD-10-CM

## 2023-04-20 NOTE — TELEPHONE ENCOUNTER
----- Message from Neli Woodall NP sent at 4/20/2023  2:50 PM CDT -----  Holter revealed continued high PVC burden despite start of beta blocker and no further evidence of atrial fibrillation. I would like him to see electrophysiology to discuss possible ablation vs. medications for PVC suppression. He should continue his metoprolol and eliquis.

## 2023-04-21 NOTE — TELEPHONE ENCOUNTER
Attempted to call patient with recommendations from Raven Woodall NP left message for patient to call back.  BELKYS Pastrana RN

## 2023-04-24 NOTE — TELEPHONE ENCOUNTER
Called patient with results of Holter monitor and recommendations from Raven Woodall NP.  Order in chart and will message scheduling to call patient to arrange.  BELKYS Pastrana RN

## 2023-05-12 ENCOUNTER — OFFICE VISIT (OUTPATIENT)
Dept: CARDIOLOGY | Facility: CLINIC | Age: 68
End: 2023-05-12
Payer: COMMERCIAL

## 2023-05-12 VITALS
HEART RATE: 54 BPM | HEIGHT: 68 IN | BODY MASS INDEX: 28.37 KG/M2 | WEIGHT: 187.2 LBS | SYSTOLIC BLOOD PRESSURE: 156 MMHG | DIASTOLIC BLOOD PRESSURE: 84 MMHG

## 2023-05-12 DIAGNOSIS — I49.3 PVC'S (PREMATURE VENTRICULAR CONTRACTIONS): Primary | ICD-10-CM

## 2023-05-12 DIAGNOSIS — I48.0 PAROXYSMAL ATRIAL FIBRILLATION (H): ICD-10-CM

## 2023-05-12 DIAGNOSIS — I25.10 CORONARY ARTERY DISEASE INVOLVING NATIVE CORONARY ARTERY OF NATIVE HEART WITHOUT ANGINA PECTORIS: ICD-10-CM

## 2023-05-12 DIAGNOSIS — R00.2 PALPITATIONS: ICD-10-CM

## 2023-05-12 PROCEDURE — 99214 OFFICE O/P EST MOD 30 MIN: CPT | Performed by: INTERNAL MEDICINE

## 2023-05-12 RX ORDER — METOPROLOL SUCCINATE 50 MG/1
50 TABLET, EXTENDED RELEASE ORAL DAILY
Qty: 30 TABLET | Refills: 11 | Status: SHIPPED | OUTPATIENT
Start: 2023-05-12 | End: 2024-05-10

## 2023-05-12 NOTE — LETTER
"5/12/2023    Sen Leon MD  [address] [facility] [address]    RE: Cesar Murillo       Dear Colleague,     I had the pleasure of seeing Cesar Murillo in the [facility].  PHYSICIAN NOTE:  This visit was completed in person at the [facility].      I had the pleasure of seeing . Cesar Murillo for evaluation of frequent ventricular ectopics and paroxysmal AF.  He has been referred by Neli Woodall NP and Dr. Von Pereira.    This is a very pleasant 68-year-old gentleman, smoker, with recent diagnosis of squamous cell lung cancer (RLL lobectomy and mediastinal lymph node dissection in 01/2023 at [facility]).  History of hypertension.  Before lung surgery he underwent cardiac evaluation which revealed mild cardiomyopathy with EF 45-50%, severe coronary artery calcification but only mild CAD (coronary angiogram, 01/2023).  He developed frequent PVCs during dobutamine echocardiogram in early January.    In February 2023 an 8-day cardiac monitor showed frequent PVCs (11%), 7 VT runs (longest 4 beats) and paroxysmal AF (burden 4%, longest 8 hours).  He was placed on apixaban.  Follow-up 48-hour Holter monitor in 04/2023 showed PVC burden of 17%, PAC burden 3%, no AF.      The patient tells me he believes he has had \"extra beats\" for 5-6 years.  They never seem to bother him too much although lately he has had instances where he has a sudden jolt chest sensation, I am not sure if this is related to PVCs but is more likely to happen at night.  He has not had sustained tachycardia, syncope or near syncope.    He still recovering from his surgery he has some discomfort in his abdomen.    Social & family hx: The patient is  and lives with his wife in [place].  Modest alcohol use, 1-2 drinks/day, still smokes 1/2 pack/day.  Mother with MI and MVR in her 70s.       PHYSICAL EXAMINATION:  Vital signs: 156/84, 54 (occasional ectopics), weight 85 kg, BMI 28.5  General: Very pleasant " "gentleman, in no distress  ENT/Mouth:  no nasal discharge.  Eyes:  normal conjunctivae.   Neck:  no thyromegaly or lymphadenopathy.  Chest/Lungs:  patient is not dyspneic.  Lungs with mildly decreased breath sounds, without rales or wheezing  Cardiovascular: Normal JVP, rhythm is regular.  No gallop, murmur or rub.    Abdomen: Nicely healed chest/abdomen incisions after thoracoscopic surgery.  No abdominal distention.    Extremities:  no edema  Skin:  no xanthelasma.    Neurologic:  alert & oriented x 3.  No tremor.    Vascular:  2+ carotids without bruits.  2+ radials.        DIAGNOSTIC STUDIES:  Laboratory studies: Sodium 139, potassium 4.7, creatinine 0.86, calcium 9.9, hematocrit 42.5  ECG: Sinus rhythm with PVCs (2 morphologies)  Echocardiogram (01/2023): EF 45-50%, mild global LV hypokinesis, mild AI and MR  Dobutamine echocardiogram: Echo results reviewed.  Unable to retrieve the ECG tracing from this test.      IMPRESSION:  Frequent PVCs.  Recent burden 17%.  Multifocal, at least 2 and most likely 3 ECG morphologies.  Possibly symptomatic, though the \"jolting\" sensation already mentioned in the HPI is infrequent while his PVCs are frequent.  Not sure there is a clear correlation.  Mild cardiomyopathy, EF 45-50%.  It is conceivable this is PVC-mediated.  Paroxysmal atrial fibrillation.  [id number] probably 2.  Probably asymptomatic.  AF with burden 4% seen on his leadless monitor few weeks after thoracic surgery but was not seen on a recent 48-hour Holter.  AF may be a transient post thoracic surgery arrhythmia.  Squamous cell lung cancer, recent lobectomy.  The patient was informed of a \"spot\" seen on postop scans.  He will require close follow-up.  He has not received chemotherapy or radiation so far.    RECOMMENDATIONS:  Increase Toprol-XL from 25 mg to 50 mg daily.  Would not recommend antiarrhythmic drug therapy or PVC ablation at this point.  An EP procedure could be challenging as there seem to be " 2-3 PVC foci.  AF may resolve on its own and not require long-term management.  Continue apixaban for now.  Repeat 7-day cardiac monitor and echocardiogram in 5-6 months.  Follow-up with EP provider after completion of these tests.    It was my pleasure seeing this delightful patient.  Please feel free to call with any questions.     Mei Collazo MD, [facility]        Encounter Diagnoses   Name Primary?    PVC's (premature ventricular contractions)     Paroxysmal atrial fibrillation (H)     Palpitations     Coronary artery disease involving native coronary artery of native heart without angina pectoris        CURRENT MEDICATIONS:  Current Outpatient Medications   Medication Sig Dispense Refill    acetaminophen (TYLENOL) 500 MG tablet Take 2 tablets (1,000 mg) by mouth 2 times daily (Patient not taking: Reported on 4/19/2023)      apixaban ANTICOAGULANT (ELIQUIS) 5 MG tablet Take 1 tablet (5 mg) by mouth 2 times daily 180 tablet 3    aspirin (ASA) 81 MG EC tablet Take 1 tablet (81 mg) by mouth daily      docusate sodium (COLACE) 100 MG capsule Take 100 mg by mouth as needed      lisinopril (ZESTRIL) 10 MG tablet Take 1 tablet (10 mg) by mouth daily 90 tablet 3    LORazepam (ATIVAN) 1 MG tablet Take 1 tablet by mouth at bedtime as needed for insonmia. 15 tablet 0    methocarbamol (ROBAXIN) 500 MG tablet Take 1 tablet (500 mg) by mouth every 6 hours as needed for muscle spasms 30 tablet 0    metoprolol succinate ER (TOPROL XL) 25 MG 24 hr tablet Take 1 tablet (25 mg) by mouth daily 90 tablet 3    nicotine (NICODERM CQ) 14 MG/24HR 24 hr patch Place 1 patch onto the skin daily 30 patch 0    polyethylene glycol (MIRALAX) 17 GM/Dose powder Take 17 g (1 capful.) by mouth daily      rosuvastatin (CRESTOR) 10 MG tablet Take 1 tablet (10 mg) by mouth daily 90 tablet 2    senna-docusate (SENOKOT-S/PERICOLACE) 8.6-50 MG tablet Take 1 tablet by mouth 2 times daily 50 tablet 0       ALLERGIES   No Known Allergies    PAST MEDICAL  HISTORY:  Past Medical History:   Diagnosis Date    Acute bilateral low back pain without sciatica 07/23/2018    Anxiety, generalized 11/17/2017    Ascending aorta dilation (H)     4.2 cm on CT scan August 2017, follow annually, 4.1 cm August 2018, stable October 2019.  4.5 cm April 2022.  Recheck in 1 year    CAD (coronary artery disease)     cardiac cath 1/17/2023: non-obstructive CAD    Calcaneal fracture 2013    Chronic foot/heel pain    Chronic cough 04/14/2022    Chronic fatigue 10/14/2019    Chronic hepatitis C (H)     Successfully treated.  Previously on interferon.  More recently SOFOSBUVIR and ribavirin, biopsy has been negative for cirrhosis, PCR -2015    Chronic insomnia     Chronic pain of right ankle 05/17/2018    COPD, mild (H) 2/28/2023    Erectile dysfunction     Essential hypertension     External hemorrhoids 2012    History of alcohol abuse     History of colon polyps     Colonoscopy April 2020 with polyps.  Repeat in 5 years    History of depression     2014, prescribed citalopram    Lower abdominal pain 07/23/2018    Nonischemic cardiomyopathy (H) 2/28/2023    Echocardiogram demonstrating 40 to 50% ejection fraction    Nonobstructive atherosclerosis of coronary artery 2/28/2023    Coronary angiogram demonstrating mild to moderate multivessel disease.  January 2023    Osteoarthritis of multiple joints     Chronic bilateral knee and chronic low back pain    Paroxysmal atrial fibrillation (H) 3/13/2023    Found on event monitor 4% burden longest run 8 hours March 2023    Porphyria cutanea tarda (H)     Pulmonary nodule     CT August 2018 3 mm right upper lobe nodule reported as stable, stable October 2019.  CT with 6 mm right lower lobe nodule April 2022 needing 6-month follow-up    Rectal bleeding 03/02/2020    Screening for AAA (abdominal aortic aneurysm)     CT 2018 without abdominal aneurysm    Shingles 05/13/2021    Slow transit constipation 2/28/2023    Tobacco abuse 11/17/2017       PAST  SURGICAL HISTORY:  Past Surgical History:   Procedure Laterality Date    ANKLE FRACTURE SURGERY      ARTHROSCOPY KNEE Bilateral     COLONOSCOPY      CV CORONARY ANGIOGRAM N/A 1/17/2023    Procedure: Coronary Angiogram;  Surgeon: Souleymane Ratliff MD;  Location:  HEART CARDIAC CATH LAB    CV LEFT HEART CATH N/A 1/17/2023    Procedure: Left Heart Catheterization;  Surgeon: Souleymane Ratliff MD;  Location:  HEART CARDIAC CATH LAB    CV LEFT VENTRICULOGRAM N/A 1/17/2023    Procedure: Left Ventriculogram;  Surgeon: Souleymane Ratliff MD;  Location:  HEART CARDIAC CATH LAB    DAVINCI LOBECTOMY LUNG Right 1/18/2023    Procedure: Robot-assisted thoracoscopic right lower lobe wedge, right lower lobectomy, and mediastinal lymph node dissection;  Surgeon: oLbo Leger MD;  Location: [facility] OR    FOOT FRACTURE SURGERY  09/01/2013    calcaneal fracture    WRIST SURGERY      ligament injury       FAMILY HISTORY:  Family History   Problem Relation Age of Onset    Coronary Artery Disease Mother         d. 84    Cerebrovascular Disease Mother     Kidney failure Father         d 76    Rectal Cancer Father     Skin Cancer Brother     Crohn's Disease Brother     Anesthesia Reaction No family hx of     Deep Vein Thrombosis (DVT) No family hx of        SOCIAL HISTORY:  Social History     Socioeconomic History    Marital status:    Tobacco Use    Smoking status: Every Day     Packs/day: 0.50     Types: Cigarettes     Start date: 1968    Smokeless tobacco: Never   Substance and Sexual Activity    Alcohol use: Yes     Comment: Alcoholic Drinks/day: 0-2 per day    Drug use: Yes     Types: Marijuana     Comment: Drug use: Frequent use   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         Review of Systems:  Skin:          Eyes:         ENT:         Respiratory:          Cardiovascular:         Gastroenterology:        Genitourinary:         Musculoskeletal:         Neurologic:         Psychiatric:          Heme/Lymph/Imm:         Endocrine:           Physical Exam:  Vitals: There were no vitals taken for this visit.    Constitutional:           Skin:             Head:           Eyes:           Lymph:      ENT:           Neck:           Respiratory:            Cardiac:                                                           GI:           Extremities and Muscular Skeletal:                 Neurological:           Psych:           CC  Neli Woodall, NP  [address] [address]                  Thank you for allowing me to participate in the care of your patient.      Sincerely,     Mei Collazo MD     [facility]

## 2023-05-12 NOTE — PROGRESS NOTES
"PHYSICIAN NOTE:  This visit was completed in person at the [facility].      I had the pleasure of seeing Mr. Cesar Murillo for evaluation of frequent ventricular ectopics and paroxysmal AF.  He has been referred by Neli Woodall NP and Dr. Von Pereira.    This is a very pleasant 68-year-old gentleman, smoker, with recent diagnosis of squamous cell lung cancer (RLL lobectomy and mediastinal lymph node dissection in 01/2023 at [facility]).  History of hypertension.  Before lung surgery he underwent cardiac evaluation which revealed mild cardiomyopathy with EF 45-50%, severe coronary artery calcification but only mild CAD (coronary angiogram, 01/2023).  He developed frequent PVCs during dobutamine echocardiogram in early January.    In February 2023 an 8-day cardiac monitor showed frequent PVCs (11%), 7 VT runs (longest 4 beats) and paroxysmal AF (burden 4%, longest 8 hours).  He was placed on apixaban.  Follow-up 48-hour Holter monitor in 04/2023 showed PVC burden of 17%, PAC burden 3%, no AF.      The patient tells me he believes he has had \"extra beats\" for 5-6 years.  They never seem to bother him too much although lately he has had instances where he has a sudden jolt chest sensation, I am not sure if this is related to PVCs but is more likely to happen at night.  He has not had sustained tachycardia, syncope or near syncope.    He still recovering from his surgery he has some discomfort in his abdomen.    Social & family hx: The patient is  and lives with his wife in [place].  Modest alcohol use, 1-2 drinks/day, still smokes 1/2 pack/day.  Mother with MI and MVR in her 70s.       PHYSICAL EXAMINATION:  Vital signs: 156/84, 54 (occasional ectopics), weight 85 kg, BMI 28.5  General: Very pleasant gentleman, in no distress  ENT/Mouth:  no nasal discharge.  Eyes:  normal conjunctivae.   Neck:  no thyromegaly or lymphadenopathy.  Chest/Lungs:  patient is not dyspneic.  Lungs with mildly decreased " "breath sounds, without rales or wheezing  Cardiovascular: Normal JVP, rhythm is regular.  No gallop, murmur or rub.    Abdomen: Nicely healed chest/abdomen incisions after thoracoscopic surgery.  No abdominal distention.    Extremities:  no edema  Skin:  no xanthelasma.    Neurologic:  alert & oriented x 3.  No tremor.    Vascular:  2+ carotids without bruits.  2+ radials.        DIAGNOSTIC STUDIES:  Laboratory studies: Sodium 139, potassium 4.7, creatinine 0.86, calcium 9.9, hematocrit 42.5  ECG: Sinus rhythm with PVCs (2 morphologies)  Echocardiogram (01/2023): EF 45-50%, mild global LV hypokinesis, mild AI and MR  Dobutamine echocardiogram: Echo results reviewed.  Unable to retrieve the ECG tracing from this test.      IMPRESSION:  1. Frequent PVCs.  Recent burden 17%.  Multifocal, at least 2 and most likely 3 ECG morphologies.  Possibly symptomatic, though the \"jolting\" sensation already mentioned in the HPI is infrequent while his PVCs are frequent.  Not sure there is a clear correlation.  2. Mild cardiomyopathy, EF 45-50%.  It is conceivable this is PVC-mediated.  3. Paroxysmal atrial fibrillation.  [id number] probably 2.  Probably asymptomatic.  AF with burden 4% seen on his leadless monitor few weeks after thoracic surgery but was not seen on a recent 48-hour Holter.  AF may be a transient post thoracic surgery arrhythmia.  4. Squamous cell lung cancer, recent lobectomy.  The patient was informed of a \"spot\" seen on postop scans.  He will require close follow-up.  He has not received chemotherapy or radiation so far.    RECOMMENDATIONS:  1. Increase Toprol-XL from 25 mg to 50 mg daily.  Would not recommend antiarrhythmic drug therapy or PVC ablation at this point.  An EP procedure could be challenging as there seem to be 2-3 PVC foci.  2. AF may resolve on its own and not require long-term management.  Continue apixaban for now.  3. Repeat 7-day cardiac monitor and echocardiogram in 5-6 " months.  4. Follow-up with EP provider after completion of these tests.    It was my pleasure seeing this delightful patient.  Please feel free to call with any questions.     Mei Collazo MD, [facility]        Encounter Diagnoses   Name Primary?     PVC's (premature ventricular contractions)      Paroxysmal atrial fibrillation (H)      Palpitations      Coronary artery disease involving native coronary artery of native heart without angina pectoris        CURRENT MEDICATIONS:  Current Outpatient Medications   Medication Sig Dispense Refill     acetaminophen (TYLENOL) 500 MG tablet Take 2 tablets (1,000 mg) by mouth 2 times daily (Patient not taking: Reported on 4/19/2023)       apixaban ANTICOAGULANT (ELIQUIS) 5 MG tablet Take 1 tablet (5 mg) by mouth 2 times daily 180 tablet 3     aspirin (ASA) 81 MG EC tablet Take 1 tablet (81 mg) by mouth daily       docusate sodium (COLACE) 100 MG capsule Take 100 mg by mouth as needed       lisinopril (ZESTRIL) 10 MG tablet Take 1 tablet (10 mg) by mouth daily 90 tablet 3     LORazepam (ATIVAN) 1 MG tablet Take 1 tablet by mouth at bedtime as needed for insonmia. 15 tablet 0     methocarbamol (ROBAXIN) 500 MG tablet Take 1 tablet (500 mg) by mouth every 6 hours as needed for muscle spasms 30 tablet 0     metoprolol succinate ER (TOPROL XL) 25 MG 24 hr tablet Take 1 tablet (25 mg) by mouth daily 90 tablet 3     nicotine (NICODERM CQ) 14 MG/24HR 24 hr patch Place 1 patch onto the skin daily 30 patch 0     polyethylene glycol (MIRALAX) 17 GM/Dose powder Take 17 g (1 capful.) by mouth daily       rosuvastatin (CRESTOR) 10 MG tablet Take 1 tablet (10 mg) by mouth daily 90 tablet 2     senna-docusate (SENOKOT-S/PERICOLACE) 8.6-50 MG tablet Take 1 tablet by mouth 2 times daily 50 tablet 0       ALLERGIES   No Known Allergies    PAST MEDICAL HISTORY:  Past Medical History:   Diagnosis Date     Acute bilateral low back pain without sciatica 07/23/2018     Anxiety, generalized  11/17/2017     Ascending aorta dilation (H)     4.2 cm on CT scan August 2017, follow annually, 4.1 cm August 2018, stable October 2019.  4.5 cm April 2022.  Recheck in 1 year     CAD (coronary artery disease)     cardiac cath 1/17/2023: non-obstructive CAD     Calcaneal fracture 2013    Chronic foot/heel pain     Chronic cough 04/14/2022     Chronic fatigue 10/14/2019     Chronic hepatitis C (H)     Successfully treated.  Previously on interferon.  More recently SOFOSBUVIR and ribavirin, biopsy has been negative for cirrhosis, PCR -2015     Chronic insomnia      Chronic pain of right ankle 05/17/2018     COPD, mild (H) 2/28/2023     Erectile dysfunction      Essential hypertension      External hemorrhoids 2012     History of alcohol abuse      History of colon polyps     Colonoscopy April 2020 with polyps.  Repeat in 5 years     History of depression     2014, prescribed citalopram     Lower abdominal pain 07/23/2018     Nonischemic cardiomyopathy (H) 2/28/2023    Echocardiogram demonstrating 40 to 50% ejection fraction     Nonobstructive atherosclerosis of coronary artery 2/28/2023    Coronary angiogram demonstrating mild to moderate multivessel disease.  January 2023     Osteoarthritis of multiple joints     Chronic bilateral knee and chronic low back pain     Paroxysmal atrial fibrillation (H) 3/13/2023    Found on event monitor 4% burden longest run 8 hours March 2023     Porphyria cutanea tarda (H)      Pulmonary nodule     CT August 2018 3 mm right upper lobe nodule reported as stable, stable October 2019.  CT with 6 mm right lower lobe nodule April 2022 needing 6-month follow-up     Rectal bleeding 03/02/2020     Screening for AAA (abdominal aortic aneurysm)     CT 2018 without abdominal aneurysm     Shingles 05/13/2021     Slow transit constipation 2/28/2023     Tobacco abuse 11/17/2017       PAST SURGICAL HISTORY:  Past Surgical History:   Procedure Laterality Date     ANKLE FRACTURE SURGERY        ARTHROSCOPY KNEE Bilateral      COLONOSCOPY       CV CORONARY ANGIOGRAM N/A 1/17/2023    Procedure: Coronary Angiogram;  Surgeon: Souleymane Ratliff MD;  Location:  [facility] LAB     CV LEFT HEART CATH N/A 1/17/2023    Procedure: Left Heart Catheterization;  Surgeon: Souleymane Ratliff MD;  Location:  [facility]     CV LEFT VENTRICULOGRAM N/A 1/17/2023    Procedure: Left Ventriculogram;  Surgeon: Souleymane Ratliff MD;  Location:  [facility]     DAVINCI LOBECTOMY LUNG Right 1/18/2023    Procedure: Robot-assisted thoracoscopic right lower lobe wedge, right lower lobectomy, and mediastinal lymph node dissection;  Surgeon: Lobo Leger MD;  Location: [facility] OR     FOOT FRACTURE SURGERY  09/01/2013    calcaneal fracture     WRIST SURGERY      ligament injury       FAMILY HISTORY:  Family History   Problem Relation Age of Onset     Coronary Artery Disease Mother         d. 84     Cerebrovascular Disease Mother      Kidney failure Father         d 76     Rectal Cancer Father      Skin Cancer Brother      Crohn's Disease Brother      Anesthesia Reaction No family hx of      Deep Vein Thrombosis (DVT) No family hx of        SOCIAL HISTORY:  Social History     Socioeconomic History     Marital status:    Tobacco Use     Smoking status: Every Day     Packs/day: 0.50     Types: Cigarettes     Start date: 1968     Smokeless tobacco: Never   Substance and Sexual Activity     Alcohol use: Yes     Comment: Alcoholic Drinks/day: 0-2 per day     Drug use: Yes     Types: Marijuana     Comment: Drug use: Frequent use   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         Review of Systems:  Skin:          Eyes:         ENT:         Respiratory:          Cardiovascular:         Gastroenterology:        Genitourinary:         Musculoskeletal:         Neurologic:         Psychiatric:         Heme/Lymph/Imm:         Endocrine:           Physical Exam:  Vitals: There were  no vitals taken for this visit.    Constitutional:           Skin:             Head:           Eyes:           Lymph:      ENT:           Neck:           Respiratory:            Cardiac:                                                           GI:           Extremities and Muscular Skeletal:                 Neurological:           Psych:           CC  Neli Woodall, NP  [address] [address]

## 2023-05-23 ENCOUNTER — TELEPHONE (OUTPATIENT)
Dept: CARDIOLOGY | Facility: CLINIC | Age: 68
End: 2023-05-23
Payer: COMMERCIAL

## 2023-05-23 NOTE — TELEPHONE ENCOUNTER
[place] Community Measures Blood Pressure guideline reviewed.  Patients recent blood pressure is outside of guideline parameters.  Called pt to review, no answer.  Left voicemail message asking patient to check their blood pressure using a home blood pressure cuff or by going to a [facility].  Patient instructed to then call [phone] ([place]) and leave a message with their name, date of birth, and blood pressure reading that was completed within the last 24 hours and where it was completed.  Will await call back for further review.  DAVION Solis

## 2023-06-13 ENCOUNTER — OFFICE VISIT (OUTPATIENT)
Dept: INTERNAL MEDICINE | Facility: CLINIC | Age: 68
End: 2023-06-13
Payer: COMMERCIAL

## 2023-06-13 VITALS
TEMPERATURE: 98.1 F | SYSTOLIC BLOOD PRESSURE: 112 MMHG | HEART RATE: 59 BPM | HEIGHT: 68 IN | DIASTOLIC BLOOD PRESSURE: 68 MMHG | OXYGEN SATURATION: 98 % | WEIGHT: 186.7 LBS | BODY MASS INDEX: 28.3 KG/M2 | RESPIRATION RATE: 16 BRPM

## 2023-06-13 DIAGNOSIS — M77.02 MEDIAL EPICONDYLITIS OF ELBOW, LEFT: ICD-10-CM

## 2023-06-13 DIAGNOSIS — I49.3 VENTRICULAR ECTOPY: ICD-10-CM

## 2023-06-13 DIAGNOSIS — I48.0 PAROXYSMAL ATRIAL FIBRILLATION (H): ICD-10-CM

## 2023-06-13 DIAGNOSIS — Z51.81 ENCOUNTER FOR THERAPEUTIC DRUG MONITORING: ICD-10-CM

## 2023-06-13 DIAGNOSIS — Z72.0 TOBACCO ABUSE: ICD-10-CM

## 2023-06-13 DIAGNOSIS — I10 ESSENTIAL HYPERTENSION: ICD-10-CM

## 2023-06-13 DIAGNOSIS — Z00.00 ENCOUNTER FOR MEDICARE ANNUAL WELLNESS EXAM: Primary | ICD-10-CM

## 2023-06-13 DIAGNOSIS — C34.91 SQUAMOUS CELL LUNG CANCER, RIGHT (H): ICD-10-CM

## 2023-06-13 DIAGNOSIS — R73.01 IMPAIRED FASTING GLUCOSE: ICD-10-CM

## 2023-06-13 DIAGNOSIS — I42.8 NONISCHEMIC CARDIOMYOPATHY (H): ICD-10-CM

## 2023-06-13 DIAGNOSIS — Z12.5 SCREENING FOR PROSTATE CANCER: ICD-10-CM

## 2023-06-13 DIAGNOSIS — J44.9 COPD, MILD (H): ICD-10-CM

## 2023-06-13 DIAGNOSIS — M15.0 PRIMARY OSTEOARTHRITIS INVOLVING MULTIPLE JOINTS: ICD-10-CM

## 2023-06-13 DIAGNOSIS — I25.10 NONOBSTRUCTIVE ATHEROSCLEROSIS OF CORONARY ARTERY: ICD-10-CM

## 2023-06-13 DIAGNOSIS — Z86.0100 HISTORY OF COLON POLYPS: ICD-10-CM

## 2023-06-13 DIAGNOSIS — F41.1 ANXIETY, GENERALIZED: ICD-10-CM

## 2023-06-13 DIAGNOSIS — Z87.19 HISTORY OF CHRONIC HEPATITIS: ICD-10-CM

## 2023-06-13 DIAGNOSIS — I77.810 ASCENDING AORTA DILATION (H): ICD-10-CM

## 2023-06-13 PROBLEM — C34.30 LUNG CANCER, LOWER LOBE (H): Status: RESOLVED | Noted: 2023-01-18 | Resolved: 2023-06-13

## 2023-06-13 LAB
ALBUMIN SERPL BCG-MCNC: 4.1 G/DL (ref 3.5–5.2)
ALBUMIN UR-MCNC: NEGATIVE MG/DL
ALP SERPL-CCNC: 97 U/L (ref 40–129)
ALT SERPL W P-5'-P-CCNC: 23 U/L (ref 0–70)
ANION GAP SERPL CALCULATED.3IONS-SCNC: 12 MMOL/L (ref 7–15)
APPEARANCE UR: CLEAR
AST SERPL W P-5'-P-CCNC: 27 U/L (ref 0–45)
BILIRUB SERPL-MCNC: 0.3 MG/DL
BILIRUB UR QL STRIP: NEGATIVE
BUN SERPL-MCNC: 14.8 MG/DL (ref 8–23)
CALCIUM SERPL-MCNC: 8.8 MG/DL (ref 8.8–10.2)
CHLORIDE SERPL-SCNC: 107 MMOL/L (ref 98–107)
CHOLEST SERPL-MCNC: 134 MG/DL
COLOR UR AUTO: YELLOW
CREAT SERPL-MCNC: 0.89 MG/DL (ref 0.67–1.17)
DEPRECATED HCO3 PLAS-SCNC: 22 MMOL/L (ref 22–29)
ERYTHROCYTE [DISTWIDTH] IN BLOOD BY AUTOMATED COUNT: 12.3 % (ref 10–15)
GFR SERPL CREATININE-BSD FRML MDRD: >90 ML/MIN/1.73M2
GLUCOSE SERPL-MCNC: 108 MG/DL (ref 70–99)
GLUCOSE UR STRIP-MCNC: NEGATIVE MG/DL
HBA1C MFR BLD: 5.6 % (ref 0–5.6)
HCT VFR BLD AUTO: 46 % (ref 40–53)
HDLC SERPL-MCNC: 46 MG/DL
HGB BLD-MCNC: 14.8 G/DL (ref 13.3–17.7)
HGB UR QL STRIP: NEGATIVE
KETONES UR STRIP-MCNC: NEGATIVE MG/DL
LDLC SERPL CALC-MCNC: 77 MG/DL
LEUKOCYTE ESTERASE UR QL STRIP: NEGATIVE
MCH RBC QN AUTO: 31.1 PG (ref 26.5–33)
MCHC RBC AUTO-ENTMCNC: 32.2 G/DL (ref 31.5–36.5)
MCV RBC AUTO: 97 FL (ref 78–100)
NITRATE UR QL: NEGATIVE
NONHDLC SERPL-MCNC: 88 MG/DL
PH UR STRIP: 5.5 [PH] (ref 5–8)
PLATELET # BLD AUTO: 149 10E3/UL (ref 150–450)
POTASSIUM SERPL-SCNC: 4 MMOL/L (ref 3.4–5.3)
PROT SERPL-MCNC: 6.6 G/DL (ref 6.4–8.3)
PSA SERPL DL<=0.01 NG/ML-MCNC: 0.71 NG/ML (ref 0–4.5)
RBC # BLD AUTO: 4.76 10E6/UL (ref 4.4–5.9)
SODIUM SERPL-SCNC: 141 MMOL/L (ref 136–145)
SP GR UR STRIP: 1.02 (ref 1–1.03)
TRIGL SERPL-MCNC: 55 MG/DL
UROBILINOGEN UR STRIP-ACNC: 0.2 E.U./DL
WBC # BLD AUTO: 9.4 10E3/UL (ref 4–11)

## 2023-06-13 PROCEDURE — 85027 COMPLETE CBC AUTOMATED: CPT | Performed by: INTERNAL MEDICINE

## 2023-06-13 PROCEDURE — G0103 PSA SCREENING: HCPCS | Performed by: INTERNAL MEDICINE

## 2023-06-13 PROCEDURE — 90732 PPSV23 VACC 2 YRS+ SUBQ/IM: CPT | Performed by: INTERNAL MEDICINE

## 2023-06-13 PROCEDURE — G0009 ADMIN PNEUMOCOCCAL VACCINE: HCPCS | Performed by: INTERNAL MEDICINE

## 2023-06-13 PROCEDURE — 83036 HEMOGLOBIN GLYCOSYLATED A1C: CPT | Performed by: INTERNAL MEDICINE

## 2023-06-13 PROCEDURE — 81003 URINALYSIS AUTO W/O SCOPE: CPT | Performed by: INTERNAL MEDICINE

## 2023-06-13 PROCEDURE — 36415 COLL VENOUS BLD VENIPUNCTURE: CPT | Performed by: INTERNAL MEDICINE

## 2023-06-13 PROCEDURE — 80053 COMPREHEN METABOLIC PANEL: CPT | Performed by: INTERNAL MEDICINE

## 2023-06-13 PROCEDURE — 99215 OFFICE O/P EST HI 40 MIN: CPT | Mod: 25 | Performed by: INTERNAL MEDICINE

## 2023-06-13 PROCEDURE — G0439 PPPS, SUBSEQ VISIT: HCPCS | Performed by: INTERNAL MEDICINE

## 2023-06-13 PROCEDURE — 80061 LIPID PANEL: CPT | Performed by: INTERNAL MEDICINE

## 2023-06-13 ASSESSMENT — ENCOUNTER SYMPTOMS
CONSTIPATION: 1
EYE PAIN: 0
MYALGIAS: 0
NERVOUS/ANXIOUS: 0
WEAKNESS: 1
SORE THROAT: 1
FREQUENCY: 0
CHILLS: 0
DIZZINESS: 1
PARESTHESIAS: 0
HEMATOCHEZIA: 0
JOINT SWELLING: 0
HEARTBURN: 1
SHORTNESS OF BREATH: 1
FEVER: 0
DYSURIA: 0
HEADACHES: 0
DIARRHEA: 0
ABDOMINAL PAIN: 1
ARTHRALGIAS: 0
COUGH: 1
NAUSEA: 0
PALPITATIONS: 0
HEMATURIA: 0

## 2023-06-13 ASSESSMENT — ACTIVITIES OF DAILY LIVING (ADL): CURRENT_FUNCTION: NO ASSISTANCE NEEDED

## 2023-06-13 NOTE — PROGRESS NOTES
He is at risk for lack of exercise and has been provided with information to increase physical activity for the benefit of his well-being.  The patient reports that he drinks more than one alcoholic drink per day and sometimes engages in binge or excessive drinking. He was counseled and given information about possible harmful effects of excessive alcohol intake as well as where to get help for alcohol problems.  The patient was counseled and encouraged to consider modifying their diet and eating habits. He was provided with information on recommended healthy diet options.

## 2023-06-13 NOTE — LETTER
June 13, 2023      Cesar Murillo  [address] [place] [address]        Dear Cesar,    Your cholesterol is well controlled with rosuvastatin.  Normal kidney function and normal liver studies.  No anemia.  PSA remains low and is reassuring for no prostate cancer.  Normal urinalysis.    Your fasting glucose is in the high normal range at 108 and I am checking an additional lab called an A1c to make sure that you are not developing prediabetes.        Resulted Orders   Comprehensive metabolic panel (BMP + Alb, Alk Phos, ALT, AST, Total. Bili, TP)   Result Value Ref Range    Sodium 141 136 - 145 mmol/L    Potassium 4.0 3.4 - 5.3 mmol/L    Chloride 107 98 - 107 mmol/L    Carbon Dioxide (CO2) 22 22 - 29 mmol/L    Anion Gap 12 7 - 15 mmol/L    Urea Nitrogen 14.8 8.0 - 23.0 mg/dL    Creatinine 0.89 0.67 - 1.17 mg/dL    Calcium 8.8 8.8 - 10.2 mg/dL    Glucose 108 (H) 70 - 99 mg/dL    Alkaline Phosphatase 97 40 - 129 U/L    AST 27 0 - 45 U/L      Comment:      Reference intervals for this test were updated on 6/12/2023 to more accurately reflect our healthy population. There may be differences in the flagging of prior results with similar values performed with this method. Interpretation of those prior results can be made in the context of the updated reference intervals.    ALT 23 0 - 70 U/L      Comment:      Reference intervals for this test were updated on 6/12/2023 to more accurately reflect our healthy population. There may be differences in the flagging of prior results with similar values performed with this method. Interpretation of those prior results can be made in the context of the updated reference intervals.      Protein Total 6.6 6.4 - 8.3 g/dL    Albumin 4.1 3.5 - 5.2 g/dL    Bilirubin Total 0.3 <=1.2 mg/dL    GFR Estimate >90 >60 mL/min/1.73m2      Comment:      eGFR calculated using 2021 CKD-EPI equation.   CBC with platelets   Result Value Ref Range    WBC Count 9.4 4.0 - 11.0 10e3/uL    RBC  Count 4.76 4.40 - 5.90 10e6/uL    Hemoglobin 14.8 13.3 - 17.7 g/dL    Hematocrit 46.0 40.0 - 53.0 %    MCV 97 78 - 100 fL    MCH 31.1 26.5 - 33.0 pg    MCHC 32.2 31.5 - 36.5 g/dL    RDW 12.3 10.0 - 15.0 %    Platelet Count 149 (L) 150 - 450 10e3/uL   PSA, screen   Result Value Ref Range    Prostate Specific Antigen Screen 0.71 0.00 - 4.50 ng/mL    Narrative    This result is obtained using the Roche Elecsys total PSA method on the renata e801 immunoassay analyzer. Results obtained with different assay methods or kits cannot be used interchangeably.   Lipid Profile (Chol, Trig, HDL, LDL calc)   Result Value Ref Range    Cholesterol 134 <200 mg/dL    Triglycerides 55 <150 mg/dL    Direct Measure HDL 46 >=40 mg/dL    LDL Cholesterol Calculated 77 <=100 mg/dL    Non HDL Cholesterol 88 <130 mg/dL    Narrative    Cholesterol  Desirable:  <200 mg/dL    Triglycerides  Normal:  Less than 150 mg/dL  Borderline High:  150-199 mg/dL  High:  200-499 mg/dL  Very High:  Greater than or equal to 500 mg/dL    Direct Measure HDL  Female:  Greater than or equal to 50 mg/dL   Male:  Greater than or equal to 40 mg/dL    LDL Cholesterol  Desirable:  <100mg/dL  Above Desirable:  100-129 mg/dL   Borderline High:  130-159 mg/dL   High:  160-189 mg/dL   Very High:  >= 190 mg/dL    Non HDL Cholesterol  Desirable:  130 mg/dL  Above Desirable:  130-159 mg/dL  Borderline High:  160-189 mg/dL  High:  190-219 mg/dL  Very High:  Greater than or equal to 220 mg/dL   UA Macroscopic with reflex to Microscopic and Culture   Result Value Ref Range    Color Urine Yellow Colorless, Straw, Light Yellow, Yellow    Appearance Urine Clear Clear    Glucose Urine Negative Negative mg/dL    Bilirubin Urine Negative Negative    Ketones Urine Negative Negative mg/dL    Specific Gravity Urine 1.025 1.005 - 1.030    Blood Urine Negative Negative    pH Urine 5.5 5.0 - 8.0    Protein Albumin Urine Negative Negative mg/dL    Urobilinogen Urine 0.2 0.2, 1.0 E.U./dL     Nitrite Urine Negative Negative    Leukocyte Esterase Urine Negative Negative    Narrative    Microscopic not indicated       If you have any questions or concerns, please call the clinic at the number listed above.       Sincerely,      Sen Leon MD

## 2023-06-13 NOTE — PATIENT INSTRUCTIONS
Annual flu shot every fall    Make sure you get your second Shingrix vaccine completed after June 20    Complete tobacco cessation is necessary    Recommending eye exam every 1 to 2 years    Colonoscopy will be due in 2025    I would recommend contacting [facility] to make an appointment with Dr. Vishnu Schaeffer regarding your elbow      Patient Education   Personalized Prevention Plan  You are due for the preventive services outlined below.  Your care team is available to assist you in scheduling these services.  If you have already completed any of these items, please share that information with your care team to update in your medical record.  Health Maintenance Due   Topic Date Due    COPD Action Plan  Never done    Pneumococcal Vaccine (3 - PPSV23 if available, else PCV20) 03/02/2021    COVID-19 Vaccine (6 - Pfizer series) 04/08/2023       Exercise for a Healthier Heart  You may wonder how you can improve the health of your heart. If you re thinking about exercise, you re on the right track. You don t need to become an athlete. But you do need a certain amount of brisk exercise to help strengthen your heart. If you have been diagnosed with a heart condition, your healthcare provider may advise exercise to help your condition. To help make exercise a habit, choose safe, fun activities.      Exercise with a friend. When activity is fun, you're more likely to stick with it.     Before you start  Check with your healthcare provider before starting an exercise program. This is especially important if you haven't been active for a while. It's also important if you have a long-term (chronic) health problem such as heart disease, diabetes, or obesity. Also check with your provider if you're at high risk for having these problems.   Why exercise?  Exercising regularly offers many healthy rewards. It can help you do all of these:   Improve your blood cholesterol level to help prevent further heart trouble.  Lower your  blood pressure to help prevent a stroke or heart attack.  Control diabetes or reduce your risk of getting this disease.  Improve your heart and lung function.  Reach and stay at a healthy weight.  Make your muscles stronger so you can stay active.  Prevent falls and fractures by slowing the loss of bone mass (osteoporosis).  Manage stress better.  Improve your sense of self and your body image.  Exercise tips    Ease into your routine. Set small goals. Then build on them. Talk with your healthcare provider first before starting an exercise routine if you're not sure what your activity level should be.  Exercise on most days. Aim for a total of at least 150 minutes (2 hours and 30 minutes) or more of moderate-intensity aerobic activity each week. You could also do 75 minutes (1 hour and 15 minutes) or more of vigorous-intensity aerobic activity each week. Or try for a combination of both. Moderate activity means that you breathe heavier and your heart rate increases, but you can still talk. Think about doing at least 30 minutes of moderate exercise, 5 times a week. It's OK to work up to the 30-minute period over time. Examples of moderate-intensity activity are brisk walking, gardening, and water aerobics.  Step up your daily activity level.  Along with your exercise program, try being more active the whole day. Walk instead of drive. Or park further away so that you take more steps each day. Do more household tasks or yard work. You may not be able to meet the advised amount of physical activity. But doing some moderate- or vigorous-intensity aerobic activity can help reduce your risk for heart disease. Your healthcare provider can help you figure out what is best for you.  Choose 1 or more activities you enjoy.  Walking is one of the easiest things you can do. You can also try swimming, riding a bike, dancing, or taking an exercise class.    Call 911  Call 911 right away if any of these occur:   Chest pain that  doesn't go away quickly with rest  New burning, tightness, pressure, or heaviness in your chest, neck, shoulders, back, or arms  Abnormal or severe shortness of breath  A very fast or irregular heartbeat (palpitations)  Fainting  When to call your healthcare provider  Call your healthcare provider if you have any of these:   Dizziness or lightheadedness  Mild shortness of breath or chest pain  Increased or new joint or muscle pain    [employer] last reviewed this educational content on 7/1/2022 2000-2022 [employer]. All rights reserved. This information is not intended as a substitute for professional medical care. Always follow your healthcare professional's instructions.         Patient Education   Alcohol Use     Many people can enjoy a glass of wine or beer without any negative consequences to their health. According to the Centers for Disease Control and Prevention (CDC), having one or fewer drinks per day for women and two or fewer per day for men is considered moderate drinking.     When people drink more than moderately, it can become concerning. Excessive drinking is defined as consuming 15 drinks or more per week for men and 8 drinks or more per week for women. There are various health problems associated with excessive drinking, which include:  Damage to vital organs like the heart, brain, liver and pancreas  Harm to the digestive tract  Weaken the immune system  Higher risk for heart disease and cancer    There are many resources available to people who are addicted to alcohol. A counselor or other health care provider can give you support. So can a , , or rabbi who is trained in substance abuse counseling. Friends and family may also help once you are connected with professionals.           Understanding USDA MyPlate  The USDA has guidelines to help you make healthy food choices. These are called MyPlate. MyPlate shows the food groups that make up healthy meals using the  image of a place setting. Before you eat, think about the healthiest choices for what to put on your plate or in your cup or bowl. To learn more about building a healthy plate, visit www.choosemyplate.gov.     The food groups  Fruits. Any fruit or 100% fruit juice counts as part of the Fruit Group. Fruits may be fresh, canned, frozen, or dried, and may be whole, cut-up, or pureed. Make 1/2 of your plate fruits and vegetables.  Vegetables. Any vegetable or 100% vegetable juice counts as a member of the Vegetable Group. Vegetables may be fresh, frozen, canned, or dried. They can be served raw or cooked and may be whole, cut-up, or mashed. Make 1/2 of your plate fruits and vegetables.  Grains. All foods made from grains are part of the Grains Group. These include wheat, rice, oats, cornmeal, and barley. Grains are often used to make foods such as bread, pasta, oatmeal, cereal, tortillas, and grits. Grains should be no more than 1/4 of your plate. At least half of your grains should be whole grains.  Protein. This group includes meat, poultry, seafood, beans and peas, eggs, processed soy products (such as tofu), nuts (including nut butters), and seeds. Make protein choices no more than 1/4 of your plate. Meat and poultry choices should be lean or low fat.  Dairy. The Dairy Group includes all fluid milk products and foods made from milk that contain calcium, such as yogurt and cheese. (Foods that have little calcium, such as cream, butter, and cream cheese, are not part of this group.) Most dairy choices should be low-fat or fat-free.  Oils. Oils aren't a food group, but they do contain essential nutrients. However it's important to watch your intake of oils. These are fats that are liquid at room temperature. They include canola, corn, olive, soybean, vegetable, and sunflower oil. Foods that are mainly oil include mayonnaise, certain salad dressings, and soft margarines. You likely already get your daily oil allowance  from the foods you eat.  Things to limit  Eating healthy also means limiting these things in your diet:  Salt (sodium). Many processed foods have a lot of sodium. To keep sodium intake down, eat fresh vegetables, meats, poultry, and seafood when possible. Purchase low-sodium, reduced-sodium, or no-salt-added food products at the store. And don't add salt to your meals at home. Instead, season them with herbs and spices such as dill, oregano, cumin, and paprika. Or try adding flavor with lemon or lime zest and juice.  Saturated fat. Saturated fats are most often found in animal products such as beef, pork, and chicken. They are often solid at room temperature, such as butter. To reduce your saturated fat intake, choose leaner cuts of meat and poultry. And try healthier cooking methods such as grilling, broiling, roasting, or baking. For a simple lower-fat swap, use plain nonfat yogurt instead of mayonnaise when making potato salad or macaroni salad.  Added sugars. These are sugars added to foods. They are in foods such as ice cream, candy, soda, fruit drinks, sports drinks, energy drinks, cookies, pastries, jams, and syrups. Cut down on added sugars by sharing sweet treats with a family member or friend. You can also choose fruit for dessert, and drink water or other unsweetened beverages.  [employer] last reviewed this educational content on 6/1/2020 2000-2022 [employer]. All rights reserved. This information is not intended as a substitute for professional medical care. Always follow your healthcare professional's instructions.

## 2023-06-13 NOTE — LETTER
June 20, 2023      Cesar Murillo  [address] [place] [address]        Dear Cesar,    Although your glucose was slightly out of the ideal range, your A1c returned normal at 5.6%.  No diabetes or prediabetes.    Resulted Orders   Comprehensive metabolic panel (BMP + Alb, Alk Phos, ALT, AST, Total. Bili, TP)   Result Value Ref Range    Sodium 141 136 - 145 mmol/L    Potassium 4.0 3.4 - 5.3 mmol/L    Chloride 107 98 - 107 mmol/L    Carbon Dioxide (CO2) 22 22 - 29 mmol/L    Anion Gap 12 7 - 15 mmol/L    Urea Nitrogen 14.8 8.0 - 23.0 mg/dL    Creatinine 0.89 0.67 - 1.17 mg/dL    Calcium 8.8 8.8 - 10.2 mg/dL    Glucose 108 (H) 70 - 99 mg/dL    Alkaline Phosphatase 97 40 - 129 U/L    AST 27 0 - 45 U/L      Comment:      Reference intervals for this test were updated on 6/12/2023 to more accurately reflect our healthy population. There may be differences in the flagging of prior results with similar values performed with this method. Interpretation of those prior results can be made in the context of the updated reference intervals.    ALT 23 0 - 70 U/L      Comment:      Reference intervals for this test were updated on 6/12/2023 to more accurately reflect our healthy population. There may be differences in the flagging of prior results with similar values performed with this method. Interpretation of those prior results can be made in the context of the updated reference intervals.      Protein Total 6.6 6.4 - 8.3 g/dL    Albumin 4.1 3.5 - 5.2 g/dL    Bilirubin Total 0.3 <=1.2 mg/dL    GFR Estimate >90 >60 mL/min/1.73m2      Comment:      eGFR calculated using 2021 CKD-EPI equation.   CBC with platelets   Result Value Ref Range    WBC Count 9.4 4.0 - 11.0 10e3/uL    RBC Count 4.76 4.40 - 5.90 10e6/uL    Hemoglobin 14.8 13.3 - 17.7 g/dL    Hematocrit 46.0 40.0 - 53.0 %    MCV 97 78 - 100 fL    MCH 31.1 26.5 - 33.0 pg    MCHC 32.2 31.5 - 36.5 g/dL    RDW 12.3 10.0 - 15.0 %    Platelet Count 149 (L) 150 - 450  10e3/uL   PSA, screen   Result Value Ref Range    Prostate Specific Antigen Screen 0.71 0.00 - 4.50 ng/mL    Narrative    This result is obtained using the Roche Elecsys total PSA method on the renata e801 immunoassay analyzer. Results obtained with different assay methods or kits cannot be used interchangeably.   Lipid Profile (Chol, Trig, HDL, LDL calc)   Result Value Ref Range    Cholesterol 134 <200 mg/dL    Triglycerides 55 <150 mg/dL    Direct Measure HDL 46 >=40 mg/dL    LDL Cholesterol Calculated 77 <=100 mg/dL    Non HDL Cholesterol 88 <130 mg/dL    Narrative    Cholesterol  Desirable:  <200 mg/dL    Triglycerides  Normal:  Less than 150 mg/dL  Borderline High:  150-199 mg/dL  High:  200-499 mg/dL  Very High:  Greater than or equal to 500 mg/dL    Direct Measure HDL  Female:  Greater than or equal to 50 mg/dL   Male:  Greater than or equal to 40 mg/dL    LDL Cholesterol  Desirable:  <100mg/dL  Above Desirable:  100-129 mg/dL   Borderline High:  130-159 mg/dL   High:  160-189 mg/dL   Very High:  >= 190 mg/dL    Non HDL Cholesterol  Desirable:  130 mg/dL  Above Desirable:  130-159 mg/dL  Borderline High:  160-189 mg/dL  High:  190-219 mg/dL  Very High:  Greater than or equal to 220 mg/dL   UA Macroscopic with reflex to Microscopic and Culture   Result Value Ref Range    Color Urine Yellow Colorless, Straw, Light Yellow, Yellow    Appearance Urine Clear Clear    Glucose Urine Negative Negative mg/dL    Bilirubin Urine Negative Negative    Ketones Urine Negative Negative mg/dL    Specific Gravity Urine 1.025 1.005 - 1.030    Blood Urine Negative Negative    pH Urine 5.5 5.0 - 8.0    Protein Albumin Urine Negative Negative mg/dL    Urobilinogen Urine 0.2 0.2, 1.0 E.U./dL    Nitrite Urine Negative Negative    Leukocyte Esterase Urine Negative Negative    Narrative    Microscopic not indicated   Hemoglobin A1c   Result Value Ref Range    Hemoglobin A1C 5.6 0.0 - 5.6 %      Comment:      Normal <5.7%   Prediabetes  5.7-6.4%    Diabetes 6.5% or higher     Note: Adopted from ADA consensus guidelines.       If you have any questions or concerns, please call the clinic at the number listed above.       Sincerely,      Sen Leon MD

## 2023-06-13 NOTE — PROGRESS NOTES
"SUBJECTIVE:   Cesar is a 68 year old who presents for Preventive Visit.    68-year-old male here for annual wellness visit and to follow-up medical problems including recent diagnosis of squamous cell cancer of the lung, stage I, nonobstructive coronary artery disease, COPD, chronic tobacco use, paroxysmal atrial fibrillation, hypertension, and several other issues.  See assessment and plan for details.          6/13/2023    10:05 AM   Additional Questions   Roomed by      Are you in the first 12 months of your Medicare coverage?  No    60 minutes spent in the management of this patient including reviewing records, obtaining history, performing exam, ordering appropriate tests and documenting visit.    20 of those minutes spent addressing wellness and 40 minutes spent addressing chronic and new medical problems.  See assessment plan for details.    Healthy Habits:     In general, how would you rate your overall health?  Good    Frequency of exercise:  None    Do you usually eat at least 4 servings of fruit and vegetables a day, include whole grains    & fiber and avoid regularly eating high fat or \"junk\" foods?  No    Taking medications regularly:  Yes    Medication side effects:  Lightheadedness    Ability to successfully perform activities of daily living:  No assistance needed    Home Safety:  No safety concerns identified    Hearing Impairment:  Need to ask people to speak up or repeat themselves and no hearing concerns    In the past 6 months, have you been bothered by leaking of urine?  No    In general, how would you rate your overall mental or emotional health?  Good      PHQ-2 Total Score: 0    Additional concerns today:  No        Have you ever done Advance Care Planning? (For example, a Health Directive, POLST, or a discussion with a medical provider or your loved ones about your wishes): No, advance care planning information given to patient to review.  Patient plans to discuss their wishes with loved " ones or provider.         Fall risk  Fallen 2 or more times in the past year?: No  Any fall with injury in the past year?: No    Cognitive Screening   1) Repeat 3 items (Leader, Season, Table)    2) Clock draw: NORMAL  3) 3 item recall: Recalls 3 objects  Results: 3 items recalled: COGNITIVE IMPAIRMENT LESS LIKELY    Mini-CogTM Copyright EVELIA Mcdonald. Licensed by the author for use in [facility]; reprinted with permission (pascual@[facility]). All rights reserved.      Do you have sleep apnea, excessive snoring or daytime drowsiness?: yes    Reviewed and updated as needed this visit by clinical staff   Tobacco  Allergies  Meds  Problems  Med Hx  Surg Hx  Fam Hx          Reviewed and updated as needed this visit by Provider   Tobacco  Allergies  Meds  Problems  Med Hx  Surg Hx  Fam Hx         Social History     Tobacco Use     Smoking status: Every Day     Packs/day: 0.50     Types: Cigarettes     Start date: 1968     Smokeless tobacco: Never   Vaping Use     Vaping status: Not on file   Substance Use Topics     Alcohol use: Yes     Comment: Alcoholic Drinks/day: 0-2 per day             6/13/2023    10:01 AM   Alcohol Use   Prescreen: >3 drinks/day or >7 drinks/week? Yes   AUDIT SCORE  7     Do you have a current opioid prescription? No  Do you use any other controlled substances or medications that are not prescribed by a provider? Alcohol and Cannabis              Current providers sharing in care for this patient include:   Patient Care Team:  Sen Leon MD as PCP - General  Sen Leon MD as Assigned PCP  Lobo Leger MD as MD (Cardiovascular & Thoracic Surgery)  Dakota De Dios MD as MD (Internal Medicine - Pediatrics)  Boaz Hutchins [facility] as Assigned MTM Pharmacist  Aubree Beasley LPN as LPN (Thoracic Surgery)  Neli Woodall NP as Nurse Practitioner (Cardiology)  Mei Collazo MD as Assigned Heart and Vascular Provider    The following health  maintenance items are reviewed in Epic and correct as of today:  Health Maintenance   Topic Date Due     COPD ACTION PLAN  Never done     Pneumococcal Vaccine: 65+ Years (3 - PPSV23 if available, else PCV20) 03/02/2021     COVID-19 Vaccine (6 - Pfizer series) 04/08/2023     MEDICARE ANNUAL WELLNESS VISIT  04/14/2023     ANNUAL REVIEW OF HM ORDERS  04/14/2023     NICOTINE/TOBACCO CESSATION COUNSELING Q 1 YR  12/09/2023     FALL RISK ASSESSMENT  06/13/2024     ADVANCE CARE PLANNING  04/14/2027     LIPID  08/10/2027     DTAP/TDAP/TD IMMUNIZATION (2 - Td or Tdap) 11/20/2028     COLORECTAL CANCER SCREENING  05/11/2030     SPIROMETRY  Completed     PHQ-2 (once per calendar year)  Completed     INFLUENZA VACCINE  Completed     AORTIC ANEURYSM SCREENING (SYSTEM ASSIGNED)  Completed     IPV IMMUNIZATION  Aged Out     MENINGITIS IMMUNIZATION  Aged Out     ZOSTER IMMUNIZATION  Discontinued     HEPATITIS B IMMUNIZATION  Discontinued     LUNG CANCER SCREENING  Discontinued     Lab work is in process          Review of Systems   Constitutional: Negative for chills and fever.   HENT: Positive for congestion and sore throat. Negative for ear pain and hearing loss.    Eyes: Negative for pain and visual disturbance.   Respiratory: Positive for cough and shortness of breath.    Cardiovascular: Negative for chest pain, palpitations and peripheral edema.   Gastrointestinal: Positive for abdominal pain, constipation and heartburn. Negative for diarrhea, hematochezia and nausea.   Genitourinary: Positive for impotence and urgency. Negative for dysuria, frequency, genital sores, hematuria and penile discharge.   Musculoskeletal: Negative for arthralgias, joint swelling and myalgias.   Skin: Negative for rash.   Neurological: Positive for dizziness and weakness. Negative for headaches and paresthesias.   Psychiatric/Behavioral: Negative for mood changes. The patient is not nervous/anxious.          OBJECTIVE:   /68 (BP Location: Right  "arm, Patient Position: Sitting, Cuff Size: Adult Regular)   Pulse 59   Temp 98.1  F (36.7  C) (Oral)   Resp 16   Ht 1.727 m (5' 8\")   Wt 84.7 kg (186 lb 11.2 oz)   SpO2 98%   BMI 28.39 kg/m   Estimated body mass index is 28.39 kg/m  as calculated from the following:    Height as of this encounter: 1.727 m (5' 8\").    Weight as of this encounter: 84.7 kg (186 lb 11.2 oz).  Physical Exam  EYES: Eyelids, conjunctiva, and sclera were normal. Pupils were normal. Cornea, iris, and lens were normal bilaterally.  HEAD, EARS, NOSE, MOUTH, AND THROAT: Head and face were normal. TMs and external auditory canals are normal  NECK: Neck appearance was normal. There were no neck masses and the thyroid was not enlarged and no nodules are felt.  No lymphadenopathy.  RESPIRATORY: Breathing pattern was normal and the chest moved symmetrically.   Lung sounds were normal and there were no rales or wheezes.  CARDIOVASCULAR: Heart rate and rhythm were normal.  S1 and S2 were normal and there were no extra sounds or murmurs. Peripheral pulses in arms and legs were normal.  There was no peripheral edema.  No carotid bruits.  GASTROINTESTINAL:  Bowel sounds were present.   Palpation detected no tenderness, mass, or enlarged organs.   RECTAL/PROSTATE: Deferred  MUSCULOSKELETAL: Ecchymosis left upper extremity medial aspect  LYMPHATIC: There were no enlarged nodes.  SKIN/HAIR/NAILS: Skin color was normal.  There were no concerning skin lesions.  NEUROLOGIC: The patient was alert and oriented to person, place, time, and circumstance. Speech was normal. Cranial nerves were normal. Motor strength was normal for age. The patient was normally coordinated.  Sensation intact.  PSYCHIATRIC:  Mood and affect were normal and the patient had normal recent and remote memory. The patient's judgment and insight were normal.        ASSESSMENT / PLAN:   1. Encounter for Medicare annual wellness exam  Immunizations are reviewed and providing Pneumovax " 23.  Reminding him to get his second Shingrix completed after June 20.  COVID vaccination discussed.  Recommending annual flu shot..  Living will discussed.  Information provided.  Discussed smoking cessation.   Discussed using alcohol in moderation.  Regular exercise and good diet habits to maintain a healthy weight discussed.  Up to date with colonoscopies and this should be repeated in 2025.  Prostate exam is deferred but I will check a PSA for prostate cancer screening.  Dementia and depression screening completed.  Recommending getting an annual eye exam completed.  Seeing a dentist every 6 months recommended.  Skin exam performed and recommending regular use of sunblock.  Will screen for diabetes with fasting glucose.  Ultrasound was negative for AAA.    2. Squamous cell lung cancer, right (H)  Found to have enlarging right lower lobe lung nodule this winter and underwent robotic assisted resection confirmed to be early stage squamous cell lung cancer.  He is followed by oncology with plans for another CT scan this July as a new nodule was noted in right upper lobe on his last scan.  The plan is to continue imaging every 6 months for a total of 2 years.  He still have some residual pain from surgery but it is manageable without medication.  He was able to taper off of oxycodone.  He may use Tylenol as needed.  Again discussed the importance of complete tobacco cessation.    3. Tobacco abuse  He has cut back on his tobacco use but unfortunately is still smoking half pack of cigarettes per day.  He is applying a nicotine patch daily.  We discussed using nicotine gum or lozenges or cartridges.  Ideally he should be totally off cigarettes and he understands the importance.    4. COPD, mild (H)  No change in symptoms related to mild COPD.  Not responsive to bronchodilators.  Smoking cessation emphasized.    5. Nonischemic cardiomyopathy (H)  Echocardiogram demonstrating global hypokinesis with EF of 45%.  Evaluated  by cardiology.  Possibly related to frequent PVCs.  These are better controlled with increased dose of beta-blocker.  Echocardiogram is being repeated later this month.  He continues on metoprolol and lisinopril.    6. Paroxysmal atrial fibrillation (H)  Found to have paroxysmal atrial fibrillation during cardiac monitor.  4% burden.  Continues Eliquis 5 mg twice daily for cardioembolic prevention.  However, atrial fibrillation may have been transient following thoracic surgery and as indicated by cardiology, he may not need chronic anticoagulation long-term.    7. Ventricular ectopy  Frequent PVCs now on higher dose of metoprolol 50 mg daily    8. Nonobstructive atherosclerosis of coronary artery  Coronary angiogram performed after elevated coronary calcium score found with angiogram demonstrating only mild to moderate disease.  He continues on rosuvastatin.  I am recommending discontinuing aspirin while on Eliquis  - Lipid Profile (Chol, Trig, HDL, LDL calc); Future    9. Ascending aorta dilation (H)  Dilated ascending aorta stable at 4.4 cm on recent imaging.  Asymptomatic.    10. Essential hypertension  Blood pressure is very well controlled with combination of lisinopril and metoprolol.  Some concerns that blood pressure might be running too low as he does feel lightheaded at times when standing up.  May need to cut back on lisinopril further if this becomes any more symptomatic  - Comprehensive metabolic panel (BMP + Alb, Alk Phos, ALT, AST, Total. Bili, TP); Future  - CBC with platelets; Future  - UA Macroscopic with reflex to Microscopic and Culture; Future    11. Anxiety, generalized  Uses lorazepam at night for insomnia and anxiety.    12. Medial epicondylitis of elbow, left  Recurrent pain involving medial elbow suggesting medial epicondylitis.  Recent injury with ecchymosis while golfing.  He has avoided any golfing the past 8 days and we discussed resting further until symptoms have completely resolved.  " He can go see an orthopedic hand specialist such as Dr. Vishnu Schaeffer if ongoing symptoms    13. History of chronic hepatitis  Successful treatment of chronic hepatitis C    14. Primary osteoarthritis involving multiple joints  Stable    15. History of colon polyps  Colonoscopy will be needed in 2025    16. Screening for prostate cancer  Exam deferred but will continue PSA annually for prostate cancer screening  - PSA, screen; Future    17. Encounter for therapeutic drug monitoring  Monitoring CBC while anticoagulated, LFTs while on statin and renal function on ACE inhibitor  - Comprehensive metabolic panel (BMP + Alb, Alk Phos, ALT, AST, Total. Bili, TP); Future  - CBC with platelets; Future    Patient has been advised of split billing requirements and indicates understanding: Yes          BMI:   Estimated body mass index is 28.39 kg/m  as calculated from the following:    Height as of this encounter: 1.727 m (5' 8\").    Weight as of this encounter: 84.7 kg (186 lb 11.2 oz).         He reports that he has been smoking cigarettes. He started smoking about 55 years ago. He has been smoking an average of .5 packs per day. He has never used smokeless tobacco.  Nicotine/Tobacco Cessation Plan:   Discussed continuing nicotine patch and using other nicotine replacement      Appropriate preventive services were discussed with this patient, including applicable screening as appropriate for cardiovascular disease, diabetes, osteopenia/osteoporosis, and glaucoma.  As appropriate for age/gender, discussed screening for colorectal cancer, prostate cancer, breast cancer, and cervical cancer. Checklist reviewing preventive services available has been given to the patient.    Reviewed patients plan of care and provided an AVS. The Basic Care Plan (routine screening as documented in Health Maintenance) for Cesar meets the Care Plan requirement. This Care Plan has been established and reviewed with the Patient.      Sen RIZVI" MD Carolyn  [facility]    Identified Health Risks:    I have reviewed Opioid Use Disorder and Substance Use Disorder risk factors and made any needed referrals.          He is at risk for lack of exercise and has been provided with information to increase physical activity for the benefit of his well-being.  The patient reports that he drinks more than one alcoholic drink per day and sometimes engages in binge or excessive drinking. He was counseled and given information about possible harmful effects of excessive alcohol intake as well as where to get help for alcohol problems.  The patient was counseled and encouraged to consider modifying their diet and eating habits. He was provided with information on recommended healthy diet options.

## 2023-06-15 ENCOUNTER — HOSPITAL ENCOUNTER (OUTPATIENT)
Dept: CARDIOLOGY | Facility: CLINIC | Age: 68
Discharge: HOME | End: 2023-06-15
Attending: NURSE PRACTITIONER | Admitting: NURSE PRACTITIONER
Payer: COMMERCIAL

## 2023-06-15 DIAGNOSIS — I42.8 NONISCHEMIC CARDIOMYOPATHY (H): Primary | ICD-10-CM

## 2023-06-15 LAB — LVEF ECHO: NORMAL

## 2023-06-15 PROCEDURE — 255N000002 HC RX 255 OP 636: Performed by: NURSE PRACTITIONER

## 2023-06-15 PROCEDURE — 999N000208 ECHOCARDIOGRAM COMPLETE

## 2023-06-15 PROCEDURE — 93306 TTE W/DOPPLER COMPLETE: CPT | Mod: 26 | Performed by: INTERNAL MEDICINE

## 2023-06-15 RX ADMIN — HUMAN ALBUMIN MICROSPHERES AND PERFLUTREN 9 ML: 10; .22 INJECTION, SOLUTION INTRAVENOUS at 14:44

## 2023-06-16 ENCOUNTER — TELEPHONE (OUTPATIENT)
Dept: CARDIOLOGY | Facility: CLINIC | Age: 68
End: 2023-06-16
Payer: COMMERCIAL

## 2023-06-16 NOTE — TELEPHONE ENCOUNTER
Called patient with echo results and recommendations from Raven Woodall NP.  Patient states he had recent physical from his PCP and PCP was asking why he is taking a baby aspirin melissa.  Patient advised his angiogram in January showed mild nonobstructive coronary artery disease he should be on aspirin 81 mg to prevent clot formation obstructive coronary artery disease. Pt advised to continue current medications. Ninfa DAVE

## 2023-06-16 NOTE — TELEPHONE ENCOUNTER
----- Message from Neli Woodall NP sent at 6/15/2023  4:35 PM CDT -----  Ejection fraction has improved with medical therapy to low normal 50-55%.

## 2023-07-18 NOTE — PROGRESS NOTES
THORACIC SURGERY FOLLOW UP VISIT      I saw Mr. Murillo in follow-up today. The clinical summary follows:     PREOP DIAGNOSIS   Lung nodule    PROCEDURE   Robot assisted thoracoscopic right lower lobe wedge resection, completion right lower lobectomy, mediastinal lymph node dissection    DATE OF PROCEDURE  01/18/2023    HISTOPATHOLOGY   Squamous cell carcinoma, keratinizing type, moderately differentiated, pT1bN0    COMPLICATIONS  None    INTERVAL STUDIES  CT chest: No significant interval change including right lower lobectomy and 3 mm right upper lobe pulmonary nodule. Continued ectasia mid ascending thoracic aorta 4.4 cm.    Past Medical History:   Diagnosis Date     Acute bilateral low back pain without sciatica 07/23/2018     Anxiety, generalized 11/17/2017     Ascending aorta dilation (H)     4.2 cm on CT scan August 2017, follow annually, 4.1 cm August 2018, stable October 2019.  4.5 cm April 2022.  4.4 cm April 2023 recheck in 1 year     Calcaneal fracture 2013    Chronic foot/heel pain     Chronic cough 04/14/2022     Chronic fatigue 10/14/2019     Chronic hepatitis C (H)     Successfully treated.  Previously on interferon.  More recently SOFOSBUVIR and ribavirin, biopsy has been negative for cirrhosis, PCR -2015     Chronic insomnia      Chronic pain of right ankle 05/17/2018     COPD, mild (H) 02/28/2023     Erectile dysfunction      Essential hypertension      External hemorrhoids 2012     History of alcohol abuse      History of colon polyps     Colonoscopy April 2020 with polyps.  Repeat in 5 years     History of depression     2014, prescribed citalopram     Lower abdominal pain 07/23/2018     Medial epicondylitis of elbow, left 6/13/2023     Nonischemic cardiomyopathy (H) 02/28/2023    Echocardiogram demonstrating 40 to 50% ejection fraction     Nonobstructive atherosclerosis of coronary artery 02/28/2023    Coronary angiogram demonstrating mild to moderate multivessel disease.  January 2023      Osteoarthritis of multiple joints     Chronic bilateral knee and chronic low back pain     Paroxysmal atrial fibrillation (H) 03/13/2023    Found on event monitor 4% burden longest run 8 hours March 2023     Porphyria cutanea tarda (H)      Pulmonary nodule     CT August 2018 3 mm right upper lobe nodule reported as stable, stable October 2019.  CT with 6 mm right lower lobe nodule April 2022 needing 6-month follow-up     Rectal bleeding 03/02/2020     Screening for AAA (abdominal aortic aneurysm)     CT 2018 without abdominal aneurysm     Shingles 05/13/2021     Slow transit constipation 02/28/2023     Squamous cell lung cancer, right (H)     status post robot assisted thoracoscopic right lower lobe wedge resection, completion lobectomy, and mediastinal lymph node dissection for a pT1bN0 (stage IA2) non small cell lung cancer.     Tobacco abuse 11/17/2017     Ventricular ectopy 06/13/2023    Frequent PVCs      Past Surgical History:   Procedure Laterality Date     ANKLE FRACTURE SURGERY       ARTHROSCOPY KNEE Bilateral      COLONOSCOPY       CV CORONARY ANGIOGRAM N/A 1/17/2023    Procedure: Coronary Angiogram;  Surgeon: Souleymane Ratliff MD;  Location:  HEART CARDIAC CATH LAB     CV LEFT HEART CATH N/A 1/17/2023    Procedure: Left Heart Catheterization;  Surgeon: Souleymane Ratliff MD;  Location: [facility] CARDIAC CATH LAB     CV LEFT VENTRICULOGRAM N/A 1/17/2023    Procedure: Left Ventriculogram;  Surgeon: Souleymane Ratliff MD;  Location: [facility] CARDIAC CATH LAB     DAVINCI LOBECTOMY LUNG Right 1/18/2023    Procedure: Robot-assisted thoracoscopic right lower lobe wedge, right lower lobectomy, and mediastinal lymph node dissection;  Surgeon: Lobo Leger MD;  Location: [facility] OR     FOOT FRACTURE SURGERY  09/01/2013    calcaneal fracture     WRIST SURGERY      ligament injury      Social History     Socioeconomic History     Marital status:      Spouse name: Not on file     Number of children: Not on  "file     Years of education: Not on file     Highest education level: Not on file   Occupational History     Not on file   Tobacco Use     Smoking status: Every Day     Packs/day: 0.50     Types: Cigarettes     Start date: 1968     Smokeless tobacco: Never   Substance and Sexual Activity     Alcohol use: Yes     Comment: Alcoholic Drinks/day: 1-2 per day     Drug use: Yes     Types: Marijuana     Comment: Drug use: Frequent use     Sexual activity: Not on file   Other Topics Concern     Not on file   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 He just got back from a weekend in [place], [place] where he bested one of his brothers in golf. He has no new concerns or complaints.    OBJECTIVE  BP (!) 149/92 (BP Location: Right arm, Patient Position: Chair, Cuff Size: Adult Large)   Pulse 57   Temp 98.2  F (36.8  C)   Resp 16   Ht 1.727 m (5' 7.99\")   Wt 86.5 kg (190 lb 11.2 oz)   SpO2 99%   BMI 29.00 kg/m       From a personal perspective, he and all his brothers are going to [place] this fall for another golf match.     IMPRESSION   68 year-old male status post thoracoscopic right lower lobe wedge resection, completion lobectomy and mediastinal lymph node dissection for a pT1bN0 (stage IA2) non small cell mao cancer. At his last appointment his new baseline chest CT demonstrated a new 3 mm nodule in the right upper lobe so we are seeing him back today with a short term 3 month follow up chest CT rather than a 6 month follow up CT.    PLAN  I spent 15 min on the date of the encounter in chart review, patient visit, review of tests, documentation " and/or discussion with other providers about the issues documented above. I reviewed the plan as follows:  Follow up with me in 6 months with chest CT prior  All questions were answered and the patient and present family were in agreement with the plan.  I appreciate the opportunity to participate in the care of your patient and will keep you updated.  Sincerely,

## 2023-07-19 ENCOUNTER — ONCOLOGY VISIT (OUTPATIENT)
Dept: SURGERY | Facility: CLINIC | Age: 68
End: 2023-07-19
Attending: CLINICAL NURSE SPECIALIST
Payer: COMMERCIAL

## 2023-07-19 ENCOUNTER — ANCILLARY PROCEDURE (OUTPATIENT)
Dept: CT IMAGING | Facility: CLINIC | Age: 68
End: 2023-07-19
Attending: CLINICAL NURSE SPECIALIST
Payer: COMMERCIAL

## 2023-07-19 VITALS
SYSTOLIC BLOOD PRESSURE: 149 MMHG | OXYGEN SATURATION: 99 % | WEIGHT: 190.7 LBS | DIASTOLIC BLOOD PRESSURE: 92 MMHG | TEMPERATURE: 98.2 F | HEART RATE: 57 BPM | RESPIRATION RATE: 16 BRPM | BODY MASS INDEX: 28.9 KG/M2 | HEIGHT: 68 IN

## 2023-07-19 DIAGNOSIS — C34.31 MALIGNANT NEOPLASM OF LOWER LOBE OF RIGHT LUNG (H): ICD-10-CM

## 2023-07-19 DIAGNOSIS — C34.31 MALIGNANT NEOPLASM OF LOWER LOBE OF RIGHT LUNG (H): Primary | ICD-10-CM

## 2023-07-19 PROCEDURE — G0463 HOSPITAL OUTPT CLINIC VISIT: HCPCS | Performed by: CLINICAL NURSE SPECIALIST

## 2023-07-19 PROCEDURE — 99213 OFFICE O/P EST LOW 20 MIN: CPT | Performed by: CLINICAL NURSE SPECIALIST

## 2023-07-19 PROCEDURE — 71260 CT THORAX DX C+: CPT | Performed by: RADIOLOGY

## 2023-07-19 RX ORDER — IOPAMIDOL 755 MG/ML
91 INJECTION, SOLUTION INTRAVASCULAR ONCE
Status: COMPLETED | OUTPATIENT
Start: 2023-07-19 | End: 2023-07-19

## 2023-07-19 RX ORDER — ASPIRIN 81 MG/1
81 TABLET, CHEWABLE ORAL DAILY
COMMUNITY
End: 2024-07-09

## 2023-07-19 RX ADMIN — IOPAMIDOL 91 ML: 755 INJECTION, SOLUTION INTRAVASCULAR at 12:36

## 2023-07-19 ASSESSMENT — PAIN SCALES - GENERAL: PAINLEVEL: NO PAIN (0)

## 2023-07-19 NOTE — LETTER
7/19/2023         RE: Cesar Murillo  [address]  [place] [address]        Dear Colleague,    Thank you for referring your patient, Cesar Murillo, to the [facility]. Please see a copy of my visit note below.    THORACIC SURGERY FOLLOW UP VISIT      I saw Mr. Murillo in follow-up today. The clinical summary follows:     PREOP DIAGNOSIS   Lung nodule    PROCEDURE   Robot assisted thoracoscopic right lower lobe wedge resection, completion right lower lobectomy, mediastinal lymph node dissection    DATE OF PROCEDURE  01/18/2023    HISTOPATHOLOGY   Squamous cell carcinoma, keratinizing type, moderately differentiated, pT1bN0    COMPLICATIONS  None    INTERVAL STUDIES  CT chest: No significant interval change including right lower lobectomy and 3 mm right upper lobe pulmonary nodule. Continued ectasia mid ascending thoracic aorta 4.4 cm.    Past Medical History:   Diagnosis Date    Acute bilateral low back pain without sciatica 07/23/2018    Anxiety, generalized 11/17/2017    Ascending aorta dilation (H)     4.2 cm on CT scan August 2017, follow annually, 4.1 cm August 2018, stable October 2019.  4.5 cm April 2022.  4.4 cm April 2023 recheck in 1 year    Calcaneal fracture 2013    Chronic foot/heel pain    Chronic cough 04/14/2022    Chronic fatigue 10/14/2019    Chronic hepatitis C (H)     Successfully treated.  Previously on interferon.  More recently SOFOSBUVIR and ribavirin, biopsy has been negative for cirrhosis, PCR -2015    Chronic insomnia     Chronic pain of right ankle 05/17/2018    COPD, mild (H) 02/28/2023    Erectile dysfunction     Essential hypertension     External hemorrhoids 2012    History of alcohol abuse     History of colon polyps     Colonoscopy April 2020 with polyps.  Repeat in 5 years    History of depression     2014, prescribed citalopram    Lower abdominal pain 07/23/2018    Medial epicondylitis of elbow, left 6/13/2023    Nonischemic cardiomyopathy (H)  02/28/2023    Echocardiogram demonstrating 40 to 50% ejection fraction    Nonobstructive atherosclerosis of coronary artery 02/28/2023    Coronary angiogram demonstrating mild to moderate multivessel disease.  January 2023    Osteoarthritis of multiple joints     Chronic bilateral knee and chronic low back pain    Paroxysmal atrial fibrillation (H) 03/13/2023    Found on event monitor 4% burden longest run 8 hours March 2023    Porphyria cutanea tarda (H)     Pulmonary nodule     CT August 2018 3 mm right upper lobe nodule reported as stable, stable October 2019.  CT with 6 mm right lower lobe nodule April 2022 needing 6-month follow-up    Rectal bleeding 03/02/2020    Screening for AAA (abdominal aortic aneurysm)     CT 2018 without abdominal aneurysm    Shingles 05/13/2021    Slow transit constipation 02/28/2023    Squamous cell lung cancer, right (H)     status post robot assisted thoracoscopic right lower lobe wedge resection, completion lobectomy, and mediastinal lymph node dissection for a pT1bN0 (stage IA2) non small cell lung cancer.    Tobacco abuse 11/17/2017    Ventricular ectopy 06/13/2023    Frequent PVCs      Past Surgical History:   Procedure Laterality Date    ANKLE FRACTURE SURGERY      ARTHROSCOPY KNEE Bilateral     COLONOSCOPY      CV CORONARY ANGIOGRAM N/A 1/17/2023    Procedure: Coronary Angiogram;  Surgeon: Souleymane Ratliff MD;  Location:  HEART CARDIAC CATH LAB    CV LEFT HEART CATH N/A 1/17/2023    Procedure: Left Heart Catheterization;  Surgeon: Souleymane Ratliff MD;  Location: [facility] CARDIAC CATH LAB    CV LEFT VENTRICULOGRAM N/A 1/17/2023    Procedure: Left Ventriculogram;  Surgeon: Souleymane Ratliff MD;  Location:  HEART CARDIAC CATH LAB    DAVINCI LOBECTOMY LUNG Right 1/18/2023    Procedure: Robot-assisted thoracoscopic right lower lobe wedge, right lower lobectomy, and mediastinal lymph node dissection;  Surgeon: Lobo Leger MD;  Location: [facility] OR    FOOT FRACTURE SURGERY   "09/01/2013    calcaneal fracture    WRIST SURGERY      ligament injury      Social History     Socioeconomic History    Marital status:      Spouse name: Not on file    Number of children: Not on file    Years of education: Not on file    Highest education level: Not on file   Occupational History    Not on file   Tobacco Use    Smoking status: Every Day     Packs/day: 0.50     Types: Cigarettes     Start date: 1968    Smokeless tobacco: Never   Substance and Sexual Activity    Alcohol use: Yes     Comment: Alcoholic Drinks/day: 1-2 per day    Drug use: Yes     Types: Marijuana     Comment: Drug use: Frequent use    Sexual activity: Not on file   Other Topics Concern    Not on file   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 He just got back from a weekend in [place], [place] where he bested one of his brothers in golf. He has no new concerns or complaints.    OBJECTIVE  BP (!) 149/92 (BP Location: Right arm, Patient Position: Chair, Cuff Size: Adult Large)   Pulse 57   Temp 98.2  F (36.8  C)   Resp 16   Ht 1.727 m (5' 7.99\")   Wt 86.5 kg (190 lb 11.2 oz)   SpO2 99%   BMI 29.00 kg/m       From a personal perspective, he and all his brothers are going to [place] this fall for another golf match.     IMPRESSION   68 year-old male status post thoracoscopic right lower lobe wedge resection, completion lobectomy and mediastinal lymph node dissection for a pT1bN0 (stage IA2) non small cell mao cancer. At his last appointment his new baseline chest CT demonstrated a new 3 mm nodule in the right upper lobe so we are seeing him back today " with a short term 3 month follow up chest CT rather than a 6 month follow up CT.    PLAN  I spent 15 min on the date of the encounter in chart review, patient visit, review of tests, documentation and/or discussion with other providers about the issues documented above. I reviewed the plan as follows:  Follow up with me in 6 months with chest CT prior  All questions were answered and the patient and present family were in agreement with the plan.  I appreciate the opportunity to participate in the care of your patient and will keep you updated.  Sincerely,      FAUZIA Nava CNS

## 2023-07-19 NOTE — NURSING NOTE
"Oncology Rooming Note    July 19, 2023 1:49 PM   Cesar Murillo is a 68 year old male who presents for:    Chief Complaint   Patient presents with     Oncology Clinic Visit     P RETURN - LUNG CANCER     Initial Vitals: BP (!) 149/92 (BP Location: Right arm, Patient Position: Chair, Cuff Size: Adult Large)   Pulse 57   Temp 98.2  F (36.8  C)   Resp 16   Ht 1.727 m (5' 7.99\")   Wt 86.5 kg (190 lb 11.2 oz)   SpO2 99%   BMI 29.00 kg/m   Estimated body mass index is 29 kg/m  as calculated from the following:    Height as of this encounter: 1.727 m (5' 7.99\").    Weight as of this encounter: 86.5 kg (190 lb 11.2 oz). Body surface area is 2.04 meters squared.  No Pain (0) Comment: Data Unavailable   No LMP for male patient.  Allergies reviewed: Yes  Medications reviewed: Yes    Medications: Medication refills not needed today.  Pharmacy name entered into [facility]: [id number] - [place], [place] - [address] [place] [address], [place]YAMILE[place]            "

## 2023-07-19 NOTE — LETTER
7/19/2023         RE: Cesar Murillo  [address]  [place] [address]      THORACIC SURGERY FOLLOW UP VISIT      I saw Mr. Murillo in follow-up today. The clinical summary follows:     PREOP DIAGNOSIS   Lung nodule    PROCEDURE   Robot assisted thoracoscopic right lower lobe wedge resection, completion right lower lobectomy, mediastinal lymph node dissection    DATE OF PROCEDURE  01/18/2023    HISTOPATHOLOGY   Squamous cell carcinoma, keratinizing type, moderately differentiated, pT1bN0    COMPLICATIONS  None    INTERVAL STUDIES  CT chest: No significant interval change including right lower lobectomy and 3 mm right upper lobe pulmonary nodule. Continued ectasia mid ascending thoracic aorta 4.4 cm.    Past Medical History:   Diagnosis Date    Acute bilateral low back pain without sciatica 07/23/2018    Anxiety, generalized 11/17/2017    Ascending aorta dilation (H)     4.2 cm on CT scan August 2017, follow annually, 4.1 cm August 2018, stable October 2019.  4.5 cm April 2022.  4.4 cm April 2023 recheck in 1 year    Calcaneal fracture 2013    Chronic foot/heel pain    Chronic cough 04/14/2022    Chronic fatigue 10/14/2019    Chronic hepatitis C (H)     Successfully treated.  Previously on interferon.  More recently SOFOSBUVIR and ribavirin, biopsy has been negative for cirrhosis, PCR -2015    Chronic insomnia     Chronic pain of right ankle 05/17/2018    COPD, mild (H) 02/28/2023    Erectile dysfunction     Essential hypertension     External hemorrhoids 2012    History of alcohol abuse     History of colon polyps     Colonoscopy April 2020 with polyps.  Repeat in 5 years    History of depression     2014, prescribed citalopram    Lower abdominal pain 07/23/2018    Medial epicondylitis of elbow, left 6/13/2023    Nonischemic cardiomyopathy (H) 02/28/2023    Echocardiogram demonstrating 40 to 50% ejection fraction    Nonobstructive atherosclerosis of coronary artery 02/28/2023    Coronary angiogram  demonstrating mild to moderate multivessel disease.  January 2023    Osteoarthritis of multiple joints     Chronic bilateral knee and chronic low back pain    Paroxysmal atrial fibrillation (H) 03/13/2023    Found on event monitor 4% burden longest run 8 hours March 2023    Porphyria cutanea tarda (H)     Pulmonary nodule     CT August 2018 3 mm right upper lobe nodule reported as stable, stable October 2019.  CT with 6 mm right lower lobe nodule April 2022 needing 6-month follow-up    Rectal bleeding 03/02/2020    Screening for AAA (abdominal aortic aneurysm)     CT 2018 without abdominal aneurysm    Shingles 05/13/2021    Slow transit constipation 02/28/2023    Squamous cell lung cancer, right (H)     status post robot assisted thoracoscopic right lower lobe wedge resection, completion lobectomy, and mediastinal lymph node dissection for a pT1bN0 (stage IA2) non small cell lung cancer.    Tobacco abuse 11/17/2017    Ventricular ectopy 06/13/2023    Frequent PVCs      Past Surgical History:   Procedure Laterality Date    ANKLE FRACTURE SURGERY      ARTHROSCOPY KNEE Bilateral     COLONOSCOPY      CV CORONARY ANGIOGRAM N/A 1/17/2023    Procedure: Coronary Angiogram;  Surgeon: Souleymane Ratliff MD;  Location: [facility] CARDIAC CATH LAB    CV LEFT HEART CATH N/A 1/17/2023    Procedure: Left Heart Catheterization;  Surgeon: Souleymane Ratliff MD;  Location: [facility] CARDIAC CATH LAB    CV LEFT VENTRICULOGRAM N/A 1/17/2023    Procedure: Left Ventriculogram;  Surgeon: Souleymane Ratliff MD;  Location: [facility] CARDIAC CATH LAB    DAVINCI LOBECTOMY LUNG Right 1/18/2023    Procedure: Robot-assisted thoracoscopic right lower lobe wedge, right lower lobectomy, and mediastinal lymph node dissection;  Surgeon: Lobo Leger MD;  Location: [facility] OR    FOOT FRACTURE SURGERY  09/01/2013    calcaneal fracture    WRIST SURGERY      ligament injury      Social History     Socioeconomic History    Marital status:      Spouse  "name: Not on file    Number of children: Not on file    Years of education: Not on file    Highest education level: Not on file   Occupational History    Not on file   Tobacco Use    Smoking status: Every Day     Packs/day: 0.50     Types: Cigarettes     Start date: 1968    Smokeless tobacco: Never   Substance and Sexual Activity    Alcohol use: Yes     Comment: Alcoholic Drinks/day: 1-2 per day    Drug use: Yes     Types: Marijuana     Comment: Drug use: Frequent use    Sexual activity: Not on file   Other Topics Concern    Not on file   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 He just got back from a weekend in [place], [place] where he bested one of his brothers in golf. He has no new concerns or complaints.    OBJECTIVE  BP (!) 149/92 (BP Location: Right arm, Patient Position: Chair, Cuff Size: Adult Large)   Pulse 57   Temp 98.2  F (36.8  C)   Resp 16   Ht 1.727 m (5' 7.99\")   Wt 86.5 kg (190 lb 11.2 oz)   SpO2 99%   BMI 29.00 kg/m       From a personal perspective, he and all his brothers are going to [place] this fall for another golf match.     IMPRESSION   68 year-old male status post thoracoscopic right lower lobe wedge resection, completion lobectomy and mediastinal lymph node dissection for a pT1bN0 (stage IA2) non small cell mao cancer. At his last appointment his new baseline chest CT demonstrated a new 3 mm nodule in the right upper lobe so we are seeing him back today with a short term 3 month follow up chest CT rather than a 6 month follow up CT.    PLAN  I spent 15 min on the date of the encounter in chart review, patient " visit, review of tests, documentation and/or discussion with other providers about the issues documented above. I reviewed the plan as follows:  Follow up with me in 6 months with chest CT prior  All questions were answered and the patient and present family were in agreement with the plan.  I appreciate the opportunity to participate in the care of your patient and will keep you updated.    Sincerely,            FAUZIA Nava CNS

## 2023-07-28 DIAGNOSIS — F41.1 ANXIETY, GENERALIZED: ICD-10-CM

## 2023-07-28 RX ORDER — LORAZEPAM 1 MG/1
TABLET ORAL
Qty: 15 TABLET | Refills: 0 | Status: SHIPPED | OUTPATIENT
Start: 2023-07-28 | End: 2023-11-16

## 2023-10-03 ENCOUNTER — HOSPITAL ENCOUNTER (OUTPATIENT)
Dept: CARDIOLOGY | Facility: CLINIC | Age: 68
Discharge: HOME | End: 2023-10-03
Attending: INTERNAL MEDICINE | Admitting: INTERNAL MEDICINE
Payer: COMMERCIAL

## 2023-10-03 DIAGNOSIS — I49.3 PVC'S (PREMATURE VENTRICULAR CONTRACTIONS): ICD-10-CM

## 2023-10-03 PROCEDURE — 93244 EXT ECG>48HR<7D REV&INTERPJ: CPT | Performed by: INTERNAL MEDICINE

## 2023-10-03 PROCEDURE — 93242 EXT ECG>48HR<7D RECORDING: CPT

## 2023-10-30 ENCOUNTER — TELEPHONE (OUTPATIENT)
Dept: CARDIOLOGY | Facility: CLINIC | Age: 68
End: 2023-10-30
Payer: COMMERCIAL

## 2023-10-30 NOTE — TELEPHONE ENCOUNTER
HUMZA for pt that Raven would like pt to see EP and that I have an opening the day after held for him.  Hold is on 11/14 at 2:30. TomasRYAMILETH   home

## 2023-11-07 ENCOUNTER — LAB (OUTPATIENT)
Dept: LAB | Facility: CLINIC | Age: 68
End: 2023-11-07
Payer: COMMERCIAL

## 2023-11-07 ENCOUNTER — HOSPITAL ENCOUNTER (OUTPATIENT)
Dept: CARDIOLOGY | Facility: CLINIC | Age: 68
Discharge: HOME | End: 2023-11-07
Attending: INTERNAL MEDICINE | Admitting: INTERNAL MEDICINE
Payer: COMMERCIAL

## 2023-11-07 DIAGNOSIS — I25.10 CORONARY ARTERY DISEASE INVOLVING NATIVE CORONARY ARTERY OF NATIVE HEART WITHOUT ANGINA PECTORIS: ICD-10-CM

## 2023-11-07 DIAGNOSIS — I49.3 PVC'S (PREMATURE VENTRICULAR CONTRACTIONS): ICD-10-CM

## 2023-11-07 LAB
ALT SERPL W P-5'-P-CCNC: 16 U/L (ref 0–70)
BI-PLANE LVEF ECHO: NORMAL
CHOLEST SERPL-MCNC: 121 MG/DL
HDLC SERPL-MCNC: 48 MG/DL
LDLC SERPL CALC-MCNC: 60 MG/DL
NONHDLC SERPL-MCNC: 73 MG/DL
TRIGL SERPL-MCNC: 65 MG/DL

## 2023-11-07 PROCEDURE — 80061 LIPID PANEL: CPT | Performed by: NURSE PRACTITIONER

## 2023-11-07 PROCEDURE — 84460 ALANINE AMINO (ALT) (SGPT): CPT | Performed by: NURSE PRACTITIONER

## 2023-11-07 PROCEDURE — 36415 COLL VENOUS BLD VENIPUNCTURE: CPT | Performed by: NURSE PRACTITIONER

## 2023-11-07 PROCEDURE — 93306 TTE W/DOPPLER COMPLETE: CPT | Mod: 26 | Performed by: INTERNAL MEDICINE

## 2023-11-07 PROCEDURE — 93306 TTE W/DOPPLER COMPLETE: CPT

## 2023-11-10 NOTE — TELEPHONE ENCOUNTER
Spoke to pt on  11/8 and he states that he is ok with seeing Mae on 11/14 at 1430 in the place of seeing Raven Woodall on 11/13.  Pt rescheduled. Joey

## 2023-11-14 ENCOUNTER — OFFICE VISIT (OUTPATIENT)
Dept: CARDIOLOGY | Facility: CLINIC | Age: 68
End: 2023-11-14
Attending: INTERNAL MEDICINE
Payer: COMMERCIAL

## 2023-11-14 VITALS
BODY MASS INDEX: 27.7 KG/M2 | HEIGHT: 69 IN | HEART RATE: 62 BPM | SYSTOLIC BLOOD PRESSURE: 124 MMHG | DIASTOLIC BLOOD PRESSURE: 78 MMHG | WEIGHT: 187 LBS | OXYGEN SATURATION: 96 %

## 2023-11-14 DIAGNOSIS — I42.8 NONISCHEMIC CARDIOMYOPATHY (H): Primary | ICD-10-CM

## 2023-11-14 DIAGNOSIS — I25.10 CORONARY ARTERY DISEASE INVOLVING NATIVE CORONARY ARTERY OF NATIVE HEART WITHOUT ANGINA PECTORIS: ICD-10-CM

## 2023-11-14 DIAGNOSIS — I48.0 PAROXYSMAL ATRIAL FIBRILLATION (H): ICD-10-CM

## 2023-11-14 DIAGNOSIS — I49.3 PVC'S (PREMATURE VENTRICULAR CONTRACTIONS): ICD-10-CM

## 2023-11-14 PROCEDURE — 99214 OFFICE O/P EST MOD 30 MIN: CPT | Performed by: NURSE PRACTITIONER

## 2023-11-14 NOTE — LETTER
11/14/2023    Sen Leon MD  [address] [facility] [place] [address]    RE: Cesar Murillo       Dear Colleague,     I had the pleasure of seeing Cesar Murillo in the [facility].    Electrophysiology Clinic Progress Note  Cesar Murillo MRN# [id number]   YOB: 1955 Age: 68 year old     Primary cardiologist: Dr. Pereira (general) and Dr. Collazo ()    Reason for visit: Follow up cardiac testing    History of presenting illness:    Cesar Murillo is a pleasant 68 year old patient with past medical history significant for:    Multifocal PVCs:   Nonischemic cardiomyopathy: LVEF 45 to 50% with severe coronary calcification but only mild CAD by coronary angiography (noted in preoperative evaluation prior to RLL lobectomy and mediastinal lymph node dissection).  Possibly PVC mediated  Squamous cell lung cancer status post right lower lobe lobectomy and mediastinal lymph node dissection in 1/23 at [facility]  Paroxysmal atrial fibrillation  [id number] score 2 anticoagulated on apixaban  GERD  Tobacco use: 1/2 ppd  EtOH use: 1-3 drinks a day    The patient was referred to Dr. Collazo with electrophysiology due to a cardiac monitor that showed frequent PVCs (11%) 7 runs of VT the longest being 4 beats and paroxysmal atrial fibrillation (4% burden with longest episode lasting 8 hours).  Upon review of his ECG and noted that there were at least 2 if not 3 ECG morphologies.  It was recommended that he increase metoprolol XL from 25 to 50 mg daily.  Antiarrhythmic drug therapy for PVC ablation not recommended at this point given the multifocal PVCs.  It was recommended that he follow-up in 5 to 6 months with a repeat 7-day Zio patch.    Today he returns to follow-up an echocardiogram and a repeat Zio patch.  His Zio patch revealed that he has paroxysmal atrial fibrillation with controlled ventricular response with a 6% burden.  The longest episode was 7 hours and 7 minutes with average heart rate of 85  bpm.  His echocardiogram noted LVEF of 52% that is stable from June when his EF was 50 to 55%.      He states that he has no cardiopulmonary complaints.  His biggest concern is heartburn and upon further discussion he drinks 1-3 cocktails a night and drinks coffee on empty stomach daily.  One of his favorite cocktails is vodka and grapefruit juice.  I cautioned against the grapefruit juice while taking a statin.  He says his symptoms are relieved with heartburn.  He also has bilateral foot numbness right greater than left.  Cesar also states that he continues to smoke half pack per day and also smokes marijuana and has edibles.  Occasionally he will have external hemorrhoids with significant mount of bleeding.  We discussed that technically we could discontinue aspirin as he has nonobstructive CAD at this point.  He will continue to take it but if needed to interrupt or discontinue in the future we certainly could.    We discussed in depth rate versus rhythm control for his atrial fibrillation including antiarrhythmic versus catheter ablation.  He is mildly symptomatic while in atrial fibrillation and at this point he prefers rate control strategy.    Diagnotic studies:  Echocardiogram (11/7/2023): LVEF 52%, Mild AR, ascending aortic dilation at 4.1 cm  Zio patch (10/2023): PAF with CVR (6% burden) longest episode of 7 hours and 7 minutes with average heart rate of 85 bpm.  PVC burden ~4%.   Echocardiogram (6/2023): LVEF 50-55%, mild MR.   Holter (4/2023): PVC 17% burden  Zio Patch (2/2023): 11% PVC burden, 7 runs of VT longest lasting 4 beats, paroxysmal atrial fibrillation with burden of 4% and longest episode lasting 8 hours with controlled ventricular response.  Coronary angiogram (1/2023): The ejection fraction is 40-50% by visual estimate. 2nd Diag lesion is 20% stenosed. Prox LAD to Mid LAD lesion is 30% stenosed. RPAV lesion is 30% stenosed. Prox RCA to Mid RCA lesion is 30% stenosed. Mid RCA to Dist RCA  lesion is 20% stenosed.  Stress echo (1/2023): LVEF of 40 to 45% significant dyssynchrony due to PVCs no significant valvular abnormalities.            Assessment and Plan:     ASSESSMENT:    PVC  Multifocal with burden improving from 17% down to 4% on metoprolol XL 50 mg daily.    Mild nonischemic cardiomyopathy   LVEF 45 to 50% (1/2023) that has improved to 52% 11/7/2023)  CT chest showed severe coronary calcification but only mild CAD by coronary angiography   Compensated  GDMT: Metoprolol XL 50 mg daily, lisinopril 10 mg daily    Mild coronary artery disease   Coronary angiogram (1/2023): The ejection fraction is 40-50% by visual estimate. 2nd Diag lesion is 20% stenosed. Prox LAD to Mid LAD lesion is 30% stenosed. RPAV lesion is 30% stenosed. Prox RCA to Mid RCA lesion is 30% stenosed. Mid RCA to Dist RCA lesion is 20% stenosed.  FLP (11/7/2023): Total cholesterol 121, HDL 48, LDL 70 and triglycerides 65 currently on rosuvastatin 10 mg daily    Paroxysmal atrial fibrillation  [id number] score 2 anticoagulated on apixaban  6% burden with CVR on recent  monitor.    PLAN:     Continue present medical therapy without changes.  Return to clinic in follow-up with general cardiology in 1 year.  EP follow-up as needed.       Orders this Visit:  Orders Placed This Encounter   Procedures    Follow-Up with Cardiology     No orders of the defined types were placed in this encounter.    There are no discontinued medications.    Today's clinic visit entailed:  Review of the result(s) of each unique test - EKG, echo, cath, SE, FLP  30 minutes spent by me on the date of the encounter doing chart review, history and exam, documentation and further activities per the note  Provider  Link to [employer] Help Grid     The level of medical decision making during this visit was of moderate complexity.           Review of Systems:     Review of Systems:  Skin:        Eyes:       ENT:       Respiratory:  Positive for shortness of  "breath  Cardiovascular:  Negative for;chest pain;edema;fatigue;syncope or near-syncope Positive for;palpitations;lightheadedness;dizziness  Gastroenterology:      Genitourinary:       Musculoskeletal:       Neurologic:       Psychiatric:       Heme/Lymph/Imm:  Negative    Endocrine:  Negative              Physical Exam:     Vitals: /78   Pulse 62   Ht 1.74 m (5' 8.5\")   Wt 84.8 kg (187 lb)   SpO2 96%   BMI 28.02 kg/m    Constitutional: Well nourished and in no apparent distress.  Eyes: Pupils equal, round. Sclerae anicteric.   HEENT: Normocephalic, atraumatic.   Neck: Supple.  Respiratory: Breathing non-labored. Lungs clear to auscultation bilaterally. No crackles, wheezes, rhonchi, or rales.  Cardiovascular:  Regular rate and rhythm, normal S1 and S2. No murmur, rub, or gallop.  Skin: Warm, dry. No rashes, cyanosis, or xanthelasma.  Extremities: No edema.  Neurologic: No gross motor deficits. Alert, awake, and oriented to person, place and time.  Psychiatric: Affect appropriate.        CURRENT MEDICATIONS:  Current Outpatient Medications   Medication Sig Dispense Refill    apixaban ANTICOAGULANT (ELIQUIS) 5 MG tablet Take 1 tablet (5 mg) by mouth 2 times daily 180 tablet 3    aspirin (ASA) 81 MG chewable tablet Take 81 mg by mouth daily      docusate sodium (COLACE) 100 MG capsule Take 100 mg by mouth as needed      lisinopril (ZESTRIL) 10 MG tablet Take 1 tablet (10 mg) by mouth daily 90 tablet 3    LORazepam (ATIVAN) 1 MG tablet Take 1 tablet by mouth at bedtime as needed for insonmia. 15 tablet 0    methocarbamol (ROBAXIN) 500 MG tablet Take 1 tablet (500 mg) by mouth every 6 hours as needed for muscle spasms 30 tablet 0    metoprolol succinate ER (TOPROL XL) 50 MG 24 hr tablet Take 1 tablet (50 mg) by mouth daily 30 tablet 11    nicotine (NICODERM CQ) 14 MG/24HR 24 hr patch Place 1 patch onto the skin daily 30 patch 0    polyethylene glycol (MIRALAX) 17 GM/Dose powder Take 17 g (1 capful.) by mouth " daily      rosuvastatin (CRESTOR) 10 MG tablet Take 1 tablet (10 mg) by mouth daily 90 tablet 2    senna-docusate (SENOKOT-S/PERICOLACE) 8.6-50 MG tablet Take 1 tablet by mouth 2 times daily 50 tablet 0    acetaminophen (TYLENOL) 500 MG tablet Take 2 tablets (1,000 mg) by mouth 2 times daily (Patient not taking: Reported on 6/13/2023)         ALLERGIES  No Known Allergies      PAST MEDICAL HISTORY:  Past Medical History:   Diagnosis Date    Acute bilateral low back pain without sciatica 07/23/2018    Anxiety, generalized 11/17/2017    Ascending aorta dilation (H24)     4.2 cm on CT scan August 2017, follow annually, 4.1 cm August 2018, stable October 2019.  4.5 cm April 2022.  4.4 cm April 2023 recheck in 1 year    Calcaneal fracture 2013    Chronic foot/heel pain    Chronic cough 04/14/2022    Chronic fatigue 10/14/2019    Chronic hepatitis C (H)     Successfully treated.  Previously on interferon.  More recently SOFOSBUVIR and ribavirin, biopsy has been negative for cirrhosis, PCR -2015    Chronic insomnia     Chronic pain of right ankle 05/17/2018    COPD, mild (H) 02/28/2023    Erectile dysfunction     Essential hypertension     External hemorrhoids 2012    History of alcohol abuse     History of colon polyps     Colonoscopy April 2020 with polyps.  Repeat in 5 years    History of depression     2014, prescribed citalopram    Lower abdominal pain 07/23/2018    Medial epicondylitis of elbow, left 6/13/2023    Nonischemic cardiomyopathy (H) 02/28/2023    Echocardiogram demonstrating 40 to 50% ejection fraction    Nonobstructive atherosclerosis of coronary artery 02/28/2023    Coronary angiogram demonstrating mild to moderate multivessel disease.  January 2023    Osteoarthritis of multiple joints     Chronic bilateral knee and chronic low back pain    Paroxysmal atrial fibrillation (H) 03/13/2023    Found on event monitor 4% burden longest run 8 hours March 2023    Porphyria cutanea tarda (H)     Pulmonary nodule      CT August 2018 3 mm right upper lobe nodule reported as stable, stable October 2019.  CT with 6 mm right lower lobe nodule April 2022 needing 6-month follow-up    Rectal bleeding 03/02/2020    Screening for AAA (abdominal aortic aneurysm)     CT 2018 without abdominal aneurysm    Shingles 05/13/2021    Slow transit constipation 02/28/2023    Squamous cell lung cancer, right (H)     status post robot assisted thoracoscopic right lower lobe wedge resection, completion lobectomy, and mediastinal lymph node dissection for a pT1bN0 (stage IA2) non small cell lung cancer.    Tobacco abuse 11/17/2017    Ventricular ectopy 06/13/2023    Frequent PVCs       PAST SURGICAL HISTORY:  Past Surgical History:   Procedure Laterality Date    ANKLE FRACTURE SURGERY      ARTHROSCOPY KNEE Bilateral     COLONOSCOPY      CV CORONARY ANGIOGRAM N/A 1/17/2023    Procedure: Coronary Angiogram;  Surgeon: Souleymane Ratliff MD;  Location:  HEART CARDIAC CATH LAB    CV LEFT HEART CATH N/A 1/17/2023    Procedure: Left Heart Catheterization;  Surgeon: Souleymane Ratliff MD;  Location:  HEART [facility]ARD[facility] CATH LAB    CV LEFT VENTRICULOGRAM N/A 1/17/2023    Procedure: Left Ventriculogram;  Surgeon: Souleymane Ratliff MD;  Location:  HEART [facility]ARD[facility] CATH LAB    DAVINCI LOBECTOMY LUNG Right 1/18/2023    Procedure: Robot-assisted thoracoscopic right lower lobe wedge, right lower lobectomy, and mediastinal lymph node dissection;  Surgeon: Lobo Leger MD;  Location: [facility] OR    FOOT FRACTURE SURGERY  09/01/2013    calcaneal fracture    WRIST SURGERY      ligament injury       FAMILY HISTORY:  Family History   Problem Relation Age of Onset    Coronary Artery Disease Mother         d. 84    Cerebrovascular Disease Mother     Kidney failure Father         d 76    Rectal Cancer Father     Skin Cancer Brother     Crohn's Disease Brother     Anesthesia Reaction No family hx of     Deep Vein Thrombosis (DVT) No family hx of        SOCIAL HISTORY:  Social  History     Socioeconomic History    Marital status:      Spouse name: None    Number of children: None    Years of education: None    Highest education level: None   Tobacco Use    Smoking status: Every Day     Packs/day: .5     Types: Cigarettes     Start date: 1968    Smokeless tobacco: Never   Substance and Sexual Activity    Alcohol use: Yes     Comment: 1-3 drinks daily    Drug use: Yes     Types: Marijuana     Comment: Drug use: Frequent use   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                 Thank you for allowing me to participate in the care of your patient.      Sincerely,     FAUZIA Ocampo [facility]  cc:   Mei Collazo MD  [address]  [place] [address]

## 2023-11-14 NOTE — PATIENT INSTRUCTIONS
Today's Recommendations    Continue all medications without changes.  Please follow up with Dr. Pereira in 6 months.    Please send RF Code message or call [phone] to the RN team with questions or concerns.     Scheduling and after hours number [phone]    FAUZIA Stroud, CNP

## 2023-11-14 NOTE — PROGRESS NOTES
Electrophysiology Clinic Progress Note  Cesar Murillo MRN# [id number]   YOB: 1955 Age: 68 year old     Primary cardiologist: Dr. Pereira (general) and Dr. Collazo (EP)    Reason for visit: Follow up cardiac testing    History of presenting illness:    Cesar Murillo is a pleasant 68 year old patient with past medical history significant for:    Multifocal PVCs:   Nonischemic cardiomyopathy: LVEF 45 to 50% with severe coronary calcification but only mild CAD by coronary angiography (noted in preoperative evaluation prior to RLL lobectomy and mediastinal lymph node dissection).  Possibly PVC mediated  Squamous cell lung cancer status post right lower lobe lobectomy and mediastinal lymph node dissection in 1/23 at [facility]  Paroxysmal atrial fibrillation  [id number] score 2 anticoagulated on apixaban  GERD  Tobacco use: 1/2 ppd  EtOH use: 1-3 drinks a day    The patient was referred to Dr. Collazo with electrophysiology due to a cardiac monitor that showed frequent PVCs (11%) 7 runs of VT the longest being 4 beats and paroxysmal atrial fibrillation (4% burden with longest episode lasting 8 hours).  Upon review of his ECG and noted that there were at least 2 if not 3 ECG morphologies.  It was recommended that he increase metoprolol XL from 25 to 50 mg daily.  Antiarrhythmic drug therapy for PVC ablation not recommended at this point given the multifocal PVCs.  It was recommended that he follow-up in 5 to 6 months with a repeat 7-day Zio patch.    Today he returns to follow-up an echocardiogram and a repeat Zio patch.  His Zio patch revealed that he has paroxysmal atrial fibrillation with controlled ventricular response with a 6% burden.  The longest episode was 7 hours and 7 minutes with average heart rate of 85 bpm.  His echocardiogram noted LVEF of 52% that is stable from June when his EF was 50 to 55%.      He states that he has no cardiopulmonary complaints.  His biggest concern is heartburn and upon further  discussion he drinks 1-3 cocktails a night and drinks coffee on empty stomach daily.  One of his favorite cocktails is vodka and grapefruit juice.  I cautioned against the grapefruit juice while taking a statin.  He says his symptoms are relieved with heartburn.  He also has bilateral foot numbness right greater than left.  Cesar also states that he continues to smoke half pack per day and also smokes marijuana and has edibles.  Occasionally he will have external hemorrhoids with significant mount of bleeding.  We discussed that technically we could discontinue aspirin as he has nonobstructive CAD at this point.  He will continue to take it but if needed to interrupt or discontinue in the future we certainly could.    We discussed in depth rate versus rhythm control for his atrial fibrillation including antiarrhythmic versus catheter ablation.  He is mildly symptomatic while in atrial fibrillation and at this point he prefers rate control strategy.    Diagnotic studies:  Echocardiogram (11/7/2023): LVEF 52%, Mild AR, ascending aortic dilation at 4.1 cm  Zio patch (10/2023): PAF with CVR (6% burden) longest episode of 7 hours and 7 minutes with average heart rate of 85 bpm.  PVC burden ~4%.   Echocardiogram (6/2023): LVEF 50-55%, mild MR.   Holter (4/2023): PVC 17% burden  Zio Patch (2/2023): 11% PVC burden, 7 runs of VT longest lasting 4 beats, paroxysmal atrial fibrillation with burden of 4% and longest episode lasting 8 hours with controlled ventricular response.  Coronary angiogram (1/2023): The ejection fraction is 40-50% by visual estimate. 2nd Diag lesion is 20% stenosed. Prox LAD to Mid LAD lesion is 30% stenosed. RPAV lesion is 30% stenosed. Prox RCA to Mid RCA lesion is 30% stenosed. Mid RCA to Dist RCA lesion is 20% stenosed.  Stress echo (1/2023): LVEF of 40 to 45% significant dyssynchrony due to PVCs no significant valvular abnormalities.            Assessment and Plan:     ASSESSMENT:    PVC  Multifocal  with burden improving from 17% down to 4% on metoprolol XL 50 mg daily.    Mild nonischemic cardiomyopathy   LVEF 45 to 50% (1/2023) that has improved to 52% 11/7/2023)  CT chest showed severe coronary calcification but only mild CAD by coronary angiography   Compensated  GDMT: Metoprolol XL 50 mg daily, lisinopril 10 mg daily    Mild coronary artery disease   Coronary angiogram (1/2023): The ejection fraction is 40-50% by visual estimate. 2nd Diag lesion is 20% stenosed. Prox LAD to Mid LAD lesion is 30% stenosed. RPAV lesion is 30% stenosed. Prox RCA to Mid RCA lesion is 30% stenosed. Mid RCA to Dist RCA lesion is 20% stenosed.  FLP (11/7/2023): Total cholesterol 121, HDL 48, LDL 70 and triglycerides 65 currently on rosuvastatin 10 mg daily    Paroxysmal atrial fibrillation  [id number] score 2 anticoagulated on apixaban  6% burden with CVR on recent  monitor.    PLAN:     Continue present medical therapy without changes.  Return to clinic in follow-up with general cardiology in 1 year.  EP follow-up as needed.       Orders this Visit:  Orders Placed This Encounter   Procedures    Follow-Up with Cardiology     No orders of the defined types were placed in this encounter.    There are no discontinued medications.    Today's clinic visit entailed:  Review of the result(s) of each unique test - EKG, echo, cath, SE, FLP  30 minutes spent by me on the date of the encounter doing chart review, history and exam, documentation and further activities per the note  Provider  Link to [facility] Help Grid     The level of medical decision making during this visit was of moderate complexity.           Review of Systems:     Review of Systems:  Skin:        Eyes:       ENT:       Respiratory:  Positive for shortness of breath  Cardiovascular:  Negative for;chest pain;edema;fatigue;syncope or near-syncope Positive for;palpitations;lightheadedness;dizziness  Gastroenterology:      Genitourinary:       Musculoskeletal:      "  Neurologic:       Psychiatric:       Heme/Lymph/Imm:  Negative    Endocrine:  Negative              Physical Exam:     Vitals: /78   Pulse 62   Ht 1.74 m (5' 8.5\")   Wt 84.8 kg (187 lb)   SpO2 96%   BMI 28.02 kg/m    Constitutional: Well nourished and in no apparent distress.  Eyes: Pupils equal, round. Sclerae anicteric.   HEENT: Normocephalic, atraumatic.   Neck: Supple.  Respiratory: Breathing non-labored. Lungs clear to auscultation bilaterally. No crackles, wheezes, rhonchi, or rales.  Cardiovascular:  Regular rate and rhythm, normal S1 and S2. No murmur, rub, or gallop.  Skin: Warm, dry. No rashes, cyanosis, or xanthelasma.  Extremities: No edema.  Neurologic: No gross motor deficits. Alert, awake, and oriented to person, place and time.  Psychiatric: Affect appropriate.        CURRENT MEDICATIONS:  Current Outpatient Medications   Medication Sig Dispense Refill    apixaban ANTICOAGULANT (ELIQUIS) 5 MG tablet Take 1 tablet (5 mg) by mouth 2 times daily 180 tablet 3    aspirin (ASA) 81 MG chewable tablet Take 81 mg by mouth daily      docusate sodium (COLACE) 100 MG capsule Take 100 mg by mouth as needed      lisinopril (ZESTRIL) 10 MG tablet Take 1 tablet (10 mg) by mouth daily 90 tablet 3    LORazepam (ATIVAN) 1 MG tablet Take 1 tablet by mouth at bedtime as needed for insonmia. 15 tablet 0    methocarbamol (ROBAXIN) 500 MG tablet Take 1 tablet (500 mg) by mouth every 6 hours as needed for muscle spasms 30 tablet 0    metoprolol succinate ER (TOPROL XL) 50 MG 24 hr tablet Take 1 tablet (50 mg) by mouth daily 30 tablet 11    nicotine (NICODERM CQ) 14 MG/24HR 24 hr patch Place 1 patch onto the skin daily 30 patch 0    polyethylene glycol (MIRALAX) 17 GM/Dose powder Take 17 g (1 capful.) by mouth daily      rosuvastatin (CRESTOR) 10 MG tablet Take 1 tablet (10 mg) by mouth daily 90 tablet 2    senna-docusate (SENOKOT-S/PERICOLACE) 8.6-50 MG tablet Take 1 tablet by mouth 2 times daily 50 tablet 0    " acetaminophen (TYLENOL) 500 MG tablet Take 2 tablets (1,000 mg) by mouth 2 times daily (Patient not taking: Reported on 6/13/2023)         ALLERGIES  No Known Allergies      PAST MEDICAL HISTORY:  Past Medical History:   Diagnosis Date    Acute bilateral low back pain without sciatica 07/23/2018    Anxiety, generalized 11/17/2017    Ascending aorta dilation (H24)     4.2 cm on CT scan August 2017, follow annually, 4.1 cm August 2018, stable October 2019.  4.5 cm April 2022.  4.4 cm April 2023 recheck in 1 year    Calcaneal fracture 2013    Chronic foot/heel pain    Chronic cough 04/14/2022    Chronic fatigue 10/14/2019    Chronic hepatitis C (H)     Successfully treated.  Previously on interferon.  More recently SOFOSBUVIR and ribavirin, biopsy has been negative for cirrhosis, PCR -2015    Chronic insomnia     Chronic pain of right ankle 05/17/2018    COPD, mild (H) 02/28/2023    Erectile dysfunction     Essential hypertension     External hemorrhoids 2012    History of alcohol abuse     History of colon polyps     Colonoscopy April 2020 with polyps.  Repeat in 5 years    History of depression     2014, prescribed citalopram    Lower abdominal pain 07/23/2018    Medial epicondylitis of elbow, left 6/13/2023    Nonischemic cardiomyopathy (H) 02/28/2023    Echocardiogram demonstrating 40 to 50% ejection fraction    Nonobstructive atherosclerosis of coronary artery 02/28/2023    Coronary angiogram demonstrating mild to moderate multivessel disease.  January 2023    Osteoarthritis of multiple joints     Chronic bilateral knee and chronic low back pain    Paroxysmal atrial fibrillation (H) 03/13/2023    Found on event monitor 4% burden longest run 8 hours March 2023    Porphyria cutanea tarda (H)     Pulmonary nodule     CT August 2018 3 mm right upper lobe nodule reported as stable, stable October 2019.  CT with 6 mm right lower lobe nodule April 2022 needing 6-month follow-up    Rectal bleeding 03/02/2020    Screening  for AAA (abdominal aortic aneurysm)     CT 2018 without abdominal aneurysm    Shingles 05/13/2021    Slow transit constipation 02/28/2023    Squamous cell lung cancer, right (H)     status post robot assisted thoracoscopic right lower lobe wedge resection, completion lobectomy, and mediastinal lymph node dissection for a pT1bN0 (stage IA2) non small cell lung cancer.    Tobacco abuse 11/17/2017    Ventricular ectopy 06/13/2023    Frequent PVCs       PAST SURGICAL HISTORY:  Past Surgical History:   Procedure Laterality Date    ANKLE FRACTURE SURGERY      ARTHROSCOPY KNEE Bilateral     COLONOSCOPY      CV CORONARY ANGIOGRAM N/A 1/17/2023    Procedure: Coronary Angiogram;  Surgeon: Souleymane Ratliff MD;  Location:  [facility]ART CARDIAC CATH LAB    CV LEFT HEART CATH N/A 1/17/2023    Procedure: Left Heart Catheterization;  Surgeon: Souleymane Ratliff MD;  Location:  HE[facility] CARD[facility] CATH LAB    CV LEFT VENTRICULOGRAM N/A 1/17/2023    Procedure: Left Ventriculogram;  Surgeon: Souleymane Ratliff MD;  Location: [facility] CARDIAC CATH LAB    DAVINCI LOBECTOMY LUNG Right 1/18/2023    Procedure: Robot-assisted thoracoscopic right lower lobe wedge, right lower lobectomy, and mediastinal lymph node dissection;  Surgeon: Lobo Leger MD;  Location: [facility] OR    FOOT FRACTURE SURGERY  09/01/2013    calcaneal fracture    WRIST SURGERY      ligament injury       FAMILY HISTORY:  Family History   Problem Relation Age of Onset    Coronary Artery Disease Mother         d. 84    Cerebrovascular Disease Mother     Kidney failure Father         d 76    Rectal Cancer Father     Skin Cancer Brother     Crohn's Disease Brother     Anesthesia Reaction No family hx of     Deep Vein Thrombosis (DVT) No family hx of        SOCIAL HISTORY:  Social History     Socioeconomic History    Marital status:      Spouse name: None    Number of children: None    Years of education: None    Highest education level: None   Tobacco Use    Smoking status:  Every Day     Packs/day: .5     Types: Cigarettes     Start date: 1968    Smokeless tobacco: Never   Substance and Sexual Activity    Alcohol use: Yes     Comment: 1-3 drinks daily    Drug use: Yes     Types: Marijuana     Comment: Drug use: Frequent use   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

## 2023-11-15 ENCOUNTER — TELEPHONE (OUTPATIENT)
Dept: INTERNAL MEDICINE | Facility: CLINIC | Age: 68
End: 2023-11-15
Payer: COMMERCIAL

## 2023-11-15 NOTE — TELEPHONE ENCOUNTER
Order/Referral Request    Who is requesting: patient    Orders being requested: referral for podiatry    Reason service is needed/diagnosis: Pt states his toes feel like they are tingling at times during the night. Primarily the right foot but sometimes the left foot. Pt states this has been going on for the past couple of months.    When are orders needed by: as soon as possible.    Has this been discussed with Provider: No    Does patient have a preference on a Group/Provider/Facility? [facility]     Does patient have an appointment scheduled?: No    Where to send orders: Place orders within Epic    Okay to leave a detailed message?: Yes at Cell number on file:    Telephone Information:   Mobile [phone]     Kamilla Kamara

## 2023-11-15 NOTE — TELEPHONE ENCOUNTER
"Contacted patient. He stated he has discussed this a little bit with pcp before. I am unable to find documentation of this. Patient described \"pins and needles\" sensation in both feet especially when on feet for awhile. We discussed that patient will need to be seen for evaluation. He is agreeable. I offered OV this week and patient declined, he wants to wait for pcp to return to clinic. He is comfortable waiting until next available and will call with concerns or changes in symptoms.   "

## 2023-11-16 DIAGNOSIS — F41.1 ANXIETY, GENERALIZED: ICD-10-CM

## 2023-11-16 RX ORDER — LORAZEPAM 1 MG/1
TABLET ORAL
Qty: 15 TABLET | Refills: 0 | Status: SHIPPED | OUTPATIENT
Start: 2023-11-16 | End: 2024-01-04

## 2023-11-20 ENCOUNTER — TELEPHONE (OUTPATIENT)
Dept: CARDIOLOGY | Facility: CLINIC | Age: 68
End: 2023-11-20
Payer: COMMERCIAL

## 2023-11-20 NOTE — TELEPHONE ENCOUNTER
[facility] Call Center    Phone Message    May a detailed message be left on voicemail: yes     Reason for Call: Other: Pt would like a call back to discuss medication as the cost is to much and he is wondering if a different cheaper Blood thinner medication would work     Action Taken: Other: Cardio    Travel Screening: Not Applicable

## 2023-11-20 NOTE — TELEPHONE ENCOUNTER
11/20/23 Spoke w pt and explained that he is currently in donut hole or coverage gap for medications right now. This will re-set at the beginning of the year 2024. Discussed information from pharmacy liaison as below   He stated he has enough Eliquis for now. He is going to check w Medicare Representative to discuss plan for next year . He stated he will stay on Eliquis for now and call back with any other issues/questions  Monty 123 pm

## 2023-11-20 NOTE — TELEPHONE ENCOUNTER
"Patient has Medicare D through [facility].  Patient's drug costs have exceeded $4660, and as such will now pay a 25% coinsurance on all covered drugs.  (Also called the \"coverage gap\" or \"donut hole.\")      Eliquis/Xarelto  --$151/mo.  --If total out-of-pocket costs exceed $7400, coinsurance will be reduced to a 5%, equivalent to $30/mo.    [facility] offers Xarelto through the mail to all patients in the coverage gap, regardless of income, for $85/mo or $240/3 mo.  Patient can enroll in this program at [employer] or by calling 1EQXAREL[employer] ([phone]).      Dabigatran 150mg: $42/mo.  Of note, this medication is non-formulary and will be more expensive than Xarelto or Eliquis as of 1/1/24.    Jantoven (warfarin): $2/mo.    Please let me know if patient would like income-based resources for free or discounted medication are needed.    Riri Mckeon  Pharmacy Technician/Liaison, Discharge Pharmacy   [phone] (voice or text)  [email or web address]      "

## 2023-12-07 ENCOUNTER — OFFICE VISIT (OUTPATIENT)
Dept: INTERNAL MEDICINE | Facility: CLINIC | Age: 68
End: 2023-12-07
Payer: COMMERCIAL

## 2023-12-07 VITALS
OXYGEN SATURATION: 98 % | HEART RATE: 70 BPM | DIASTOLIC BLOOD PRESSURE: 76 MMHG | SYSTOLIC BLOOD PRESSURE: 128 MMHG | RESPIRATION RATE: 16 BRPM | BODY MASS INDEX: 27.85 KG/M2 | TEMPERATURE: 98.2 F | HEIGHT: 69 IN | WEIGHT: 188 LBS

## 2023-12-07 DIAGNOSIS — M79.672 BILATERAL FOOT PAIN: Primary | ICD-10-CM

## 2023-12-07 DIAGNOSIS — C34.91 SQUAMOUS CELL LUNG CANCER, RIGHT (H): ICD-10-CM

## 2023-12-07 DIAGNOSIS — Z72.0 TOBACCO ABUSE: ICD-10-CM

## 2023-12-07 DIAGNOSIS — M79.671 BILATERAL FOOT PAIN: Primary | ICD-10-CM

## 2023-12-07 PROCEDURE — 99214 OFFICE O/P EST MOD 30 MIN: CPT | Performed by: INTERNAL MEDICINE

## 2023-12-07 RX ORDER — VARENICLINE TARTRATE 0.5 (11)-1
KIT ORAL
Qty: 53 TABLET | Refills: 0 | Status: SHIPPED | OUTPATIENT
Start: 2023-12-07 | End: 2024-06-18

## 2023-12-07 NOTE — PROGRESS NOTES
"  Assessment & Plan     Bilateral foot pain  68-year-old male with bilateral foot pain for the past few months.  Describes severe pain involving the lateral aspect of both feet especially involving the plantar surface of his third and fourth toes.  Pain is worse when wearing tight shoes and when walking or standing.  Exam with good pedal pulses and both feet are unremarkable in appearance.  There is no swelling, redness or reproducible tenderness.  I am questioning whether this could be a referred pain from his back or whether he is developing neuropathy.  Will proceed with an EMG of bilateral lower extremity to further evaluate.  Discussed trying gabapentin but he would prefer to await the results of the diagnostic study.  Consider involving a podiatrist if his EMG is normal  - EMG; Future    Tobacco abuse  Diagnosed with lung cancer and underwent resection last year.  Still smoking and we discussed strategies to quit.  Recommending Chantix and he is open to trying.  We discussed potential side effects of the medication including nausea, vivid dreams and suicidal thoughts.  He understands to stop the medication immediately and contact me if he has any such problems.  - varenicline (CHANTIX COLIN) 0.5 MG X 11 & 1 MG X 42 tablet; Take 0.5 mg tab daily for 3 days, THEN 0.5 mg tab twice daily for 4 days, THEN 1 mg twice daily.    Squamous cell lung cancer, right (H)  Resection of early-stage squamous cell lung cancer last winter.  Followed by oncology with plans for follow-up CT scan in January.  Smoking cessation discussed as above.             BMI:   Estimated body mass index is 28.17 kg/m  as calculated from the following:    Height as of this encounter: 1.74 m (5' 8.5\").    Weight as of this encounter: 85.3 kg (188 lb).       See Patient Instructions    Sen Leon MD  [facility]    Irwin Guerrero is a 68 year old, presenting for the following health issues: Bilateral " "foot pain discussed during today's visit as well as smoking cessation.  See assessment and plan for details.  Toe Pain (Toe pain when wearing shoes.)        12/7/2023     4:52 PM   Additional Questions   Roomed by Soco CONNELLY   Accompanied by kamron       History of Present Illness       Reason for visit:  Issues with my feet & toes  Symptom onset:  More than a month    He eats 0-1 servings of fruits and vegetables daily.He exercises with enough effort to increase his heart rate 10 to 19 minutes per day.  He exercises with enough effort to increase his heart rate 3 or less days per week.   He is taking medications regularly.                 Review of Systems   Denies any back pain      Objective    /76   Pulse 70   Temp 98.2  F (36.8  C)   Resp 16   Ht 1.74 m (5' 8.5\")   Wt 85.3 kg (188 lb)   SpO2 98%   BMI 28.17 kg/m    Body mass index is 28.17 kg/m .  Physical Exam   Both feet unremarkable in appearance with good pedal pulses and no edema.  No reproducible tenderness.  No swelling, warmth or redness.        "

## 2023-12-29 DIAGNOSIS — I25.10 CORONARY ARTERY CALCIFICATION SEEN ON CT SCAN: ICD-10-CM

## 2023-12-29 DIAGNOSIS — Z72.0 TOBACCO ABUSE: ICD-10-CM

## 2023-12-29 RX ORDER — ROSUVASTATIN CALCIUM 10 MG/1
10 TABLET, COATED ORAL DAILY
Qty: 90 TABLET | Refills: 2 | Status: SHIPPED | OUTPATIENT
Start: 2023-12-29 | End: 2024-09-26

## 2024-01-04 ENCOUNTER — PATIENT OUTREACH (OUTPATIENT)
Dept: GASTROENTEROLOGY | Facility: CLINIC | Age: 69
End: 2024-01-04
Payer: COMMERCIAL

## 2024-01-04 DIAGNOSIS — F41.1 ANXIETY, GENERALIZED: ICD-10-CM

## 2024-01-05 RX ORDER — LORAZEPAM 1 MG/1
TABLET ORAL
Qty: 15 TABLET | Refills: 0 | Status: SHIPPED | OUTPATIENT
Start: 2024-01-05 | End: 2024-04-04

## 2024-02-02 ENCOUNTER — ANCILLARY PROCEDURE (OUTPATIENT)
Dept: CT IMAGING | Facility: CLINIC | Age: 69
End: 2024-02-02
Attending: CLINICAL NURSE SPECIALIST
Payer: COMMERCIAL

## 2024-02-02 DIAGNOSIS — C34.31 MALIGNANT NEOPLASM OF LOWER LOBE OF RIGHT LUNG (H): ICD-10-CM

## 2024-02-02 PROCEDURE — 71260 CT THORAX DX C+: CPT | Mod: GC | Performed by: RADIOLOGY

## 2024-02-02 RX ORDER — IOPAMIDOL 755 MG/ML
92 INJECTION, SOLUTION INTRAVASCULAR ONCE
Status: COMPLETED | OUTPATIENT
Start: 2024-02-02 | End: 2024-02-02

## 2024-02-02 RX ADMIN — IOPAMIDOL 92 ML: 755 INJECTION, SOLUTION INTRAVASCULAR at 12:43

## 2024-02-02 NOTE — DISCHARGE INSTRUCTIONS

## 2024-02-05 ENCOUNTER — ONCOLOGY VISIT (OUTPATIENT)
Dept: SURGERY | Facility: CLINIC | Age: 69
End: 2024-02-05
Attending: CLINICAL NURSE SPECIALIST
Payer: COMMERCIAL

## 2024-02-05 VITALS
HEART RATE: 56 BPM | WEIGHT: 190.2 LBS | OXYGEN SATURATION: 99 % | DIASTOLIC BLOOD PRESSURE: 70 MMHG | TEMPERATURE: 98.4 F | SYSTOLIC BLOOD PRESSURE: 120 MMHG | BODY MASS INDEX: 28.5 KG/M2 | RESPIRATION RATE: 16 BRPM

## 2024-02-05 DIAGNOSIS — C34.31 MALIGNANT NEOPLASM OF LOWER LOBE OF RIGHT LUNG (H): Primary | ICD-10-CM

## 2024-02-05 PROCEDURE — G0463 HOSPITAL OUTPT CLINIC VISIT: HCPCS | Performed by: CLINICAL NURSE SPECIALIST

## 2024-02-05 PROCEDURE — 99213 OFFICE O/P EST LOW 20 MIN: CPT | Performed by: CLINICAL NURSE SPECIALIST

## 2024-02-05 ASSESSMENT — PAIN SCALES - GENERAL: PAINLEVEL: NO PAIN (0)

## 2024-02-05 NOTE — NURSING NOTE
"Oncology Rooming Note    February 5, 2024 2:16 PM   Cesar Murillo is a 69 year old male who presents for:    No chief complaint on file.    Initial Vitals: /70 (BP Location: Left arm, Patient Position: Sitting, Cuff Size: Adult Regular)   Pulse 56   Temp 98.4  F (36.9  C) (Oral)   Resp 16   Wt 86.3 kg (190 lb 3.2 oz)   SpO2 99%   BMI 28.50 kg/m   Estimated body mass index is 28.5 kg/m  as calculated from the following:    Height as of 12/7/23: 1.74 m (5' 8.5\").    Weight as of this encounter: 86.3 kg (190 lb 3.2 oz). Body surface area is 2.04 meters squared.  No Pain (0) Comment: Data Unavailable   No LMP for male patient.  Allergies reviewed: Yes  Medications reviewed: Yes    Medications: Medication refills not needed today.  Pharmacy name entered into My[employer]: [id number] [address]    Frailty Screening:   Is the patient here for a new oncology consult visit in cancer care? 2. No      Clinical concerns: none       Smiley Roblero              "

## 2024-02-05 NOTE — LETTER
2/5/2024         RE: Cesar Murillo  [address]  [place] [address]        Dear Colleague,    Thank you for referring your patient, Cesar Murillo, to the [facility]. Please see a copy of my visit note below.    THORACIC SURGERY FOLLOW UP VISIT      I saw Mr. Murillo in follow-up today. The clinical summary follows:     PREOP DIAGNOSIS   Lung nodule  PROCEDURE   Robot assisted thoracoscopic right lower lobe wedge resection, completion right lower lobectomy, mediastinal lymph node dissection    DATE OF PROCEDURE  01/18/2023    HISTOPATHOLOGY   Squamous cell carcinoma, keratinizing type, moderately differentiated pT1bN0    COMPLICATIONS  None    INTERVAL STUDIES  CT chest 02/02/2024: Stable 3 mm right upper lobe pulmonary nodule. No evidence of disease  recurrence status post right lower lobectomy.    Past Medical History:   Diagnosis Date    Acute bilateral low back pain without sciatica 07/23/2018    Anxiety, generalized 11/17/2017    Ascending aorta dilation (H24)     4.2 cm on CT scan August 2017, follow annually, 4.1 cm August 2018, stable October 2019.  4.5 cm April 2022.  4.4 cm April 2023 recheck in 1 year    Bilateral foot pain 12/07/2023    Calcaneal fracture 2013    Chronic foot/heel pain    Chronic cough 04/14/2022    Chronic fatigue 10/14/2019    Chronic hepatitis C (H)     Successfully treated.  Previously on interferon.  More recently SOFOSBUVIR and ribavirin, biopsy has been negative for cirrhosis, PCR -2015    Chronic insomnia     Chronic pain of right ankle 05/17/2018    COPD, mild (H) 02/28/2023    Erectile dysfunction     Essential hypertension     External hemorrhoids 2012    History of alcohol abuse     History of colon polyps     Colonoscopy April 2020 with polyps.  Repeat in 5 years    History of depression     2014, prescribed citalopram    Lower abdominal pain 07/23/2018    Medial epicondylitis of elbow, left 06/13/2023    Nonischemic cardiomyopathy (H)  02/28/2023    Echocardiogram demonstrating 40 to 50% ejection fraction    Nonobstructive atherosclerosis of coronary artery 02/28/2023    Coronary angiogram demonstrating mild to moderate multivessel disease.  January 2023    Osteoarthritis of multiple joints     Chronic bilateral knee and chronic low back pain    Paroxysmal atrial fibrillation (H) 03/13/2023    Found on event monitor 4% burden longest run 8 hours March 2023    Porphyria cutanea tarda (H)     Pulmonary nodule     CT August 2018 3 mm right upper lobe nodule reported as stable, stable October 2019.  CT with 6 mm right lower lobe nodule April 2022 needing 6-month follow-up    Rectal bleeding 03/02/2020    Screening for AAA (abdominal aortic aneurysm)     CT 2018 without abdominal aneurysm    Shingles 05/13/2021    Slow transit constipation 02/28/2023    Squamous cell lung cancer, right (H)     status post robot assisted thoracoscopic right lower lobe wedge resection, completion lobectomy, and mediastinal lymph node dissection for a pT1bN0 (stage IA2) non small cell lung cancer.    Tobacco abuse 11/17/2017    Ventricular ectopy 06/13/2023    Frequent PVCs      Past Surgical History:   Procedure Laterality Date    ANKLE FRACTURE SURGERY      ARTHROSCOPY KNEE Bilateral     COLONOSCOPY      CV CORONARY ANGIOGRAM N/A 1/17/2023    Procedure: Coronary Angiogram;  Surgeon: Souleymane Ratliff MD;  Location:  HEART CARDIAC CATH LAB    CV LEFT HEART CATH N/A 1/17/2023    Procedure: Left Heart Catheterization;  Surgeon: Souleymane Ratliff MD;  Location: [facility] CARDIAC CATH LAB    CV LEFT VENTRICULOGRAM N/A 1/17/2023    Procedure: Left Ventriculogram;  Surgeon: Souleymane Ratliff MD;  Location:  HEART CARDIAC CATH LAB    DAVINCI LOBECTOMY LUNG Right 1/18/2023    Procedure: Robot-assisted thoracoscopic right lower lobe wedge, right lower lobectomy, and mediastinal lymph node dissection;  Surgeon: Lobo Leger MD;  Location: [facility] OR    FOOT FRACTURE SURGERY   09/01/2013    calcaneal fracture    WRIST SURGERY      ligament injury      Social History     Socioeconomic History    Marital status:      Spouse name: Not on file    Number of children: Not on file    Years of education: Not on file    Highest education level: Not on file   Occupational History    Not on file   Tobacco Use    Smoking status: Every Day     Packs/day: .5     Types: Cigarettes     Start date: 1968     Passive exposure: Current    Smokeless tobacco: Never   Vaping Use    Vaping Use: Never used   Substance and Sexual Activity    Alcohol use: Yes     Comment: 1-3 drinks daily    Drug use: Yes     Types: Marijuana     Comment: Drug use: Frequent use    Sexual activity: Not on file   Other Topics Concern    Not on file   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       Social Determinants of Health     Financial Resource Strain: Low Risk  (12/7/2023)    Financial Resource Strain     Within the past 12 months, have you or your family members you live with been unable to get utilities (heat, electricity) when it was really needed?: No   Food Insecurity: Low Risk  (12/7/2023)    Food Insecurity     Within the past 12 months, did you worry that your food would run out before you got money to buy more?: No     Within the past 12 months, did the food you bought just not last and you didn t have money to get more?: No   Transportation Needs: Low Risk  (12/7/2023)    Transportation Needs     Within the past 12 months, has lack of transportation kept you from medical appointments, getting your medicines, non-medical meetings or appointments, work, or from getting things that you need?: No   Physical Activity: Not on file   Stress: Not on file   Social Connections: Not on file   Interpersonal Safety: Low Risk  (12/7/2023)    Interpersonal Safety     Do you feel physically and emotionally safe where you currently live?: Yes     Within the past 12 months, have you been hit, slapped,  kicked or otherwise physically hurt by someone?: No     Within the past 12 months, have you been humiliated or emotionally abused in other ways by your partner or ex-partner?: No   Housing Stability: Low Risk  (12/7/2023)    Housing Stability     Do you have housing? : Yes     Are you worried about losing your housing?: No      SUBJECTIVE   Cesar is doing ok. He denies cough or shortness of breath. He is still smoking but has cut back significantly. He used to smoke 2 packs a day now he is down to about an average of 1/2 pack per day. He also smokes marijuana but has cut back on this as well. His primary care doctor gave him a new prescription to help him quit however, when he read the possible side effects and saw that the medication could cause suicidal or homicidal thoughts, he refused to take the medication.     OBJECTIVE  /70 (BP Location: Left arm, Patient Position: Sitting, Cuff Size: Adult Regular)   Pulse 56   Temp 98.4  F (36.9  C) (Oral)   Resp 16   Wt 86.3 kg (190 lb 3.2 oz)   SpO2 99%   BMI 28.50 kg/m       From a personal perspective, his daughter recently got engaged. He thinks she will be getting  this summer.    IMPRESSION  69 year-old male status post thoracoscopic right lower lobe wedge resection, completion lobectomy and mediastinal lymph node dissection for a pT1bN0 (stage IA2) non small cell lung cancer.  He is here today for lung cancer surveillance.    We reviewed his chest CT and Cesar is happy to hear that it is stable. I asked if there was anything I could do to help with smoking cessation but Cesar says he will continue to work on things himself.    PLAN  I spent 25 min on the date of the encounter in chart review, patient visit, review of tests, documentation and/or discussion with other providers about the issues documented above. I reviewed the plan as follows:  Follow up with me in 6 months with chest CT prior  1. Necessary Tests & Appointments: chest CT    All  questions were answered and the patient and present family were in agreement with the plan.  I appreciate the opportunity to participate in the care of your patient and will keep you updated.  Sincerely,    FAUZIA Nava CNS

## 2024-02-05 NOTE — PROGRESS NOTES
THORACIC SURGERY FOLLOW UP VISIT      I saw Mr. Murillo in follow-up today. The clinical summary follows:     PREOP DIAGNOSIS   Lung nodule  PROCEDURE   Robot assisted thoracoscopic right lower lobe wedge resection, completion right lower lobectomy, mediastinal lymph node dissection    DATE OF PROCEDURE  01/18/2023    HISTOPATHOLOGY   Squamous cell carcinoma, keratinizing type, moderately differentiated pT1bN0    COMPLICATIONS  None    INTERVAL STUDIES  CT chest 02/02/2024: Stable 3 mm right upper lobe pulmonary nodule. No evidence of disease  recurrence status post right lower lobectomy.    Past Medical History:   Diagnosis Date    Acute bilateral low back pain without sciatica 07/23/2018    Anxiety, generalized 11/17/2017    Ascending aorta dilation (H24)     4.2 cm on CT scan August 2017, follow annually, 4.1 cm August 2018, stable October 2019.  4.5 cm April 2022.  4.4 cm April 2023 recheck in 1 year    Bilateral foot pain 12/07/2023    Calcaneal fracture 2013    Chronic foot/heel pain    Chronic cough 04/14/2022    Chronic fatigue 10/14/2019    Chronic hepatitis C (H)     Successfully treated.  Previously on interferon.  More recently SOFOSBUVIR and ribavirin, biopsy has been negative for cirrhosis, PCR -2015    Chronic insomnia     Chronic pain of right ankle 05/17/2018    COPD, mild (H) 02/28/2023    Erectile dysfunction     Essential hypertension     External hemorrhoids 2012    History of alcohol abuse     History of colon polyps     Colonoscopy April 2020 with polyps.  Repeat in 5 years    History of depression     2014, prescribed citalopram    Lower abdominal pain 07/23/2018    Medial epicondylitis of elbow, left 06/13/2023    Nonischemic cardiomyopathy (H) 02/28/2023    Echocardiogram demonstrating 40 to 50% ejection fraction    Nonobstructive atherosclerosis of coronary artery 02/28/2023    Coronary angiogram demonstrating mild to moderate multivessel disease.  January 2023    Osteoarthritis of multiple  joints     Chronic bilateral knee and chronic low back pain    Paroxysmal atrial fibrillation (H) 03/13/2023    Found on event monitor 4% burden longest run 8 hours March 2023    Porphyria cutanea tarda (H)     Pulmonary nodule     CT August 2018 3 mm right upper lobe nodule reported as stable, stable October 2019.  CT with 6 mm right lower lobe nodule April 2022 needing 6-month follow-up    Rectal bleeding 03/02/2020    Screening for AAA (abdominal aortic aneurysm)     CT 2018 without abdominal aneurysm    Shingles 05/13/2021    Slow transit constipation 02/28/2023    Squamous cell lung cancer, right (H)     status post robot assisted thoracoscopic right lower lobe wedge resection, completion lobectomy, and mediastinal lymph node dissection for a pT1bN0 (stage IA2) non small cell lung cancer.    Tobacco abuse 11/17/2017    Ventricular ectopy 06/13/2023    Frequent PVCs      Past Surgical History:   Procedure Laterality Date    ANKLE FRACTURE SURGERY      ARTHROSCOPY KNEE Bilateral     COLONOSCOPY      CV CORONARY ANGIOGRAM N/A 1/17/2023    Procedure: Coronary Angiogram;  Surgeon: Souleymane Ratliff MD;  Location: [facility] CARDIAC CATH LAB    CV LEFT HEART CATH N/A 1/17/2023    Procedure: Left Heart Catheterization;  Surgeon: Souleymane Ratliff MD;  Location: [facility] CARDIAC CATH LAB    CV LEFT VENTRICULOGRAM N/A 1/17/2023    Procedure: Left Ventriculogram;  Surgeon: Souleymane Ratliff MD;  Location: [facility] CARDIAC CATH LAB    DAVINCI LOBECTOMY LUNG Right 1/18/2023    Procedure: Robot-assisted thoracoscopic right lower lobe wedge, right lower lobectomy, and mediastinal lymph node dissection;  Surgeon: Lobo Leger MD;  Location: [facility] OR    FOOT FRACTURE SURGERY  09/01/2013    calcaneal fracture    WRIST SURGERY      ligament injury      Social History     Socioeconomic History    Marital status:      Spouse name: Not on file    Number of children: Not on file    Years of education: Not on file    Highest  education level: Not on file   Occupational History    Not on file   Tobacco Use    Smoking status: Every Day     Packs/day: .5     Types: Cigarettes     Start date: 1968     Passive exposure: Current    Smokeless tobacco: Never   Vaping Use    Vaping Use: Never used   Substance and Sexual Activity    Alcohol use: Yes     Comment: 1-3 drinks daily    Drug use: Yes     Types: Marijuana     Comment: Drug use: Frequent use    Sexual activity: Not on file   Other Topics Concern    Not on file   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       Social Determinants of Health     Financial Resource Strain: Low Risk  (12/7/2023)    Financial Resource Strain     Within the past 12 months, have you or your family members you live with been unable to get utilities (heat, electricity) when it was really needed?: No   Food Insecurity: Low Risk  (12/7/2023)    Food Insecurity     Within the past 12 months, did you worry that your food would run out before you got money to buy more?: No     Within the past 12 months, did the food you bought just not last and you didn t have money to get more?: No   Transportation Needs: Low Risk  (12/7/2023)    Transportation Needs     Within the past 12 months, has lack of transportation kept you from medical appointments, getting your medicines, non-medical meetings or appointments, work, or from getting things that you need?: No   Physical Activity: Not on file   Stress: Not on file   Social Connections: Not on file   Interpersonal Safety: Low Risk  (12/7/2023)    Interpersonal Safety     Do you feel physically and emotionally safe where you currently live?: Yes     Within the past 12 months, have you been hit, slapped, kicked or otherwise physically hurt by someone?: No     Within the past 12 months, have you been humiliated or emotionally abused in other ways by your partner or ex-partner?: No   Housing Stability: Low Risk  (12/7/2023)    Housing Stability     Do you  have housing? : Yes     Are you worried about losing your housing?: No      SUBJECTIVE   Cesar is doing ok. He denies cough or shortness of breath. He is still smoking but has cut back significantly. He used to smoke 2 packs a day now he is down to about an average of 1/2 pack per day. He also smokes marijuana but has cut back on this as well. His primary care doctor gave him a new prescription to help him quit however, when he read the possible side effects and saw that the medication could cause suicidal or homicidal thoughts, he refused to take the medication.     OBJECTIVE  /70 (BP Location: Left arm, Patient Position: Sitting, Cuff Size: Adult Regular)   Pulse 56   Temp 98.4  F (36.9  C) (Oral)   Resp 16   Wt 86.3 kg (190 lb 3.2 oz)   SpO2 99%   BMI 28.50 kg/m       From a personal perspective, his daughter recently got engaged. He thinks she will be getting  this summer.    IMPRESSION  69 year-old male status post thoracoscopic right lower lobe wedge resection, completion lobectomy and mediastinal lymph node dissection for a pT1bN0 (stage IA2) non small cell lung cancer.  He is here today for lung cancer surveillance.    We reviewed his chest CT and Cesar is happy to hear that it is stable. I asked if there was anything I could do to help with smoking cessation but Cesar says he will continue to work on things himself.    PLAN  I spent 25 min on the date of the encounter in chart review, patient visit, review of tests, documentation and/or discussion with other providers about the issues documented above. I reviewed the plan as follows:  Follow up with me in 6 months with chest CT prior  1. Necessary Tests & Appointments: chest CT    All questions were answered and the patient and present family were in agreement with the plan.  I appreciate the opportunity to participate in the care of your patient and will keep you updated.  Sincerely,

## 2024-02-09 ENCOUNTER — OFFICE VISIT (OUTPATIENT)
Dept: NEUROLOGY | Facility: CLINIC | Age: 69
End: 2024-02-09
Attending: INTERNAL MEDICINE
Payer: COMMERCIAL

## 2024-02-09 DIAGNOSIS — M79.672 BILATERAL FOOT PAIN: ICD-10-CM

## 2024-02-09 DIAGNOSIS — M79.671 BILATERAL FOOT PAIN: ICD-10-CM

## 2024-02-09 PROCEDURE — 95910 NRV CNDJ TEST 7-8 STUDIES: CPT | Performed by: PHYSICAL MEDICINE & REHABILITATION

## 2024-02-09 PROCEDURE — 95885 MUSC TST DONE W/NERV TST LIM: CPT | Mod: LT | Performed by: PHYSICAL MEDICINE & REHABILITATION

## 2024-02-09 PROCEDURE — 95886 MUSC TEST DONE W/N TEST COMP: CPT | Mod: RT | Performed by: PHYSICAL MEDICINE & REHABILITATION

## 2024-02-09 NOTE — LETTER
2024       RE: Cesar Murillo  [address]  [place] [address]       Dear Colleague,    Thank you for referring your patient, Cesar Murillo, to the [facility]IN[facility] [place] [facility]. Please see a copy of my visit note below.                            [facility]  Electrodiagnostic Laboratory                 Department of Neurology                                                                                                         Test Date:  2024    Patient: Cesar Murillo : 1955 Physician: Breanne Díaz MD   Sex: Male AGE: 69 year Ref Phys: Sen Leon MD    ID#: [id number]   Technician: Armando Ferrell     History and Examination:  Cesar Murillo is a 70 yo male who presents with bilateral foot pain, mainly located at the lateral aspect of his feet and the plantar aspects of his 3rd and 4th toes, and slightly worse on the right side. Query polyneuropathy.     Techniques:  Motor conduction studies were done with surface recording electrodes. Sensory conduction studies were performed with surface electrodes, unless indicated otherwise by (n), designating the use of subdermal recording electrodes. Temperature was monitored and recorded throughout the study. Upper extremities were maintained at a temperature of 32 degrees Centigrade or higher.  EMG was done with a concentric needle electrode.     Results:  All nerve conduction studies (as indicated in the following tables) were within normal limits.      F Wave studies indicate that the left tibial F wave has prolonged latency (62.24 ms).  The right tibial F wave has prolonged latency (65.70 ms).      All examined muscles (as indicated in the following table) showed no evidence of electrical instability.        Interpretation:  Normal study     --There is no convincing electrodiagnostic evidence of polyneuropathy. A small fiber neuropathy cannot be  excluded.    --There is no electrodiagnostic evidence of right lower extremity radiculopathy.      ___________________________  Breanne Díaz MD        Nerve Conduction Studies  Motor Sites      Latency Amplitude Neg. Amp Diff Segment Distance Velocity Neg. Dur Neg Area Diff Temperature Comment   Site (ms) Norm (mV) Norm (%)  cm m/s Norm (ms) (%) ( C)    Left Fibular (EDB) Motor   Ankle 4.4  < 6.0 4.7  > 2.0  Ankle-EDB 8   8.1  30.4    Right Fibular (EDB) Motor   Ankle 5.1  < 6.0 4.5  > 2.0  Ankle-EDB 8   6.2  30.5    Bel Fib Head 12.2 - 4.4 - -2 Bel Fib Head-Ankle 27 38  > 38 6.9 11 30.5    Pop Fossa 14.2 - 4.4 - 0 Pop Fossa-Bel Fib Head 8.5 43  > 38 6.6 -2 30.4    Left Tibial (AHB) Motor   Ankle 4.4  < 6.5 5.3  > 5.0  Ankle-AHB 8   8.5  30.3    Right Tibial (AHB) Motor   Ankle 5.0  < 6.5 6.7  > 5.0  Ankle-AHB 8   7.9  30.1    Knee 14.6 - 4.5 - -33 Knee-Ankle 39.5 41  > 38 9.3 -11 30      Sensory Sites      Onset Lat Peak Lat Amp (O-P) Amp (P-P) Segment Distance Velocity Temperature Comment   Site ms (ms)  V Norm ( V)  cm m/s Norm ( C)    Left Sural Sensory   Calf-Lat Mall 3.6 4.5 10  > 5 11 Calf-Lat Mall 14 39  > 38 30.4    Right Sural Sensory   Calf-Lat Mall 3.1 4.0 7  > 5 14 Calf-Lat Mall 14 45  > 38 30.3      F Wave Studies     Min-F Max-F Dispersion Persistence Mean-F F-Norm L-R Mean-F L-R Mean-F Norm F/M Ratio F-M Lat (ms)   Left Tibial (Abd Hallucis)  29.5  C   61.25 64.38 3.13 100.00 62.24 <61 3.46 <5.7 3.65 57.81   Right Tibial (Abd Hallucis)  29.9  C   64.84 67.81 2.97 100.00 65.70 <61 3.46 <5.7 6.06 56.41       Electromyography     Side Muscle Ins Act Fibs/PSW Fasc HF Amp Dur Poly Recrt Int Pat   Right Tib Anterior Nml None Nml 0 Nml Nml 0 Nml Nml   Right Gastroc Nml None Nml 0 Nml Nml 0 Nml Nml   Right Vastus Lat Nml None Nml 0 Nml Nml 0 Nml Nml   Left Tib Anterior Nml None Nml 0 Nml Nml 0 Nml Nml   Left Gastroc Nml None Nml 0 Nml Nml 0 Nml Nml   Left Vastus Lat Nml None Nml 0 Nml Nml 0 Nml Nml   Right  Biceps Fem SH Nml None Nml 0 Nml Nml 0 Nml Nml   Right Gluteus Med Nml None Nml 0 Nml Nml 0 Nml Nml         NCS Waveforms:    Motor                Sensory                  Again, thank you for allowing me to participate in the care of your patient.      Sincerely,    Breanne Díaz MD

## 2024-02-09 NOTE — PROGRESS NOTES
[facility]  Electrodiagnostic Laboratory                 Department of Neurology                                                                                                         Test Date:  2024    Patient: Cesar Murillo : 1955 Physician: Breanne Díaz MD   Sex: Male AGE: 69 year Ref Phys: Sen Leon MD    ID#: [id number]   Technician: Armando Ferrell     History and Examination:  Cesar Murillo is a 70 yo male who presents with bilateral foot pain, mainly located at the lateral aspect of his feet and the plantar aspects of his 3rd and 4th toes, and slightly worse on the right side. Query polyneuropathy.     Techniques:  Motor conduction studies were done with surface recording electrodes. Sensory conduction studies were performed with surface electrodes, unless indicated otherwise by (n), designating the use of subdermal recording electrodes. Temperature was monitored and recorded throughout the study. Upper extremities were maintained at a temperature of 32 degrees Centigrade or higher.  EMG was done with a concentric needle electrode.     Results:  All nerve conduction studies (as indicated in the following tables) were within normal limits.      F Wave studies indicate that the left tibial F wave has prolonged latency (62.24 ms).  The right tibial F wave has prolonged latency (65.70 ms).      All examined muscles (as indicated in the following table) showed no evidence of electrical instability.        Interpretation:  Normal study     --There is no convincing electrodiagnostic evidence of polyneuropathy. A small fiber neuropathy cannot be excluded.    --There is no electrodiagnostic evidence of right lower extremity radiculopathy.      ___________________________  Breanne Díaz MD        Nerve Conduction Studies  Motor Sites      Latency Amplitude Neg. Amp Diff Segment Distance Velocity Neg. Dur Neg Area Diff Temperature Comment   Site (ms) Norm (mV)  Norm (%)  cm m/s Norm (ms) (%) ( C)    Left Fibular (EDB) Motor   Ankle 4.4  < 6.0 4.7  > 2.0  Ankle-EDB 8   8.1  30.4    Right Fibular (EDB) Motor   Ankle 5.1  < 6.0 4.5  > 2.0  Ankle-EDB 8   6.2  30.5    Bel Fib Head 12.2 - 4.4 - -2 Bel Fib Head-Ankle 27 38  > 38 6.9 11 30.5    Pop Fossa 14.2 - 4.4 - 0 Pop Fossa-Bel Fib Head 8.5 43  > 38 6.6 -2 30.4    Left Tibial (AHB) Motor   Ankle 4.4  < 6.5 5.3  > 5.0  Ankle-AHB 8   8.5  30.3    Right Tibial (AHB) Motor   Ankle 5.0  < 6.5 6.7  > 5.0  Ankle-AHB 8   7.9  30.1    Knee 14.6 - 4.5 - -33 Knee-Ankle 39.5 41  > 38 9.3 -11 30      Sensory Sites      Onset Lat Peak Lat Amp (O-P) Amp (P-P) Segment Distance Velocity Temperature Comment   Site ms (ms)  V Norm ( V)  cm m/s Norm ( C)    Left Sural Sensory   Calf-Lat Mall 3.6 4.5 10  > 5 11 Calf-Lat Mall 14 39  > 38 30.4    Right Sural Sensory   Calf-Lat Mall 3.1 4.0 7  > 5 14 Calf-Lat Mall 14 45  > 38 30.3      F Wave Studies     Min-F Max-F Dispersion Persistence Mean-F F-Norm L-R Mean-F L-R Mean-F Norm F/M Ratio F-M Lat (ms)   Left Tibial (Abd Hallucis)  29.5  C   61.25 64.38 3.13 100.00 62.24 <61 3.46 <5.7 3.65 57.81   Right Tibial (Abd Hallucis)  29.9  C   64.84 67.81 2.97 100.00 65.70 <61 3.46 <5.7 6.06 56.41       Electromyography     Side Muscle Ins Act Fibs/PSW Fasc HF Amp Dur Poly Recrt Int Pat   Right Tib Anterior Nml None Nml 0 Nml Nml 0 Nml Nml   Right Gastroc Nml None Nml 0 Nml Nml 0 Nml Nml   Right Vastus Lat Nml None Nml 0 Nml Nml 0 Nml Nml   Left Tib Anterior Nml None Nml 0 Nml Nml 0 Nml Nml   Left Gastroc Nml None Nml 0 Nml Nml 0 Nml Nml   Left Vastus Lat Nml None Nml 0 Nml Nml 0 Nml Nml   Right Biceps Fem SH Nml None Nml 0 Nml Nml 0 Nml Nml   Right Gluteus Med Nml None Nml 0 Nml Nml 0 Nml Nml         NCS Waveforms:    Motor                Sensory

## 2024-02-21 DIAGNOSIS — I10 BENIGN ESSENTIAL HYPERTENSION: ICD-10-CM

## 2024-02-22 RX ORDER — LISINOPRIL 10 MG/1
10 TABLET ORAL DAILY
Qty: 90 TABLET | Refills: 0 | Status: SHIPPED | OUTPATIENT
Start: 2024-02-22 | End: 2024-08-19

## 2024-04-03 DIAGNOSIS — F41.1 ANXIETY, GENERALIZED: ICD-10-CM

## 2024-04-03 DIAGNOSIS — I48.0 PAROXYSMAL ATRIAL FIBRILLATION (H): ICD-10-CM

## 2024-04-03 DIAGNOSIS — C34.31 MALIGNANT NEOPLASM OF LOWER LOBE OF RIGHT LUNG (H): ICD-10-CM

## 2024-04-04 RX ORDER — LORAZEPAM 1 MG/1
TABLET ORAL
Qty: 15 TABLET | Refills: 0 | Status: SHIPPED | OUTPATIENT
Start: 2024-04-04 | End: 2024-06-03

## 2024-04-04 RX ORDER — METHOCARBAMOL 500 MG/1
500 TABLET, FILM COATED ORAL EVERY 6 HOURS PRN
Qty: 30 TABLET | Refills: 0 | Status: SHIPPED | OUTPATIENT
Start: 2024-04-04

## 2024-04-04 RX ORDER — APIXABAN 5 MG/1
5 TABLET, FILM COATED ORAL 2 TIMES DAILY
Qty: 180 TABLET | Refills: 3 | Status: SHIPPED | OUTPATIENT
Start: 2024-04-04

## 2024-05-08 DIAGNOSIS — I48.0 PAROXYSMAL ATRIAL FIBRILLATION (H): ICD-10-CM

## 2024-05-10 DIAGNOSIS — I48.0 PAROXYSMAL ATRIAL FIBRILLATION (H): ICD-10-CM

## 2024-05-10 RX ORDER — METOPROLOL SUCCINATE 50 MG/1
50 TABLET, EXTENDED RELEASE ORAL DAILY
Qty: 90 TABLET | Refills: 1 | Status: SHIPPED | OUTPATIENT
Start: 2024-05-10

## 2024-05-10 RX ORDER — METOPROLOL SUCCINATE 50 MG/1
50 TABLET, EXTENDED RELEASE ORAL DAILY
Qty: 30 TABLET | Refills: 0 | OUTPATIENT
Start: 2024-05-10

## 2024-06-03 DIAGNOSIS — F41.1 ANXIETY, GENERALIZED: ICD-10-CM

## 2024-06-03 RX ORDER — LORAZEPAM 1 MG/1
TABLET ORAL
Qty: 15 TABLET | Refills: 0 | Status: SHIPPED | OUTPATIENT
Start: 2024-06-03 | End: 2024-08-02

## 2024-06-18 ENCOUNTER — OFFICE VISIT (OUTPATIENT)
Dept: INTERNAL MEDICINE | Facility: CLINIC | Age: 69
End: 2024-06-18
Payer: COMMERCIAL

## 2024-06-18 VITALS
DIASTOLIC BLOOD PRESSURE: 74 MMHG | TEMPERATURE: 97.8 F | OXYGEN SATURATION: 96 % | WEIGHT: 182 LBS | RESPIRATION RATE: 18 BRPM | HEIGHT: 69 IN | HEART RATE: 66 BPM | SYSTOLIC BLOOD PRESSURE: 116 MMHG | BODY MASS INDEX: 26.96 KG/M2

## 2024-06-18 DIAGNOSIS — J44.9 CHRONIC OBSTRUCTIVE PULMONARY DISEASE, UNSPECIFIED COPD TYPE (H): ICD-10-CM

## 2024-06-18 DIAGNOSIS — I77.810 ASCENDING AORTA DILATION (H): ICD-10-CM

## 2024-06-18 DIAGNOSIS — G89.29 CHRONIC BILATERAL LOW BACK PAIN WITHOUT SCIATICA: ICD-10-CM

## 2024-06-18 DIAGNOSIS — I25.10 NONOBSTRUCTIVE ATHEROSCLEROSIS OF CORONARY ARTERY: ICD-10-CM

## 2024-06-18 DIAGNOSIS — F41.1 ANXIETY, GENERALIZED: ICD-10-CM

## 2024-06-18 DIAGNOSIS — C34.91 SQUAMOUS CELL LUNG CANCER, RIGHT (H): ICD-10-CM

## 2024-06-18 DIAGNOSIS — Z12.5 SCREENING FOR PROSTATE CANCER: ICD-10-CM

## 2024-06-18 DIAGNOSIS — Z51.81 ENCOUNTER FOR THERAPEUTIC DRUG MONITORING: ICD-10-CM

## 2024-06-18 DIAGNOSIS — R73.01 IMPAIRED FASTING GLUCOSE: Primary | ICD-10-CM

## 2024-06-18 DIAGNOSIS — Z72.0 TOBACCO ABUSE: ICD-10-CM

## 2024-06-18 DIAGNOSIS — Z87.19 HISTORY OF CHRONIC HEPATITIS: ICD-10-CM

## 2024-06-18 DIAGNOSIS — I48.0 PAROXYSMAL ATRIAL FIBRILLATION (H): ICD-10-CM

## 2024-06-18 DIAGNOSIS — I49.3 VENTRICULAR ECTOPY: ICD-10-CM

## 2024-06-18 DIAGNOSIS — R53.82 CHRONIC FATIGUE: ICD-10-CM

## 2024-06-18 DIAGNOSIS — K21.9 GASTROESOPHAGEAL REFLUX DISEASE WITHOUT ESOPHAGITIS: ICD-10-CM

## 2024-06-18 DIAGNOSIS — I42.8 NONISCHEMIC CARDIOMYOPATHY (H): ICD-10-CM

## 2024-06-18 DIAGNOSIS — I10 ESSENTIAL HYPERTENSION: ICD-10-CM

## 2024-06-18 DIAGNOSIS — M79.672 BILATERAL FOOT PAIN: ICD-10-CM

## 2024-06-18 DIAGNOSIS — Z00.00 ENCOUNTER FOR MEDICARE ANNUAL WELLNESS EXAM: Primary | ICD-10-CM

## 2024-06-18 DIAGNOSIS — F51.04 CHRONIC INSOMNIA: ICD-10-CM

## 2024-06-18 DIAGNOSIS — M79.671 BILATERAL FOOT PAIN: ICD-10-CM

## 2024-06-18 DIAGNOSIS — Z86.0100 HISTORY OF COLON POLYPS: ICD-10-CM

## 2024-06-18 DIAGNOSIS — M54.50 CHRONIC BILATERAL LOW BACK PAIN WITHOUT SCIATICA: ICD-10-CM

## 2024-06-18 PROBLEM — Z98.890 STATUS POST CORONARY ANGIOGRAM: Status: RESOLVED | Noted: 2023-01-17 | Resolved: 2024-06-18

## 2024-06-18 LAB
ALBUMIN SERPL BCG-MCNC: 4.5 G/DL (ref 3.5–5.2)
ALBUMIN UR-MCNC: 100 MG/DL
ALP SERPL-CCNC: 89 U/L (ref 40–150)
ALT SERPL W P-5'-P-CCNC: 12 U/L (ref 0–70)
ANION GAP SERPL CALCULATED.3IONS-SCNC: 11 MMOL/L (ref 7–15)
APPEARANCE UR: CLEAR
AST SERPL W P-5'-P-CCNC: 24 U/L (ref 0–45)
B BURGDOR IGG+IGM SER QL: 0.18
BACTERIA #/AREA URNS HPF: ABNORMAL /HPF
BILIRUB SERPL-MCNC: 0.6 MG/DL
BILIRUB UR QL STRIP: ABNORMAL
BUN SERPL-MCNC: 12.3 MG/DL (ref 8–23)
CALCIUM SERPL-MCNC: 9.6 MG/DL (ref 8.8–10.2)
CHLORIDE SERPL-SCNC: 100 MMOL/L (ref 98–107)
CHOLEST SERPL-MCNC: 118 MG/DL
COLOR UR AUTO: YELLOW
CREAT SERPL-MCNC: 0.99 MG/DL (ref 0.67–1.17)
DEPRECATED HCO3 PLAS-SCNC: 26 MMOL/L (ref 22–29)
EGFRCR SERPLBLD CKD-EPI 2021: 82 ML/MIN/1.73M2
ERYTHROCYTE [DISTWIDTH] IN BLOOD BY AUTOMATED COUNT: 11.8 % (ref 10–15)
FASTING STATUS PATIENT QL REPORTED: YES
FASTING STATUS PATIENT QL REPORTED: YES
GLUCOSE SERPL-MCNC: 106 MG/DL (ref 70–99)
GLUCOSE UR STRIP-MCNC: NEGATIVE MG/DL
HBA1C MFR BLD: 5.6 % (ref 0–5.6)
HCT VFR BLD AUTO: 46.8 % (ref 40–53)
HDLC SERPL-MCNC: 48 MG/DL
HGB BLD-MCNC: 15.6 G/DL (ref 13.3–17.7)
HGB UR QL STRIP: NEGATIVE
KETONES UR STRIP-MCNC: ABNORMAL MG/DL
LDLC SERPL CALC-MCNC: 54 MG/DL
LEUKOCYTE ESTERASE UR QL STRIP: NEGATIVE
MCH RBC QN AUTO: 31.8 PG (ref 26.5–33)
MCHC RBC AUTO-ENTMCNC: 33.3 G/DL (ref 31.5–36.5)
MCV RBC AUTO: 95 FL (ref 78–100)
MUCOUS THREADS #/AREA URNS LPF: PRESENT /LPF
NITRATE UR QL: NEGATIVE
NONHDLC SERPL-MCNC: 70 MG/DL
PH UR STRIP: 5.5 [PH] (ref 5–8)
PLATELET # BLD AUTO: 130 10E3/UL (ref 150–450)
POTASSIUM SERPL-SCNC: 5.1 MMOL/L (ref 3.4–5.3)
PROT SERPL-MCNC: 7.2 G/DL (ref 6.4–8.3)
PSA SERPL DL<=0.01 NG/ML-MCNC: 0.63 NG/ML (ref 0–4.5)
RBC # BLD AUTO: 4.91 10E6/UL (ref 4.4–5.9)
RBC #/AREA URNS AUTO: ABNORMAL /HPF
SODIUM SERPL-SCNC: 137 MMOL/L (ref 135–145)
SP GR UR STRIP: 1.02 (ref 1–1.03)
SQUAMOUS #/AREA URNS AUTO: ABNORMAL /LPF
TRIGL SERPL-MCNC: 81 MG/DL
UROBILINOGEN UR STRIP-ACNC: 0.2 E.U./DL
WBC # BLD AUTO: 9.2 10E3/UL (ref 4–11)
WBC #/AREA URNS AUTO: ABNORMAL /HPF

## 2024-06-18 PROCEDURE — G0103 PSA SCREENING: HCPCS | Performed by: INTERNAL MEDICINE

## 2024-06-18 PROCEDURE — 99215 OFFICE O/P EST HI 40 MIN: CPT | Mod: 25 | Performed by: INTERNAL MEDICINE

## 2024-06-18 PROCEDURE — 80061 LIPID PANEL: CPT | Performed by: INTERNAL MEDICINE

## 2024-06-18 PROCEDURE — 85027 COMPLETE CBC AUTOMATED: CPT | Performed by: INTERNAL MEDICINE

## 2024-06-18 PROCEDURE — 36415 COLL VENOUS BLD VENIPUNCTURE: CPT | Performed by: INTERNAL MEDICINE

## 2024-06-18 PROCEDURE — G0439 PPPS, SUBSEQ VISIT: HCPCS | Performed by: INTERNAL MEDICINE

## 2024-06-18 PROCEDURE — 80053 COMPREHEN METABOLIC PANEL: CPT | Performed by: INTERNAL MEDICINE

## 2024-06-18 PROCEDURE — 86618 LYME DISEASE ANTIBODY: CPT | Performed by: INTERNAL MEDICINE

## 2024-06-18 PROCEDURE — 81001 URINALYSIS AUTO W/SCOPE: CPT | Performed by: INTERNAL MEDICINE

## 2024-06-18 PROCEDURE — 83036 HEMOGLOBIN GLYCOSYLATED A1C: CPT | Performed by: INTERNAL MEDICINE

## 2024-06-18 RX ORDER — BUPROPION HYDROCHLORIDE 150 MG/1
150 TABLET, FILM COATED, EXTENDED RELEASE ORAL 2 TIMES DAILY
Qty: 60 TABLET | Refills: 5 | Status: SHIPPED | OUTPATIENT
Start: 2024-06-18

## 2024-06-18 RX ORDER — CALCIUM CARBONATE 500 MG/1
1 TABLET, CHEWABLE ORAL 2 TIMES DAILY
COMMUNITY

## 2024-06-18 SDOH — HEALTH STABILITY: PHYSICAL HEALTH: ON AVERAGE, HOW MANY DAYS PER WEEK DO YOU ENGAGE IN MODERATE TO STRENUOUS EXERCISE (LIKE A BRISK WALK)?: 0 DAYS

## 2024-06-18 ASSESSMENT — SOCIAL DETERMINANTS OF HEALTH (SDOH): HOW OFTEN DO YOU GET TOGETHER WITH FRIENDS OR RELATIVES?: ONCE A WEEK

## 2024-06-18 NOTE — LETTER
June 21, 2024      Cesar Murillo  [address] [place] [address]        Dear Cesar,    Your cholesterol is well-controlled with rosuvastatin.  CBC looks okay.  No anemia.  Platelet count in the low normal range but is of minimal concern.  Normal kidney function and normal liver studies.  Your glucose of 106 is above the ideal range but your A1c of 5.6% is normal.  No diabetes or prediabetes.  Lyme titer is negative.  PSA is low and reassuring for no prostate cancer.  Urinalysis with nonspecific findings.  A small amount of protein is present and should be monitored.  However, again your kidney function is normal at this time.    Resulted Orders   CBC with platelets   Result Value Ref Range    WBC Count 9.2 4.0 - 11.0 10e3/uL    RBC Count 4.91 4.40 - 5.90 10e6/uL    Hemoglobin 15.6 13.3 - 17.7 g/dL    Hematocrit 46.8 40.0 - 53.0 %    MCV 95 78 - 100 fL    MCH 31.8 26.5 - 33.0 pg    MCHC 33.3 31.5 - 36.5 g/dL    RDW 11.8 10.0 - 15.0 %    Platelet Count 130 (L) 150 - 450 10e3/uL   Comprehensive metabolic panel (BMP + Alb, Alk Phos, ALT, AST, Total. Bili, TP)   Result Value Ref Range    Sodium 137 135 - 145 mmol/L      Comment:      Reference intervals for this test were updated on 09/26/2023 to more accurately reflect our healthy population. There may be differences in the flagging of prior results with similar values performed with this method. Interpretation of those prior results can be made in the context of the updated reference intervals.     Potassium 5.1 3.4 - 5.3 mmol/L    Carbon Dioxide (CO2) 26 22 - 29 mmol/L    Anion Gap 11 7 - 15 mmol/L    Urea Nitrogen 12.3 8.0 - 23.0 mg/dL    Creatinine 0.99 0.67 - 1.17 mg/dL    GFR Estimate 82 >60 mL/min/1.73m2      Comment:      eGFR calculated using 2021 CKD-EPI equation.    Calcium 9.6 8.8 - 10.2 mg/dL    Chloride 100 98 - 107 mmol/L    Glucose 106 (H) 70 - 99 mg/dL    Alkaline Phosphatase 89 40 - 150 U/L    AST 24 0 - 45 U/L      Comment:      Reference  intervals for this test were updated on 6/12/2023 to more accurately reflect our healthy population. There may be differences in the flagging of prior results with similar values performed with this method. Interpretation of those prior results can be made in the context of the updated reference intervals.    ALT 12 0 - 70 U/L      Comment:      Reference intervals for this test were updated on 6/12/2023 to more accurately reflect our healthy population. There may be differences in the flagging of prior results with similar values performed with this method. Interpretation of those prior results can be made in the context of the updated reference intervals.      Protein Total 7.2 6.4 - 8.3 g/dL    Albumin 4.5 3.5 - 5.2 g/dL    Bilirubin Total 0.6 <=1.2 mg/dL    Patient Fasting > 8hrs? Yes    PSA, screen   Result Value Ref Range    Prostate Specific Antigen Screen 0.63 0.00 - 4.50 ng/mL    Narrative    This result is obtained using the Roche Elecsys total PSA method on the renata e801 immunoassay analyzer. Results obtained with different assay methods or kits cannot be used interchangeably.   Lipid Profile (Chol, Trig, HDL, LDL calc)   Result Value Ref Range    Cholesterol 118 <200 mg/dL    Triglycerides 81 <150 mg/dL    Direct Measure HDL 48 >=40 mg/dL    LDL Cholesterol Calculated 54 <=100 mg/dL    Non HDL Cholesterol 70 <130 mg/dL    Patient Fasting > 8hrs? Yes     Narrative    Cholesterol  Desirable:  <200 mg/dL    Triglycerides  Normal:  Less than 150 mg/dL  Borderline High:  150-199 mg/dL  High:  200-499 mg/dL  Very High:  Greater than or equal to 500 mg/dL    Direct Measure HDL  Female:  Greater than or equal to 50 mg/dL   Male:  Greater than or equal to 40 mg/dL    LDL Cholesterol  Desirable:  <100mg/dL  Above Desirable:  100-129 mg/dL   Borderline High:  130-159 mg/dL   High:  160-189 mg/dL   Very High:  >= 190 mg/dL    Non HDL Cholesterol  Desirable:  130 mg/dL  Above Desirable:  130-159 mg/dL  Borderline  High:  160-189 mg/dL  High:  190-219 mg/dL  Very High:  Greater than or equal to 220 mg/dL   UA Macroscopic with reflex to Microscopic and Culture - Lab Collect   Result Value Ref Range    Color Urine Yellow Colorless, Straw, Light Yellow, Yellow    Appearance Urine Clear Clear    Glucose Urine Negative Negative mg/dL    Bilirubin Urine Small (A) Negative    Ketones Urine Trace (A) Negative mg/dL    Specific Gravity Urine 1.025 1.005 - 1.030    Blood Urine Negative Negative    pH Urine 5.5 5.0 - 8.0    Protein Albumin Urine 100 (A) Negative mg/dL    Urobilinogen Urine 0.2 0.2, 1.0 E.U./dL    Nitrite Urine Negative Negative    Leukocyte Esterase Urine Negative Negative   Lyme Disease Total Abs Bld with Reflex to Confirm CLIA   Result Value Ref Range    Lyme Disease Antibodies Total 0.18 <0.90      Comment:      Non-reactive, Absence of detectable Borrelia burgdorferi antibodies. A non-reactive result does not exclude the possibility of Borrelia burgdorferi infection. If early Lyme disease is suspected, a second sample should be collected and tested 2 to 4 weeks later.   UA Microscopic with Reflex to Culture   Result Value Ref Range    Bacteria Urine Few (A) None Seen /HPF    RBC Urine None Seen 0-2 /HPF /HPF    WBC Urine None Seen 0-5 /HPF /HPF    Squamous Epithelials Urine Few (A) None Seen /LPF    Mucus Urine Present (A) None Seen /LPF    Narrative    Urine Culture not indicated       If you have any questions or concerns, please call the clinic at the number listed above.       Sincerely,      Sen Leon MD

## 2024-06-18 NOTE — PATIENT INSTRUCTIONS
"Patient Education   Preventive Care Advice   This is general advice we often give to help people stay healthy. Your care team may have specific advice just for you. Please talk to your care team about your own preventive care needs.  Lifestyle  Exercise at least 150 minutes each week (30 minutes a day, 5 days a week).  Do muscle strengthening activities 2 days a week. These help control your weight and prevent disease.  No smoking.  Discuss with cardiology whether you should continue aspirin while on Eliquis  Wear sunscreen to prevent skin cancer.  Consider seeing a dermatologist every year for a total-body skin exam.  You can call dermatology consultants at [phone] to schedule  Have your home tested for radon every 2 to 5 years. Radon is a colorless, odorless gas that can harm your lungs. To learn more, go to www.health.[facility].[place] and search for \"Radon in Homes.\"  Keep guns unloaded and locked up in a safe place like a safe or gun vault, or, use a gun lock and hide the keys. Always lock away bullets separately. To learn more, visit [employer].[place].gov and search for \"safe gun storage.\"  Nutrition  Eat 5 or more servings of fruits and vegetables each day.  Try wheat bread, brown rice and whole grain pasta (instead of white bread, rice, and pasta).  Get enough calcium and vitamin D. Check the label on foods and aim for 100% of the RDA (recommended daily allowance).  Regular exams  Have a dental exam and cleaning every 6 months.  Annual eye exam  See your health care team every year to talk about:  Any changes in your health.  Any medicines your care team has prescribed.  Preventive care, family planning, and ways to prevent chronic diseases.  Shots (vaccines)   COVID-19 shot: Get this shot when it's available this fall  Flu shot: Get a flu shot every year.  Tetanus shot: Get a tetanus shot every 10 years.  Shingles shot (for age 50 and up).  Completed  General health tests  Diabetes screening: Annually  Cholesterol " test: Annually  Hepatitis C: Get tested at least once in your life.  Abdominal aortic aneurysm screening: Completed  STIs (sexually transmitted infections)  Before age 24: Ask your care team if you should be screened for STIs.  After age 24: Get screened for STIs if you're at risk. You are at risk for STIs (including HIV) if:  You are sexually active with more than one person.  You don't use condoms every time.  You or a partner was diagnosed with a sexually transmitted infection.  If you are at risk for HIV, ask about PrEP medicine to prevent HIV.  Get tested for HIV at least once in your life, whether you are at risk for HIV or not.  Cancer screening tests  Colon cancer screening: It is important to start screening for colon cancer at age 45.  Colonoscopy will be due April 2025  Prostate cancer screening test: Annual PSA  Lung cancer screening: CT as planned in August  For informational purposes only. Not to replace the advice of your health care provider. Copyright   2023 [facility] [employer]. All rights reserved. Clinically reviewed by the [facility]. [employer] 455832 - REV 04/24.  Learning About Sleeping Well  What does sleeping well mean?     Sleeping well means getting enough sleep to feel good and stay healthy. How much sleep is enough varies among people.  The number of hours you sleep and how you feel when you wake up are both important. If you do not feel refreshed, you probably need more sleep. Another sign of not getting enough sleep is feeling tired during the day.  Experts recommend that adults get at least 7 or more hours of sleep per day. Children and older adults need more sleep.  Why is getting enough sleep important?  Getting enough quality sleep is a basic part of good health. When your sleep suffers, your physical health, mood, and your thoughts can suffer too. You may find yourself feeling more grumpy or stressed. Not getting enough sleep also can lead to  "serious problems, including injury, accidents, anxiety, and depression.  What might cause poor sleeping?  Many things can cause sleep problems, including:  Changes to your sleep schedule.  Stress. Stress can be caused by fear about a single event, such as giving a speech. Or you may have ongoing stress, such as worry about work or school.  Depression, anxiety, and other mental or emotional conditions.  Changes in your sleep habits or surroundings. This includes changes that happen where you sleep, such as noise, light, or sleeping in a different bed. It also includes changes in your sleep pattern, such as having jet lag or working a late shift.  Health problems, such as pain, breathing problems, and restless legs syndrome.  Lack of regular exercise.  Using alcohol, nicotine, or caffeine before bed.  How can you help yourself?  Here are some tips that may help you sleep more soundly and wake up feeling more refreshed.  Your sleeping area   Use your bedroom only for sleeping and sex. A bit of light reading may help you fall asleep. But if it doesn't, do your reading elsewhere in the house. Try not to use your TV, computer, smartphone, or tablet while you are in bed.  Be sure your bed is big enough to stretch out comfortably, especially if you have a sleep partner.  Keep your bedroom quiet, dark, and cool. Use curtains, blinds, or a sleep mask to block out light. To block out noise, use earplugs, soothing music, or a \"white noise\" machine.  Your evening and bedtime routine   Create a relaxing bedtime routine. You might want to take a warm shower or bath, or listen to soothing music.  Go to bed at the same time every night. And get up at the same time every morning, even if you feel tired.  What to avoid   Limit caffeine (coffee, tea, caffeinated sodas) during the day, and don't have any for at least 6 hours before bedtime.  Avoid drinking alcohol before bedtime. Alcohol can cause you to wake up more often during the " "night.  Try not to smoke or use tobacco, especially in the evening. Nicotine can keep you awake.  Limit naps during the day, especially close to bedtime.  Avoid lying in bed awake for too long. If you can't fall asleep or if you wake up in the middle of the night and can't get back to sleep within about 20 minutes, get out of bed and go to another room until you feel sleepy.  Avoid taking medicine right before bed that may keep you awake or make you feel hyper or energized. Your doctor can tell you if your medicine may do this and if you can take it earlier in the day.  If you can't sleep   Imagine yourself in a peaceful, pleasant scene. Focus on the details and feelings of being in a place that is relaxing.  Get up and do a quiet or boring activity until you feel sleepy.  Avoid drinking any liquids before going to bed to help prevent waking up often to use the bathroom.  Where can you learn more?  Go to https://www.[employer].net/patiented  Enter J942 in the search box to learn more about \"Learning About Sleeping Well.\"  Current as of: July 10, 2023               Content Version: 14.0    9359-9849 [employer].   Care instructions adapted under license by your healthcare professional. If you have questions about a medical condition or this instruction, always ask your healthcare professional. [employer] disclaims any warranty or liability for your use of this information.      9 Ways to Cut Back on Drinking  Maybe you've found yourself drinking more alcohol than you'd prefer. If you want to cut back, here are some ideas to try.    Think before you drink.  Do you really want a drink, or is it just a habit? If you're used to having a drink at a certain time, try doing something else then.     Look for substitutes.  Find some no-alcohol drinks that you enjoy, like flavored seltzer water, tea with honey, or tonic with a slice of lime. Or try alcohol-free beer or \"virgin\" cocktails (without the " "alcohol).     Drink more water.  Use water to quench your thirst. Drink a glass of water before you have any alcohol. Have another glass along with every drink or between drinks.     Shrink your drink.  For example, have a bottle of beer instead of a pint. Use a smaller glass for wine. Choose drinks with lower alcohol content (ABV%). Or use less liquor and more mixer in cocktails.     Slow down.  It's easy to drink quickly and without thinking about it. Pay attention, and make each drink last longer.     Do the math.  Total up how much you spend on alcohol each month. How much is that a year? If you cut back, what could you do with the money you save?     Take a break.  Choose a day or two each week when you won't drink at all. Notice how you feel on those days, physically and emotionally. How did you sleep? Do you feel better? Over time, add more break days.     Count calories.  Would you like to lose some weight? For some people that's a good motivator for cutting back. Figure out how many calories are in each drink. How many does that add up to in a day? In a week? In a month?     Practice saying no.  Be ready when someone offers you a drink. Try: \"Thanks, I've had enough.\" Or \"Thanks, but I'm cutting back.\" Or \"No, thanks. I feel better when I drink less.\"   Current as of: November 15, 2023               Content Version: 14.0    1417-1325 [employer].   Care instructions adapted under license by your healthcare professional. If you have questions about a medical condition or this instruction, always ask your healthcare professional. [employer] disclaims any warranty or liability for your use of this information.    Learning About Being Physically Active  What is physical activity?     Being physically active means doing any kind of activity that gets your body moving.  The types of physical activity that can help you get fit and stay healthy include:  Aerobic or \"cardio\" activities. " "These make your heart beat faster and make you breathe harder, such as brisk walking, riding a bike, or running. They strengthen your heart and lungs and build up your endurance.  Strength training activities. These make your muscles work against, or \"resist,\" something. Examples include lifting weights or doing push-ups. These activities help tone and strengthen your muscles and bones.  Stretches. These let you move your joints and muscles through their full range of motion. Stretching helps you be more flexible.  Reaching a balance between these three types of physical activity is important because each one contributes to your overall fitness.  What are the benefits of being active?  Being active is one of the best things you can do for your health. It helps you to:  Feel stronger and have more energy to do all the things you like to do.  Focus better at school or work.  Feel, think, and sleep better.  Reach and stay at a healthy weight.  Lose fat and build lean muscle.  Lower your risk for serious health problems, including diabetes, heart attack, high blood pressure, and some cancers.  Keep your heart, lungs, bones, muscles, and joints strong and healthy.  How can you make being active part of your life?  Start slowly. Make it your long-term goal to get at least 30 minutes of exercise on most days of the week. Walking is a good choice. You also may want to do other activities, such as running, swimming, cycling, or playing tennis or team sports.  Pick activities that you like--ones that make your heart beat faster, your muscles stronger, and your muscles and joints more flexible. If you find more than one thing you like doing, do them all. You don't have to do the same thing every day.  Get your heart pumping every day. Any activity that makes your heart beat faster and keeps it at that rate for a while counts.  Here are some great ways to get your heart beating faster:  Go for a brisk walk, run, or hike.  Go " "for a swim or bike ride.  Take an online exercise class or dance.  Play a game of touch football, basketball, or soccer.  Play tennis, pickleball, or racquetball.  Climb stairs.  Even some household chores can be aerobic. Just do them at a faster pace. Raking or mowing the lawn, sweeping the garage, and vacuuming and cleaning your home all can help get your heart rate up.  Strengthen your muscles during the week. You don't have to lift heavy weights or grow big, bulky muscles to get stronger. Doing a few simple activities that make your muscles work against, or \"resist,\" something can help you get stronger. Aim for at least twice a week.  For example, you can:  Do push-ups or sit-ups, which use your own body weight as resistance.  Lift weights or dumbbells or use stretch bands at home or in a gym or community center.  Stretch your muscles often. Stretching will help you as you become more active. It can help you stay flexible and loosen tight muscles. It can also help improve your balance and posture and can be a great way to relax.  Be sure to stretch the muscles you'll be using when you work out. It's best to warm your muscles slightly before you stretch them. Walk or do some other light aerobic activity for a few minutes. Then start stretching.  When you stretch your muscles:  Do it slowly. Stretching is not about going fast or making sudden movements.  Don't push or bounce during a stretch.  Hold each stretch for at least 15 to 30 seconds, if you can. You should feel a stretch in the muscle, but not pain.  Breathe out as you do the stretch. Then breathe in as you hold the stretch. Don't hold your breath.  If you're worried about how more activity might affect your health, have a checkup before you start. Follow any special advice your doctor gives you for getting a smart start.  Where can you learn more?  Go to https://www.healthwise.net/patiented  Enter W332 in the search box to learn more about \"Learning About " "Being Physically Active.\"  Current as of: June 5, 2023               Content Version: 14.0    2458-3442 [employer].   Care instructions adapted under license by your healthcare professional. If you have questions about a medical condition or this instruction, always ask your healthcare professional. [employer] disclaims any warranty or liability for your use of this information.         "

## 2024-06-18 NOTE — PROGRESS NOTES
Preventive Care Visit  [facility]  Sen Leon MD, Internal Medicine  Jun 18, 2024      Assessment & Plan     Encounter for Medicare annual wellness exam  Immunizations are reviewed and recommending annual flu shot and COVID vaccination.  Everything else is looking up-to-date.  Living will discussed.  Discussed smoking cessation.  Discussed using alcohol in moderation.  Regular exercise and good diet habits to maintain a healthy weight discussed.  Up to date with colonoscopies and this should be repeated in 2025.  Prostate exam is deferred but I will check a PSA for prostate cancer screening.  Dementia and depression screening completed.  Recommending getting an annual eye exam completed.  Seeing a dentist every 6 months recommended.  Skin exam performed and recommending regular use of sunblock.  We discussed seeing a dermatologist every 1 to 2 years.    Will screen for diabetes with fasting glucose.    Previous imaging negative for AAA.    Squamous cell lung cancer, right (H)  Enlarging right lower lobe nodule confirmed as early-stage squamous cell lung cancer with robotic assisted resection during the winter 2022-23.  Followed by oncology.  Most recent CT scan in February showed in nodule involving right upper lobe with plans for additional imaging/follow-up in August.  Unfortunately still using tobacco as discussed below.  - CBC with platelets; Future    Nonischemic cardiomyopathy (H)  Echocardiogram demonstrating global hypokinesis with EF 45-50%.  Followed by cardiology.  He has ventricular ectopy.  Continues on metoprolol and lisinopril.  He appears euvolemic.  Does not require diuretics.    Nonobstructive atherosclerosis of coronary artery  Coronary angiogram performed following elevated coronary calcium score showing mild to moderate disease.  He continues on rosuvastatin.  He is to discuss whether he should continue aspirin while on Eliquis with cardiology.  He is under the  impression that cardiology has told him to continue.  - Lipid Profile (Chol, Trig, HDL, LDL calc); Future    Paroxysmal atrial fibrillation (H)  Paroxysmal atrial fibrillation identified on cardiac monitor.  Continues Eliquis 5 mg twice daily for cardioembolic prophylaxis.    Ascending aorta dilation (H24)  No change in dilated ascending thoracic aorta again measuring 4.4 cm on CT scan in February 2024.  Asymptomatic.  Continue good blood pressure control.      Ventricular ectopy  As above, continue metoprolol    Chronic obstructive pulmonary disease, unspecified COPD type (H)  No symptoms related to COPD.  Smoking cessation emphasized    Tobacco abuse  He is reluctant to take Chantix because of possible side effects regarding his mood.  He actually would be a good candidate for Zyban and will start 150 mg daily for 2 weeks increasing to twice daily thereafter.  Discussed potential side effects.  He can also make use of a nicotine patch and other nicotine replacement including electronic cigarettes/vaping if necessary to get him off of cigarettes.  - buPROPion (ZYBAN) 150 MG 12 hr tablet; Take 1 tablet (150 mg) by mouth 2 times daily Start with 1 tablet daily and increase to twice daily after 2 weeks    Gastroesophageal reflux disease without esophagitis  He has had more reflux symptoms over the past 6 months.  No changes in diet.  He does drink a moderate amount of alcohol.  Recommending omeprazole 20 mg every morning before breakfast and continued modification of diet and alcohol intake  - omeprazole (PRILOSEC) 20 MG DR capsule; Take 1 capsule (20 mg) by mouth daily    Chronic fatigue  He is felt more sluggish over the past year.  Plans for repeat imaging of lungs in August.  Checking appropriate metabolic studies.  He is asking about Lyme and does frequently visit an area where there is large amounts of ticks.  Lyme titer is reasonable.  Questioning also the possibility of sleep apnea and I am recommending a  visit with the sleep clinic  - Lyme Disease Total Abs Bld with Reflex to Confirm CLIA; Future  - Adult Sleep Eval & Management  Referral; Future    Anxiety, generalized  He is under high levels of stress.  It affects his sleep.  He will take lorazepam intermittently for anxiety averaging about 2/week.  The above prescription for bupropion may prove helpful in this regard    Essential hypertension  Excellent blood pressure control with current medication  - CBC with platelets; Future  - Comprehensive metabolic panel (BMP + Alb, Alk Phos, ALT, AST, Total. Bili, TP); Future  - UA Macroscopic with reflex to Microscopic and Culture - Lab Collect; Future    History of chronic hepatitis  History of successful treatment of chronic hepatitis C.  Monitoring LFTs.  No hepatic lesions on recent imaging  - Comprehensive metabolic panel (BMP + Alb, Alk Phos, ALT, AST, Total. Bili, TP); Future    Chronic insomnia      Bilateral foot pain  EMG was normal showing no neuropathy.  Symptoms are actually improved.  I suspect musculoskeletal    History of colon polyps  Last colonoscopy was 2020 and consider repeating next year    Chronic bilateral low back pain without sciatica  He will use methocarbamol occasionally for back pain    Encounter for therapeutic drug monitoring  Monitor CBC while anticoagulated with Eliquis, monitor LFTs while on statin  - CBC with platelets; Future  - Comprehensive metabolic panel (BMP + Alb, Alk Phos, ALT, AST, Total. Bili, TP); Future    Screening for prostate cancer    - PSA, screen; Future    Patient has been advised of split billing requirements and indicates understanding: Yes    67 minutes spent in the management of this patient including reviewing records, obtaining history, performing exam, ordering appropriate tests and documenting visit.    20 of those minutes spent addressing wellness and 47 minutes spent addressing chronic and new medical problems.  See assessment plan for  "details.    Nicotine/Tobacco Cessation  He reports that he has been smoking cigarettes. He started smoking about 56 years ago. He has a 28.2 pack-year smoking history. He has been exposed to tobacco smoke. He has never used smokeless tobacco.  Nicotine/Tobacco Cessation Plan  Pharmacotherapies : bupropion (Zyban)  Nicotine replacement      BMI  Estimated body mass index is 27.27 kg/m  as calculated from the following:    Height as of this encounter: 1.74 m (5' 8.5\").    Weight as of this encounter: 82.6 kg (182 lb).       Counseling  Appropriate preventive services were discussed with this patient, including applicable screening as appropriate for fall prevention, nutrition, physical activity, Tobacco-use cessation, weight loss and cognition.  Checklist reviewing preventive services available has been given to the patient.  Reviewed patient's diet, addressing concerns and/or questions.   Discussed possible causes of fatigue. The patient reports drinking more than 3 alcoholic drinks per day and/or more than 7 drhnks per week. The patient was counseled and given information about possible harmful effects of excessive alcohol intake.    Follow-up annually for annual wellness visit    Irwin Guerrero is a 69 year old, presenting for the following: Here for his annual wellness visit and to follow-up multiple chronic medical problems including history of lung cancer, COPD, nonischemic cardiomyopathy, paroxysmal atrial fibrillation, etc.  See assessment and plan for details  Physical (AWV, fasting)        6/18/2024    10:13 AM   Additional Questions   Roomed by          Health Care Directive  Patient does not have a Health Care Directive or Living Will: Discussed advance care planning with patient; however, patient declined at this time.          6/18/2024   General Health   How would you rate your overall physical health? Good   Feel stress (tense, anxious, or unable to sleep) To some extent   (!) STRESS CONCERN      " 6/18/2024   Nutrition   Diet: Regular (no restrictions)         6/18/2024   Exercise   Days per week of moderate/strenous exercise 0 days   (!) EXERCISE CONCERN      6/18/2024   Social Factors   Frequency of gathering with friends or relatives Once a week   Worry food won't last until get money to buy more No   Food not last or not have enough money for food? No   Do you have housing?  Yes   Are you worried about losing your housing? No   Lack of transportation? No   Unable to get utilities (heat,electricity)? No         6/18/2024   Fall Risk   Fallen 2 or more times in the past year? No   Trouble with walking or balance? No          6/18/2024   Activities of Daily Living- Home Safety   Needs help with the following daily activites None of the above   Safety concerns in the home None of the above         6/18/2024   Dental   Dentist two times every year? Yes         6/18/2024   Hearing Screening   Hearing concerns? None of the above         6/18/2024   Driving Risk Screening   Patient/family members have concerns about driving No         6/18/2024   General Alertness/Fatigue Screening   Have you been more tired than usual lately? (!) YES         6/18/2024   Urinary Incontinence Screening   Bothered by leaking urine in past 6 months No         6/18/2024   TB Screening   Were you born outside of the [place]? No         Today's PHQ-2 Score:       6/18/2024    10:21 AM   PHQ-2 ( 1999 [employer])   Q1: Little interest or pleasure in doing things 0   Q2: Feeling down, depressed or hopeless 0   PHQ-2 Score 0   Q1: Little interest or pleasure in doing things Not at all   Q2: Feeling down, depressed or hopeless Not at all   PHQ-2 Score 0           6/18/2024   Substance Use   Alcohol more than 3/day or more than 7/wk Yes   How often do you have a drink containing alcohol 4 or more times a week   How many alcohol drinks on typical day 1 or 2   How often do you have 5+ drinks at one occasion Monthly   Audit 2/3 Score 2   How often not  able to stop drinking once started Never   How often failed to do what normally expected Never   How often needed first drink in am after a heavy drinking session Never   How often feeling of guilt or remorse after drinking Never   How often unable to remember what happened the night before Never   Have you or someone else been injured because of your drinking No   Has anyone been concerned or suggested you cut down on drinking No   TOTAL SCORE - AUDIT 6   Do you have a current opioid prescription? No   How severe/bad is pain from 1 to 10? 0/10 (No Pain)   Do you use any other substances recreationally? No     Social History     Tobacco Use    Smoking status: Every Day     Current packs/day: 0.50     Average packs/day: 0.5 packs/day for 56.5 years (28.2 ttl pk-yrs)     Types: Cigarettes     Start date: 1968     Passive exposure: Current    Smokeless tobacco: Never   Vaping Use    Vaping status: Never Used   Substance Use Topics    Alcohol use: Yes     Comment: 1-3 drinks daily    Drug use: Yes     Types: Marijuana     Comment: Drug use: Frequent use       Last PSA:   Prostate Specific Antigen Screen   Date Value Ref Range Status   06/13/2023 0.71 0.00 - 4.50 ng/mL Final   04/14/2022 0.59 0.00 - 4.50 ug/L Final     ASCVD Risk   The ASCVD Risk score (Rossy DK, et al., 2019) failed to calculate for the following reasons:    The valid total cholesterol range is 130 to 320 mg/dL            Reviewed and updated as needed this visit by Provider   Tobacco  Allergies  Meds  Problems  Med Hx  Surg Hx  Fam Hx            Past Medical History:   Diagnosis Date    Acute bilateral low back pain without sciatica 07/23/2018    Anxiety, generalized 11/17/2017    Ascending aorta dilation (H24)     4.2 cm on CT scan August 2017, follow annually, 4.1 cm August 2018, stable October 2019.  4.5 cm April 2022.  4.4 cm April 2023 recheck in 1 year    Bilateral foot pain 12/07/2023    Calcaneal fracture 2013    Chronic  foot/heel pain    Chronic bilateral low back pain without sciatica 06/18/2024    Chronic cough 04/14/2022    Chronic fatigue 10/14/2019    Chronic hepatitis C (H)     Successfully treated.  Previously on interferon.  More recently SOFOSBUVIR and ribavirin, biopsy has been negative for cirrhosis, PCR -2015    Chronic insomnia     Chronic pain of right ankle 05/17/2018    COPD, mild (H) 02/28/2023    Erectile dysfunction     Essential hypertension     External hemorrhoids 2012    Gastroesophageal reflux disease without esophagitis 06/18/2024    History of alcohol abuse     History of colon polyps     Colonoscopy April 2020 with polyps.  Repeat in 5 years    History of depression     2014, prescribed citalopram    Lower abdominal pain 07/23/2018    Medial epicondylitis of elbow, left 06/13/2023    Nonischemic cardiomyopathy (H) 02/28/2023    Echocardiogram demonstrating 40 to 50% ejection fraction    Nonobstructive atherosclerosis of coronary artery 02/28/2023    Coronary angiogram demonstrating mild to moderate multivessel disease.  January 2023    Osteoarthritis of multiple joints     Chronic bilateral knee and chronic low back pain    Paroxysmal atrial fibrillation (H) 03/13/2023    Found on event monitor 4% burden longest run 8 hours March 2023    Porphyria cutanea tarda (H)     Pulmonary nodule     CT August 2018 3 mm right upper lobe nodule reported as stable, stable October 2019.  CT with 6 mm right lower lobe nodule April 2022 needing 6-month follow-up    Rectal bleeding 03/02/2020    Screening for AAA (abdominal aortic aneurysm)     CT 2018 without abdominal aneurysm    Shingles 05/13/2021    Slow transit constipation 02/28/2023    Squamous cell lung cancer, right (H)     status post robot assisted thoracoscopic right lower lobe wedge resection, completion lobectomy, and mediastinal lymph node dissection for a pT1bN0 (stage IA2) non small cell lung cancer.    Tobacco abuse 11/17/2017    Ventricular ectopy  06/13/2023    Frequent PVCs     Past Surgical History:   Procedure Laterality Date    ANKLE FRACTURE SURGERY      ARTHROSCOPY KNEE Bilateral     COLONOSCOPY      CV CORONARY ANGIOGRAM N/A 1/17/2023    Procedure: Coronary Angiogram;  Surgeon: Souleymane Ratliff MD;  Location:  [facility]    CV LEFT HEART CATH N/A 1/17/2023    Procedure: Left Heart Catheterization;  Surgeon: Souleymane Ratliff MD;  Location:  [facility]    CV LEFT VENTRICULOGRAM N/A 1/17/2023    Procedure: Left Ventriculogram;  Surgeon: Souleymane Ratliff MD;  Location:  [facility]    DAVINCI LOBECTOMY LUNG Right 1/18/2023    Procedure: Robot-assisted thoracoscopic right lower lobe wedge, right lower lobectomy, and mediastinal lymph node dissection;  Surgeon: Lobo Leger MD;  Location: [facility] OR    FOOT FRACTURE SURGERY  09/01/2013    calcaneal fracture    WRIST SURGERY      ligament injury     Current providers sharing in care for this patient include:  Patient Care Team:  Sen Leon MD as PCP - General  Sen Leon MD as Assigned PCP  Lobo Leger MD as MD (Cardiovascular & Thoracic Surgery)  Dakota De Dios MD as MD (Internal Medicine - Pediatrics)  Boaz Hutchins RPH as Assigned MTM Pharmacist  Aubree Beasley LPN as LPN (Thoracic Surgery)  Neli Woodall NP as Nurse Practitioner (Cardiology)  Neli Woodall NP as Assigned Heart and Vascular Provider  Breanne Díaz MD as Assigned Neuroscience Provider    The following health maintenance items are reviewed in Epic and correct as of today:  Health Maintenance   Topic Date Due    COPD ACTION PLAN  Never done    COVID-19 Vaccine (7 - 2023-24 season) 03/09/2024    LIPID  11/07/2024    COLORECTAL CANCER SCREENING  05/11/2025    NICOTINE/TOBACCO CESSATION COUNSELING Q 1 YR  06/18/2025    MEDICARE ANNUAL WELLNESS VISIT  06/18/2025    ANNUAL REVIEW OF HM ORDERS  06/18/2025    FALL RISK ASSESSMENT  06/18/2025    GLUCOSE  06/13/2026  "   DTAP/TDAP/TD IMMUNIZATION (2 - Td or Tdap) 11/20/2028    ADVANCE CARE PLANNING  06/18/2029    SPIROMETRY  Completed    PHQ-2 (once per calendar year)  Completed    INFLUENZA VACCINE  Completed    Pneumococcal Vaccine: 65+ Years  Completed    RSV VACCINE (Pregnancy & 60+)  Completed    AORTIC ANEURYSM SCREENING (SYSTEM ASSIGNED)  Completed    IPV IMMUNIZATION  Aged Out    HPV IMMUNIZATION  Aged Out    MENINGITIS IMMUNIZATION  Aged Out    RSV MONOCLONAL ANTIBODY  Aged Out    HEPATITIS C SCREENING  Discontinued    ZOSTER IMMUNIZATION  Discontinued    LUNG CANCER SCREENING  Discontinued         Review of Systems  Constitutional, HEENT, cardiovascular, pulmonary, GI, , musculoskeletal, neuro, skin, endocrine and psych systems are negative, except as otherwise noted.     Objective    Exam  /74 (BP Location: Right arm, Patient Position: Sitting, Cuff Size: Adult Regular)   Pulse 66   Temp 97.8  F (36.6  C) (Oral)   Resp 18   Ht 1.74 m (5' 8.5\")   Wt 82.6 kg (182 lb)   SpO2 96%   BMI 27.27 kg/m     Estimated body mass index is 27.27 kg/m  as calculated from the following:    Height as of this encounter: 1.74 m (5' 8.5\").    Weight as of this encounter: 82.6 kg (182 lb).    Physical Exam  EYES: Eyelids, conjunctiva, and sclera were normal. Pupils were normal. Cornea, iris, and lens were normal bilaterally.  HEAD, EARS, NOSE, MOUTH, AND THROAT: Head and face were normal. TMs and external auditory canals are normal.  Oropharynx normal  NECK: Neck appearance was normal. There were no neck masses and the thyroid was not enlarged and no nodules are felt.  No lymphadenopathy.  RESPIRATORY: Breathing pattern was normal and the chest moved symmetrically.   Lung sounds were normal and there were no rales or wheezes.  CARDIOVASCULAR: Heart rate and rhythm were normal.  S1 and S2 were normal and there were no extra sounds or murmurs. Peripheral pulses in arms and legs were normal.  There was no peripheral edema.  No " carotid bruits.  GASTROINTESTINAL:  Bowel sounds were present.   Palpation detected no tenderness, mass, or enlarged organs.   RECTAL/PROSTATE: Deferred  MUSCULOSKELETAL: Skeletal configuration was normal and muscle mass was normal for age. Joint appearance was overall normal.  LYMPHATIC: There were no enlarged nodes.  SKIN/HAIR/NAILS: Skin color was normal.  There were no concerning skin lesions.  NEUROLOGIC: The patient was alert and oriented to person, place, time, and circumstance. Speech was normal. Cranial nerves were normal. Motor strength was normal for age. The patient was normally coordinated.    PSYCHIATRIC:  Mood and affect were normal and the patient had normal recent and remote memory. The patient's judgment and insight were normal.         6/18/2024   Mini Cog   Clock Draw Score 2 Normal   3 Item Recall 3 objects recalled   Mini Cog Total Score 5              Signed Electronically by: Sen Leon MD

## 2024-07-02 ENCOUNTER — TELEPHONE (OUTPATIENT)
Dept: CARDIOLOGY | Facility: CLINIC | Age: 69
End: 2024-07-02
Payer: COMMERCIAL

## 2024-07-02 NOTE — TELEPHONE ENCOUNTER
[facility] Call Center    Phone Message    May a detailed message be left on voicemail: yes     Reason for Call: Medication Question or concern regarding medication   Prescription Clarification  Name of Medication: Aspirin   Prescribing Provider: Marly   Pharmacy: [id number] [address] [facility]     What on the order needs clarification? Pt is wondering if he needs to continue to take the low dose aspirin. He has not taken it in 7 days or so since his primacy stated he didn't see a need for it since he is still taking eliquis. Pt would still like Alexsandra recommendation. Please return call to advise.       Action Taken: Other: Cardiology    Travel Screening: Not Applicable     Date of Service:     Thank you!  [facility]

## 2024-07-02 NOTE — TELEPHONE ENCOUNTER
Spoke to patient to let him know that Mae is out for the week returning on 7/8.  She will review his request when she returns and we will get back to him.  Patient provided verbal understanding.  TENZIN Barth

## 2024-07-09 NOTE — TELEPHONE ENCOUNTER
Spoke to patient to review recommendations per Mae Luke,SARIAH.    ---okay to be off aspirin  Medication list updated in epic. Patient provided verbal understanding regarding above.  TENZIN Barth

## 2024-08-02 ENCOUNTER — ANCILLARY PROCEDURE (OUTPATIENT)
Dept: CT IMAGING | Facility: CLINIC | Age: 69
End: 2024-08-02
Attending: CLINICAL NURSE SPECIALIST
Payer: COMMERCIAL

## 2024-08-02 DIAGNOSIS — C34.31 MALIGNANT NEOPLASM OF LOWER LOBE OF RIGHT LUNG (H): ICD-10-CM

## 2024-08-02 DIAGNOSIS — F41.1 ANXIETY, GENERALIZED: ICD-10-CM

## 2024-08-02 LAB
CREAT BLD-MCNC: 1.3 MG/DL (ref 0.7–1.3)
EGFRCR SERPLBLD CKD-EPI 2021: 59 ML/MIN/1.73M2

## 2024-08-02 PROCEDURE — 71260 CT THORAX DX C+: CPT | Mod: GC | Performed by: RADIOLOGY

## 2024-08-02 PROCEDURE — 82565 ASSAY OF CREATININE: CPT | Performed by: PATHOLOGY

## 2024-08-02 RX ORDER — IOPAMIDOL 755 MG/ML
90 INJECTION, SOLUTION INTRAVASCULAR ONCE
Status: COMPLETED | OUTPATIENT
Start: 2024-08-02 | End: 2024-08-02

## 2024-08-02 RX ORDER — LORAZEPAM 1 MG/1
TABLET ORAL
Qty: 15 TABLET | Refills: 0 | Status: SHIPPED | OUTPATIENT
Start: 2024-08-02

## 2024-08-02 RX ADMIN — IOPAMIDOL 90 ML: 755 INJECTION, SOLUTION INTRAVASCULAR at 13:53

## 2024-08-02 NOTE — PROGRESS NOTES
THORACIC SURGERY FOLLOW UP VISIT      I saw Mr. Murillo in follow-up today. The clinical summary follows:     PREOP DIAGNOSIS   Lung nodule  PROCEDURE   Robot assisted thoracoscopic right lower lobe wedge resection, completion right lower lobectomy, mediastinal lymph node dissection     DATE OF PROCEDURE  01/18/2023    HISTOPATHOLOGY   Squamous cell carcinoma, keratinizing type, moderately differentiated pT1bN0     COMPLICATIONS  None    INTERVAL STUDIES  CT chest 08/02/2024: Surgical changes of a right lower lobe wedge resection without evidence of local disease recurrence.     Past Medical History:   Diagnosis Date    Acute bilateral low back pain without sciatica 07/23/2018    Anxiety, generalized 11/17/2017    Ascending aorta dilation (H24)     4.2 cm on CT scan August 2017, follow annually, 4.1 cm August 2018, stable October 2019.  4.5 cm April 2022.  4.4 cm April 2023 recheck in 1 year    Bilateral foot pain 12/07/2023    Calcaneal fracture 2013    Chronic foot/heel pain    Chronic bilateral low back pain without sciatica 06/18/2024    Chronic cough 04/14/2022    Chronic fatigue 10/14/2019    Chronic hepatitis C (H)     Successfully treated.  Previously on interferon.  More recently SOFOSBUVIR and ribavirin, biopsy has been negative for cirrhosis, PCR -2015    Chronic insomnia     Chronic pain of right ankle 05/17/2018    COPD, mild (H) 02/28/2023    Erectile dysfunction     Essential hypertension     External hemorrhoids 2012    Gastroesophageal reflux disease without esophagitis 06/18/2024    History of alcohol abuse     History of colon polyps     Colonoscopy April 2020 with polyps.  Repeat in 5 years    History of depression     2014, prescribed citalopram    Lower abdominal pain 07/23/2018    Medial epicondylitis of elbow, left 06/13/2023    Nonischemic cardiomyopathy (H) 02/28/2023    Echocardiogram demonstrating 40 to 50% ejection fraction    Nonobstructive atherosclerosis of coronary artery 02/28/2023     Coronary angiogram demonstrating mild to moderate multivessel disease.  January 2023    Osteoarthritis of multiple joints     Chronic bilateral knee and chronic low back pain    Paroxysmal atrial fibrillation (H) 03/13/2023    Found on event monitor 4% burden longest run 8 hours March 2023    Porphyria cutanea tarda (H)     Pulmonary nodule     CT August 2018 3 mm right upper lobe nodule reported as stable, stable October 2019.  CT with 6 mm right lower lobe nodule April 2022 needing 6-month follow-up    Rectal bleeding 03/02/2020    Screening for AAA (abdominal aortic aneurysm)     CT 2018 without abdominal aneurysm    Shingles 05/13/2021    Slow transit constipation 02/28/2023    Squamous cell lung cancer, right (H)     status post robot assisted thoracoscopic right lower lobe wedge resection, completion lobectomy, and mediastinal lymph node dissection for a pT1bN0 (stage IA2) non small cell lung cancer.    Tobacco abuse 11/17/2017    Ventricular ectopy 06/13/2023    Frequent PVCs     Past Surgical History:   Procedure Laterality Date    ANKLE FRACTURE SURGERY      ARTHROSCOPY KNEE Bilateral     COLONOSCOPY      CV CORONARY ANGIOGRAM N/A 1/17/2023    Procedure: Coronary Angiogram;  Surgeon: Souleymane Ratliff MD;  Location: [facility] CARDIAC CATH LAB    CV LEFT HEART CATH N/A 1/17/2023    Procedure: Left Heart Catheterization;  Surgeon: Souleymane Ratliff MD;  Location: [facility] CARDIAC CATH LAB    CV LEFT VENTRICULOGRAM N/A 1/17/2023    Procedure: Left Ventriculogram;  Surgeon: Souleymane Ratliff MD;  Location: [facility] CARDIAC CATH LAB    DAVINCI LOBECTOMY LUNG Right 1/18/2023    Procedure: Robot-assisted thoracoscopic right lower lobe wedge, right lower lobectomy, and mediastinal lymph node dissection;  Surgeon: Lobo Leger MD;  Location: [facility] OR    FOOT FRACTURE SURGERY  09/01/2013    calcaneal fracture    WRIST SURGERY      ligament injury      Social History     Socioeconomic History    Marital status:       Spouse name: Not on file    Number of children: Not on file    Years of education: Not on file    Highest education level: Not on file   Occupational History    Not on file   Tobacco Use    Smoking status: Every Day     Current packs/day: 0.50     Average packs/day: 0.5 packs/day for 56.6 years (28.3 ttl pk-yrs)     Types: Cigarettes     Start date: 1968     Passive exposure: Current    Smokeless tobacco: Never   Vaping Use    Vaping status: Never Used   Substance and Sexual Activity    Alcohol use: Yes     Comment: 1-3 drinks daily    Drug use: Yes     Types: Marijuana     Comment: Drug use: Frequent use    Sexual activity: Not on file   Other Topics Concern    Not on file   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       Social Determinants of Health     Financial Resource Strain: Low Risk  (6/18/2024)    Financial Resource Strain     Within the past 12 months, have you or your family members you live with been unable to get utilities (heat, electricity) when it was really needed?: No   Food Insecurity: Low Risk  (6/18/2024)    Food Insecurity     Within the past 12 months, did you worry that your food would run out before you got money to buy more?: No     Within the past 12 months, did the food you bought just not last and you didn t have money to get more?: No   Transportation Needs: Low Risk  (6/18/2024)    Transportation Needs     Within the past 12 months, has lack of transportation kept you from medical appointments, getting your medicines, non-medical meetings or appointments, work, or from getting things that you need?: No   Physical Activity: Unknown (6/18/2024)    Exercise Vital Sign     Days of Exercise per Week: 0 days     Minutes of Exercise per Session: Not on file   Stress: Stress Concern Present (6/18/2024)    [employer] - Occupational Stress Questionnaire     Feeling of Stress : To some extent   Social Connections: Unknown  (6/18/2024)    Social Connection and Isolation Panel [NHANES]     Frequency of Communication with Friends and Family: Not on file     Frequency of Social Gatherings with Friends and Family: Once a week     Attends Confucianism Services: Not on file     Active Member of Clubs or Organizations: Not on file     Attends Club or Organization Meetings: Not on file     Marital Status: Not on file   Interpersonal Safety: Low Risk  (6/18/2024)    Interpersonal Safety     Do you feel physically and emotionally safe where you currently live?: Yes     Within the past 12 months, have you been hit, slapped, kicked or otherwise physically hurt by someone?: No     Within the past 12 months, have you been humiliated or emotionally abused in other ways by your partner or ex-partner?: No   Housing Stability: Low Risk  (6/18/2024)    Housing Stability     Do you have housing? : Yes     Are you worried about losing your housing?: No      SUBJECTIVE   Cesar is doing well. He denies cough or shortness of breath. He is still smoking but has cut way back. He just started on a new medication that helps reduce his nicotine cravings. He also wears a nicotine patch.    OBJECTIVE  BP (!) 159/96 (BP Location: Right arm, Patient Position: Sitting, Cuff Size: Adult Regular)   Pulse 52   Temp 97.8  F (36.6  C) (Oral)   Resp 16   Wt 83.9 kg (184 lb 14.4 oz)   SpO2 95%   BMI 27.71 kg/m       From a personal perspective, his daughter is getting  in 2 weeks. She will be having the ceremony and reception in her back yard. She will have a band and will have a food truck.    I reviewed his CT results with him. We will get another chest CT in 6 months.    IMPRESSION   Cesar is a 69 year-old male status post robot assisted thoracoscopic right lower lobe wedge resection, completion right lower lobectomy and mediastinal lymph node dissection of a pT1bN0 (stage IA2) non small cell lung cancer. He is here today for lung cancer surveillance.     PLAN  I  spent 15 min on the date of the encounter in chart review, patient visit, review of tests, documentation and/or discussion with other providers about the issues documented above. I reviewed the plan as follows:  Follow up with me in 6 months with a chest CT prior  1. Necessary Tests & Appointments: chest CT  All questions were answered and the patient and present family were in agreement with the plan.  I appreciate the opportunity to participate in the care of your patient and will keep you updated.  Sincerely,    FAUZIA Nava CNS

## 2024-08-02 NOTE — DISCHARGE INSTRUCTIONS

## 2024-08-05 ENCOUNTER — ONCOLOGY VISIT (OUTPATIENT)
Dept: SURGERY | Facility: CLINIC | Age: 69
End: 2024-08-05
Attending: CLINICAL NURSE SPECIALIST
Payer: COMMERCIAL

## 2024-08-05 VITALS
HEART RATE: 52 BPM | BODY MASS INDEX: 27.71 KG/M2 | SYSTOLIC BLOOD PRESSURE: 159 MMHG | OXYGEN SATURATION: 95 % | WEIGHT: 184.9 LBS | DIASTOLIC BLOOD PRESSURE: 96 MMHG | RESPIRATION RATE: 16 BRPM | TEMPERATURE: 97.8 F

## 2024-08-05 DIAGNOSIS — C34.31 MALIGNANT NEOPLASM OF LOWER LOBE OF RIGHT LUNG (H): Primary | ICD-10-CM

## 2024-08-05 PROCEDURE — 99213 OFFICE O/P EST LOW 20 MIN: CPT | Performed by: CLINICAL NURSE SPECIALIST

## 2024-08-05 PROCEDURE — G0463 HOSPITAL OUTPT CLINIC VISIT: HCPCS | Performed by: CLINICAL NURSE SPECIALIST

## 2024-08-05 ASSESSMENT — PAIN SCALES - GENERAL: PAINLEVEL: NO PAIN (0)

## 2024-08-05 NOTE — LETTER
8/5/2024      Cesar Murillo  [address]  [place] [address]      Dear Colleague,    Thank you for referring your patient, Cesar Murillo, to the [facility]. Please see a copy of my visit note below.    THORACIC SURGERY FOLLOW UP VISIT      I saw Mr. Murillo in follow-up today. The clinical summary follows:     PREOP DIAGNOSIS   Lung nodule  PROCEDURE   Robot assisted thoracoscopic right lower lobe wedge resection, completion right lower lobectomy, mediastinal lymph node dissection     DATE OF PROCEDURE  01/18/2023    HISTOPATHOLOGY   Squamous cell carcinoma, keratinizing type, moderately differentiated pT1bN0     COMPLICATIONS  None    INTERVAL STUDIES  CT chest 08/02/2024: Surgical changes of a right lower lobe wedge resection without evidence of local disease recurrence.     Past Medical History:   Diagnosis Date     Acute bilateral low back pain without sciatica 07/23/2018     Anxiety, generalized 11/17/2017     Ascending aorta dilation (H24)     4.2 cm on CT scan August 2017, follow annually, 4.1 cm August 2018, stable October 2019.  4.5 cm April 2022.  4.4 cm April 2023 recheck in 1 year     Bilateral foot pain 12/07/2023     Calcaneal fracture 2013    Chronic foot/heel pain     Chronic bilateral low back pain without sciatica 06/18/2024     Chronic cough 04/14/2022     Chronic fatigue 10/14/2019     Chronic hepatitis C (H)     Successfully treated.  Previously on interferon.  More recently SOFOSBUVIR and ribavirin, biopsy has been negative for cirrhosis, PCR -2015     Chronic insomnia      Chronic pain of right ankle 05/17/2018     COPD, mild (H) 02/28/2023     Erectile dysfunction      Essential hypertension      External hemorrhoids 2012     Gastroesophageal reflux disease without esophagitis 06/18/2024     History of alcohol abuse      History of colon polyps     Colonoscopy April 2020 with polyps.  Repeat in 5 years     History of depression     2014, prescribed  citalopram     Lower abdominal pain 07/23/2018     Medial epicondylitis of elbow, left 06/13/2023     Nonischemic cardiomyopathy (H) 02/28/2023    Echocardiogram demonstrating 40 to 50% ejection fraction     Nonobstructive atherosclerosis of coronary artery 02/28/2023    Coronary angiogram demonstrating mild to moderate multivessel disease.  January 2023     Osteoarthritis of multiple joints     Chronic bilateral knee and chronic low back pain     Paroxysmal atrial fibrillation (H) 03/13/2023    Found on event monitor 4% burden longest run 8 hours March 2023     Porphyria cutanea tarda (H)      Pulmonary nodule     CT August 2018 3 mm right upper lobe nodule reported as stable, stable October 2019.  CT with 6 mm right lower lobe nodule April 2022 needing 6-month follow-up     Rectal bleeding 03/02/2020     Screening for AAA (abdominal aortic aneurysm)     CT 2018 without abdominal aneurysm     Shingles 05/13/2021     Slow transit constipation 02/28/2023     Squamous cell lung cancer, right (H)     status post robot assisted thoracoscopic right lower lobe wedge resection, completion lobectomy, and mediastinal lymph node dissection for a pT1bN0 (stage IA2) non small cell lung cancer.     Tobacco abuse 11/17/2017     Ventricular ectopy 06/13/2023    Frequent PVCs     Past Surgical History:   Procedure Laterality Date     ANKLE FRACTURE SURGERY       ARTHROSCOPY KNEE Bilateral      COLONOSCOPY       CV CORONARY ANGIOGRAM N/A 1/17/2023    Procedure: Coronary Angiogram;  Surgeon: Souleymane Ratliff MD;  Location:  [facility] CARDI[facility] CATH LAB     CV LEFT HEART CATH N/A 1/17/2023    Procedure: Left Heart Catheterization;  Surgeon: Souleymane Ratliff MD;  Location:  HE[facility] CARDIAC CATH LAB     CV LEFT VENTRICULOGRAM N/A 1/17/2023    Procedure: Left Ventriculogram;  Surgeon: Souleymane Ratliff MD;  Location:  HEART CARDIAC CATH LAB     DAVINCI LOBECTOMY LUNG Right 1/18/2023    Procedure: Robot-assisted thoracoscopic right  lower lobe wedge, right lower lobectomy, and mediastinal lymph node dissection;  Surgeon: Lobo Leger MD;  Location: [facility] OR     FOOT FRACTURE SURGERY  09/01/2013    calcaneal fracture     WRIST SURGERY      ligament injury      Social History     Socioeconomic History     Marital status:      Spouse name: Not on file     Number of children: Not on file     Years of education: Not on file     Highest education level: Not on file   Occupational History     Not on file   Tobacco Use     Smoking status: Every Day     Current packs/day: 0.50     Average packs/day: 0.5 packs/day for 56.6 years (28.3 ttl pk-yrs)     Types: Cigarettes     Start date: 1968     Passive exposure: Current     Smokeless tobacco: Never   Vaping Use     Vaping status: Never Used   Substance and Sexual Activity     Alcohol use: Yes     Comment: 1-3 drinks daily     Drug use: Yes     Types: Marijuana     Comment: Drug use: Frequent use     Sexual activity: Not on file   Other Topics Concern     Not on file   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       Social Determinants of Health     Financial Resource Strain: Low Risk  (6/18/2024)    Financial Resource Strain      Within the past 12 months, have you or your family members you live with been unable to get utilities (heat, electricity) when it was really needed?: No   Food Insecurity: Low Risk  (6/18/2024)    Food Insecurity      Within the past 12 months, did you worry that your food would run out before you got money to buy more?: No      Within the past 12 months, did the food you bought just not last and you didn t have money to get more?: No   Transportation Needs: Low Risk  (6/18/2024)    Transportation Needs      Within the past 12 months, has lack of transportation kept you from medical appointments, getting your medicines, non-medical meetings or appointments, work, or from getting things that you need?: No   Physical Activity: Unknown (6/18/2024)     Exercise Vital Sign      Days of Exercise per Week: 0 days      Minutes of Exercise per Session: Not on file   Stress: Stress Concern Present (6/18/2024)    [employer] - Occupational Stress Questionnaire      Feeling of Stress : To some extent   Social Connections: Unknown (6/18/2024)    Social Connection and Isolation Panel [NHANES]      Frequency of Communication with Friends and Family: Not on file      Frequency of Social Gatherings with Friends and Family: Once a week      Attends Mandaeism Services: Not on file      Active Member of Clubs or Organizations: Not on file      Attends Club or Organization Meetings: Not on file      Marital Status: Not on file   Interpersonal Safety: Low Risk  (6/18/2024)    Interpersonal Safety      Do you feel physically and emotionally safe where you currently live?: Yes      Within the past 12 months, have you been hit, slapped, kicked or otherwise physically hurt by someone?: No      Within the past 12 months, have you been humiliated or emotionally abused in other ways by your partner or ex-partner?: No   Housing Stability: Low Risk  (6/18/2024)    Housing Stability      Do you have housing? : Yes      Are you worried about losing your housing?: No      SUBJECTIVE   Cesar is doing well. He denies cough or shortness of breath. He is still smoking but has cut way back. He just started on a new medication that helps reduce his nicotine cravings. He also wears a nicotine patch.    OBJECTIVE  BP (!) 159/96 (BP Location: Right arm, Patient Position: Sitting, Cuff Size: Adult Regular)   Pulse 52   Temp 97.8  F (36.6  C) (Oral)   Resp 16   Wt 83.9 kg (184 lb 14.4 oz)   SpO2 95%   BMI 27.71 kg/m       From a personal perspective, his daughter is getting  in 2 weeks. She will be having the ceremony and reception in her back yard. She will have a band and will have a food truck.    I reviewed his CT results with him. We will get another chest CT  in 6 months.    TIP Guerrero is a 69 year-old male status post robot assisted thoracoscopic right lower lobe wedge resection, completion right lower lobectomy and mediastinal lymph node dissection of a pT1bN0 (stage IA2) non small cell lung cancer. He is here today for lung cancer surveillance.     PLAN  I spent 15 min on the date of the encounter in chart review, patient visit, review of tests, documentation and/or discussion with other providers about the issues documented above. I reviewed the plan as follows:  Follow up with me in 6 months with a chest CT prior  1. Necessary Tests & Appointments: chest CT  All questions were answered and the patient and present family were in agreement with the plan.  I appreciate the opportunity to participate in the care of your patient and will keep you updated.  Sincerely,    FAUZIA Nava       Again, thank you for allowing me to participate in the care of your patient.        Sincerely,        FAUZIA Nava CNS

## 2024-08-05 NOTE — NURSING NOTE
"Oncology Rooming Note    August 5, 2024 12:11 PM   Cesar Murillo is a 69 year old male who presents for:    Chief Complaint   Patient presents with    Oncology Clinic Visit     Malignant neoplasm of lower lobe of right lung         Initial Vitals: There were no vitals taken for this visit. Estimated body mass index is 27.27 kg/m  as calculated from the following:    Height as of 6/18/24: 1.74 m (5' 8.5\").    Weight as of 6/18/24: 82.6 kg (182 lb). There is no height or weight on file to calculate BSA.  Data Unavailable Comment: Data Unavailable   No LMP for male patient.  Allergies reviewed: Yes  Medications reviewed: Yes    Medications: Medication refills not needed today.  Pharmacy name entered into [employer]: [id number] [address]    Frailty Screening:   Is the patient here for a new oncology consult visit in cancer care? 2. No      Clinical concerns: none       Rena Cartwright"

## 2024-08-18 DIAGNOSIS — I10 BENIGN ESSENTIAL HYPERTENSION: ICD-10-CM

## 2024-08-19 RX ORDER — LISINOPRIL 10 MG/1
10 TABLET ORAL DAILY
Qty: 90 TABLET | Refills: 3 | Status: SHIPPED | OUTPATIENT
Start: 2024-08-19

## 2024-09-26 DIAGNOSIS — I25.10 CORONARY ARTERY CALCIFICATION SEEN ON CT SCAN: ICD-10-CM

## 2024-09-26 DIAGNOSIS — Z72.0 TOBACCO ABUSE: ICD-10-CM

## 2024-09-26 RX ORDER — ROSUVASTATIN CALCIUM 10 MG/1
10 TABLET, COATED ORAL DAILY
Qty: 90 TABLET | Refills: 2 | Status: SHIPPED | OUTPATIENT
Start: 2024-09-26

## 2024-10-09 ENCOUNTER — OFFICE VISIT (OUTPATIENT)
Dept: SLEEP MEDICINE | Facility: CLINIC | Age: 69
End: 2024-10-09
Attending: INTERNAL MEDICINE
Payer: COMMERCIAL

## 2024-10-09 VITALS
SYSTOLIC BLOOD PRESSURE: 157 MMHG | HEART RATE: 52 BPM | DIASTOLIC BLOOD PRESSURE: 98 MMHG | OXYGEN SATURATION: 99 % | RESPIRATION RATE: 18 BRPM

## 2024-10-09 DIAGNOSIS — R53.82 CHRONIC FATIGUE: ICD-10-CM

## 2024-10-09 DIAGNOSIS — G47.33 OSA (OBSTRUCTIVE SLEEP APNEA): Primary | ICD-10-CM

## 2024-10-09 PROCEDURE — 99205 OFFICE O/P NEW HI 60 MIN: CPT | Performed by: INTERNAL MEDICINE

## 2024-10-09 NOTE — PROGRESS NOTES
Name: Cesar Murillo MRN# [id number]   Age: 69 year old YOB: 1955     Date of Consultation: October 9, 2024  Consultation is requested by: Sen Leon MD  [address] [place] [address] Sen Leon  Primary care provider: Sen Leon       Reason for Sleep Consult:     eCsar Murillo is sent by Sen Leon for a sleep consultation regarding daytime sleepiness.         Assessment and Plan:     Summary Sleep Diagnoses:  Hypersomnolence  Clinical signs and symptoms of obstructive sleep apnea    Comorbid Diagnoses:  Chronic back pain  Heart disease  Hypertension  Paroxysmal atrial fibrillation  Mild COPD FEV1/VC 65% FEV1 104% 2022  Squamous cell lung cancer RLL wedge resection 8/2024  Anxiety   Alcohol use disorder    Summary Recommendations(things to be done):  Home sleep study to evaluate to obstructive sleep apnea  Follow up in 3 months    Summary Counseling (items discussed):    - Discussed sleep hygiene and Cesar's use of caffeine (a stimulant) to counterbalance use of alcohol (depressant)  - Discussed some treatment options for obstructive sleep apnea and the benefits of treating PAWAN    Medical Decision-making:   STOP-BANG score of 4, based on male sex, hypertension, daytime sleepiness, and snoring. Will proceed with home sleep study to assess for obstructive sleep apnea and plan for follow up to discuss further treatment and lifestyle changes to help promote restful sleep.    Patient seen and staffed with Dr. Phelps.    Michell Keita MD  Internal Medicine-Pediatrics  Pulmonary/Critical Care Fellow - PGY7/FL2  [facility]  P: [phone]  This patient was examined and evaluated jointly with Dr. Keita     Total time reviewing previous testing, medications, record review and preparation, 60  min  Assessment and plan were developed jointly and discussed with the patient during this visit.   JALYN PHELPS MD  [facility]  [facility]  "[facility]            HISTORY PRESENT ILLNESS:     Mr. Murillo is a 69-year-old M who reports sleepiness particularly in the afternoons, if he's lying in bed reading. If he gets himself outside or doing something, he feels okay (and is not sleepy), but he's wondering if there's anything that can be done to help avoid the sleepiness he feels in the afternoons. He tells us that his primary care provider mentioned that obstructive sleep apnea could be contributing, considering his hypertension, history of atrial fibrillation, and that he's male.     Typically, Cesar goes to sleep between 10:30 to midnight. He reads in bed and will occasionally stay up until 1:30AM or later finishing a book, but this isn't very common. He typically wakes up between 8:30-10AM feeling groggy for 20-30 minutes before he's \"ready to go.\" No differences in sleep schedule between weekdays and weekends-- Mr. Murillo is a retired .    He sometimes takes 'power naps' either in the afternoon or, very occasionally, around 11PM before he goes to bed. He 'power naps' 2-3x times a week in the afternoon and wakes up feeling refreshed if he keeps it to 20-30 minutes; sometimes, Cesar will accidentally sleep for 2-3 hours, then be quite groggy and have trouble falling asleep that night.     Denies having trouble falling asleep normally, or staying asleep; he will get up 1-2x a night to use the bathroom sometimes, but it's not particularly bothersome to him. Cesar likes to read books in bed, both before he sleeps and in the afternoons. Has a prescription for and will sometimes take lorazepam or have an ETOH drink to help him sleep if he's feeling particularly stressed.     Does have a history of snoring 'my entire adult life.' No bed partner-- he and his wife have slept separately for >10 years because of conflicting sleep schedules. No witnessed apneas. No history of sleepwalking or acting out dreams. No reflux symptoms " now that he's on omeprazole. Never had a sleep study before. No CPAP. No retainer or oral appliance.    Smokes 1/2ppd; x55 years, plus marijuana (~50 years) -- used to smoke 1-2ppd, cut down 18 months ago    - wears the nicotine patch    - on buproprion BID for nicotine cravings (works!)  ETOH daily, 1-5 per day          SLEEP-WAKE SCHEDULE:        No variation between weekdays and weekends.  - Goes to bed 10:30PM to 12:30AM  - Gets up between 8:30-10:30AM  - Wakes up 0-2x per night to use the bathroom; sometimes does sleep through the night  - Naps 2-3x per week in the afternoons, typically 20-30 minutes and does wake feeling refreshed (occasionally will accidentally nap 2-3 hours, after which he feels groggy and then cannot sleep that night)         SCALES:       EPWORTH SLEEPINESS SCALE         10/9/2024    10:00 AM    [place] Sleepiness Scale ( DONG Duarte  [id number]<br>ESS - [place]/English - Final version - 21 Nov 07 - [facility] [place].)   [place] Score (Sleep) 7       INSOMNIA SEVERITY INDEX (IRENE)          10/9/2024    10:38 AM   Insomnia Severity Index (IRENE)   Difficulty falling asleep 3   Difficulty staying asleep 3   Problems waking up too early 1   How SATISFIED/DISSATISFIED are you with your CURRENT sleep pattern? 3   How NOTICEABLE to others do you think your sleep problem is in terms of impairing the quality of your life? 1   How WORRIED/DISTRESSED are you about your current sleep problem? 2   To what extent do you consider your sleep problem to INTERFERE with your daily functioning (e.g. daytime fatigue, mood, ability to function at work/daily chores, concentration, memory, mood, etc.) CURRENTLY? 2   IRENE Total Score 15       Guidelines for Scoring/Interpretation:  Total score categories:  0-7 = No clinically significant insomnia   8-14 = Subthreshold insomnia   15-21 = Clinical insomnia (moderate severity)  22-28 = Clinical insomnia (severe)  Used via courtesy of www.K[employer].va.gov with permission  "from Sen Serra PhD., [facility]      STOP BANG         10/9/2024    12:00 PM   STOP BANG Questionnaire (  2008, the American Society of Anesthesiologists, Inc. [employer]   [facility]: 157/98         GAD7         No data to display                  CAGE-AID         No data to display                CAGE-AID reprinted with permission from the [place] Medical Journal, JUSTYN Uriostegui. and LILI Palacio, \"Conjoint screening questionnaires for alcohol and drug abuse\" [place] Medical Journal 94: 135-140, 1995.      PATIENT HEALTH QUESTIONNAIRE-9 (PHQ - 9)        2/28/2023    12:58 PM   PHQ-9 ([employer])   1.  Little interest or pleasure in doing things 2   2.  Feeling down, depressed, or hopeless 1   3.  Trouble falling or staying asleep, or sleeping too much 3   4.  Feeling tired or having little energy 3   5.  Poor appetite or overeating 0   6.  Feeling bad about yourself - or that you are a failure or have let yourself or your family down 0   7.  Trouble concentrating on things, such as reading the newspaper or watching television 2   8.  Moving or speaking so slowly that other people could have noticed. Or the opposite - being so fidgety or restless that you have been moving around a lot more than usual 0   9.  Thoughts that you would be better off dead, or of hurting yourself in some way 0   PHQ-9 Total Score 11   6.  Feeling bad about yourself 0   7.  Trouble concentrating 2   8.  Moving slowly or restless 0   9.  Suicidal or self-harm thoughts 0   1.  Little interest or pleasure in doing things More than half the days   2.  Feeling down, depressed, or hopeless Several days   3.  Trouble falling or staying asleep, or sleeping too much Nearly every day   4.  Feeling tired or having little energy Nearly every day   5.  Poor appetite or overeating Not at all   6.  Feeling bad about yourself Not at all   7.  Trouble concentrating More than half the days   8.  Moving slowly or restless " Not at all   9.  Suicidal or self-harm thoughts Not at all   PHQ-9 via [facility] TOTAL SCORE-----> 11 (Moderate depression)   Difficulty at work, home, or with people Somewhat difficult       Developed by Fady Vega, Meli Soria, Morris Fernandes and colleagues, with an educational anne-marie from [employer] No permission required to reproduce, translate, display or distribute.        Allergies:    No Known Allergies         Problem List:     Patient Active Problem List   Diagnosis    External hemorrhoids    Anxiety, generalized    Chronic insomnia    Erectile dysfunction    Essential hypertension    Osteoarthritis of multiple joints    Tobacco abuse    Chronic pain of right ankle    Chronic fatigue    History of colon polyps    Chronic cough    Nonobstructive atherosclerosis of coronary artery    COPD, mild (H)    Nonischemic cardiomyopathy (H)    Slow transit constipation    Paroxysmal atrial fibrillation (H)    History of chronic hepatitis    Ventricular ectopy    Squamous cell lung cancer, right (H)    Ascending aorta dilation (H)    Medial epicondylitis of elbow, left    Bilateral foot pain    Gastroesophageal reflux disease without esophagitis    Chronic bilateral low back pain without sciatica            MEDICATIONS:     Current Outpatient Medications   Medication Sig Dispense Refill    acetaminophen (TYLENOL) 500 MG tablet Take 2 tablets (1,000 mg) by mouth 2 times daily (Patient not taking: Reported on 8/5/2024)      apixaban ANTICOAGULANT (ELIQUIS ANTICOAGULANT) 5 MG tablet Take 1 tablet (5 mg) by mouth 2 times daily 180 tablet 3    buPROPion (ZYBAN) 150 MG 12 hr tablet Take 1 tablet (150 mg) by mouth 2 times daily Start with 1 tablet daily and increase to twice daily after 2 weeks 60 tablet 5    calcium carbonate (TUMS) 500 MG chewable tablet Take 1 chew tab by mouth 2 times daily (Patient not taking: Reported on 8/5/2024)      docusate sodium (COLACE) 100 MG capsule Take 100 mg by mouth as  needed (Patient not taking: Reported on 8/5/2024)      lisinopril (ZESTRIL) 10 MG tablet TAKE 1 TABLET BY MOUTH ONCE DAILY 90 tablet 3    LORazepam (ATIVAN) 1 MG tablet Take 1 tablet by mouth at bedtime as needed for insonmia. 15 tablet 0    methocarbamol (ROBAXIN) 500 MG tablet Take 1 tablet (500 mg) by mouth every 6 hours as needed for muscle spasms 30 tablet 0    metoprolol succinate ER (TOPROL XL) 50 MG 24 hr tablet Take 1 tablet (50 mg) by mouth daily 90 tablet 1    nicotine (NICODERM CQ) 14 MG/24HR 24 hr patch Place 1 patch onto the skin daily 30 patch 0    omeprazole (PRILOSEC) 20 MG DR capsule Take 1 capsule (20 mg) by mouth daily 90 capsule 3    polyethylene glycol (MIRALAX) 17 GM/Dose powder Take 17 g (1 capful.) by mouth daily      rosuvastatin (CRESTOR) 10 MG tablet Take 1 tablet (10 mg) by mouth daily 90 tablet 2    senna-docusate (SENOKOT-S/PERICOLACE) 8.6-50 MG tablet Take 1 tablet by mouth 2 times daily (Patient not taking: Reported on 8/5/2024) 50 tablet 0       Problem List:  Patient Active Problem List    Diagnosis Date Noted    Gastroesophageal reflux disease without esophagitis 06/18/2024     Priority: Medium    Chronic bilateral low back pain without sciatica 06/18/2024     Priority: Medium    Bilateral foot pain 12/07/2023     Priority: Medium    History of chronic hepatitis 06/13/2023     Priority: Medium     History of chronic hepatitis C successfully treated      Ventricular ectopy 06/13/2023     Priority: Medium     Frequent PVCs      Squamous cell lung cancer, right (H) 06/13/2023     Priority: Medium     status post robot assisted thoracoscopic right lower lobe wedge resection, completion lobectomy, and mediastinal lymph node dissection for a pT1bN0 (stage IA2) non small cell lung cancer.      Ascending aorta dilation (H) 06/13/2023     Priority: Medium     4.2 cm on CT scan August 2017, follow annually, 4.1 cm August 2018, stable October 2019.  4.5 cm April 2022.  4.4 cm April 2023  recheck in 1 year      Medial epicondylitis of elbow, left 06/13/2023     Priority: Medium    Paroxysmal atrial fibrillation (H) 03/13/2023     Priority: Medium     Found on event monitor 4% burden longest run 8 hours March 2023      Nonobstructive atherosclerosis of coronary artery 02/28/2023     Priority: Medium     Coronary angiogram demonstrating mild to moderate multivessel disease.  January 2023      COPD, mild (H) 02/28/2023     Priority: Medium    Nonischemic cardiomyopathy (H) 02/28/2023     Priority: Medium     Echocardiogram demonstrating 40 to 50% ejection fraction      Slow transit constipation 02/28/2023     Priority: Medium    Chronic cough 04/14/2022     Priority: Medium    History of colon polyps      Priority: Medium     Colonoscopy April 2020 with polyps.  Repeat in 5 years        Chronic fatigue 10/14/2019     Priority: Medium    Chronic pain of right ankle 05/17/2018     Priority: Medium    External hemorrhoids      Priority: Medium     Created by Conversion  Replacement Utility updated for latest IMO load        Chronic insomnia      Priority: Medium    Erectile dysfunction      Priority: Medium    Essential hypertension      Priority: Medium    Osteoarthritis of multiple joints      Priority: Medium     Chronic bilateral knee and chronic low back pain        Anxiety, generalized 11/17/2017     Priority: Medium    Tobacco abuse 11/17/2017     Priority: Medium        Past Medical/Surgical History:  Past Medical History:   Diagnosis Date    Acute bilateral low back pain without sciatica 07/23/2018    Anxiety, generalized 11/17/2017    Ascending aorta dilation (H)     4.2 cm on CT scan August 2017, follow annually, 4.1 cm August 2018, stable October 2019.  4.5 cm April 2022.  4.4 cm April 2023 recheck in 1 year    Bilateral foot pain 12/07/2023    Calcaneal fracture 2013    Chronic foot/heel pain    Chronic bilateral low back pain without sciatica 06/18/2024    Chronic cough 04/14/2022    Chronic  fatigue 10/14/2019    Chronic hepatitis C (H)     Successfully treated.  Previously on interferon.  More recently SOFOSBUVIR and ribavirin, biopsy has been negative for cirrhosis, PCR -2015    Chronic insomnia     Chronic pain of right ankle 05/17/2018    COPD, mild (H) 02/28/2023    Erectile dysfunction     Essential hypertension     External hemorrhoids 2012    Gastroesophageal reflux disease without esophagitis 06/18/2024    History of alcohol abuse     History of colon polyps     Colonoscopy April 2020 with polyps.  Repeat in 5 years    History of depression     2014, prescribed citalopram    Lower abdominal pain 07/23/2018    Medial epicondylitis of elbow, left 06/13/2023    Nonischemic cardiomyopathy (H) 02/28/2023    Echocardiogram demonstrating 40 to 50% ejection fraction    Nonobstructive atherosclerosis of coronary artery 02/28/2023    Coronary angiogram demonstrating mild to moderate multivessel disease.  January 2023    Osteoarthritis of multiple joints     Chronic bilateral knee and chronic low back pain    Paroxysmal atrial fibrillation (H) 03/13/2023    Found on event monitor 4% burden longest run 8 hours March 2023    Porphyria cutanea tarda (H)     Pulmonary nodule     CT August 2018 3 mm right upper lobe nodule reported as stable, stable October 2019.  CT with 6 mm right lower lobe nodule April 2022 needing 6-month follow-up    Rectal bleeding 03/02/2020    Screening for AAA (abdominal aortic aneurysm)     CT 2018 without abdominal aneurysm    Shingles 05/13/2021    Slow transit constipation 02/28/2023    Squamous cell lung cancer, right (H)     status post robot assisted thoracoscopic right lower lobe wedge resection, completion lobectomy, and mediastinal lymph node dissection for a pT1bN0 (stage IA2) non small cell lung cancer.    Tobacco abuse 11/17/2017    Ventricular ectopy 06/13/2023    Frequent PVCs     Past Surgical History:   Procedure Laterality Date    ANKLE FRACTURE SURGERY       ARTHROSCOPY KNEE Bilateral     COLONOSCOPY      CV CORONARY ANGIOGRAM N/A 1/17/2023    Procedure: Coronary Angiogram;  Surgeon: Soulyemane Ratliff MD;  Location:  [facility] LAB    CV LEFT HEART CATH N/A 1/17/2023    Procedure: Left Heart Catheterization;  Surgeon: Souleymane Ratliff MD;  Location:  [facility]    CV LEFT VENTRICULOGRAM N/A 1/17/2023    Procedure: Left Ventriculogram;  Surgeon: Souleymane Ratliff MD;  Location:  [facility] LAB    DAVINCI LOBECTOMY LUNG Right 1/18/2023    Procedure: Robot-assisted thoracoscopic right lower lobe wedge, right lower lobectomy, and mediastinal lymph node dissection;  Surgeon: Lobo Leger MD;  Location: [facility] OR    FOOT FRACTURE SURGERY  09/01/2013    calcaneal fracture    WRIST SURGERY      ligament injury       Social History:  Social History     Socioeconomic History    Marital status:      Spouse name: Not on file    Number of children: Not on file    Years of education: Not on file    Highest education level: Not on file   Occupational History    Not on file   Tobacco Use    Smoking status: Every Day     Current packs/day: 0.50     Average packs/day: 0.5 packs/day for 56.8 years (28.4 ttl pk-yrs)     Types: Cigarettes     Start date: 1968     Passive exposure: Current    Smokeless tobacco: Never   Vaping Use    Vaping status: Never Used   Substance and Sexual Activity    Alcohol use: Yes     Comment: 1-3 drinks daily    Drug use: Yes     Types: Marijuana     Comment: Drug use: Frequent use    Sexual activity: Not on file   Other Topics Concern    Not on file   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       Social Determinants of Health     Financial Resource Strain: Low Risk  (6/18/2024)    Financial Resource Strain     Within the past 12 months, have you or your family members you live with been unable to get utilities (heat, electricity) when it was really needed?: No   Food Insecurity: Low Risk   (6/18/2024)    Food Insecurity     Within the past 12 months, did you worry that your food would run out before you got money to buy more?: No     Within the past 12 months, did the food you bought just not last and you didn t have money to get more?: No   Transportation Needs: Low Risk  (6/18/2024)    Transportation Needs     Within the past 12 months, has lack of transportation kept you from medical appointments, getting your medicines, non-medical meetings or appointments, work, or from getting things that you need?: No   Physical Activity: Unknown (6/18/2024)    Exercise Vital Sign     Days of Exercise per Week: 0 days     Minutes of Exercise per Session: Not on file   Stress: Stress Concern Present (6/18/2024)    [employer] [employer] - Occupational Stress Questionnaire     Feeling of Stress : To some extent   Social Connections: Unknown (6/18/2024)    Social Connection and Isolation Panel [NHANES]     Frequency of Communication with Friends and Family: Not on file     Frequency of Social Gatherings with Friends and Family: Once a week     Attends Rastafarian Services: Not on file     Active Member of Clubs or Organizations: Not on file     Attends Club or Organization Meetings: Not on file     Marital Status: Not on file   Interpersonal Safety: Low Risk  (6/18/2024)    Interpersonal Safety     Do you feel physically and emotionally safe where you currently live?: Yes     Within the past 12 months, have you been hit, slapped, kicked or otherwise physically hurt by someone?: No     Within the past 12 months, have you been humiliated or emotionally abused in other ways by your partner or ex-partner?: No   Housing Stability: Low Risk  (6/18/2024)    Housing Stability     Do you have housing? : Yes     Are you worried about losing your housing?: No       Family History:  Family History   Problem Relation Age of Onset    Coronary Artery Disease Mother         d. 84    Cerebrovascular Disease Mother   "   Kidney failure Father         d 76    Rectal Cancer Father     Skin Cancer Brother     Crohn's Disease Brother     Anesthesia Reaction No family hx of     Deep Vein Thrombosis (DVT) No family hx of        Review of Systems:  A complete review of systems reviewed by me is negative with the exeption of what has been mentioned in the history of present illness.           Physical Examination:     Vitals: BP (!) 157/98   Pulse 52   Resp 18   SpO2 99%   BMI= There is no height or weight on file to calculate BMI.  Mandibular profile: No retrognathia   GENERAL: alert and no distress  EYES: Eyes grossly normal to inspection.  No discharge or erythema, or obvious scleral/conjunctival abnormalities.  RESP: No audible wheeze, cough, or visible cyanosis.    SKIN: Visible skin clear. No significant rash, abnormal pigmentation or lesions.  NEURO: Cranial nerves grossly intact.  Mentation and speech appropriate for age.  PSYCH: Appropriate affect, tone, and pace of words      Neck Cir (cm): 43 cm           Data: All pertinent previous laboratory data reviewed     Recent Labs   Lab Test 08/02/24  1352 06/18/24  1143 06/13/23  1104   NA  --  137 141   POTASSIUM  --  5.1 4.0   CHLORIDE  --  100 107   CO2  --  26 22   ANIONGAP  --  11 12   GLC  --  106* 108*   BUN  --  12.3 14.8   CR 1.3 0.99 0.89   JANINA  --  9.6 8.8       Recent Labs   Lab Test 06/18/24  1143   WBC 9.2   RBC 4.91   HGB 15.6   HCT 46.8   MCV 95   MCH 31.8   MCHC 33.3   RDW 11.8   *       Recent Labs   Lab Test 06/18/24  1143   PROTTOTAL 7.2   ALBUMIN 4.5   BILITOTAL 0.6   ALKPHOS 89   AST 24   ALT 12       TSH (uIU/mL)   Date Value   10/14/2019 2.95       No results found for: \"UAMP\", \"UBARB\", \"BENZODIAZEUR\", \"UCANN\", \"UCOC\", \"OPIT\", \"UPCP\"    No results found for: \"IRONSAT\", \"LO66188\", \"KIMO\"    No results found for: \"PH\", \"PHARTERIAL\", \"PO2\", \"WW0TDWUQELG\", \"SAT\", \"PCO2\", \"HCO3\", \"BASEEXCESS\", \"JADIEL\", " "\"BEB\"    @LABRCNTIPR(phv:4,pco2v:4,po2v:4,hco3v:4,trenton:4,o2per:4)@    Echocardiology: No results found for this or any previous visit (from the past 4320 hour(s)).    Chest x-ray:   No results found for this or any previous visit from the past 365 days.      Chest CT:   CT Chest w contrast 08/02/2024    Narrative  CT CHEST W CONTRAST 8/2/2024 2:00 PM    History: Malignant neoplasm of lower lobe of right lung (H)    Comparison: CT chest 2/20/2024, 5/4/2022    Technique: CT of the chest was obtained without intravenous contrast.  Axial, coronal, and sagittal reconstructions were obtained and  reviewed.    Contrast: ISOVUE 370 90cc    Findings:    Lungs: Stable surgical changes of right lower lobe wedge resection. No  evidence of local disease recurrence. Stable 4 mm right middle lobe  nodule (series 4 image 131) unchanged since at least 5/4/2022 and  suggestive of benign etiology. No new or enlarging pulmonary nodules.  No pneumothorax or pleural effusion.  Airways: Central tracheobronchial tree is clear.  Vessels: The ascending thoracic aorta measures 4.5 cm, dilated. The  main pulmonary artery is normal in caliber. Normal three-vessel arch  Heart: Heart size is normal without pericardial effusion  Lymph nodes: No suspicious mediastinal or hilar lymphadenopathy.  Thyroid: Within normal limits.  Esophagus: Within normal limits    Upper abdomen: Unchanged partially imaged medial right renal cyst..    Bones and soft tissues: No suspicious axillary lymphadenopathy or soft  tissue mass. No suspicious osseous lesion. Multilevel degenerative  changes of the spine.    Impression  Impression: Surgical changes of a right lower lobe wedge resection  without evidence of local disease recurrence.    I have personally reviewed the examination and initial interpretation  and I agree with the findings.    BAIRON THAPA MD      SYSTEM ID:  [id number]      PFT: Most Recent [facility] Pulmonary Function Testing    FVC-Pred   Date Value " "Ref Range Status   12/05/2022 3.78 L      FVC-Pre   Date Value Ref Range Status   12/05/2022 4.69 L      FVC-%Pred-Pre   Date Value Ref Range Status   12/05/2022 124 %      FEV1-Pre   Date Value Ref Range Status   12/05/2022 3.06 L      FEV1-%Pred-Pre   Date Value Ref Range Status   12/05/2022 104 %      FEV1FVC-Pred   Date Value Ref Range Status   12/05/2022 78 %      FEV1FVC-Pre   Date Value Ref Range Status   12/05/2022 65 %      No results found for: \"20029\"  FEFMax-Pred   Date Value Ref Range Status   12/05/2022 8.26 L/sec      FEFMax-Pre   Date Value Ref Range Status   12/05/2022 6.98 L/sec      FEFMax-%Pred-Pre   Date Value Ref Range Status   12/05/2022 84 %      ExpTime-Pre   Date Value Ref Range Status   12/05/2022 7.77 sec      FIFMax-Pre   Date Value Ref Range Status   12/05/2022 5.11 L/sec      FEV1FEV6-Pred   Date Value Ref Range Status   12/05/2022 78 %      FEV1FEV6-Pre   Date Value Ref Range Status   12/05/2022 69 %      No results found for: \"20055\"      Michell Keita MD 10/9/2024     Total time spent reviewing medical records including previous testing and interpretation as well as direct patient contact and documentation on this date:    "

## 2024-10-09 NOTE — PATIENT INSTRUCTIONS
"          MY TREATMENT INFORMATION FOR SLEEP APNEA-  Cesar Murillo    DOCTOR : Marquise Phelps MD    Am I having a sleep study at a sleep center?  --->Due to normal delays, you will be contacted within 2-4 weeks to schedule    Am I having a home sleep study?  --->Watch the video for the device you are using:    -/drop off device-   https://www.Sense.lyube.com/watch?v=yGGFBdELGhk    -Disposable device sent out require phone/computer application-   https://www.Sense.lyube.com/watch?v=BCce_vbiwxE      Frequently asked questions:  1. What is Obstructive Sleep Apnea (PAWAN)? PAWAN is the most common type of sleep apnea. Apnea means, \"without breath.\"  Apnea is most often caused by narrowing or collapse of the upper airway as muscles relax during sleep.   Almost everyone has occasional apneas. Most people with sleep apnea have had brief interruptions at night frequently for many years.  The severity of sleep apnea is related to how frequent and severe the events are.   2. What are the consequences of PAWAN? Symptoms include: feeling sleepy during the day, snoring loudly, gasping or stopping of breathing, trouble sleeping, and occasionally morning headaches or heartburn at night.  Sleepiness can be serious and even increase the risk of falling asleep while driving. Other health consequences may include development of high blood pressure and other cardiovascular disease in persons who are susceptible. Untreated PAWAN  can contribute to heart disease, stroke and diabetes.   3. What are the treatment options? In most situations, sleep apnea is a lifelong disease that must be managed with daily therapy. Medications are not effective for sleep apnea and surgery is generally not considered until other therapies have been tried. Your treatment is your choice . Continuous Positive Airway (CPAP) works right away and is the therapy that is effective in nearly everyone. An oral device to hold your jaw forward is usually the next most reliable " option. Other options include postioning devices (to keep you off your back), weight loss, and surgery including a tongue pacing device. There is more detail about some of these options below.  4. Are my sleep studies covered by insurance? Although we will request verification of coverage, we advise you also check in advance of the study to ensure there is coverage.    Important tips for those choosing CPAP and similar devices  REMEMBER-IF YOU RECEIVE A CALL FROM  [phone]-->IT IS TO SETUP A DEVICE  For new devices, sign up for device ROLANDA to monitor your device for your followup visits  We encourage you to utilize the TRAKLOK rolanda or website ( https://[employer]/ ) to monitor your therapy progress and share the data with your healthcare team when you discuss your sleep apnea.                                                    Know your equipment:  CPAP is continuous positive airway pressure that prevents obstructive sleep apnea by keeping the throat from collapsing while you are sleeping. In most cases, the device is  smart  and can slowly self-adjusts if your throat collapses and keeps a record every day of how well you are treated-this information is available to you and your care team.  BPAP is bilevel positive airway pressure that keeps your throat open and also assists each breath with a pressure boost to maintain adequate breathing.  Special kinds of BPAP are used in patients who have inadequate breathing from lung or heart disease. In most cases, the device is  smart  and can slowly self-adjusts to assist breathing. Like CPAP, the device keeps a record of how well you are treated.  Your mask is your connection to the device. You get to choose what feels most comfortable and the staff will help to make sure if fits. Here: are some examples of the different masks that are available: Magnetic mask aids may assist with use but there are safety issues that should be addressed when considering with  magnets* ( see end of discussion).       Key points to remember on your journey with sleep apnea:  Sleep study.  PAP devices often need to be adjusted during a sleep study to show that they are effective and adjusted right.  Good tips to remember: Try wearing just the mask during a quiet time during the day so your body adapts to wearing it. A humidifier is recommended for comfort in most cases to prevent drying of your nose and throat. Allergy medication from your provider may help you if you are having nasal congestion.  Getting settled-in. It takes more than one night for most of us to get used to wearing a mask. Try wearing just the mask during a quiet time during the day so your body adapts to wearing it. A humidifier is recommended for comfort in most cases. Our team will work with you carefully on the first day and will be in contact within 4 days and again at 2 and 4 weeks for advice and remote device adjustments. Your therapy is evaluated by the device each day.   Use it every night. The more you are able to sleep naturally for 7-8 hours, the more likely you will have good sleep and to prevent health risks or symptoms from sleep apnea. Even if you use it 4 hours it helps. Occasionally all of us are unable to use a medical therapy, in sleep apnea, it is not dangerous to miss one night.   Communicate. Call our skilled team on the number provided on the first day if your visit for problems that make it difficult to wear the device. Over 2 out of 3 patients can learn to wear the device long-term with help from our team. Remember to call our team or your sleep providers if you are unable to wear the device as we may have other solutions for those who cannot adapt to mask CPAP therapy. It is recommended that you sleep your sleep provider within the first 3 months and yearly after that if you are not having problems.   Use it for your health. We encourage use of CPAP masks during daytime quiet periods to allow  your face and brain to adapt to the sensation of CPAP so that it will be a more natural sensation to awaken to at night or during naps. This can be very useful during the first few weeks or months of adapting to CPAP though it does not help medically to wear CPAP during wakefulness and  should not be used as a strategy just to meet guidelines.  Take care of your equipment. Make sure you clean your mask and tubing using directions every day and that your filter and mask are replaced as recommended or if they are not working.     *Masks with magnets:  Updated Contraindications  Masks with magnetic components are contraindicated for use by patients where they, or anyone in close physical contact while using the mask, have the following:   Active medical implants that interact with magnets (i.e., pacemakers, implantable cardioverter defibrillators (ICD), neurostimulators, cerebrospinal fluid (CSF) shunts, insulin/infusion pumps)   Metallic implants/objects containing ferromagnetic material (i.e., aneurysm clips/flow disruption devices, embolic coils, stents, valves, electrodes, implants to restore hearing or balance with implanted magnets, ocular implants, metallic splinters in the eye)  Updated Warning  Keep the mask magnets at a safe distance of at least 6 inches (150 mm) away from implants or medical devices that may be adversely affected by magnetic interference. This warning applies to you or anyone in close physical contact with your mask. The magnets are in the frame and lower headgear clips, with a magnetic field strength of up to 400mT. When worn, they connect to secure the mask but may inadvertently detach while asleep.  Implants/medical devices, including those listed within contraindications, may be adversely affected if they change function under external magnetic fields or contain ferromagnetic materials that attract/repel to magnetic fields (some metallic implants, e.g., contact lenses with metal, dental  implants, metallic cranial plates, screws, demario hole covers, and bone substitute devices). Consult your physician and  of your implant / other medical device for information on the potential adverse effects of magnetic fields.    BESIDES CPAP, WHAT OTHER THERAPIES ARE THERE?    Positioning Device  Positioning devices are generally used when sleep apnea is mild and only occurs on your back.This example shows a pillow that straps around the waist. It may be appropriate for those whose sleep study shows milder sleep apnea that occurs primarily when lying flat on one's back. Preliminary studies have shown benefit but effectiveness at home may need to be verified by a home sleep test. These devices are generally not covered by medical insurance.  Examples of devices that maintain sleeping on the back to prevent snoring and mild sleep apnea.    Belt type body positioner  http://[employer]/    Electronic reminder  http://nightshifttherapy.com/            Oral Appliance  What is oral appliance therapy?  An oral appliance device fits on your teeth at night like a retainer used after having braces. The device is made by a specialized dentist and requires several visits over 1-2 months before a manufactured device is made to fit your teeth and is adjusted to prevent your sleep apnea. Once an oral device is working properly, snoring should be improved. A home sleep test may be recommended at that time if to determine whether the sleep apnea is adequately treated.       Some things to remember:  -Oral devices are often, but not always, covered by your medical insurance. Be sure to check with your insurance provider.   -If you are referred for oral therapy, you will be given a list of specialized dentists to consider or you may choose to visit the Web site of the American Academy of Dental Sleep Medicine  -Oral devices are less likely to work if you have severe sleep apnea or are extremely overweight.     More  detailed information  An oral appliance is a small acrylic device that fits over the upper and lower teeth  (similar to a retainer or a mouth guard). This device slightly moves jaw forward, which moves the base of the tongue forward, opens the airway, improves breathing for effective treat snoring and obstructive sleep apnea in perhaps 7 out of 10 people .  The best working devices are custom-made by a dental device  after a mold is made of the teeth 1, 2, 3.  When is an oral appliance indicated?  Oral appliance therapy is recommended as a first-line treatment for patients with primary snoring, mild sleep apnea, and for patients with moderate sleep apnea who prefer appliance therapy to use of CPAP4, 5. Severity of sleep apnea is determined by sleep testing and is based on the number of respiratory events per hour of sleep.   How successful is oral appliance therapy?  The success rate of oral appliance therapy in patients with mild sleep apnea is 75-80% while in patients with moderate sleep apnea it is 50-70%. The chance of success in patients with severe sleep apnea is 40-50%. The research also shows that oral appliances have a beneficial effect on the cardiovascular health of PAWAN patients at the same magnitude as CPAP therapy7.  Oral appliances should be a second-line treatment in cases of severe sleep apnea, but if not completely successful then a combination therapy utilizing CPAP plus oral appliance therapy may be effective. Oral appliances tend to be effective in a broad range of patients although studies show that the patients who have the highest success are females, younger patients, those with milder disease, and less severe obesity. 3, 6.   Finding a dentist that practices dental sleep medicine  Specific training is available through the American Academy of Dental Sleep Medicine for dentists interested in working in the field of sleep. To find a dentist who is educated in the field of sleep and  the use of oral appliances, near you, visit the Web site of the American Academy of Dental Sleep Medicine.    References  1. Ayah, et al. Objectively measured vs self-reported compliance during oral appliance therapy for sleep-disordered breathing. Chest 2013; 144(5): 4066-9384.  2. Thea et al. Objective measurement of compliance during oral appliance therapy for sleep-disordered breathing. Thorax 2013; 68(1): 91-96.  3. Poonam et al. Mandibular advancement devices in 620 men and women with PAWAN and snoring: tolerability and predictors of treatment success. Chest 2004; 125: 6778-6174.  4. Elizabeth, et al. Oral appliances for snoring and PAWAN: a review. Sleep 2006; 29: 244-262.  5. Jeanette et al. Oral appliance treatment for PWAAN: an update. J Clin Sleep Med 2014; 10(2): 215-227.  6. Fausto et al. Predictors of OSAH treatment outcome. J Dent Res 2007; 86: 9092-1871.      Weight Loss:   Your There is no height or weight on file to calculate BMI.    Being overweight does not necessarily mean you will have health consequences.  Those who have BMI over 35 or over 27 with existing medical conditions carries greater risk.   Weight loss decreases severity of sleep apnea in most people with obesity. For those with mild obesity who have developed snoring with weight gain, even 15-30 pound weight loss can improve and occasionally milder eliminate sleep apnea.  Structured and life-long dietary and health habits are necessary to lose weight and keep healthier weight levels.     The Comprehensive Weight loss program offers all aspects of weight loss strategies including two Non-Surgical Weight Loss Programs: Medical Weight Management and our 24 Week Healthy Lifestyle Program:    Medical Weight Management: You will meet with a Medical Weight Management Provider, as well as a Registered Dietician. The program may include medication therapy, dietary education, recommended exercise and physical therapy programs,  monthly support group meetings, and possible psychological counseling. Follow up visits with the provider or dietician are scheduled based on your progress and needs.    24 Week Healthy Lifestyle Program: This unique program is designed to give you the support of weekly appointments and activities thru a 24-week period. It may include all of the components of the basic program (above), with the addition of 11 individual Health  Visits, 24-week access to the [employer] website for over 700 online classes, and monthly support group meetings. This program has an out-of-pocket expense of $499 to cover the items that can not be billed to insurance (health coaches and [facility] access), and is non-refundable/non-transferable (you may be able to use a Health Savings Account; ask your HSA provider). There may be an optional meal replacement plan prescribed as well.   Surgical management achieves meaningful long-term weight loss and improvement in health risks in most patients with more severe obesity.      Sleep Apnea Surgery:    Surgery for obstructive sleep apnea is considered generally only when other therapies fail to work. Surgery may be discussed with you if you are having a difficult time tolerating CPAP and or when there is an abnormal structure that requires surgical correction.  Nose and throat surgeries often enlarge the airway to prevent collapse.  Most of these surgeries create pain for 1-2 weeks and up to half of the most common surgeries are not effective throughout life.  You should carefully discuss the benefits and drawbacks to surgery with your sleep provider and surgeon to determine if it is the best solution for you.   More information  Surgery for PAWAN is directed at areas that are responsible for narrowing or complete obstruction of the airway during sleep.  There are a wide range of procedures available to enlarge and/or stabilize the airway to prevent blockage of breathing in the three major  areas where it can occur: the palate, tongue, and nasal regions.  Successful surgical treatment depends on the accurate identification of the factors responsible for obstructive sleep apnea in each person.  A personalized approach is required because there is no single treatment that works well for everyone.  Because of anatomic variation, consultation with an examination by a sleep surgeon is a critical first step in determining what surgical options are best for each patient.  In some cases, examination during sedation may be recommended in order to guide the selection of procedures.  Patients will be counseled about risks and benefits as well as the typical recovery course after surgery. Surgery is typically not a cure for a person s PAWAN.  However, surgery will often significantly improve one s PAWAN severity (termed  success rate ).  Even in the absence of a cure, surgery will decrease the cardiovascular risk associated with OSA7; improve overall quality of life8 (sleepiness, functionality, sleep quality, etc).      Palate Procedures:  Patients with PAWAN often have narrowing of their airway in the region of their tonsils and uvula.  The goals of palate procedures are to widen the airway in this region as well as to help the tissues resist collapse.  Modern palate procedure techniques focus on tissue conservation and soft tissue rearrangement, rather than tissue removal.  Often the uvula is preserved in this procedure. Residual sleep apnea is common in patient after pharyngoplasty with an average reduction in sleep apnea events of 33%2.      Tongue Procedures:  ExamWhile patients are awake, the muscles that surround the throat are active and keep this region open for breathing. These muscles relax during sleep, allowing the tongue and other structures to collapse and block breathing.  There are several different tongue procedures available.  Selection of a tongue base procedure depends on characteristics seen on  physical exam.  Generally, procedures are aimed at removing bulky tissues in this area or preventing the back of the tongue from falling back during sleep.  Success rates for tongue surgery range from 50-62%3.    Hypoglossal Nerve Stimulation:  Hypoglossal nerve stimulation has recently received approval from the [place] Food and Drug Administration for the treatment of obstructive sleep apnea.  This is based on research showing that the system was safe and effective in treating sleep apnea6.  Results showed that the median AHI score decreased 68%, from 29.3 to 9.0. This therapy uses an implant system that senses breathing patterns and delivers mild stimulation to airway muscles, which keeps the airway open during sleep.  The system consists of three fully implanted components: a small generator (similar in size to a pacemaker), a breathing sensor, and a stimulation lead.  Using a small handheld remote, a patient turns the therapy on before bed and off upon awakening.    Candidates for this device must be greater than 18 years of age, have moderate to severe obstructive sleep apnea with less than 25% central events  (AHI between 15-65), BMI less than 35, have tried CPAP/oral appliance for at least 8 weeks without success, and have appropriate upper airway anatomy (determined by a sleep endoscopy performed by Dr. Ralph Faye or Dr. Juan José Muller).    Nasal Procedures:  Nasal obstruction can interfere with nasal breathing during the day and night.  Studies have shown that relief of nasal obstruction can improve the ability of some patients to tolerate positive airway pressure therapy for obstructive sleep apnea1.  Treatment options include medications such as nasal saline, topical corticosteroid and antihistamine sprays, and oral medications such as antihistamines or decongestants. Non-surgical treatments can include external nasal dilators for selected patients. If these are not successful by themselves,  surgery can improve the nasal airway either alone or in combination with these other options.        Combination Procedures:  Combination of surgical procedures and other treatments may be recommended, particularly if patients have more than one area of narrowing or persistent positional disease.  The success rate of combination surgery ranges from 66-80%2,3.    References  Fanny VINCENT. The Role of the Nose in Snoring and Obstructive Sleep Apnoea: An Update.  Eur Arch Otorhinolaryngol. 2011; 268: 1365-73.   Lissa SM; Wayne JA; Kaden JR; Pallanch JF; Rodríguez MB; Roxane SG; Ronit MAURICIO. Surgical modifications of the upper airway for obstructive sleep apnea in adults: a systematic review and meta-analysis. SLEEP 2010;33(10):1353-7802. Carson QUEEN. Hypopharyngeal surgery in obstructive sleep apnea: an evidence-based medicine review.  Arch Otolaryngol Head Neck Surg. 2006 Feb;132(2):206-13.  Vitor YH1, David Y, Osito MASON. The efficacy of anatomically based multilevel surgery for obstructive sleep apnea. Otolaryngol Head Neck Surg. 2003 Oct;129(4):327-35.  Carson QUEEN, Goldberg A. Hypopharyngeal Surgery in Obstructive Sleep Apnea: An Evidence-Based Medicine Review. Arch Otolaryngol Head Neck Surg. 2006 Feb;132(2):206-13.  Ferny ASTUDILLO et al. Upper-Airway Stimulation for Obstructive Sleep Apnea.  N Engl J Med. 2014 Jan 9;370(2):139-49.  Anai Y et al. Increased Incidence of Cardiovascular Disease in Middle-aged Men with Obstructive Sleep Apnea. Am J Respir Crit Care Med; 2002 166: 159-165  Valenzuela EM et al. Studying Life Effects and Effectiveness of Palatopharyngoplasty (SLEEP) study: Subjective Outcomes of Isolated Uvulopalatopharyngoplasty. Otolaryngol Head Neck Surg. 2011; 144: 623-631.        WHAT IF I ONLY HAVE SNORING?    Mandibular advancement devices, lateral sleep positioning, long-term weight loss and treatment of nasal allergies have been shown to improve snoring.  Exercising tongue muscles with a game  (https://[employer]akie/Budist.Dinglepharb.Beyond Oblivion/us/rolanda/soundly-reduce-snoring/xr5105932816) or stimulating the tongue during the day with a device (https://doi.org/10.3390/pwm16798704) have improved snoring in some individuals.  https://www.[employer]bl[employer]ion/  https://www.sleepfoundation.org/best-anti-snoring-mouthpieces-and-mouthguards    Remember to Drive Safe... Drive Alive     Sleep health profoundly affects your health, mood, and your safety.  Thirty three percent of the population (one in three of us) is not getting enough sleep and many have a sleep disorder. Not getting enough sleep or having an untreated / undertreated sleep condition may make us sleepy without even knowing it. In fact, our driving could be dramatically impaired due to our sleep health. As your provider, here are some things I would like you to know about driving:     Here are some warning signs for impairment and dangerous drowsy driving:              -Having been awake more than 16 hours               -Looking tired               -Eyelid drooping              -Head nodding (it could be too late at this point)              -Driving for more than 30 minutes     Some things you could do to make the driving safer if you are experiencing some drowsiness:              -Stop driving and rest              -Call for transportation              -Make sure your sleep disorder is adequately treated     Some things that have been shown NOT to work when experiencing drowsiness while driving:              -Turning on the radio              -Opening windows              -Eating any  distracting  /  entertaining  foods (e.g., sunflower seeds, candy, or any other)              -Talking on the phone      Your decision may not only impact your life, but also the life of others. Please, remember to drive safe for yourself and all of us.

## 2024-10-22 ENCOUNTER — OFFICE VISIT (OUTPATIENT)
Dept: INTERNAL MEDICINE | Facility: CLINIC | Age: 69
End: 2024-10-22
Payer: COMMERCIAL

## 2024-10-22 VITALS
DIASTOLIC BLOOD PRESSURE: 80 MMHG | BODY MASS INDEX: 27.85 KG/M2 | HEIGHT: 69 IN | WEIGHT: 188 LBS | TEMPERATURE: 97.6 F | RESPIRATION RATE: 16 BRPM | HEART RATE: 64 BPM | SYSTOLIC BLOOD PRESSURE: 138 MMHG | OXYGEN SATURATION: 96 %

## 2024-10-22 DIAGNOSIS — I48.0 PAROXYSMAL ATRIAL FIBRILLATION (H): ICD-10-CM

## 2024-10-22 DIAGNOSIS — Z72.0 TOBACCO ABUSE: ICD-10-CM

## 2024-10-22 DIAGNOSIS — S22.32XD CLOSED FRACTURE OF ONE RIB OF LEFT SIDE WITH ROUTINE HEALING: Primary | ICD-10-CM

## 2024-10-22 DIAGNOSIS — I10 ESSENTIAL HYPERTENSION: ICD-10-CM

## 2024-10-22 PROCEDURE — 99214 OFFICE O/P EST MOD 30 MIN: CPT | Performed by: INTERNAL MEDICINE

## 2024-10-22 PROCEDURE — G2211 COMPLEX E/M VISIT ADD ON: HCPCS | Performed by: INTERNAL MEDICINE

## 2024-10-22 RX ORDER — OXYCODONE HYDROCHLORIDE 5 MG/1
1 TABLET ORAL EVERY 4 HOURS PRN
COMMUNITY
Start: 2024-10-20 | End: 2024-10-22

## 2024-10-22 RX ORDER — OXYCODONE HYDROCHLORIDE 5 MG/1
5-10 TABLET ORAL EVERY 6 HOURS PRN
Qty: 30 TABLET | Refills: 0 | Status: SHIPPED | OUTPATIENT
Start: 2024-10-22

## 2024-10-22 RX ORDER — OXYCODONE HYDROCHLORIDE 5 MG/1
5-10 TABLET ORAL EVERY 6 HOURS PRN
Qty: 30 TABLET | Refills: 0 | Status: SHIPPED | OUTPATIENT
Start: 2024-10-22 | End: 2024-10-22

## 2024-10-22 NOTE — PROGRESS NOTES
Assessment & Plan     Closed fracture of one rib of left side with routine healing  69-year-old male who has sustained an acute fracture to his left 10th rib.  Awoke with severe pain in his left mid back.  Evaluated urgent care over the weekend and x-ray confirming acute fracture left 10th rib.  There is bruising over the area clearly suggesting trauma to the region.  He does not recall any injury however, he was using a fair amount of alcohol that evening and I suspect this is a contributing factor to his lack of recall of any injury that occurred.  Emphasized the need for him to cut back on his alcohol use.  He should certainly not use any alcohol while taking oxycodone.  I am refilling 30 tablets and we outlined a strategy in which he should alternate taking oxycodone with acetaminophen to control his pain.  I anticipate his pain will remain severe during the 2 weeks following his injury but there after he should be able to get by with acetaminophen only.  It will take up to 6 weeks for his fracture to completely heal.  - oxyCODONE (ROXICODONE) 5 MG tablet; Take 1-2 tablets (5-10 mg) by mouth every 6 hours as needed for severe pain.    Paroxysmal atrial fibrillation (H)  We discussed his Eliquis and he would prefer a 30-day supply rather than 90-day supply especially when he is in the donut hole  - apixaban ANTICOAGULANT (ELIQUIS ANTICOAGULANT) 5 MG tablet; Take 1 tablet (5 mg) by mouth 2 times daily.    Tobacco abuse  Brigitte has been helping him cut back on his cigarette use    Essential hypertension  Blood pressure looking well-controlled with current medication    The longitudinal plan of care for the diagnosis(es)/condition(s) as documented were addressed during this visit. Due to the added complexity in care, I will continue to support Cesar in the subsequent management and with ongoing continuity of care.           BMI  Estimated body mass index is 28.17 kg/m  as calculated from the following:    Height as  "of this encounter: 1.74 m (5' 8.5\").    Weight as of this encounter: 85.3 kg (188 lb).         Follow-up for annual wellness visit June 2025    Irwin Guerrero is a 69 year old, presenting for the following health issues: Here after injury with acute rib fracture and a follow-up other medical problems.  See assessment and plan for details  Follow Up (, 10/19/24 back/rib pain, found rib fracture on xray)      10/22/2024     9:49 AM   Additional Questions   Roomed by      History of Present Illness       Back Pain:  He presents for follow up of back pain. Patient's back pain is a new problem.    Original cause of back pain: not sure  First noticed back pain: in the last week  Patient feels back pain: dailyLocation of back pain:  Left middle of back  Description of back pain: sharp  Back pain spreads: nowhere    Since patient first noticed back pain, pain is: always present, but gets better and worse  Does back pain interfere with his job:  Not applicable  On a scale of 1-10 (10 being the worst), patient describes pain as:  10  What makes back pain worse: lying down, sitting and twisting   Acupuncture: not tried  Acetaminophen: not tried  Activity or exercise: not tried  Chiropractor:  Not tried  Cold: not tried  Heat: not tried  Massage: not tried  Muscle relaxants: helpful  NSAIDS: not tried  Opioids: helpful  Physical Therapy: not tried  Rest: helpful  Steroid Injection: not tried  Stretching: not tried  Surgery: not tried  TENS unit: not helpful  Other healthcare providers patient is seeing for back pain: None   He is taking medications regularly.                 Review of Systems  No shortness of breath      Objective    /80 (BP Location: Right arm, Patient Position: Sitting, Cuff Size: Adult Regular)   Pulse 64   Temp 97.6  F (36.4  C) (Oral)   Resp 16   Ht 1.74 m (5' 8.5\")   Wt 85.3 kg (188 lb)   SpO2 96%   BMI 28.17 kg/m    Body mass index is 28.17 kg/m .  Physical Exam   Ecchymosis/hematoma " involving region of left 10th rib where there is tenderness secondary to rib fracture    Breath sounds normal bilaterally        Signed Electronically by: Sen Leon MD

## 2024-10-22 NOTE — PATIENT INSTRUCTIONS
I would suggest alternating oxycodone with 2 tablets of extra strength acetaminophen/Tylenol throughout the day to control your pain.  After 10 days, I would try to avoid taking any further oxycodone and make use of acetaminophen to control symptoms

## 2024-11-27 DIAGNOSIS — I48.0 PAROXYSMAL ATRIAL FIBRILLATION (H): ICD-10-CM

## 2024-11-27 RX ORDER — METOPROLOL SUCCINATE 50 MG/1
50 TABLET, EXTENDED RELEASE ORAL DAILY
Qty: 90 TABLET | Refills: 0 | Status: SHIPPED | OUTPATIENT
Start: 2024-11-27

## 2024-11-27 NOTE — LETTER
November 27, 2024       TO: Cesar Murillo  [address] [place] [address]       Dear Cesar Murillo,    We recently received a call from your pharmacy requesting a refill of your medication(s).    Our records indicate that you are due for follow-up with your Heart Care Provider. We will refill your medications for 3 months which will allow you enough time to be seen.    Please call [phone] to schedule your appointment.    Thank you for allowing [facility] to be a part of your health care team and we look forward to seeing you soon.    Thank you,    [facility]

## 2024-12-23 ENCOUNTER — OFFICE VISIT (OUTPATIENT)
Dept: SLEEP MEDICINE | Facility: CLINIC | Age: 69
End: 2024-12-23
Payer: COMMERCIAL

## 2024-12-23 DIAGNOSIS — G47.33 OSA (OBSTRUCTIVE SLEEP APNEA): ICD-10-CM

## 2024-12-23 DIAGNOSIS — R53.82 CHRONIC FATIGUE: ICD-10-CM

## 2024-12-23 NOTE — PROGRESS NOTES
Pt is completing a home sleep test. Pt was instructed on how to put on the Noxturnal T3 device and associated equipment before going to bed and given the opportunity to practice putting it on before leaving the sleep center. Pt was reminded to bring the home sleep test kit back to the center tomorrow, at the scheduled time for download and reporting. Patient was instructed to complete study using the following treatment?  None  Neck circumference: 41 CM / 16 inches.  Device number: 01  Nicole Barkley , Home Sleep Studies Specialist  [facility]  / [facility]

## 2024-12-24 ENCOUNTER — DOCUMENTATION ONLY (OUTPATIENT)
Dept: SLEEP MEDICINE | Facility: CLINIC | Age: 69
End: 2024-12-24
Payer: COMMERCIAL

## 2024-12-26 NOTE — PROGRESS NOTES
HST POST-STUDY QUESTIONNAIRE    What time did you go to bed?  23:30  How long do you think it took to fall asleep?  1/2 hour  What time did you wake up to start the day?  09:15  Did you get up during the night at all?  yes  If you woke up, do you remember approximately what time(s)? 01:40, 03:20, 06:15  Did you have any difficulty with the equipment?  No  Did you us any type of treatment with this study?  None  Was the head of the bed elevated? No  Did you sleep in a recliner?  No  Did you stop using CPAP at least 3 days before this test?  NA  Any other information you'd like us to know?

## 2024-12-28 DIAGNOSIS — Z72.0 TOBACCO ABUSE: ICD-10-CM

## 2024-12-30 RX ORDER — BUPROPION HYDROCHLORIDE 150 MG/1
150 TABLET, FILM COATED, EXTENDED RELEASE ORAL 2 TIMES DAILY
Qty: 180 TABLET | Refills: 3 | Status: SHIPPED | OUTPATIENT
Start: 2024-12-30

## 2025-01-13 ENCOUNTER — MYC MEDICAL ADVICE (OUTPATIENT)
Dept: SLEEP MEDICINE | Facility: CLINIC | Age: 70
End: 2025-01-13
Payer: COMMERCIAL

## 2025-01-23 DIAGNOSIS — I48.0 PAROXYSMAL ATRIAL FIBRILLATION (H): ICD-10-CM

## 2025-01-23 RX ORDER — METOPROLOL SUCCINATE 50 MG/1
50 TABLET, EXTENDED RELEASE ORAL DAILY
Qty: 90 TABLET | Refills: 3 | Status: SHIPPED | OUTPATIENT
Start: 2025-01-23

## 2025-02-03 NOTE — PROGRESS NOTES
THORACIC SURGERY FOLLOW UP VISIT      I saw Mr. Murillo in follow-up today. The clinical summary follows:     PREOP DIAGNOSIS   Lung nodule  PROCEDURE   Robot assisted thoracoscopic right lower lobe wedge resection, completion right lower lobectomy, mediastinal lymph node dissection     DATE OF PROCEDURE  01/18/2023    HISTOPATHOLOGY   Squamous cell carcinoma, keratinizing type, moderately differentiated pT1bN0     COMPLICATIONS  None    INTERVAL STUDIES  CT chest 02/05/2025: final report pending at time of clinic visit. To my review it appears stable.    Past Medical History:   Diagnosis Date    Acute bilateral low back pain without sciatica 07/23/2018    Anxiety, generalized 11/17/2017    Ascending aorta dilation     4.2 cm on CT scan August 2017, follow annually, 4.1 cm August 2018, stable October 2019.  4.5 cm April 2022.  4.4 cm April 2023 recheck in 1 year    Bilateral foot pain 12/07/2023    Calcaneal fracture 2013    Chronic foot/heel pain    Chronic bilateral low back pain without sciatica 06/18/2024    Chronic cough 04/14/2022    Chronic fatigue 10/14/2019    Chronic hepatitis C (H)     Successfully treated.  Previously on interferon.  More recently SOFOSBUVIR and ribavirin, biopsy has been negative for cirrhosis, PCR -2015    Chronic insomnia     Chronic pain of right ankle 05/17/2018    Closed fracture of one rib of left side with routine healing 10/22/2024    COPD, mild (H) 02/28/2023    Erectile dysfunction     Essential hypertension     External hemorrhoids 2012    Gastroesophageal reflux disease without esophagitis 06/18/2024    History of alcohol abuse     History of colon polyps     Colonoscopy April 2020 with polyps.  Repeat in 5 years    History of depression     2014, prescribed citalopram    Lower abdominal pain 07/23/2018    Medial epicondylitis of elbow, left 06/13/2023    Nonischemic cardiomyopathy (H) 02/28/2023    Echocardiogram demonstrating 40 to 50% ejection fraction    Nonobstructive  atherosclerosis of coronary artery 02/28/2023    Coronary angiogram demonstrating mild to moderate multivessel disease.  January 2023    Osteoarthritis of multiple joints     Chronic bilateral knee and chronic low back pain    Paroxysmal atrial fibrillation (H) 03/13/2023    Found on event monitor 4% burden longest run 8 hours March 2023    Porphyria cutanea tarda (H)     Pulmonary nodule     CT August 2018 3 mm right upper lobe nodule reported as stable, stable October 2019.  CT with 6 mm right lower lobe nodule April 2022 needing 6-month follow-up    Rectal bleeding 03/02/2020    Screening for AAA (abdominal aortic aneurysm)     CT 2018 without abdominal aneurysm    Shingles 05/13/2021    Slow transit constipation 02/28/2023    Squamous cell lung cancer, right (H)     status post robot assisted thoracoscopic right lower lobe wedge resection, completion lobectomy, and mediastinal lymph node dissection for a pT1bN0 (stage IA2) non small cell lung cancer.    Tobacco abuse 11/17/2017    Ventricular ectopy 06/13/2023    Frequent PVCs      Past Surgical History:   Procedure Laterality Date    ANKLE FRACTURE SURGERY      ARTHROSCOPY KNEE Bilateral     COLONOSCOPY      CV CORONARY ANGIOGRAM N/A 1/17/2023    Procedure: Coronary Angiogram;  Surgeon: Souleymane Ratliff MD;  Location:  HEART CARDIAC CATH LAB    CV LEFT HEART CATH N/A 1/17/2023    Procedure: Left Heart Catheterization;  Surgeon: Souleymane Ratliff MD;  Location: [facility] CARDIAC CATH LAB    CV LEFT VENTRICULOGRAM N/A 1/17/2023    Procedure: Left Ventriculogram;  Surgeon: Souleymane Ratliff MD;  Location: [facility] CARDIAC CATH LAB    DAVINCI LOBECTOMY LUNG Right 1/18/2023    Procedure: Robot-assisted thoracoscopic right lower lobe wedge, right lower lobectomy, and mediastinal lymph node dissection;  Surgeon: Lobo Leger MD;  Location: [facility] OR    FOOT FRACTURE SURGERY  09/01/2013    calcaneal fracture    WRIST SURGERY      ligament injury      Social History      Socioeconomic History    Marital status:      Spouse name: Not on file    Number of children: Not on file    Years of education: Not on file    Highest education level: Not on file   Occupational History    Not on file   Tobacco Use    Smoking status: Every Day     Current packs/day: 0.50     Average packs/day: 0.5 packs/day for 57.1 years (28.5 ttl pk-yrs)     Types: Cigarettes     Start date: 1968     Passive exposure: Current    Smokeless tobacco: Never   Vaping Use    Vaping status: Never Used   Substance and Sexual Activity    Alcohol use: Yes     Comment: 1-3 drinks daily    Drug use: Yes     Types: Marijuana     Comment: Drug use: Frequent use    Sexual activity: Not on file   Other Topics Concern    Not on file   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       Social Drivers of Health     Financial Resource Strain: Low Risk  (6/18/2024)    Financial Resource Strain     Within the past 12 months, have you or your family members you live with been unable to get utilities (heat, electricity) when it was really needed?: No   Food Insecurity: Low Risk  (6/18/2024)    Food Insecurity     Within the past 12 months, did you worry that your food would run out before you got money to buy more?: No     Within the past 12 months, did the food you bought just not last and you didn t have money to get more?: No   Transportation Needs: Low Risk  (6/18/2024)    Transportation Needs     Within the past 12 months, has lack of transportation kept you from medical appointments, getting your medicines, non-medical meetings or appointments, work, or from getting things that you need?: No   Physical Activity: Unknown (6/18/2024)    Exercise Vital Sign     Days of Exercise per Week: 0 days     Minutes of Exercise per Session: Not on file   Stress: Stress Concern Present (6/18/2024)    [employer] [place] [employer] - Occupational Stress Questionnaire     Feeling of Stress : To some  extent   Social Connections: Unknown (6/18/2024)    Social Connection and Isolation Panel [NHANES]     Frequency of Communication with Friends and Family: Not on file     Frequency of Social Gatherings with Friends and Family: Once a week     Attends Yarsani Services: Not on file     Active Member of Clubs or Organizations: Not on file     Attends Club or Organization Meetings: Not on file     Marital Status: Not on file   Interpersonal Safety: Low Risk  (6/18/2024)    Interpersonal Safety     Do you feel physically and emotionally safe where you currently live?: Yes     Within the past 12 months, have you been hit, slapped, kicked or otherwise physically hurt by someone?: No     Within the past 12 months, have you been humiliated or emotionally abused in other ways by your partner or ex-partner?: No   Housing Stability: Low Risk  (6/18/2024)    Housing Stability     Do you have housing? : Yes     Are you worried about losing your housing?: No      SUBJECTIVE   Cesar is doing well. He cracked a rib last October but after about a week he felt fine. He does not have any new concerns.    OBJECTIVE  BP (!) 169/100 (BP Location: Left arm, Patient Position: Sitting, Cuff Size: Adult Regular)   Pulse 54   Temp 97.9  F (36.6  C) (Tympanic)   Resp 16   Wt 91.6 kg (202 lb)   SpO2 97%   BMI 30.27 kg/m       From a personal perspective, he is here alone today. His daughter got  last August and it was great. A few days before the wedding his daughter brought home 2 baby goats.    IMPRESSION   Cesar is a 70 year-old male status post robot assisted thoracoscopic right lower lobe wedge resection, completion right lower lobectomy and mediastinal lymph node dissection of a pT1bN0 (stage IA2) non small cell lung cancer. He is here today for lung cancer surveillance.     I will await the final report for today's chest CT and will call Cesar if there are any concerning findings.    PLAN  I spent 15 min on the date of the  encounter in chart review, patient visit, review of tests, documentation and/or discussion with other providers about the issues documented above. I reviewed the plan as follows:  Follow up with me in 6 months with a chest CT prior  1. Necessary Tests & Appointments: chest CT  All questions were answered and the patient and present family were in agreement with the plan.  I appreciate the opportunity to participate in the care of your patient and will keep you updated.  Sincerely,    FAUZIA Nava CNS

## 2025-02-05 ENCOUNTER — ONCOLOGY VISIT (OUTPATIENT)
Dept: SURGERY | Facility: CLINIC | Age: 70
End: 2025-02-05
Attending: CLINICAL NURSE SPECIALIST
Payer: COMMERCIAL

## 2025-02-05 ENCOUNTER — ANCILLARY PROCEDURE (OUTPATIENT)
Dept: CT IMAGING | Facility: CLINIC | Age: 70
End: 2025-02-05
Attending: CLINICAL NURSE SPECIALIST
Payer: COMMERCIAL

## 2025-02-05 VITALS
WEIGHT: 202 LBS | TEMPERATURE: 97.9 F | OXYGEN SATURATION: 97 % | DIASTOLIC BLOOD PRESSURE: 100 MMHG | RESPIRATION RATE: 16 BRPM | SYSTOLIC BLOOD PRESSURE: 169 MMHG | BODY MASS INDEX: 30.27 KG/M2 | HEART RATE: 54 BPM

## 2025-02-05 DIAGNOSIS — C34.31 MALIGNANT NEOPLASM OF LOWER LOBE OF RIGHT LUNG (H): Primary | ICD-10-CM

## 2025-02-05 DIAGNOSIS — C34.31 MALIGNANT NEOPLASM OF LOWER LOBE OF RIGHT LUNG (H): ICD-10-CM

## 2025-02-05 LAB
CREAT BLD-MCNC: 0.9 MG/DL (ref 0.7–1.3)
EGFRCR SERPLBLD CKD-EPI 2021: >60 ML/MIN/1.73M2

## 2025-02-05 PROCEDURE — 82565 ASSAY OF CREATININE: CPT | Performed by: PATHOLOGY

## 2025-02-05 PROCEDURE — 71260 CT THORAX DX C+: CPT | Mod: GC | Performed by: RADIOLOGY

## 2025-02-05 PROCEDURE — G0463 HOSPITAL OUTPT CLINIC VISIT: HCPCS | Performed by: CLINICAL NURSE SPECIALIST

## 2025-02-05 PROCEDURE — 99213 OFFICE O/P EST LOW 20 MIN: CPT | Performed by: CLINICAL NURSE SPECIALIST

## 2025-02-05 RX ORDER — IOPAMIDOL 755 MG/ML
92 INJECTION, SOLUTION INTRAVASCULAR ONCE
Status: COMPLETED | OUTPATIENT
Start: 2025-02-05 | End: 2025-02-05

## 2025-02-05 RX ADMIN — IOPAMIDOL 92 ML: 755 INJECTION, SOLUTION INTRAVASCULAR at 13:42

## 2025-02-05 ASSESSMENT — PAIN SCALES - GENERAL: PAINLEVEL_OUTOF10: NO PAIN (0)

## 2025-02-05 NOTE — LETTER
2/5/2025      Cesar Murillo  [address]  [place] [address]      Dear Colleague,    Thank you for referring your patient, Cesar Murillo, to the [facility]. Please see a copy of my visit note below.    THORACIC SURGERY FOLLOW UP VISIT      I saw Mr. Murillo in follow-up today. The clinical summary follows:     PREOP DIAGNOSIS   Lung nodule  PROCEDURE   Robot assisted thoracoscopic right lower lobe wedge resection, completion right lower lobectomy, mediastinal lymph node dissection     DATE OF PROCEDURE  01/18/2023    HISTOPATHOLOGY   Squamous cell carcinoma, keratinizing type, moderately differentiated pT1bN0     COMPLICATIONS  None    INTERVAL STUDIES  CT chest 02/05/2025: final report pending at time of clinic visit. To my review it appears stable.    Past Medical History:   Diagnosis Date     Acute bilateral low back pain without sciatica 07/23/2018     Anxiety, generalized 11/17/2017     Ascending aorta dilation     4.2 cm on CT scan August 2017, follow annually, 4.1 cm August 2018, stable October 2019.  4.5 cm April 2022.  4.4 cm April 2023 recheck in 1 year     Bilateral foot pain 12/07/2023     Calcaneal fracture 2013    Chronic foot/heel pain     Chronic bilateral low back pain without sciatica 06/18/2024     Chronic cough 04/14/2022     Chronic fatigue 10/14/2019     Chronic hepatitis C (H)     Successfully treated.  Previously on interferon.  More recently SOFOSBUVIR and ribavirin, biopsy has been negative for cirrhosis, PCR -2015     Chronic insomnia      Chronic pain of right ankle 05/17/2018     Closed fracture of one rib of left side with routine healing 10/22/2024     COPD, mild (H) 02/28/2023     Erectile dysfunction      Essential hypertension      External hemorrhoids 2012     Gastroesophageal reflux disease without esophagitis 06/18/2024     History of alcohol abuse      History of colon polyps     Colonoscopy April 2020 with polyps.  Repeat in 5 years      History of depression     2014, prescribed citalopram     Lower abdominal pain 07/23/2018     Medial epicondylitis of elbow, left 06/13/2023     Nonischemic cardiomyopathy (H) 02/28/2023    Echocardiogram demonstrating 40 to 50% ejection fraction     Nonobstructive atherosclerosis of coronary artery 02/28/2023    Coronary angiogram demonstrating mild to moderate multivessel disease.  January 2023     Osteoarthritis of multiple joints     Chronic bilateral knee and chronic low back pain     Paroxysmal atrial fibrillation (H) 03/13/2023    Found on event monitor 4% burden longest run 8 hours March 2023     Porphyria cutanea tarda (H)      Pulmonary nodule     CT August 2018 3 mm right upper lobe nodule reported as stable, stable October 2019.  CT with 6 mm right lower lobe nodule April 2022 needing 6-month follow-up     Rectal bleeding 03/02/2020     Screening for AAA (abdominal aortic aneurysm)     CT 2018 without abdominal aneurysm     Shingles 05/13/2021     Slow transit constipation 02/28/2023     Squamous cell lung cancer, right (H)     status post robot assisted thoracoscopic right lower lobe wedge resection, completion lobectomy, and mediastinal lymph node dissection for a pT1bN0 (stage IA2) non small cell lung cancer.     Tobacco abuse 11/17/2017     Ventricular ectopy 06/13/2023    Frequent PVCs      Past Surgical History:   Procedure Laterality Date     ANKLE FRACTURE SURGERY       ARTHROSCOPY KNEE Bilateral      COLONOSCOPY       CV CORONARY ANGIOGRAM N/A 1/17/2023    Procedure: Coronary Angiogram;  Surgeon: Souleymane Ratliff MD;  Location:  HEART CARDIAC CATH LAB     CV LEFT HEART CATH N/A 1/17/2023    Procedure: Left Heart Catheterization;  Surgeon: Souleymane Ratliff MD;  Location:  HEART CARDIAC CATH LAB     CV LEFT VENTRICULOGRAM N/A 1/17/2023    Procedure: Left Ventriculogram;  Surgeon: Souleymane Ratliff MD;  Location:  HEART CARDIAC CATH LAB     DAVINCI LOBECTOMY LUNG Right 1/18/2023     Procedure: Robot-assisted thoracoscopic right lower lobe wedge, right lower lobectomy, and mediastinal lymph node dissection;  Surgeon: Lobo Leger MD;  Location: [facility] OR     FOOT FRACTURE SURGERY  09/01/2013    calcaneal fracture     WRIST SURGERY      ligament injury      Social History     Socioeconomic History     Marital status:      Spouse name: Not on file     Number of children: Not on file     Years of education: Not on file     Highest education level: Not on file   Occupational History     Not on file   Tobacco Use     Smoking status: Every Day     Current packs/day: 0.50     Average packs/day: 0.5 packs/day for 57.1 years (28.5 ttl pk-yrs)     Types: Cigarettes     Start date: 1968     Passive exposure: Current     Smokeless tobacco: Never   Vaping Use     Vaping status: Never Used   Substance and Sexual Activity     Alcohol use: Yes     Comment: 1-3 drinks daily     Drug use: Yes     Types: Marijuana     Comment: Drug use: Frequent use     Sexual activity: Not on file   Other Topics Concern     Not on file   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       Social Drivers of Health     Financial Resource Strain: Low Risk  (6/18/2024)    Financial Resource Strain      Within the past 12 months, have you or your family members you live with been unable to get utilities (heat, electricity) when it was really needed?: No   Food Insecurity: Low Risk  (6/18/2024)    Food Insecurity      Within the past 12 months, did you worry that your food would run out before you got money to buy more?: No      Within the past 12 months, did the food you bought just not last and you didn t have money to get more?: No   Transportation Needs: Low Risk  (6/18/2024)    Transportation Needs      Within the past 12 months, has lack of transportation kept you from medical appointments, getting your medicines, non-medical meetings or appointments, work, or from getting things that you need?: No    Physical Activity: Unknown (6/18/2024)    Exercise Vital Sign      Days of Exercise per Week: 0 days      Minutes of Exercise per Session: Not on file   Stress: Stress Concern Present (6/18/2024)    [employer] [place] [employer] - Occupational Stress Questionnaire      Feeling of Stress : To some extent   Social Connections: Unknown (6/18/2024)    Social Connection and Isolation Panel [NHANES]      Frequency of Communication with Friends and Family: Not on file      Frequency of Social Gatherings with Friends and Family: Once a week      Attends Rastafarian Services: Not on file      Active Member of Clubs or Organizations: Not on file      Attends Club or Organization Meetings: Not on file      Marital Status: Not on file   Interpersonal Safety: Low Risk  (6/18/2024)    Interpersonal Safety      Do you feel physically and emotionally safe where you currently live?: Yes      Within the past 12 months, have you been hit, slapped, kicked or otherwise physically hurt by someone?: No      Within the past 12 months, have you been humiliated or emotionally abused in other ways by your partner or ex-partner?: No   Housing Stability: Low Risk  (6/18/2024)    Housing Stability      Do you have housing? : Yes      Are you worried about losing your housing?: No      SUBJECTIVE   Cesar is doing well. He cracked a rib last October but after about a week he felt fine. He does not have any new concerns.    OBJECTIVE  BP (!) 169/100 (BP Location: Left arm, Patient Position: Sitting, Cuff Size: Adult Regular)   Pulse 54   Temp 97.9  F (36.6  C) (Tympanic)   Resp 16   Wt 91.6 kg (202 lb)   SpO2 97%   BMI 30.27 kg/m       From a personal perspective, he is here alone today. His daughter got  last August and it was great. A few days before the wedding his daughter brought home 2 baby goats.    IMPRESSION   Cesar is a 70 year-old male status post robot assisted thoracoscopic right lower lobe wedge resection,  completion right lower lobectomy and mediastinal lymph node dissection of a pT1bN0 (stage IA2) non small cell lung cancer. He is here today for lung cancer surveillance.     I will await the final report for today's chest CT and will call Cesar if there are any concerning findings.    PLAN  I spent 15 min on the date of the encounter in chart review, patient visit, review of tests, documentation and/or discussion with other providers about the issues documented above. I reviewed the plan as follows:  Follow up with me in 6 months with a chest CT prior  1. Necessary Tests & Appointments: chest CT  All questions were answered and the patient and present family were in agreement with the plan.  I appreciate the opportunity to participate in the care of your patient and will keep you updated.  Sincerely,    FAUZIA Nava       Again, thank you for allowing me to participate in the care of your patient.        Sincerely,        FAUZIA Nava    Electronically signed

## 2025-02-05 NOTE — DISCHARGE INSTRUCTIONS

## 2025-02-05 NOTE — NURSING NOTE
"Oncology Rooming Note    February 5, 2025 2:22 PM   Cesar Murillo is a 70 year old male who presents for:    Chief Complaint   Patient presents with    Oncology Clinic Visit     Malignant Neoplasm of Right Lung     Initial Vitals: BP (!) 169/100 (BP Location: Left arm, Patient Position: Sitting, Cuff Size: Adult Regular)   Pulse 54   Temp 97.9  F (36.6  C) (Tympanic)   Resp 16   Wt 91.6 kg (202 lb)   SpO2 97%   BMI 30.27 kg/m   Estimated body mass index is 30.27 kg/m  as calculated from the following:    Height as of 10/22/24: 1.74 m (5' 8.5\").    Weight as of this encounter: 91.6 kg (202 lb). Body surface area is 2.1 meters squared.  No Pain (0) Comment: Data Unavailable   No LMP for male patient.  Allergies reviewed: Yes  Medications reviewed: Yes    Medications: Medication refills not needed today.  Pharmacy name entered into [facility]: [id number] [place], [place] - [address] [place] [address]    Frailty Screening:   Is the patient here for a new oncology consult visit in cancer care? 2. No      Clinical concerns: None       Barbie Nur LPN  2/5/2025              "

## 2025-02-11 ENCOUNTER — PATIENT OUTREACH (OUTPATIENT)
Dept: GASTROENTEROLOGY | Facility: CLINIC | Age: 70
End: 2025-02-11
Payer: COMMERCIAL

## 2025-02-11 DIAGNOSIS — Z12.11 SPECIAL SCREENING FOR MALIGNANT NEOPLASMS, COLON: Primary | ICD-10-CM

## 2025-02-11 NOTE — PROGRESS NOTES
"Pts last colonoscopy on file was on 5/11/2020 and was recommended to repeat in 5 years.  CRC Screening Colonoscopy Referral Review    Patient meets the inclusion criteria for screening colonoscopy standing order.    Ordering/Referring Provider:  Sen Leon      BMI: Estimated body mass index is 30.27 kg/m  as calculated from the following:    Height as of 10/22/24: 1.74 m (5' 8.5\").    Weight as of 2/5/25: 91.6 kg (202 lb).     Sedation:  Does patient have any of the following conditions affecting sedation?  Chronic or scheduled pain/narcotic medication use: MAC sedation recommended    Previous Scopes:  Any previous recommendations or follow up needs based on previous scope?  na / No recommendations.    Medical Concerns to Postpone Order:  Does patient have any of the following medical concerns that should postpone/delay colonoscopy referral?  No medical conditions affecting colonoscopy referral.    Final Referral Details:  Based on patient's medical history patient is appropriate for referral order with MAC/deep sedation.   BMI<= 45 45 < BMI <= 48 48 < BMI < = 50  BMI > 50   No Restrictions No [facility]  No [facility] with exceptions Hospital Only OR Only     "

## 2025-02-11 NOTE — LETTER
February 11, 2025      Cesar Murillo  [address]  [place] [address]              Ale Guerrero,    We hope this letter finds you doing well.     Your health is important to us at [facility]. Our records indicate that you could be due, or possibly overdue, for a screening or surveillance colonoscopy if you have not had a colonoscopy since 5/11/2020.     An order has been placed for you to have this completed. Our scheduling team will be reaching out to you to assist in getting this scheduled. If you do not hear from them within 7 days or you would like to schedule sooner, please call [phone], option 2 for endoscopy scheduling.     If you believe this is in error and have already had a colonoscopy done, please have your results and recommendations faxed to [phone].    If you have questions about this order or have further concerns, please reach out to your primary care provider.     Valentin     Colorectal Cancer RN Screening Team  [facility]

## 2025-02-24 ENCOUNTER — TELEPHONE (OUTPATIENT)
Dept: DERMATOLOGY | Facility: CLINIC | Age: 70
End: 2025-02-24
Payer: COMMERCIAL

## 2025-02-24 NOTE — TELEPHONE ENCOUNTER
2/24 Patient confirmed scheduled appointment:  Date: 7/24/2025  Time: 1:30 pm  Visit type: New Dermatology  Provider: Beverley  Location: [facility]  Testing/imaging: n/a  Additional notes: n/a

## 2025-04-01 ENCOUNTER — TELEPHONE (OUTPATIENT)
Dept: INTERNAL MEDICINE | Facility: CLINIC | Age: 70
End: 2025-04-01
Payer: COMMERCIAL

## 2025-04-01 NOTE — TELEPHONE ENCOUNTER
Outgoing call to [facility] for hold orders, relayed provider message in detail. No further questions at this time.

## 2025-04-01 NOTE — TELEPHONE ENCOUNTER
He should be at relatively low risk to hold Eliquis for 2 days prior to colonoscopy and to hold on day of procedure.  Eliquis should be restarted the following day.

## 2025-04-01 NOTE — TELEPHONE ENCOUNTER
General Call      Reason for Call:  hold and bridge orders for colonoscopy    What are your questions or concerns:  2 day hold of: apixaban ANTICOAGULANT (ELIQUIS ANTICOAGULANT) 5 MG tablet , starting 5/10/25, prior to colonoscopy on 5/12/25. Anything less than 2 day hold, requesting most recent creatinine labs    Date of last appointment with provider: 10/22/4    Okay to leave a detailed message?: Yes at Other phone number:  [phone]

## 2025-04-08 ENCOUNTER — OFFICE VISIT (OUTPATIENT)
Dept: DERMATOLOGY | Facility: CLINIC | Age: 70
End: 2025-04-08
Payer: COMMERCIAL

## 2025-04-08 DIAGNOSIS — D18.01 CHERRY ANGIOMA: ICD-10-CM

## 2025-04-08 DIAGNOSIS — L81.4 SOLAR LENTIGO: ICD-10-CM

## 2025-04-08 DIAGNOSIS — D48.9 NEOPLASM OF UNCERTAIN BEHAVIOR: Primary | ICD-10-CM

## 2025-04-08 DIAGNOSIS — L57.0 ACTINIC KERATOSIS: ICD-10-CM

## 2025-04-08 DIAGNOSIS — L82.1 SEBORRHEIC KERATOSIS: ICD-10-CM

## 2025-04-08 PROCEDURE — 88305 TISSUE EXAM BY PATHOLOGIST: CPT | Mod: 26 | Performed by: PATHOLOGY

## 2025-04-08 PROCEDURE — 88305 TISSUE EXAM BY PATHOLOGIST: CPT | Mod: TC | Performed by: DERMATOLOGY

## 2025-04-08 ASSESSMENT — PAIN SCALES - GENERAL: PAINLEVEL_OUTOF10: MILD PAIN (2)

## 2025-04-08 NOTE — NURSING NOTE
Lidocaine-epinephrine 1-1:652837 % injection   1.5 mL once for one use, starting 4/8/2025 ending 4/8/2025,  2mL disp, R-0, injection  Injected by Dr. Steele

## 2025-04-08 NOTE — NURSING NOTE
Dermatology Rooming Note    Cesar Murillo's goals for this visit include:   Chief Complaint   Patient presents with    Skin Check     FBSE- No areas of concern. Was referred by his primary.      Jeffrey Campoverde, EMT  Clinic Support  [facility]     [phone]    Employed by [employer]

## 2025-04-08 NOTE — PROGRESS NOTES
[facility] Dermatology Note  Encounter Date: Apr 8, 2025  Office Visit     Dermatology Problem List:  1. Neoplasm of uncertain behavior.  Right lower back suspect scc s/p shave biopsy 4/8/2025    2. Actinic keratosis   - s/p cryotherapy     3. Benign skin findings  ____________________________________________    Assessment & Plan:     # Neoplasm of uncertain behavior.  Right lower back s/p shave biopsy 4/8/2025   - see procedure note below     # Actinic keratosis.   Discussed etiology and treatment.    - cryotherapy today, see procedure below     # Benign skin examination.  - Reassured patient of the benign appearance of their nevi on today's exam. No concerning features on dermascopy. No treatment is necessary at this time unless the nevi change or become symptomatic.   - ABCDEs and sunscreen discussed     # Seborrheic keratoses  # Cherry angiomas  # Solar lentigines  - Reassured patient of the benign nature/appearance of this lesion(s). No treatment is necessary at this time unless the lesion(s) changes or becomes symptomatic.   - encouraged sunscreen and sun protection    Procedures Performed:   - Shave biopsy procedure note, location(s): right lower back. After discussion of benefits and risks including but not limited to bleeding, infection, scar, incomplete removal, recurrence, and non-diagnostic biopsy, verbal consent and photographs were obtained. The area was cleaned with isopropyl alcohol. 0.5mL of 1% lidocaine with epinephrine was injected to obtain adequate anesthesia of lesion(s). Shave biopsy at site(s) performed. Hemostasis was achieved with aluminium chloride. Petrolatum ointment and a sterile dressing were applied. The patient tolerated the procedure and no complications were noted. The patient was provided with verbal and written post care instructions.     - Cryotherapy procedure note, location(s): face, back and left ear. After verbal consent and discussion of risks and  benefits including, but not limited to, dyspigmentation/scar, blister, and pain, 4 lesion(s) was(were) treated with 1-2 mm freeze border for 1-2 cycles with liquid nitrogen. Post cryotherapy instructions were provided.    Follow-up: 6 month(s) in-person, or earlier for new or changing lesions    Staff and Resident:     Cedric Oliva MD  Dermatology resident PGY-2  I was present for key portions of the biopsy and the entire cryotherapy procedure. Arlen Mckenzie MD   I, Arlen Mckenzie MD, saw this patient with the resident and agree with the resident s findings and plan of care as documented in the resident s note.    ____________________________________________    CC: No chief complaint on file.    HPI:  Mr. Cesar Murillo is a(n) 70 year old male who presents today as a new patient for skin examination.    - Referred by primary care doctor.   - No lesions of concern today. Denies primary skin cancer history. Unknown of family history of melanoma  - Prior history of of lung cancer, currently in remission  - Is not on any immunosuppressive medications   - Denies recent weight loss, appetite changes, denies fevers or chills   - Wears sunscreen when playing golf      Patient is otherwise feeling well, without additional skin concerns.    Labs Reviewed:  N/A    Physical Exam:  Vitals: There were no vitals taken for this visit.  SKIN: Full skin, which includes the head/face, both arms, chest, back, abdomen,both legs, digits and/or nails, was examined.  - On the right lower back there is an red erosive plaque  - There are erythematous macules with overyling adherent scale on the face, back and ears.   - There are dome shaped bright red papules on the trunk and extremities.   - Multiple regular brown pigmented macules and papules are identified on the trunk and extremities.   - Scattered brown macules on sun exposed areas.  - There are waxy stuck on tan to brown papules on the trunk.   - No other lesions of concern  on areas examined.     Medications:  Current Outpatient Medications   Medication Sig Dispense Refill    acetaminophen (TYLENOL) 500 MG tablet Take 2 tablets (1,000 mg) by mouth 2 times daily      apixaban ANTICOAGULANT (ELIQUIS ANTICOAGULANT) 5 MG tablet Take 1 tablet (5 mg) by mouth 2 times daily. 60 tablet 11    buPROPion (ZYBAN) 150 MG 12 hr tablet Take 1 tablet (150 mg) by mouth 2 times daily. 180 tablet 3    calcium carbonate (TUMS) 500 MG chewable tablet Take 1 chew tab by mouth 2 times daily.      docusate sodium (COLACE) 100 MG capsule Take 100 mg by mouth as needed.      lisinopril (ZESTRIL) 10 MG tablet TAKE 1 TABLET BY MOUTH ONCE DAILY 90 tablet 3    LORazepam (ATIVAN) 1 MG tablet Take 1 tablet by mouth at bedtime as needed for insonmia. 15 tablet 0    methocarbamol (ROBAXIN) 500 MG tablet Take 1 tablet (500 mg) by mouth every 6 hours as needed for muscle spasms 30 tablet 0    metoprolol succinate ER (TOPROL XL) 50 MG 24 hr tablet Take 1 tablet (50 mg) by mouth daily. 90 tablet 3    nicotine (NICODERM CQ) 14 MG/24HR 24 hr patch Place 1 patch onto the skin daily (Patient not taking: Reported on 2/5/2025) 30 patch 0    omeprazole (PRILOSEC) 20 MG DR capsule Take 1 capsule (20 mg) by mouth daily 90 capsule 3    oxyCODONE (ROXICODONE) 5 MG tablet Take 1-2 tablets (5-10 mg) by mouth every 6 hours as needed for severe pain. 30 tablet 0    polyethylene glycol (MIRALAX) 17 GM/Dose powder Take 17 g (1 capful.) by mouth daily      rosuvastatin (CRESTOR) 10 MG tablet Take 1 tablet (10 mg) by mouth daily 90 tablet 2    senna-docusate (SENOKOT-S/PERICOLACE) 8.6-50 MG tablet Take 1 tablet by mouth 2 times daily 50 tablet 0     No current facility-administered medications for this visit.     Facility-Administered Medications Ordered in Other Visits   Medication Dose Route Frequency Provider Last Rate Last Admin    iopamidol (ISOVUE-370) solution    Once PRN Souleymane Ratliff MD   50 mL at 01/17/23 1037      Past  Medical History:   Patient Active Problem List   Diagnosis    External hemorrhoids    Anxiety, generalized    Chronic insomnia    Erectile dysfunction    Essential hypertension    Osteoarthritis of multiple joints    Tobacco abuse    Chronic pain of right ankle    Chronic fatigue    History of colon polyps    Chronic cough    Nonobstructive atherosclerosis of coronary artery    COPD, mild (H)    Nonischemic cardiomyopathy (H)    Slow transit constipation    Paroxysmal atrial fibrillation (H)    History of chronic hepatitis    Ventricular ectopy    Squamous cell lung cancer, right (H)    Ascending aorta dilation    Medial epicondylitis of elbow, left    Bilateral foot pain    Gastroesophageal reflux disease without esophagitis    Chronic bilateral low back pain without sciatica    Closed fracture of one rib of left side with routine healing     Past Medical History:   Diagnosis Date    Acute bilateral low back pain without sciatica 07/23/2018    Anxiety, generalized 11/17/2017    Ascending aorta dilation     4.2 cm on CT scan August 2017, follow annually, 4.1 cm August 2018, stable October 2019.  4.5 cm April 2022.  4.4 cm April 2023 recheck in 1 year    Bilateral foot pain 12/07/2023    Calcaneal fracture 2013    Chronic foot/heel pain    Chronic bilateral low back pain without sciatica 06/18/2024    Chronic cough 04/14/2022    Chronic fatigue 10/14/2019    Chronic hepatitis C (H)     Successfully treated.  Previously on interferon.  More recently SOFOSBUVIR and ribavirin, biopsy has been negative for cirrhosis, PCR -2015    Chronic insomnia     Chronic pain of right ankle 05/17/2018    Closed fracture of one rib of left side with routine healing 10/22/2024    COPD, mild (H) 02/28/2023    Erectile dysfunction     Essential hypertension     External hemorrhoids 2012    Gastroesophageal reflux disease without esophagitis 06/18/2024    History of alcohol abuse     History of colon polyps     Colonoscopy April 2020 with  polyps.  Repeat in 5 years    History of depression     2014, prescribed citalopram    Lower abdominal pain 07/23/2018    Medial epicondylitis of elbow, left 06/13/2023    Nonischemic cardiomyopathy (H) 02/28/2023    Echocardiogram demonstrating 40 to 50% ejection fraction    Nonobstructive atherosclerosis of coronary artery 02/28/2023    Coronary angiogram demonstrating mild to moderate multivessel disease.  January 2023    Osteoarthritis of multiple joints     Chronic bilateral knee and chronic low back pain    Paroxysmal atrial fibrillation (H) 03/13/2023    Found on event monitor 4% burden longest run 8 hours March 2023    Porphyria cutanea tarda (H)     Pulmonary nodule     CT August 2018 3 mm right upper lobe nodule reported as stable, stable October 2019.  CT with 6 mm right lower lobe nodule April 2022 needing 6-month follow-up    Rectal bleeding 03/02/2020    Screening for AAA (abdominal aortic aneurysm)     CT 2018 without abdominal aneurysm    Shingles 05/13/2021    Slow transit constipation 02/28/2023    Squamous cell lung cancer, right (H)     status post robot assisted thoracoscopic right lower lobe wedge resection, completion lobectomy, and mediastinal lymph node dissection for a pT1bN0 (stage IA2) non small cell lung cancer.    Tobacco abuse 11/17/2017    Ventricular ectopy 06/13/2023    Frequent PVCs       CC Referred Self, MD  No address on file on close of this encounter.

## 2025-04-08 NOTE — PATIENT INSTRUCTIONS
- Shave biopsy of the right lower back. Will call you once results are back     - Continue to daily sunscreen SPF 30 or higher     - Follow up in one year for skin examination                                Biopsy Information  This information has been written to inform you of the procedure being done today. It includes answers to questions that patients ask most often. If you have any questions, please be sure to discuss them with the dermatologist or the dermatology nurse before the procedure begins.     What is the purpose of a biopsy?    A biopsy is done to obtain a piece of skin tissue that will be sent to the lab to assist the doctor in making a diagnosis. It also may be done to treat certain skin conditions, for example- removing a mole.    How is the procedure done?    The area to be biopsied will be cleaned the alcohol. Your skin will be numbed with a local anesthetic (Lidocaine with Epinephrine). While your skin is being numbed, you may feel a burning sensation. After the area is numbed, you should not feel any pain. However, you may feel pressure during the procedure. Pressure is normal, but if you feel any pain during the procedure, let the Dermatologist know and you will be given more numbing medication.     There are two ways this procedure can be preformed:    Shave Biopsy  After the area is numbed, the dermatologist uses a scalpel blade to remove a thin slice of skin tissue. Then a solution is applied to the wound with a cotton swab to stop the bleeding. This type of biopsy requires no suture as it is very superficial.    Punch Biopsy  After the area is numbed, the dermatologist uses a special instrument shaped like a miniature cookie-cutter, to cut a very small Hughes into the skin. Using a small scissors, a piece of skin tissue is removed. Usually this type of biopsy is closed with a suture. Depending on the area of the body where the biopsy was taken from, the stitches will need to be removed in  1-2 weeks. Your Doctor will discuss where you will have the stitches removed. This is a very simple and quick procedure that can be done at any Doctor office, and in some situations, can even be done at home. If you have a follow up appointment with us in the time frame that is appropriate to have them removed, the dermatologist or dermatology nurse will remove them at that time.     The appearance of the biopsy site will vary, depending on the type of procedure done and the areas involved.    How long will the procedure take and when will I get the results?    A shave or punch biopsy usually takes between 5 and 10 minutes. If sutures are required, additional time may be needed. Results of your procedure should be available with in the next two weeks. A physician will call you to give you the results and inform you of follow up care if needed. If you have not heard from a physician within two weeks, please call our office at [phone].                                   How do I take care of the biopsy site?    Shave Biopsy  You may remove the original Band-Aid after 24 hours. Keep the site clean. Wash gently with soap and water everyday and any other time it becomes dirty. Rinse well and pat dry. It is advised that until the area is completely healed you should keep it moist with vaseline and a band-aid.     Punch Biopsy  The area should be covered with the original band-Aid and kept dry for the first 24 hours after the procedure. After the first 24 hours, you can gently clean the area with soap and water. Rinse well and pat dry. When showering, do not let the full force of the water come in contact with the biopsy site. In most cases this site will be closed with a stitch. This area should be covered with vaseline and a band-Aid until the stitch is removed. This band-Aid should be replaced with fresh vaseline applied daily. Your Doctor will discuss the length of time your sutures should be left in. We will also  discuss options for removal. Some patients return to the clinic to have them removed, some patients go to a primary care clinic closer to home and some patients remove the stitch themselves. Your doctor will discuss this with you and decide what will work best for you.    If bleeding occurs after either type of procedure apply CONSTANT pressure for 10 minutes. Make sure to keep an eye on the clock when timing this and no peeking! If bleeding does not stop, call your physician immediately at-  [phone] during regular business hours. If uncontrolled bleeding occurs after hours call [phone] and ask to have the Dermatology Resident on-call paged.     Infection  Inspect the biopsy area for signs of infection which may include:    Increased redness and swelling, drainage, pain or feeling of warmth at biopsy site.  Red streaks leading away from the wound  Pus (Clear or slightly yellow drainage is o.k.)  Fever  It is common to have slight redness and swelling at the biopsy site.   Contact your Dermatology Clinic if any signs of infection occur.     Important phone numbers:  Dermatology Clinic [phone]  After-hours number to have Dermatology Resident on-call paged [phone]   [facility] Emergency Room (answered 24 hours a day) [phone]  [facility] Emergency Room TTY for hearing impaired (answered 24 hours a day) [phone]

## 2025-04-08 NOTE — LETTER
4/8/2025       RE: Cesar Murillo  [address]  [place] [address]     Dear Colleague,    Thank you for referring your patient, Cesar Murillo, to the [facility] DERMATOLOGY [facility] [place] at [facility]. Please see a copy of my visit note below.    [facility] Dermatology Note  Encounter Date: Apr 8, 2025  Office Visit     Dermatology Problem List:  1. Neoplasm of uncertain behavior.  Right lower back suspect scc s/p shave biopsy 4/8/2025    2. Actinic keratosis   - s/p cryotherapy     3. Benign skin findings  ____________________________________________    Assessment & Plan:     # Neoplasm of uncertain behavior.  Right lower back s/p shave biopsy 4/8/2025   - see procedure note below     # Actinic keratosis.   Discussed etiology and treatment.    - cryotherapy today, see procedure below     # Benign skin examination.  - Reassured patient of the benign appearance of their nevi on today's exam. No concerning features on dermascopy. No treatment is necessary at this time unless the nevi change or become symptomatic.   - ABCDEs and sunscreen discussed     # Seborrheic keratoses  # Cherry angiomas  # Solar lentigines  - Reassured patient of the benign nature/appearance of this lesion(s). No treatment is necessary at this time unless the lesion(s) changes or becomes symptomatic.   - encouraged sunscreen and sun protection    Procedures Performed:   - Shave biopsy procedure note, location(s): right lower back. After discussion of benefits and risks including but not limited to bleeding, infection, scar, incomplete removal, recurrence, and non-diagnostic biopsy, verbal consent and photographs were obtained. The area was cleaned with isopropyl alcohol. 0.5mL of 1% lidocaine with epinephrine was injected to obtain adequate anesthesia of lesion(s). Shave biopsy at site(s) performed. Hemostasis was achieved with aluminium chloride. Petrolatum  ointment and a sterile dressing were applied. The patient tolerated the procedure and no complications were noted. The patient was provided with verbal and written post care instructions.     - Cryotherapy procedure note, location(s): face, back and left ear. After verbal consent and discussion of risks and benefits including, but not limited to, dyspigmentation/scar, blister, and pain, 4 lesion(s) was(were) treated with 1-2 mm freeze border for 1-2 cycles with liquid nitrogen. Post cryotherapy instructions were provided.    Follow-up: 6 month(s) in-person, or earlier for new or changing lesions    Staff and Resident:     Cedric Oliva MD  Dermatology resident PGY-2  I was present for key portions of the biopsy and the entire cryotherapy procedure. Arlen Mckenzie MD   I, Arlen Mckenzie MD, saw this patient with the resident and agree with the resident s findings and plan of care as documented in the resident s note.    ____________________________________________    CC: No chief complaint on file.    HPI:  Mr. Cesar Murillo is a(n) 70 year old male who presents today as a new patient for skin examination.    - Referred by primary care doctor.   - No lesions of concern today. Denies primary skin cancer history. Unknown of family history of melanoma  - Prior history of of lung cancer, currently in remission  - Is not on any immunosuppressive medications   - Denies recent weight loss, appetite changes, denies fevers or chills   - Wears sunscreen when playing golf      Patient is otherwise feeling well, without additional skin concerns.    Labs Reviewed:  N/A    Physical Exam:  Vitals: There were no vitals taken for this visit.  SKIN: Full skin, which includes the head/face, both arms, chest, back, abdomen,both legs, digits and/or nails, was examined.  - On the right lower back there is an red erosive plaque  - There are erythematous macules with overyling adherent scale on the face, back and ears.   - There  are dome shaped bright red papules on the trunk and extremities.   - Multiple regular brown pigmented macules and papules are identified on the trunk and extremities.   - Scattered brown macules on sun exposed areas.  - There are waxy stuck on tan to brown papules on the trunk.   - No other lesions of concern on areas examined.     Medications:  Current Outpatient Medications   Medication Sig Dispense Refill     acetaminophen (TYLENOL) 500 MG tablet Take 2 tablets (1,000 mg) by mouth 2 times daily       apixaban ANTICOAGULANT (ELIQUIS ANTICOAGULANT) 5 MG tablet Take 1 tablet (5 mg) by mouth 2 times daily. 60 tablet 11     buPROPion (ZYBAN) 150 MG 12 hr tablet Take 1 tablet (150 mg) by mouth 2 times daily. 180 tablet 3     calcium carbonate (TUMS) 500 MG chewable tablet Take 1 chew tab by mouth 2 times daily.       docusate sodium (COLACE) 100 MG capsule Take 100 mg by mouth as needed.       lisinopril (ZESTRIL) 10 MG tablet TAKE 1 TABLET BY MOUTH ONCE DAILY 90 tablet 3     LORazepam (ATIVAN) 1 MG tablet Take 1 tablet by mouth at bedtime as needed for insonmia. 15 tablet 0     methocarbamol (ROBAXIN) 500 MG tablet Take 1 tablet (500 mg) by mouth every 6 hours as needed for muscle spasms 30 tablet 0     metoprolol succinate ER (TOPROL XL) 50 MG 24 hr tablet Take 1 tablet (50 mg) by mouth daily. 90 tablet 3     nicotine (NICODERM CQ) 14 MG/24HR 24 hr patch Place 1 patch onto the skin daily (Patient not taking: Reported on 2/5/2025) 30 patch 0     omeprazole (PRILOSEC) 20 MG DR capsule Take 1 capsule (20 mg) by mouth daily 90 capsule 3     oxyCODONE (ROXICODONE) 5 MG tablet Take 1-2 tablets (5-10 mg) by mouth every 6 hours as needed for severe pain. 30 tablet 0     polyethylene glycol (MIRALAX) 17 GM/Dose powder Take 17 g (1 capful.) by mouth daily       rosuvastatin (CRESTOR) 10 MG tablet Take 1 tablet (10 mg) by mouth daily 90 tablet 2     senna-docusate (SENOKOT-S/PERICOLACE) 8.6-50 MG tablet Take 1 tablet by mouth 2  times daily 50 tablet 0     No current facility-administered medications for this visit.     Facility-Administered Medications Ordered in Other Visits   Medication Dose Route Frequency Provider Last Rate Last Admin     iopamidol (ISOVUE-370) solution    Once PRN Souleymane Ratliff MD   50 mL at 01/17/23 1037      Past Medical History:   Patient Active Problem List   Diagnosis     External hemorrhoids     Anxiety, generalized     Chronic insomnia     Erectile dysfunction     Essential hypertension     Osteoarthritis of multiple joints     Tobacco abuse     Chronic pain of right ankle     Chronic fatigue     History of colon polyps     Chronic cough     Nonobstructive atherosclerosis of coronary artery     COPD, mild (H)     Nonischemic cardiomyopathy (H)     Slow transit constipation     Paroxysmal atrial fibrillation (H)     History of chronic hepatitis     Ventricular ectopy     Squamous cell lung cancer, right (H)     Ascending aorta dilation     Medial epicondylitis of elbow, left     Bilateral foot pain     Gastroesophageal reflux disease without esophagitis     Chronic bilateral low back pain without sciatica     Closed fracture of one rib of left side with routine healing     Past Medical History:   Diagnosis Date     Acute bilateral low back pain without sciatica 07/23/2018     Anxiety, generalized 11/17/2017     Ascending aorta dilation     4.2 cm on CT scan August 2017, follow annually, 4.1 cm August 2018, stable October 2019.  4.5 cm April 2022.  4.4 cm April 2023 recheck in 1 year     Bilateral foot pain 12/07/2023     Calcaneal fracture 2013    Chronic foot/heel pain     Chronic bilateral low back pain without sciatica 06/18/2024     Chronic cough 04/14/2022     Chronic fatigue 10/14/2019     Chronic hepatitis C (H)     Successfully treated.  Previously on interferon.  More recently SOFOSBUVIR and ribavirin, biopsy has been negative for cirrhosis, PCR -2015     Chronic insomnia      Chronic pain of right  ankle 05/17/2018     Closed fracture of one rib of left side with routine healing 10/22/2024     COPD, mild (H) 02/28/2023     Erectile dysfunction      Essential hypertension      External hemorrhoids 2012     Gastroesophageal reflux disease without esophagitis 06/18/2024     History of alcohol abuse      History of colon polyps     Colonoscopy April 2020 with polyps.  Repeat in 5 years     History of depression     2014, prescribed citalopram     Lower abdominal pain 07/23/2018     Medial epicondylitis of elbow, left 06/13/2023     Nonischemic cardiomyopathy (H) 02/28/2023    Echocardiogram demonstrating 40 to 50% ejection fraction     Nonobstructive atherosclerosis of coronary artery 02/28/2023    Coronary angiogram demonstrating mild to moderate multivessel disease.  January 2023     Osteoarthritis of multiple joints     Chronic bilateral knee and chronic low back pain     Paroxysmal atrial fibrillation (H) 03/13/2023    Found on event monitor 4% burden longest run 8 hours March 2023     Porphyria cutanea tarda (H)      Pulmonary nodule     CT August 2018 3 mm right upper lobe nodule reported as stable, stable October 2019.  CT with 6 mm right lower lobe nodule April 2022 needing 6-month follow-up     Rectal bleeding 03/02/2020     Screening for AAA (abdominal aortic aneurysm)     CT 2018 without abdominal aneurysm     Shingles 05/13/2021     Slow transit constipation 02/28/2023     Squamous cell lung cancer, right (H)     status post robot assisted thoracoscopic right lower lobe wedge resection, completion lobectomy, and mediastinal lymph node dissection for a pT1bN0 (stage IA2) non small cell lung cancer.     Tobacco abuse 11/17/2017     Ventricular ectopy 06/13/2023    Frequent PVCs       CC Referred Self, MD  No address on file on close of this encounter.       Again, thank you for allowing me to participate in the care of your patient.      Sincerely,    Arlen Mckenzie MD

## 2025-04-09 LAB
PATH REPORT.COMMENTS IMP SPEC: NORMAL
PATH REPORT.COMMENTS IMP SPEC: NORMAL
PATH REPORT.FINAL DX SPEC: NORMAL
PATH REPORT.GROSS SPEC: NORMAL
PATH REPORT.MICROSCOPIC SPEC OTHER STN: NORMAL
PATH REPORT.RELEVANT HX SPEC: NORMAL

## 2025-04-10 ENCOUNTER — TELEPHONE (OUTPATIENT)
Dept: DERMATOLOGY | Facility: CLINIC | Age: 70
End: 2025-04-10
Payer: COMMERCIAL

## 2025-04-10 NOTE — TELEPHONE ENCOUNTER
4/10 Left Voicemail (1st Attempt) for the patient to call back and schedule the following:    Appointment type: Return Dermatology  Provider: Jonah  Return date: 4/9/2026  Specialty phone number: [phone]  Additional appointment(s) needed: na  Additonal Notes: na

## 2025-05-12 ENCOUNTER — TRANSFERRED RECORDS (OUTPATIENT)
Dept: ADMINISTRATIVE | Facility: CLINIC | Age: 70
End: 2025-05-12
Payer: COMMERCIAL

## 2025-05-14 PROBLEM — D12.6 ADENOMATOUS POLYP OF COLON: Status: ACTIVE | Noted: 2025-05-14

## 2025-05-14 NOTE — PROCEDURES
[facility]   [address] [place] [address], [address], [place] [address]     Patient Name: Cesar Murillo  Gender:  Male  Exam Date: 05/12/2025 Visit Number:  [id number]  Age: 70 Years YOB: 1955  Attending MD: Kameron Brennan MD Medical Record#:  [id number]    Procedure: Colonoscopy   Indications: Previous adenomatous polyp(s)      Referring MD: Referral Self  Primary MD:      Sen Leon MD  Medications: Admitting Medications:   0.9% Normal Saline at TKO   Intra Procedure Medications:   Patient received monitored anesthesia care.     Complications: No immediate complications  ______________________________________________________________________________  Procedure:   An examination of the heart and lungs was performed and found to be within acceptable limits.  .  The patient was therefore deemed a reasonable candidate for endoscopy and sedation.   The risks and benefits of the procedure were explained to the patient.After obtaining informed consent, the patient received monitored anesthesia care and I passed the scope   without difficulty via the rectum to the cecum.  The appendiceal orifice and ic valve were identified.  The scope was retroflexed during the examination  The quality of the prep was excellent  (Miralax/Gatorade/2 tablets Bisacodyl/Magnesium Citrate).    This was a complete examination throughout the entire colon.    Findings:    Polyp location: splenic flexure.  Quantity: 1.  Size: 3 mm.  Polyp shape:  sessile.         Maneuver: polypectomy was performed with a cold biopsy forceps.       Removal:  complete.  Retrieval: complete.  Bleeding: none.    Polyp location: descending colon.  Quantity: 1.  Size: 3 mm.  Polyp shape: sessile.         Maneuver: polypectomy was performed with a  cold biopsy forceps.       Removal: complete.  Retrieval: complete.  Bleeding: none.  Remainder of the exam is normal.    Impression:  Polyp of colon, unspecified part of colon,  unspecified type  Personal history of colonic polyps    Preliminary Plan:  The patient and their physician will receive a copy of the pathology report as well as pathology-based recommendations for future screening or surveillance.  Repeat colonoscopy in 5 years  Antiplatelets/Anticoagulants: Apixaban (Eliquis). Last dose: 2 days ago.   Restart. Date: 05/12/2025  Pathology Results:  A: COLON, SPLENIC FLEXURE, POLYP:           1. Tubular adenoma           2. Negative for high grade dysplasia           3. Per the colonoscopy report:               a. Polyp size: 3 mm               b. Resection: Complete               c. Retrieval: Complete      B: COLON, DESCENDING, POLYP:           1. Tubular adenoma           2. Negative for high grade dysplasia           3. Per the colonoscopy report:               a. Polyp size: 3 mm               b. Resection: Complete               c. Retrieval: Complete      MICROSCOPIC  A: Performed   B: Performed     Electronically signed by: Ritchie Patel MD    Interpreted at [facility], [address] [place] [id number]    Orders    Diagnostics:  Procedure Comments Timeframe Assessment   Colonoscopy  5 Years Z86.0100     Instruction(s)/Education:  Instruction/Education Timeframe Assessment   Colon Cancer Prevention  Z86.0100   Colon Polyps  Z86.0100       Final Plan:  Repeat colonoscopy in 5 years for Polyp surveillance.    We will attempt to contact you at appropriate intervals via [place] mail.  We may not be able to find you or contact you at that time, therefore you should know that the responsibility for following our recommendation rests with you.  If you don't hear from us at the time your procedure is due, please contact our office to schedule an appointment.  If your contact information should change, please contact our office so that we can update your record.      _Electronically signed by:___________________  Kameron Brennan MD                  05/12/2025    cc: Sen Leon MD

## 2025-05-19 DIAGNOSIS — F41.1 ANXIETY, GENERALIZED: ICD-10-CM

## 2025-05-19 RX ORDER — LORAZEPAM 1 MG/1
TABLET ORAL
Qty: 15 TABLET | Refills: 0 | Status: SHIPPED | OUTPATIENT
Start: 2025-05-19

## 2025-07-02 ENCOUNTER — OFFICE VISIT (OUTPATIENT)
Dept: DERMATOLOGY | Facility: CLINIC | Age: 70
End: 2025-07-02
Payer: COMMERCIAL

## 2025-07-02 VITALS — HEART RATE: 67 BPM | OXYGEN SATURATION: 97 % | DIASTOLIC BLOOD PRESSURE: 93 MMHG | SYSTOLIC BLOOD PRESSURE: 134 MMHG

## 2025-07-02 DIAGNOSIS — D48.9 NEOPLASM OF UNCERTAIN BEHAVIOR: ICD-10-CM

## 2025-07-02 DIAGNOSIS — C44.519 BASAL CELL CARCINOMA (BCC) OF LOWER BACK: Primary | ICD-10-CM

## 2025-07-02 PROCEDURE — 88305 TISSUE EXAM BY PATHOLOGIST: CPT | Mod: TC | Performed by: DERMATOLOGY

## 2025-07-02 PROCEDURE — 11603 EXC TR-EXT MAL+MARG 2.1-3 CM: CPT | Mod: GC | Performed by: DERMATOLOGY

## 2025-07-02 PROCEDURE — 3075F SYST BP GE 130 - 139MM HG: CPT | Performed by: DERMATOLOGY

## 2025-07-02 PROCEDURE — 3080F DIAST BP >= 90 MM HG: CPT | Performed by: DERMATOLOGY

## 2025-07-02 PROCEDURE — 1126F AMNT PAIN NOTED NONE PRSNT: CPT | Performed by: DERMATOLOGY

## 2025-07-02 PROCEDURE — 12032 INTMD RPR S/A/T/EXT 2.6-7.5: CPT | Mod: GC | Performed by: DERMATOLOGY

## 2025-07-02 PROCEDURE — 88305 TISSUE EXAM BY PATHOLOGIST: CPT | Mod: 26 | Performed by: DERMATOLOGY

## 2025-07-02 ASSESSMENT — PAIN SCALES - GENERAL: PAINLEVEL_OUTOF10: NO PAIN (0)

## 2025-07-02 NOTE — LETTER
7/2/2025       RE: Cesar Murillo  [address]  [place] [address]     Dear Colleague,    Thank you for referring your patient, Cesar Murillo, to the [facility] DERMATOLOGIC SURGERY CLINIC [place] at [facility]. Please see a copy of my visit note below.    DERMATOLOGY EXCISION PROCEDURE NOTE    Dermatology Problem List:  1. snBCC, right lower back, s/p shave biopsy 4/8/2025, s/p excision 7/2/25  2. Actinic keratosis   - s/p cryotherapy   3. Benign skin findings  ____________________________________________    NAME OF PROCEDURE: Excision intermediate layered linear closure  Staff surgeon: Ralph Lowery MD  Fellow: Kirsty Vang MD  Scrub Nurse: Didi Adorno    PRE-OPERATIVE DIAGNOSIS:  Basal Cell Carcinoma  POST-OPERATIVE DIAGNOSIS: Same   LOCATION: Right lower back  FINAL EXCISION SIZE(DEFECT SIZE): 2.3 x 1.8 cm  MARGIN: 4 mm  FINAL REPAIR LENGTH: 3.5 cm   ANESTHESIA: 6 cc 1% lidocaine with 1:100,000 epinephrine    INDICATIONS: This patient presented with a 1.5 x 1 cm Basal Cell Carcinoma. Excision was indicated. We discussed the principles of treatment and most likely complications including scarring, bleeding, infection, incomplete excision, wound dehiscence, pain, nerve damage, and recurrence. Informed consent was obtained and the patient underwent the procedure as follows:    PROCEDURE: The patient was taken to the operative suite. Time-out was performed.  The treatment area was anesthetized with 1% lidocaine with epinephrine. The area was prepped with Chlorhexidine and rinsed with sterile saline and draped with sterile towels. The lesion was delineated and excised down to subcutaneous fat in a elliptical manner. Hemostasis was obtained by electrocoagulation.     REPAIR: An intermediate layered linear closure was selected as the procedure which would maximally preserve both function and cosmesis.    After the excision of the tumor, the area was carefully  undermined. Hemostasis was obtained with bipolar electrocoagulation.  Closure was oriented so that the wound was in the patient's natural skin tension lines. The subcutaneous and dermal layers were then closed with 4-0 Monocryl (deep layer suturing) sutures. The epidermis was then carefully approximated along the length of the wound using 4-0 Monocryl running subcuticular(superficial layer suturing) sutures.     Estimated blood loss was less than 10 ml for all surgical sites. A sterile pressure dressing was applied and wound care instructions, with a written handout, were given. The patient was discharged from the [facility]atologic Surgery Center alert and ambulatory.    The patient elected for pathology results to automatically release and understands that the clinical staff will contact them as soon as possible to notify them of the results.    Dr. Ralph Lowery was immediately available for the entire surgery and was physicially present for the key portions of the procedure.    Anatomic Pathology Results: pending    Clinical Follow-Up: PRN    Staff Involved:   Scribe/Fellow/Staff     Scribe Disclosure:   IMARY BETH, am serving as a scribe; to document services personally performed by Ralph Lowery MD -based on data collection and the provider's statements to me.    Kirsty Vang MD   Mohs Surgery and Dermatologic Oncology Fellow        Attestation signed by Ralph Lowery MD at 7/8/2025  9:58 AM:    Attending attestation:  I was present for key elements of the procedure and immediately available for all other portions of the procedure.  I have reviewed the note and edited it as necessary.    Ralph Lowery M.D.  Professor  Director of Dermatologic Surgery  Department of Dermatology  [facility]    Dermatology Surgery Clinic  [facility] Surgery [place] [id number]      Again, thank you for allowing me to participate in the care of your patient.       Sincerely,    Ralph Lowery MD

## 2025-07-02 NOTE — NURSING NOTE
Dermatology Rooming Note    Cesar Murillo's goals for this visit include:   Chief Complaint   Patient presents with    Derm Problem     Excision, BCC on back. Has 2 spots of concern on R jawline and L cheek he would like checked if possible      ANTONIA Perry

## 2025-07-02 NOTE — PROGRESS NOTES
DERMATOLOGY EXCISION PROCEDURE NOTE    Dermatology Problem List:  1. snBCC, right lower back, s/p shave biopsy 4/8/2025, s/p excision 7/2/25  2. Actinic keratosis   - s/p cryotherapy   3. Benign skin findings  ____________________________________________    NAME OF PROCEDURE: Excision intermediate layered linear closure  Staff surgeon: Ralph Lowery MD  Fellow: Kirsty Vang MD  Scrub Nurse: Didi Adorno    PRE-OPERATIVE DIAGNOSIS:  Basal Cell Carcinoma  POST-OPERATIVE DIAGNOSIS: Same   LOCATION: Right lower back  FINAL EXCISION SIZE(DEFECT SIZE): 2.3 x 1.8 cm  MARGIN: 4 mm  FINAL REPAIR LENGTH: 3.5 cm   ANESTHESIA: 6 cc 1% lidocaine with 1:100,000 epinephrine    INDICATIONS: This patient presented with a 1.5 x 1 cm Basal Cell Carcinoma. Excision was indicated. We discussed the principles of treatment and most likely complications including scarring, bleeding, infection, incomplete excision, wound dehiscence, pain, nerve damage, and recurrence. Informed consent was obtained and the patient underwent the procedure as follows:    PROCEDURE: The patient was taken to the operative suite. Time-out was performed.  The treatment area was anesthetized with 1% lidocaine with epinephrine. The area was prepped with Chlorhexidine and rinsed with sterile saline and draped with sterile towels. The lesion was delineated and excised down to subcutaneous fat in a elliptical manner. Hemostasis was obtained by electrocoagulation.     REPAIR: An intermediate layered linear closure was selected as the procedure which would maximally preserve both function and cosmesis.    After the excision of the tumor, the area was carefully undermined. Hemostasis was obtained with bipolar electrocoagulation.  Closure was oriented so that the wound was in the patient's natural skin tension lines. The subcutaneous and dermal layers were then closed with 4-0 Monocryl (deep layer suturing) sutures. The epidermis was then carefully approximated along the  length of the wound using 4-0 Monocryl running subcuticular(superficial layer suturing) sutures.     Estimated blood loss was less than 10 ml for all surgical sites. A sterile pressure dressing was applied and wound care instructions, with a written handout, were given. The patient was discharged from the Dermatologic Surgery Center alert and ambulatory.    The patient elected for pathology results to automatically release and understands that the clinical staff will contact them as soon as possible to notify them of the results.    Dr. Ralph Lowery was immediately available for the entire surgery and was physicially present for the key portions of the procedure.    Anatomic Pathology Results: pending    Clinical Follow-Up: PRN    Staff Involved:   Scribe/Fellow/Staff     Scribe Disclosure:   IMARY BETH, am serving as a scribe; to document services personally performed by Ralph Lowery MD -based on data collection and the provider's statements to me.    Kirsty Vang MD   [facility] and Dermatologic Oncology Fellow

## 2025-07-02 NOTE — PATIENT INSTRUCTIONS
Wound care    I will experience scar, bleeding, swelling, pain, crusting and redness. I may experience incomplete removal or recurrence. Risks are bleeding, bruising, swelling, infection, nerve damage, & a large wound. A second procedure may be recommended to obtain the best cosmetic or functional result.       A three month office visit with your Surgeon is recommended for scar evaluation. Please reach out sooner if you have concerns about you surgical site/wound.    Caring for your skin after surgery    After your surgery, a pressure bandage will be placed over the area that has stitches. This is important to prevent bleeding. Please follow these instructions over the next 1 to 2 weeks. Following this regimen will help to prevent complications as your wound heals.     For the first 48 hours after your surgery:    Leave the pressure dressing on and keep it dry. If it should come loose, you may re-tape it, but do not take it off.  Relax and take it easy. Do not do any vigorous exercise or heavy lifting. This could cause the wound to bleed.  Post-Operative pain is usually mild. If you are able to take tylenol, You may take plain or extra-strength Tylenol (acetaminophen) As directed on the bottle (do not take more than 4,000mg in one day). If you are able to take ibuprofen, you can alternate the tylenol and ibuprofen.   Avoid alcohol as this may increase your tendency to bleed.   You may put an ice pack around the bandaged area for 20 minutes at a time as needed. This may help reduce swelling, bruising, and pain. Make sure the ice pack is waterproof so that the pressure bandage doesn t get wet.  If the wound is on the face try to sleep with your head elevated. Either in a recliner or propped up in bed, this will decrease swelling around the eyes.   You may see a small amount of drainage or blood on your pressure bandage. This is normal. However:  If drainage or bleeding continues or saturates the bandage, you will  need to apply firm pressure over the bandage with a piece of gauze for 15 minutes.  If bleeding continues after applying pressure for 15 minutes, apply an ice pack to the bandaged area for 15 minutes.  If bleeding still continues, call our office or go to the nearest emergency room.    Remove pressure dressing 48 hours after surgery:    Carefully remove the pressure bandage. If it seems sticky or too difficult to get off, you may need to soak it off in the shower.  After the pressure dressing is removed, you may shower and get the wound wet. However, Do Not let the forceful stream of the shower hit the wound directly.  Follow these wound care and dressing change instructions:    You have skin glue over the stitches. This will dry and flake off with time (about 2 weeks). If the stitches are still hanging around after 2 weeks, let us know, we can clip them out for you if they do not fall out with washing.   You may allow water to run over the site. Do not soak.  Do Not rub or scrub the site.  Pat dry after the shower or bath.  Avoid topical medications, lotions, creams, ointment,or oils.  Do not use tanning lamps or expose the site to sun.   Check wound appearance daily, some swelling and redness is normal after a procedure but should go away as your would is healing. If the swelling and redness or pain increases or if any other signs of infection occur listed below please send in a photo via my chart and or call us to let us know.  The clear glue film should start peeling and flaking off approximately around 2 weeks. By this time your wound should be sufficiently healed. If it still looks to be healing when the glue comes off you can clean the wound with soapy warm water daily in the shower. No need to bandage further, but you can put a bandage on the area daily for the two weeks if you would like.   If the glue comes off early before 2 weeks, apply vaseline and a bandage daily until the 2 weeks post-surgery date.  "Make sure to continue to clean the area daily before applying the vaseline and bandage.   If skin glue and sutures are still intact at 2 weeks after your procedure, you can start applying Vaseline daily to help soften up the skin glue. It will come off easier this way.        If you are able to take acetaminophen (\"Tylenol,\" etc.) and ibuprofen (\"Advil\" or \"Motrin,\" etc.), then you may STAGGER these medications by taking 400 mg of ibuprofen (usually two tabs) every 8 hours and 1,000 mg of acetaminophen (e.g., two tabs of extra-strength Tylenol) every 8 hours.    This means, for example, that you could take the followin,000 mg of Tylenol, followed 4 hours later by 400 mg Ibuprofen, followed 4 hours later with 1,000 mg of Tylenol, followed 4 hours later by 400 mg Ibuprofen, followed 4 hours later with 1,000 mg of Tylenol, and so forth.     Essentially, you can take either 1,000 mg of Tylenol or 400 mg of ibuprofen in alternating fashion EVERY FOUR HOURS.    Do NOT exceed more than 4,000 mg of Tylenol or 3,200 mg of ibuprofen per 24 hours. If you are not able to take Tylenol or ibuprofen as above due to other health issues (or a physician has told you directly that you are not allowed to take one of them, say due to pre-existing severe liver or kidney issues), then disregard the above directions.    Scientific evidence supports that this combination/schedule of pain medications is just as effective, if not more effective, than taking a narcotic pain medicine.       Follow up will be a 3 month scar evaluation either in person or via a telephone visit with you sending in a photo via [facility]. Unless you have been told to follow up sooner or if you have concerns and would like to be see sooner. Please call or send us in a [facility] message if possible and attach a photo.        What to expect:    The first couple of days your wound may be tender and may bleed slightly when doing wound care.  There may be swelling and " bruising around the wound, especially if it is near the eyes. For your comfort, you may apply ice or cold compresses to the bruises after your have removed the pressure bandage.  The area around your wound may be numb for several weeks or even months.  You may experience periodic sharp pain or mild itching around the wound as it heals.   The suture line will look dark for a while but will lighten over time.       When to call us:    You have bleeding that will not stop after applying pressure and ice.  You have pain that is not controlled with Tylenol (acetaminophen.)  You have signs or symptoms of an infection such as:  Fever over 100 degrees Fahrenheit  Redness, warmth or foul-smelling drainage from the wound  If you have any questions, or are not sure how to take care of the wound.    Phone numbers:    During business hours (M-F 8:00-4:30 p.m.)  [facility]-  [phone] Option 1 appt. Desk and ask for the Dermatology Surgery Team  [phone] Dermatology .     ---------------------------------------------------------  Evenings/Weekends/Holidays  Hospital - [phone]   TTY for hearing uunaguxu-[phone]  *Ask  to page dermatologist on-call  Emergency Yyzv-[phone]  TTY for hearing impaired- [phone]

## 2025-07-03 ENCOUNTER — OFFICE VISIT (OUTPATIENT)
Dept: INTERNAL MEDICINE | Facility: CLINIC | Age: 70
End: 2025-07-03
Payer: COMMERCIAL

## 2025-07-03 ENCOUNTER — TELEPHONE (OUTPATIENT)
Dept: DERMATOLOGY | Facility: CLINIC | Age: 70
End: 2025-07-03

## 2025-07-03 VITALS
SYSTOLIC BLOOD PRESSURE: 160 MMHG | OXYGEN SATURATION: 99 % | TEMPERATURE: 97.3 F | HEIGHT: 68 IN | HEART RATE: 58 BPM | DIASTOLIC BLOOD PRESSURE: 86 MMHG | WEIGHT: 197 LBS | BODY MASS INDEX: 29.86 KG/M2 | RESPIRATION RATE: 14 BRPM

## 2025-07-03 DIAGNOSIS — I42.8 NONISCHEMIC CARDIOMYOPATHY (H): ICD-10-CM

## 2025-07-03 DIAGNOSIS — Z87.19 HISTORY OF CHRONIC HEPATITIS: ICD-10-CM

## 2025-07-03 DIAGNOSIS — J44.9 COPD, MILD (H): ICD-10-CM

## 2025-07-03 DIAGNOSIS — Z51.81 ENCOUNTER FOR THERAPEUTIC DRUG MONITORING: ICD-10-CM

## 2025-07-03 DIAGNOSIS — K21.9 GASTROESOPHAGEAL REFLUX DISEASE WITHOUT ESOPHAGITIS: ICD-10-CM

## 2025-07-03 DIAGNOSIS — I48.0 PAROXYSMAL ATRIAL FIBRILLATION (H): ICD-10-CM

## 2025-07-03 DIAGNOSIS — I77.810 ASCENDING AORTA DILATION: ICD-10-CM

## 2025-07-03 DIAGNOSIS — C34.91 SQUAMOUS CELL LUNG CANCER, RIGHT (H): ICD-10-CM

## 2025-07-03 DIAGNOSIS — Z00.00 ENCOUNTER FOR MEDICARE ANNUAL WELLNESS EXAM: Primary | ICD-10-CM

## 2025-07-03 DIAGNOSIS — N52.9 ERECTILE DYSFUNCTION, UNSPECIFIED ERECTILE DYSFUNCTION TYPE: ICD-10-CM

## 2025-07-03 DIAGNOSIS — Z85.828 HISTORY OF BASAL CELL CARCINOMA: ICD-10-CM

## 2025-07-03 DIAGNOSIS — Z12.5 SCREENING FOR PROSTATE CANCER: ICD-10-CM

## 2025-07-03 DIAGNOSIS — Z72.0 TOBACCO ABUSE: ICD-10-CM

## 2025-07-03 DIAGNOSIS — I25.10 NONOBSTRUCTIVE ATHEROSCLEROSIS OF CORONARY ARTERY: ICD-10-CM

## 2025-07-03 DIAGNOSIS — I10 ESSENTIAL HYPERTENSION: ICD-10-CM

## 2025-07-03 DIAGNOSIS — Z86.0100 HISTORY OF COLON POLYPS: ICD-10-CM

## 2025-07-03 DIAGNOSIS — F41.1 ANXIETY, GENERALIZED: ICD-10-CM

## 2025-07-03 PROBLEM — D12.6 ADENOMATOUS POLYP OF COLON: Status: RESOLVED | Noted: 2025-05-14 | Resolved: 2025-07-03

## 2025-07-03 PROBLEM — S22.32XD CLOSED FRACTURE OF ONE RIB OF LEFT SIDE WITH ROUTINE HEALING: Status: RESOLVED | Noted: 2024-10-22 | Resolved: 2025-07-03

## 2025-07-03 PROBLEM — M77.02 MEDIAL EPICONDYLITIS OF ELBOW, LEFT: Status: RESOLVED | Noted: 2023-06-13 | Resolved: 2025-07-03

## 2025-07-03 LAB
ALBUMIN SERPL BCG-MCNC: 4.1 G/DL (ref 3.5–5.2)
ALBUMIN UR-MCNC: NEGATIVE MG/DL
ALP SERPL-CCNC: 94 U/L (ref 40–150)
ALT SERPL W P-5'-P-CCNC: 21 U/L (ref 0–70)
ANION GAP SERPL CALCULATED.3IONS-SCNC: 9 MMOL/L (ref 7–15)
APPEARANCE UR: CLEAR
AST SERPL W P-5'-P-CCNC: 26 U/L (ref 0–45)
BILIRUB SERPL-MCNC: 0.4 MG/DL
BILIRUB UR QL STRIP: ABNORMAL
BUN SERPL-MCNC: 14.1 MG/DL (ref 8–23)
CALCIUM SERPL-MCNC: 9.4 MG/DL (ref 8.8–10.4)
CHLORIDE SERPL-SCNC: 105 MMOL/L (ref 98–107)
CHOLEST SERPL-MCNC: 126 MG/DL
COLOR UR AUTO: YELLOW
CREAT SERPL-MCNC: 1.2 MG/DL (ref 0.67–1.17)
EGFRCR SERPLBLD CKD-EPI 2021: 65 ML/MIN/1.73M2
ERYTHROCYTE [DISTWIDTH] IN BLOOD BY AUTOMATED COUNT: 12.2 % (ref 10–15)
FASTING STATUS PATIENT QL REPORTED: YES
FASTING STATUS PATIENT QL REPORTED: YES
GLUCOSE SERPL-MCNC: 99 MG/DL (ref 70–99)
GLUCOSE UR STRIP-MCNC: NEGATIVE MG/DL
HCO3 SERPL-SCNC: 27 MMOL/L (ref 22–29)
HCT VFR BLD AUTO: 44.4 % (ref 40–53)
HDLC SERPL-MCNC: 48 MG/DL
HGB BLD-MCNC: 14.6 G/DL (ref 13.3–17.7)
HGB UR QL STRIP: NEGATIVE
KETONES UR STRIP-MCNC: NEGATIVE MG/DL
LDLC SERPL CALC-MCNC: 66 MG/DL
LEUKOCYTE ESTERASE UR QL STRIP: NEGATIVE
MCH RBC QN AUTO: 31.9 PG (ref 26.5–33)
MCHC RBC AUTO-ENTMCNC: 32.9 G/DL (ref 31.5–36.5)
MCV RBC AUTO: 97 FL (ref 78–100)
NITRATE UR QL: NEGATIVE
NONHDLC SERPL-MCNC: 78 MG/DL
PH UR STRIP: 5.5 [PH] (ref 5–8)
PLATELET # BLD AUTO: 135 10E3/UL (ref 150–450)
POTASSIUM SERPL-SCNC: 4.6 MMOL/L (ref 3.4–5.3)
PROT SERPL-MCNC: 6.8 G/DL (ref 6.4–8.3)
PSA SERPL DL<=0.01 NG/ML-MCNC: 1.03 NG/ML (ref 0–6.5)
RBC # BLD AUTO: 4.58 10E6/UL (ref 4.4–5.9)
SODIUM SERPL-SCNC: 141 MMOL/L (ref 135–145)
SP GR UR STRIP: 1.02 (ref 1–1.03)
T4 FREE SERPL-MCNC: 1.18 NG/DL (ref 0.9–1.7)
TRIGL SERPL-MCNC: 60 MG/DL
TSH SERPL DL<=0.005 MIU/L-ACNC: 4.25 UIU/ML (ref 0.3–4.2)
UROBILINOGEN UR STRIP-ACNC: 0.2 E.U./DL
WBC # BLD AUTO: 7.4 10E3/UL (ref 4–11)

## 2025-07-03 RX ORDER — OMEPRAZOLE 20 MG/1
20 CAPSULE, DELAYED RELEASE ORAL DAILY
Qty: 90 CAPSULE | Refills: 3 | Status: SHIPPED | OUTPATIENT
Start: 2025-07-03

## 2025-07-03 RX ORDER — SILDENAFIL 100 MG/1
100 TABLET, FILM COATED ORAL DAILY PRN
Qty: 30 TABLET | Refills: 11 | Status: SHIPPED | OUTPATIENT
Start: 2025-07-03

## 2025-07-03 RX ORDER — ROSUVASTATIN CALCIUM 10 MG/1
10 TABLET, COATED ORAL DAILY
Qty: 90 TABLET | Refills: 3 | Status: SHIPPED | OUTPATIENT
Start: 2025-07-03

## 2025-07-03 RX ORDER — LISINOPRIL 20 MG/1
20 TABLET ORAL DAILY
Qty: 90 TABLET | Refills: 3 | Status: SHIPPED | OUTPATIENT
Start: 2025-07-03

## 2025-07-03 SDOH — HEALTH STABILITY: PHYSICAL HEALTH: ON AVERAGE, HOW MANY DAYS PER WEEK DO YOU ENGAGE IN MODERATE TO STRENUOUS EXERCISE (LIKE A BRISK WALK)?: 0 DAYS

## 2025-07-03 ASSESSMENT — SOCIAL DETERMINANTS OF HEALTH (SDOH): HOW OFTEN DO YOU GET TOGETHER WITH FRIENDS OR RELATIVES?: PATIENT DECLINED

## 2025-07-03 NOTE — PROGRESS NOTES
Preventive Care Visit  [facility]  Sen Leon MD, Internal Medicine  Jul 3, 2025      Assessment & Plan     Encounter for Medicare annual wellness exam  Immunizations are reviewed and everything is up-to-date.  Recommending annual flu shot and COVID vaccination this fall.  Living will discussed.  Unfortunately still smoking cigarettes and we discussed the importance of complete cessation.  Discussed using alcohol in moderation.  Regular exercise and good diet habits to maintain a healthy weight discussed.  Up to date with colonoscopies and this should be repeated in 2030.  Prostate exam is deferred but I will check a PSA for prostate cancer screening.  Dementia and depression screening completed.  He is getting an annual eye exam completed.  Seeing a dentist every 6 months recommended.  Skin exam performed and recommending regular use of sunblock.  We discussed seeing a dermatologist every year.  Will screen for diabetes with fasting glucose.  Previous imaging negative for AAA.    Squamous cell lung cancer, right (H)  Enlarging right lower lobe nodule confirmed as early stage squamous cell lung cancer with robotic assisted resection during the winter 2020 22-23.  Followed by oncology.  Most recent CT scan in February 2025 showing no new nodules and stable previous nodules.  Imaging will be continued every 6 months.  Unfortunately still smoking as discussed below.    Paroxysmal atrial fibrillation (H)  Paroxysmal atrial fibrillation identified on cardiac monitor.  Continues Eliquis 5 mg twice daily for cardioembolic prophylaxis.  He is asymptomatic.    Essential hypertension  Blood pressure is not at goal.  We discussed the importance of sodium restriction and using alcohol only in moderation.  Will increase his dose of lisinopril to 20 mg daily and continue Toprol-XL 50 mg daily.  Will have him back in 2 months to reassess.  Checking appropriate labs.  - lisinopril (ZESTRIL) 20 MG  tablet; Take 1 tablet (20 mg) by mouth daily.  - UA Macroscopic with reflex to Microscopic and Culture - Lab Collect; Future  - TSH with free T4 reflex; Future    Nonischemic cardiomyopathy (H)  Echocardiogram demonstrating global hypokinesis with EF 45-50%.  Ventricular ectopy.  Continues lisinopril and metoprolol.  He is not requiring diuretics.  He appears euvolemic.  - Lipid Profile (Chol, Trig, HDL, LDL calc); Future    Nonobstructive atherosclerosis of coronary artery  Coronary angiogram showing mild to moderate disease following elevated coronary calcium score.  He continues on rosuvastatin.  Recheck a lipid profile  - rosuvastatin (CRESTOR) 10 MG tablet; Take 1 tablet (10 mg) by mouth daily.    COPD, mild (H)  Stable.  Smoking cessation needed.    Tobacco abuse  He is cut back on his cigarette use with Zyban but has not been able to quit and I am emphasizing the importance of complete cessation especially with his history of lung cancer.    Gastroesophageal reflux disease without esophagitis  Reflux symptoms very well-controlled with omeprazole  - omeprazole (PRILOSEC) 20 MG DR capsule; Take 1 capsule (20 mg) by mouth daily.    History of basal cell carcinoma  He is seeing a dermatologist every year    Anxiety, generalized  Using lorazepam intermittently    Ascending aorta dilation  No change in size of ascending thoracic aorta measuring 4.4 cm February 2025.  Asymptomatic.  Needing better control of blood pressure    History of chronic hepatitis  History of successful treatment of chronic hepatitis C.  Rechecking LFTs.  No hepatic lesions on recent imaging    History of colon polyps  Colonoscopy completed with adenomatous polyps removed May 2025 with recommendation return in 5 years    Erectile dysfunction, unspecified erectile dysfunction type  Refilling sildenafil with prescription provided  - sildenafil (VIAGRA) 100 MG tablet; Take 1 tablet (100 mg) by mouth daily as needed (ED).    Screening for  "prostate cancer  Annual PSA  - PSA, screen; Future    Encounter for therapeutic drug monitoring  Monitor CBC while on Eliquis, LFTs while on statin and renal function while taking lisinopril  - CBC with platelets; Future  - Comprehensive metabolic panel (BMP + Alb, Alk Phos, ALT, AST, Total. Bili, TP); Future    The longitudinal plan of care for the diagnosis(es)/condition(s) as documented were addressed during this visit. Due to the added complexity in care, I will continue to support Cesar in the subsequent management and with ongoing continuity of care.     Patient has been advised of split billing requirements and indicates understanding: Yes        Nicotine/Tobacco Cessation  He reports that he has been smoking cigarettes. He started smoking about 57 years ago. He has a 28.8 pack-year smoking history. He has been exposed to tobacco smoke. He has never used smokeless tobacco.  Nicotine/Tobacco Cessation Plan  Strategies to quit smoking discussed during today's visit as above      BMI  Estimated body mass index is 29.95 kg/m  as calculated from the following:    Height as of this encounter: 1.727 m (5' 8\").    Weight as of this encounter: 89.4 kg (197 lb).     Reviewed preventive health counseling, as reflected in patient instructions  Counseling  Appropriate preventive services were addressed with this patient via screening, questionnaire, or discussion as appropriate for fall prevention, nutrition, physical activity, Tobacco-use cessation, social engagement, weight loss and cognition.  Checklist reviewing preventive services available has been given to the patient.  Reviewed patient's diet, addressing concerns and/or questions.   The patient reports drinking more than 3 alcoholic drinks per day and/or more than 7 drhnks per week. The patient was counseled and given information about possible harmful effects of excessive alcohol intake.      Follow-up  Return in about 2 months (around 9/3/2025) for Follow-up " appointment.    [place]   Cesar is a 70 year old, presenting for the following: Here for his annual wellness visit and to follow-up multiple chronic medical problems as outlined in the assessment and plan  Annual Visit (Fasting  ; wants rx for viagra printed)        7/3/2025    10:26 AM   Additional Questions   Roomed by Agustin RUIZ            Advance Care Planning    Discussed advance care planning with patient; however, patient declined at this time.        7/3/2025   General Health   How would you rate your overall physical health? Good   Feel stress (tense, anxious, or unable to sleep) Only a little   (!) STRESS CONCERN      7/3/2025   Nutrition   Diet: Regular (no restrictions)         7/3/2025   Exercise   Days per week of moderate/strenous exercise 0 days   (!) EXERCISE CONCERN      7/3/2025   Social Factors   Frequency of gathering with friends or relatives Patient declined   Worry food won't last until get money to buy more No   Food not last or not have enough money for food? No   Do you have housing? (Housing is defined as stable permanent housing and does not include staying outside in a car, in a tent, in an abandoned building, in an overnight shelter, or couch-surfing.) Yes   Are you worried about losing your housing? No   Lack of transportation? No   Unable to get utilities (heat,electricity)? No         7/3/2025   Fall Risk   Fallen 2 or more times in the past year? No   Trouble with walking or balance? No          7/3/2025   Activities of Daily Living- Home Safety   Needs help with the following daily activites None of the above   Safety concerns in the home None of the above         7/3/2025   Dental   Dentist two times every year? Yes         7/3/2025   Hearing Screening   Hearing concerns? None of the above         7/3/2025   Driving Risk Screening   Patient/family members have concerns about driving No         7/3/2025   General Alertness/Fatigue Screening   Have you been more tired than usual  lately? No         7/3/2025   Urinary Incontinence Screening   Bothered by leaking urine in past 6 months No         Today's PHQ-2 Score:       7/3/2025    10:21 AM   PHQ-2 ( 1999 [employer])   Q1: Little interest or pleasure in doing things 0   Q2: Feeling down, depressed or hopeless 0   PHQ-2 Score 0    Q1: Little interest or pleasure in doing things Not at all   Q2: Feeling down, depressed or hopeless Not at all   PHQ-2 Score 0       Patient-reported           7/3/2025   Substance Use   If I could quit smoking, I would Neutral   I want to quit somking, worry about health affects Neutral   Willing to make a plan to quit smoking Neutral   Willing to cut down before quitting Somewhat disagree   Alcohol more than 3/day or more than 7/wk Yes   How often do you have a drink containing alcohol 4 or more times a week   How many alcohol drinks on typical day 3 or 4   How often do you have 5+ drinks at one occasion Never   Audit 2/3 Score 1   How often not able to stop drinking once started Never   How often failed to do what normally expected Never   How often needed first drink in am after a heavy drinking session Never   How often feeling of guilt or remorse after drinking Never   How often unable to remember what happened the night before Never   Have you or someone else been injured because of your drinking No   Has anyone been concerned or suggested you cut down on drinking No   TOTAL SCORE - AUDIT 5   Do you have a current opioid prescription? No   How severe/bad is pain from 1 to 10? 3/10   Do you use any other substances recreationally? (!) CANNABIS PRODUCTS     Social History     Tobacco Use    Smoking status: Every Day     Current packs/day: 0.50     Average packs/day: 0.5 packs/day for 57.5 years (28.8 ttl pk-yrs)     Types: Cigarettes     Start date: 1968     Passive exposure: Current    Smokeless tobacco: Never   Vaping Use    Vaping status: Never Used   Substance Use Topics    Alcohol use: Yes     Comment: 1-3  drinks daily    Drug use: Yes     Types: Marijuana     Comment: Drug use: Frequent use       ASCVD Risk   The ASCVD Risk score (Rossy HODGSON, et al., 2019) failed to calculate for the following reasons:    The valid total cholesterol range is 130 to 320 mg/dL            Reviewed and updated as needed this visit by Provider                    Past Medical History:   Diagnosis Date    Acute bilateral low back pain without sciatica 07/23/2018    Anxiety, generalized 11/17/2017    Ascending aorta dilation     4.2 cm on CT scan August 2017, follow annually, 4.1 cm August 2018, stable October 2019.  4.5 cm April 2022.  4.4 cm April 2023 recheck in 1 year    Bilateral foot pain 12/07/2023    Calcaneal fracture 2013    Chronic foot/heel pain    Chronic bilateral low back pain without sciatica 06/18/2024    Chronic cough 04/14/2022    Chronic fatigue 10/14/2019    Chronic hepatitis C (H)     Successfully treated.  Previously on interferon.  More recently SOFOSBUVIR and ribavirin, biopsy has been negative for cirrhosis, PCR -2015    Chronic insomnia     Chronic pain of right ankle 05/17/2018    Closed fracture of one rib of left side with routine healing 10/22/2024    COPD, mild (H) 02/28/2023    Erectile dysfunction     Essential hypertension     External hemorrhoids 2012    Gastroesophageal reflux disease without esophagitis 06/18/2024    History of alcohol abuse     History of basal cell carcinoma 07/03/2025    [facility] 2025      History of colon polyps     Colonoscopy April 2020 with polyps.  Repeat in 5 years    History of depression     2014, prescribed citalopram    Lower abdominal pain 07/23/2018    Medial epicondylitis of elbow, left 06/13/2023    Nonischemic cardiomyopathy (H) 02/28/2023    Echocardiogram demonstrating 40 to 50% ejection fraction    Nonobstructive atherosclerosis of coronary artery 02/28/2023    Coronary angiogram demonstrating mild to moderate multivessel disease.  January 2023    Osteoarthritis  of multiple joints     Chronic bilateral knee and chronic low back pain    Paroxysmal atrial fibrillation (H) 03/13/2023    Found on event monitor 4% burden longest run 8 hours March 2023    Porphyria cutanea tarda (H)     Pulmonary nodule     CT August 2018 3 mm right upper lobe nodule reported as stable, stable October 2019.  CT with 6 mm right lower lobe nodule April 2022 needing 6-month follow-up    Rectal bleeding 03/02/2020    Screening for AAA (abdominal aortic aneurysm)     CT 2018 without abdominal aneurysm    Shingles 05/13/2021    Slow transit constipation 02/28/2023    Squamous cell lung cancer, right (H)     status post robot assisted thoracoscopic right lower lobe wedge resection, completion lobectomy, and mediastinal lymph node dissection for a pT1bN0 (stage IA2) non small cell lung cancer.    Tobacco abuse 11/17/2017    Ventricular ectopy 06/13/2023    Frequent PVCs     Past Surgical History:   Procedure Laterality Date    ANKLE FRACTURE SURGERY      ARTHROSCOPY KNEE Bilateral     COLONOSCOPY      CV CORONARY ANGIOGRAM N/A 1/17/2023    Procedure: Coronary Angiogram;  Surgeon: Souleymane Ratliff MD;  Location:  [facility] CARDI[facility] CATH LAB    CV LEFT HEART CATH N/A 1/17/2023    Procedure: Left Heart Catheterization;  Surgeon: Souleymane Ratliff MD;  Location: [facility] CARDIAC CATH LAB    CV LEFT VENTRICULOGRAM N/A 1/17/2023    Procedure: Left Ventriculogram;  Surgeon: Souleymane Ratliff MD;  Location: [facility] CARDIAC CATH LAB    DAVINCI LOBECTOMY LUNG Right 1/18/2023    Procedure: Robot-assisted thoracoscopic right lower lobe wedge, right lower lobectomy, and mediastinal lymph node dissection;  Surgeon: Lobo Leger MD;  Location: [facility] OR    FOOT FRACTURE SURGERY  09/01/2013    calcaneal fracture    WRIST SURGERY      ligament injury     Current providers sharing in care for this patient include:  Patient Care Team:  Sen Leon MD as PCP - General  Sen Leon MD as Assigned  "PCP  Lobo Leger MD as MD (Cardiovascular & Thoracic Surgery)  Dakota De Dios MD as MD (Internal Medicine - Pediatrics)  Aubree Beasley LPN as LPN (Thoracic Surgery)  Neli Woodall NP as Nurse Practitioner (Cardiology)  Soco Gilman PA-C as Physician Assistant (Dermatology)  Gage Phelps MD as Assigned Sleep Provider  Arlen Mckenzie MD as MD (Dermatology)  Arlen Mckenzie MD as Assigned Dermatology Provider    The following health maintenance items are reviewed in Epic and correct as of today:  Health Maintenance   Topic Date Due    COPD ACTION PLAN  Never done    COVID-19 VACCINE (8 - 2024-25 season) 05/04/2025    BMP  06/18/2025    LIPID  06/18/2025    ANNUAL REVIEW OF HM ORDERS  06/18/2025    NICOTINE/TOBACCO CESSATION COUNSELING Q 1 YR  06/18/2025    MEDICARE ANNUAL WELLNESS VISIT  06/18/2025    INFLUENZA VACCINE (1) 09/01/2025    FALL RISK ASSESSMENT  07/03/2026    DIABETES SCREENING  06/18/2027    DTAP/TDAP/TD VACCINE (2 - Td or Tdap) 11/20/2028    ADVANCE CARE PLANNING  06/18/2029    COLORECTAL CANCER SCREENING  05/12/2030    SPIROMETRY  Completed    PHQ-2 (once per calendar year)  Completed    PNEUMOCOCCAL VACCINE 50+ YEARS  Completed    RSV VACCINE  Completed    AORTIC ANEURYSM SCREENING (SYSTEM ASSIGNED)  Completed    HPV VACCINE  Aged Out    MENINGITIS VACCINE  Aged Out    HEPATITIS C SCREENING  Discontinued    ZOSTER VACCINE  Discontinued    LUNG CANCER SCREENING  Discontinued         Review of Systems  Constitutional, HEENT, cardiovascular, pulmonary, GI, , musculoskeletal, neuro, skin, endocrine and psych systems are negative, except as otherwise noted.     Objective    Exam  BP (!) 162/99 (BP Location: Right arm, Patient Position: Sitting, Cuff Size: Adult Regular)   Pulse 58   Temp 97.3  F (36.3  C) (Temporal)   Resp 14   Ht 1.727 m (5' 8\")   Wt 89.4 kg (197 lb)   SpO2 99%   BMI 29.95 kg/m     Estimated body mass index is 29.95 kg/m  as calculated from " "the following:    Height as of this encounter: 1.727 m (5' 8\").    Weight as of this encounter: 89.4 kg (197 lb).    Physical Exam  EYES: Eyelids, conjunctiva, and sclera were normal. Pupils were normal. Cornea, iris, and lens were normal bilaterally.  HEAD, EARS, NOSE, MOUTH, AND THROAT: Head and face were normal. TMs and external auditory canals are normal.  Oropharynx normal  NECK: Neck appearance was normal. There were no neck masses and the thyroid was not enlarged and no nodules are felt.  No lymphadenopathy.  RESPIRATORY: Breathing pattern was normal and the chest moved symmetrically.   Lung sounds were normal and there were no rales or wheezes.  CARDIOVASCULAR: Heart rate and rhythm were normal.  S1 and S2 were normal and there were no extra sounds or murmurs. Peripheral pulses in arms and legs were normal.  There was no peripheral edema.  No carotid bruits.  GASTROINTESTINAL:  Bowel sounds were present.   Palpation detected no tenderness, mass, or enlarged organs.   RECTAL/PROSTATE: Deferred  MUSCULOSKELETAL: Skeletal configuration was normal and muscle mass was normal for age. Joint appearance was overall normal.  LYMPHATIC: There were no enlarged nodes.  SKIN/HAIR/NAILS: Skin color was normal.  There were no concerning skin lesions.  NEUROLOGIC: The patient was alert and oriented to person, place, time, and circumstance. Speech was normal. Cranial nerves were normal. Motor strength was normal for age. The patient was normally coordinated.  Sensation intact.  PSYCHIATRIC:  Mood and affect were normal and the patient had normal recent and remote memory. The patient's judgment and insight were normal.         7/3/2025   Mini Cog   Clock Draw Score 2 Normal   3 Item Recall 3 objects recalled   Mini Cog Total Score 5              Signed Electronically by: Sen Leon MD    "

## 2025-07-03 NOTE — TELEPHONE ENCOUNTER
Follow up call completed following excision procedure with Dr. Lowery.       Are you having pain? NO  Are you taking pain medication? none  Are you applying ice?  NO  Have you had any noticeable bleeding through the bandage? NO   Do you have any other concerns? NO     Has the patient been scheduled for a derm surg follow up (2 week nurse and/or 3 month with surgeon)? Pt declined.     Please call [phone] if you have any questions or concerns during business hours. For concerns after hours, call the hospital  to reach the dermatology resident on call at [phone], option 4.     Valerie RIZVI RN  Dermatology Surgery

## 2025-07-03 NOTE — PATIENT INSTRUCTIONS
Patient Education   Preventive Care Advice   This is general advice given by our system to help you stay healthy. However, your care team may have specific advice just for you. Please talk to your care team about your preventive care needs.  Nutrition  Eat 5 or more servings of fruits and vegetables each day.  Try wheat bread, brown rice and whole grain pasta (instead of white bread, rice, and pasta).  Get enough calcium and vitamin D. Check the label on foods and aim for 100% of the RDA (recommended daily allowance).  Lifestyle  Exercise at least 150 minutes each week  (30 minutes a day, 5 days a week).  Do muscle strengthening activities 2 days a week. These help control your weight and prevent disease.  No smoking.  Wear sunscreen to prevent skin cancer.  Continue to see your dermatologist every year  Have a dental exam and cleaning every 6 months.  Annual eye exam  Yearly exams  See your health care team every year to talk about:  Any changes in your health.  Any medicines your care team has prescribed.  Preventive care, family planning, and ways to prevent chronic diseases.  Shots (vaccines)   COVID-19 shot: Recommended this fall if available  Flu shot: Get a flu shot every year.  Tetanus shot: Get a tetanus shot every 10 years.  Pneumococcal and RSV vaccines completed  Shingles shot (for age 50 and up).  Completed  General health tests  Diabetes screening:  Cholesterol test: Annually  Hepatitis C: Get tested at least once in your life.  Cancer screening tests  Colon cancer screening: It is important to start screening for colon cancer at age 45.  Colonoscopy every 5 years  Prostate cancer screening test: Annual PSA  Lung cancer screening: Annual CT  For informational purposes only. Not to replace the advice of your health care provider. Copyright   2023 [employer]. All rights reserved. Clinically reviewed by the [facility]. [employer] [id number] - REV 01/24.  9 Ways to Cut  "Back on Drinking  Maybe you've found yourself drinking more alcohol than you'd prefer. If you want to cut back, here are some ideas to try.    Think before you drink.  Do you really want a drink, or is it just a habit? If you're used to having a drink at a certain time, try doing something else then.     Look for substitutes.  Find some no-alcohol drinks that you enjoy, like flavored seltzer water, tea with honey, or tonic with a slice of lime. Or try alcohol-free beer or \"virgin\" cocktails (without the alcohol).     Drink more water.  Use water to quench your thirst. Drink a glass of water before you have any alcohol. Have another glass along with every drink or between drinks.     Shrink your drink.  For example, have a bottle of beer instead of a pint. Use a smaller glass for wine. Choose drinks with lower alcohol content (ABV%). Or use less liquor and more mixer in cocktails.     Slow down.  It's easy to drink quickly and without thinking about it. Pay attention, and make each drink last longer.     Do the math.  Total up how much you spend on alcohol each month. How much is that a year? If you cut back, what could you do with the money you save?     Take a break.  Choose a day or two each week when you won't drink at all. Notice how you feel on those days, physically and emotionally. How did you sleep? Do you feel better? Over time, add more break days.     Count calories.  Would you like to lose some weight? For some people that's a good motivator for cutting back. Figure out how many calories are in each drink. How many does that add up to in a day? In a week? In a month?     Practice saying no.  Be ready when someone offers you a drink. Try: \"Thanks, I've had enough.\" Or \"Thanks, but I'm cutting back.\" Or \"No, thanks. I feel better when I drink less.\"   Current as of: August 20, 2024  Content Version: 14.5    6856-0832 [email or web address]   Care instructions adapted under license by your healthcare " "professional. If you have questions about a medical condition or this instruction, always ask your healthcare professional. [employer] disclaims any warranty or liability for your use of this information.  Eating Healthy Foods: Care Instructions  With every meal, you can make healthy food choices. Try to eat a variety of fruits, vegetables, whole grains, lean proteins, and low-fat dairy products. This can help you get the right balance of nutrients, including vitamins and minerals. Small changes add up over time. You can start by adding one healthy food to your meals each day.    Try to make half your plate fruits and vegetables, one-fourth whole grains, and one-fourth lean proteins. Try including dairy with your meals.   Eat more fruits and vegetables. Try to have them with most meals and snacks.   Foods for healthy eating        Fruits   These can be fresh, frozen, canned, or dried.  Try to choose whole fruit rather than fruit juice.  Eat a variety of colors.        Vegetables   These can be fresh, frozen, canned, or dried.  Beans, peas, and lentils count too.        Whole grains   Choose whole-grain breads, cereals, and noodles.  Try brown rice.        Lean proteins   These can include lean meat, poultry, fish, and eggs.  You can also have tofu, beans, peas, lentils, nuts, and seeds.        Dairy   Try milk, yogurt, and cheese.  Choose low-fat or fat-free when you can.  If you need to, use lactose-free milk or fortified plant-based milk products, such as soy milk.        Water   Drink water when you're thirsty.  Limit sugar-sweetened drinks, including soda, fruit drinks, and sports drinks.  Where can you learn more?  Go to https://www.[employer].net/patiented  Enter T756 in the search box to learn more about \"Eating Healthy Foods: Care Instructions.\"  Current as of: October 7, 2024  Content Version: 14.5 2024-2025 [employer].   Care instructions adapted under license by your healthcare " "professional. If you have questions about a medical condition or this instruction, always ask your healthcare professional. [employer] disclaims any warranty or liability for your use of this information.    Learning About Being Physically Active  What is physical activity?     Being physically active means doing any kind of activity that gets your body moving.  The types of physical activity that can help you get fit and stay healthy include:  Aerobic or \"cardio\" activities. These make your heart beat faster and make you breathe harder, such as brisk walking, riding a bike, or running. They strengthen your heart and lungs and build up your endurance.  Strength training activities. These make your muscles work against, or \"resist,\" something. Examples include lifting weights or doing push-ups. These activities help tone and strengthen your muscles and bones.  Stretches. These let you move your joints and muscles through their full range of motion. Stretching helps you be more flexible.  Reaching a balance between these three types of physical activity is important because each one contributes to your overall fitness.  What are the benefits of being active?  Being active is one of the best things you can do for your health. It helps you to:  Feel stronger and have more energy to do all the things you like to do.  Focus better at school or work.  Feel, think, and sleep better.  Reach and stay at a healthy weight.  Lose fat and build lean muscle.  Lower your risk for serious health problems, including diabetes, heart attack, high blood pressure, and some cancers.  Keep your heart, lungs, bones, muscles, and joints strong and healthy.  How can you make being active part of your life?  Start slowly. Make it your long-term goal to get at least 30 minutes of exercise on most days of the week. Walking is a good choice. You also may want to do other activities, such as running, swimming, cycling, or playing " "tennis or team sports.  Pick activities that you like--ones that make your heart beat faster, your muscles stronger, and your muscles and joints more flexible. If you find more than one thing you like doing, do them all. You don't have to do the same thing every day.  Get your heart pumping every day. Any activity that makes your heart beat faster and keeps it at that rate for a while counts.  Here are some great ways to get your heart beating faster:  Go for a brisk walk, run, or hike.  Go for a swim or bike ride.  Take an online exercise class or dance.  Play a game of touch football, basketball, or soccer.  Play tennis, pickleball, or racquetball.  Climb stairs.  Even some household chores can be aerobic. Just do them at a faster pace. Raking or mowing the lawn, sweeping the garage, and vacuuming and cleaning your home all can help get your heart rate up.  Strengthen your muscles during the week. You don't have to lift heavy weights or grow big, bulky muscles to get stronger. Doing a few simple activities that make your muscles work against, or \"resist,\" something can help you get stronger. Aim for at least twice a week.  For example, you can:  Do push-ups or sit-ups, which use your own body weight as resistance.  Lift weights or dumbbells or use stretch bands at home or in a gym or community center.  Stretch your muscles often. Stretching will help you as you become more active. It can help you stay flexible and loosen tight muscles. It can also help improve your balance and posture and can be a great way to relax.  Be sure to stretch the muscles you'll be using when you work out. It's best to warm your muscles slightly before you stretch them. Walk or do some other light aerobic activity for a few minutes. Then start stretching.  When you stretch your muscles:  Do it slowly. Stretching is not about going fast or making sudden movements.  Don't push or bounce during a stretch.  Hold each stretch for at least 15 " "to 30 seconds, if you can. You should feel a stretch in the muscle, but not pain.  Breathe out as you do the stretch. Then breathe in as you hold the stretch. Don't hold your breath.  If you're worried about how more activity might affect your health, have a checkup before you start. Follow any special advice your doctor gives you for getting a smart start.  Where can you learn more?  Go to https://www.[employer].net/patiented  Enter W332 in the search box to learn more about \"Learning About Being Physically Active.\"  Current as of: July 31, 2024  Content Version: 14.5    4160-8835 [employer].   Care instructions adapted under license by your healthcare professional. If you have questions about a medical condition or this instruction, always ask your healthcare professional. [employer] disclaims any warranty or liability for your use of this information.       "

## 2025-07-07 ENCOUNTER — RESULTS FOLLOW-UP (OUTPATIENT)
Dept: INTERNAL MEDICINE | Facility: CLINIC | Age: 70
End: 2025-07-07
Payer: COMMERCIAL

## 2025-08-20 ENCOUNTER — ANCILLARY PROCEDURE (OUTPATIENT)
Dept: CT IMAGING | Facility: CLINIC | Age: 70
End: 2025-08-20
Attending: CLINICAL NURSE SPECIALIST
Payer: COMMERCIAL

## 2025-08-20 ENCOUNTER — ONCOLOGY VISIT (OUTPATIENT)
Dept: SURGERY | Facility: CLINIC | Age: 70
End: 2025-08-20
Attending: CLINICAL NURSE SPECIALIST
Payer: COMMERCIAL

## 2025-08-20 VITALS
SYSTOLIC BLOOD PRESSURE: 169 MMHG | DIASTOLIC BLOOD PRESSURE: 105 MMHG | WEIGHT: 194.4 LBS | HEART RATE: 58 BPM | OXYGEN SATURATION: 98 % | RESPIRATION RATE: 16 BRPM | TEMPERATURE: 98.6 F | BODY MASS INDEX: 29.56 KG/M2

## 2025-08-20 DIAGNOSIS — C34.31 MALIGNANT NEOPLASM OF LOWER LOBE OF RIGHT LUNG (H): Primary | ICD-10-CM

## 2025-08-20 DIAGNOSIS — C34.31 MALIGNANT NEOPLASM OF LOWER LOBE OF RIGHT LUNG (H): ICD-10-CM

## 2025-08-20 LAB
CREAT BLD-MCNC: 1 MG/DL (ref 0.7–1.2)
EGFRCR SERPLBLD CKD-EPI 2021: >60 ML/MIN/1.73M2

## 2025-08-20 PROCEDURE — 99213 OFFICE O/P EST LOW 20 MIN: CPT | Performed by: CLINICAL NURSE SPECIALIST

## 2025-08-20 PROCEDURE — G0463 HOSPITAL OUTPT CLINIC VISIT: HCPCS | Performed by: CLINICAL NURSE SPECIALIST

## 2025-08-20 PROCEDURE — 71260 CT THORAX DX C+: CPT | Performed by: RADIOLOGY

## 2025-08-20 RX ORDER — IOPAMIDOL 755 MG/ML
95 INJECTION, SOLUTION INTRAVASCULAR ONCE
Status: COMPLETED | OUTPATIENT
Start: 2025-08-20 | End: 2025-08-20

## 2025-08-20 RX ADMIN — IOPAMIDOL 95 ML: 755 INJECTION, SOLUTION INTRAVASCULAR at 13:45

## 2025-08-20 ASSESSMENT — PAIN SCALES - GENERAL: PAINLEVEL_OUTOF10: NO PAIN (0)

## (undated) DEVICE — STPL DAVINCI SUREFORM 45MM STR TIPRELOAD 12FIRE 480445

## (undated) DEVICE — SU MONOCRYL 4-0 PS-2 27" UND Y426H

## (undated) DEVICE — SUCTION DRY CHEST DRAIN OASIS 3600-100

## (undated) DEVICE — ESU ELEC BLADE 2.75" COATED/INSULATED E1455

## (undated) DEVICE — TUBING SUCTION MEDI-VAC SOFT 3/16"X20' N520A

## (undated) DEVICE — DECANTER VIAL 2006S

## (undated) DEVICE — CATH ANGIO INFINITI JL4 4FRX100CM 538420

## (undated) DEVICE — DRSG STERI STRIP 1/2X4" R1547

## (undated) DEVICE — DAVINCI XI ENDOWRIST STAPLER RELOAD 30MM GREEN 48630G

## (undated) DEVICE — KIT HAND CONTROL ANGIOTOUCH ACIST 65CM AT-P65

## (undated) DEVICE — DAVINCI XI OBTURATOR BLADELESS 8MM 470359

## (undated) DEVICE — DAVINCI XI DRAPE ARM 470015

## (undated) DEVICE — GLOVE BIOGEL PI MICRO SZ 7.5 48575

## (undated) DEVICE — TUBING CONMED AIRSEAL SMOKE EVAC INSUFFLATION ASM-EVAC

## (undated) DEVICE — LINEN TOWEL PACK X6 WHITE 5487

## (undated) DEVICE — PREP CHLORAPREP 26ML TINTED HI-LITE ORANGE 930815

## (undated) DEVICE — ESU PENCIL W/COATED BLADE E2450H

## (undated) DEVICE — ENDO DISSECTOR KITTNER CIGARETTE ROLL4"X4" 15505/25

## (undated) DEVICE — DRSG PRIMAPORE 02X3" 7133

## (undated) DEVICE — TOTE ANGIO CORP PC15AT SAN32CC83O

## (undated) DEVICE — DAVINCI XI REDUCER 8-12MM 470381

## (undated) DEVICE — SU VICRYL 3-0 SH 27" UND J416H

## (undated) DEVICE — SU VICRYL 0 CT-1 36" J346H

## (undated) DEVICE — SUCTION MANIFOLD NEPTUNE 2 SYS 4 PORT 0702-020-000

## (undated) DEVICE — Device

## (undated) DEVICE — STPL DAVINCI SUREFORM 45MM RELOAD BLUE 48345B

## (undated) DEVICE — DRAPE IOBAN INCISE 23X17" 6650EZ

## (undated) DEVICE — DAVINCI XI RETR ENDOWRIST SMALL GRAPTOR 470318

## (undated) DEVICE — INTRO SHEATH 4FRX10CM PINNACLE RSS402

## (undated) DEVICE — ENDO VALVE SUCTION BRONCH EVIS MAJ-209

## (undated) DEVICE — NDL PERC ENTRY THINWALL 18GA 7.0" G00166

## (undated) DEVICE — MANIFOLD KIT ANGIO AUTOMATED 014613

## (undated) DEVICE — SU SILK 0 SH 30" K834H

## (undated) DEVICE — DAVINCI XI SEAL UNIVERSAL 5-8MM 470361

## (undated) DEVICE — CATH ANGIO INFINITI 3DRC 4FRX100CM 538476

## (undated) DEVICE — TUBING SUCTION 10'X3/16" N510

## (undated) DEVICE — SYSTEM CLEARIFY VISUALIZATION 21-345

## (undated) DEVICE — ENDO TROCAR CONMED AIRSEAL BLADELESS 12X120MM IAS12-120LP

## (undated) DEVICE — SYR 30ML SLIP TIP W/O NDL 302833

## (undated) DEVICE — DAVINCI XI GRASPER FENESTRATED TIP UP 8MM 470347

## (undated) DEVICE — SYR 30ML LL W/O NDL 302832

## (undated) DEVICE — DAVINCI XI DRAPE COLUMN 470341

## (undated) DEVICE — ENDO POUCH UNIV RETRIEVAL SYSTEM INZII 10MM CD001

## (undated) DEVICE — KIT ENDO FIRST STEP DISINFECTANT 200ML W/POUCH EP-4

## (undated) DEVICE — SU VICRYL 2-0 SH 27" UND J417H

## (undated) DEVICE — ESU GROUND PAD ADULT W/CORD E7507

## (undated) DEVICE — DAVINCI XI FCP CADIERE 8MM ENDOWRIST 471049

## (undated) DEVICE — DAVINCI XI ENDOWRIST STAPLER RELOAD 30MM BLUE 48630B

## (undated) DEVICE — SURGICEL ABSORBABLE HEMOSTAT SNOW 4"X4" 2083

## (undated) DEVICE — DRAPE SHEET MED 44X70" 9355

## (undated) DEVICE — DRAIN CHEST TUBE 28FR STR 8028

## (undated) DEVICE — POUCH TISSUE RETRIEVAL 15MM 6.00" INTRO TRS190SB2

## (undated) DEVICE — VESSEL LOOPS YELLOW MAXI 31145694

## (undated) DEVICE — DAVINCI XI ESU BIPOLAR LONG 78DEG ANG ENDOWRIST EXT 471400

## (undated) DEVICE — SU SILK 0 TIE 6X30" A306H

## (undated) DEVICE — CATH DIAG 4FR ANG PIG 538453S

## (undated) DEVICE — SU VICRYL 0 UR-6 27" J603H

## (undated) DEVICE — LINEN TOWEL PACK X5 5464

## (undated) DEVICE — DAVINCI ENDOWRIST STAPLER 45 SHEATH 410370

## (undated) DEVICE — DEFIB PRO-PADZ LVP LQD GEL ADULT 8900-2105-01

## (undated) DEVICE — DAVINCI XI ENDOWRIST STAPLER RELOAD 30MM WHITE 48630W

## (undated) DEVICE — ENDO VALVE BX EVIS MAJ-210

## (undated) DEVICE — LUBRICANT INST KIT ENDO-LUBE 220-90

## (undated) DEVICE — SOL ADH LIQUID BENZOIN SWAB 0.6ML C1544

## (undated) DEVICE — STPL DAVINCI SUREFORM 45MM RELOAD GREEN 48345G

## (undated) DEVICE — DRAPE MAYO STAND 23X54 8337

## (undated) RX ORDER — ACETAMINOPHEN 325 MG/1
TABLET ORAL
Status: DISPENSED
Start: 2023-01-18

## (undated) RX ORDER — LIDOCAINE HYDROCHLORIDE 10 MG/ML
INJECTION, SOLUTION EPIDURAL; INFILTRATION; INTRACAUDAL; PERINEURAL
Status: DISPENSED
Start: 2023-01-17

## (undated) RX ORDER — ENOXAPARIN SODIUM 100 MG/ML
INJECTION SUBCUTANEOUS
Status: DISPENSED
Start: 2023-01-18

## (undated) RX ORDER — BUPIVACAINE HYDROCHLORIDE AND EPINEPHRINE 5; 5 MG/ML; UG/ML
INJECTION, SOLUTION PERINEURAL
Status: DISPENSED
Start: 2023-01-13

## (undated) RX ORDER — ALBUTEROL SULFATE 0.83 MG/ML
SOLUTION RESPIRATORY (INHALATION)
Status: DISPENSED
Start: 2022-12-05

## (undated) RX ORDER — HYDROMORPHONE HYDROCHLORIDE 1 MG/ML
INJECTION, SOLUTION INTRAMUSCULAR; INTRAVENOUS; SUBCUTANEOUS
Status: DISPENSED
Start: 2023-01-18

## (undated) RX ORDER — FENTANYL CITRATE 50 UG/ML
INJECTION, SOLUTION INTRAMUSCULAR; INTRAVENOUS
Status: DISPENSED
Start: 2023-01-17

## (undated) RX ORDER — METOPROLOL TARTRATE 1 MG/ML
INJECTION, SOLUTION INTRAVENOUS
Status: DISPENSED
Start: 2023-01-10

## (undated) RX ORDER — CELECOXIB 200 MG/1
CAPSULE ORAL
Status: DISPENSED
Start: 2023-01-18

## (undated) RX ORDER — CEFAZOLIN SODIUM/WATER 2 G/20 ML
SYRINGE (ML) INTRAVENOUS
Status: DISPENSED
Start: 2023-01-18

## (undated) RX ORDER — DOBUTAMINE HYDROCHLORIDE 200 MG/100ML
INJECTION INTRAVENOUS
Status: DISPENSED
Start: 2023-01-10

## (undated) RX ORDER — BUPIVACAINE HYDROCHLORIDE 2.5 MG/ML
INJECTION, SOLUTION EPIDURAL; INFILTRATION; INTRACAUDAL
Status: DISPENSED
Start: 2023-01-18

## (undated) RX ORDER — FENTANYL CITRATE 50 UG/ML
INJECTION, SOLUTION INTRAMUSCULAR; INTRAVENOUS
Status: DISPENSED
Start: 2023-01-18

## (undated) RX ORDER — REGADENOSON 0.08 MG/ML
INJECTION, SOLUTION INTRAVENOUS
Status: DISPENSED
Start: 2023-01-13

## (undated) RX ORDER — ATROPINE SULFATE 0.4 MG/ML
AMPUL (ML) INJECTION
Status: DISPENSED
Start: 2023-01-10

## (undated) RX ORDER — LABETALOL HYDROCHLORIDE 5 MG/ML
INJECTION, SOLUTION INTRAVENOUS
Status: DISPENSED
Start: 2023-01-18

## (undated) RX ORDER — ONDANSETRON 2 MG/ML
INJECTION INTRAMUSCULAR; INTRAVENOUS
Status: DISPENSED
Start: 2023-01-18

## (undated) RX ORDER — HEPARIN SODIUM 1000 [USP'U]/ML
INJECTION, SOLUTION INTRAVENOUS; SUBCUTANEOUS
Status: DISPENSED
Start: 2023-01-17

## (undated) RX ORDER — HEPARIN SODIUM 200 [USP'U]/100ML
INJECTION, SOLUTION INTRAVENOUS
Status: DISPENSED
Start: 2023-01-17

## (undated) RX ORDER — ESMOLOL HYDROCHLORIDE 10 MG/ML
INJECTION INTRAVENOUS
Status: DISPENSED
Start: 2023-01-18

## (undated) RX ORDER — EPHEDRINE SULFATE 50 MG/ML
INJECTION, SOLUTION INTRAMUSCULAR; INTRAVENOUS; SUBCUTANEOUS
Status: DISPENSED
Start: 2023-01-18